# Patient Record
Sex: FEMALE | Race: BLACK OR AFRICAN AMERICAN | Employment: UNEMPLOYED | URBAN - METROPOLITAN AREA
[De-identification: names, ages, dates, MRNs, and addresses within clinical notes are randomized per-mention and may not be internally consistent; named-entity substitution may affect disease eponyms.]

---

## 2023-07-27 ENCOUNTER — HOSPITAL ENCOUNTER (INPATIENT)
Facility: HOSPITAL | Age: 53
LOS: 6 days | Discharge: HOME | DRG: 065 | End: 2023-08-02
Attending: EMERGENCY MEDICINE | Admitting: INTERNAL MEDICINE

## 2023-07-27 ENCOUNTER — APPOINTMENT (OUTPATIENT)
Facility: HOSPITAL | Age: 53
DRG: 065 | End: 2023-07-27

## 2023-07-27 DIAGNOSIS — E11.69 TYPE 2 DIABETES MELLITUS WITH OTHER SPECIFIED COMPLICATION, UNSPECIFIED WHETHER LONG TERM INSULIN USE (HCC): ICD-10-CM

## 2023-07-27 DIAGNOSIS — I63.9 CEREBROVASCULAR ACCIDENT (CVA), UNSPECIFIED MECHANISM (HCC): Primary | ICD-10-CM

## 2023-07-27 DIAGNOSIS — R77.8 ELEVATED TROPONIN: ICD-10-CM

## 2023-07-27 PROBLEM — E11.00 HYPEROSMOLAR HYPERGLYCEMIC STATE (HHS) (HCC): Status: ACTIVE | Noted: 2023-07-27

## 2023-07-27 LAB
ALBUMIN SERPL-MCNC: 3.6 G/DL (ref 3.5–5)
ALBUMIN/GLOB SERPL: 0.6 (ref 1.1–2.2)
ALP SERPL-CCNC: 170 U/L (ref 45–117)
ALT SERPL-CCNC: 27 U/L (ref 12–78)
ANION GAP SERPL CALC-SCNC: 11 MMOL/L (ref 5–15)
AST SERPL W P-5'-P-CCNC: 38 U/L (ref 15–37)
BASE EXCESS BLD CALC-SCNC: 2.4 MMOL/L
BASOPHILS # BLD: 0.1 K/UL (ref 0–0.1)
BASOPHILS NFR BLD: 0 % (ref 0–1)
BILIRUB SERPL-MCNC: 0.6 MG/DL (ref 0.2–1)
BUN SERPL-MCNC: 52 MG/DL (ref 6–20)
BUN/CREAT SERPL: 20 (ref 12–20)
CA-I BLD-MCNC: 1.11 MMOL/L (ref 1.12–1.32)
CA-I BLD-MCNC: 9.5 MG/DL (ref 8.5–10.1)
CHLORIDE BLD-SCNC: 93 MMOL/L (ref 98–107)
CHLORIDE SERPL-SCNC: 90 MMOL/L (ref 97–108)
CO2 BLD-SCNC: 27 MMOL/L
CO2 SERPL-SCNC: 25 MMOL/L (ref 21–32)
CREAT SERPL-MCNC: 2.61 MG/DL (ref 0.55–1.02)
CREAT UR-MCNC: 2.28 MG/DL (ref 0.6–1.3)
DIFFERENTIAL METHOD BLD: ABNORMAL
EOSINOPHIL # BLD: 0 K/UL (ref 0–0.4)
EOSINOPHIL NFR BLD: 0 % (ref 0–7)
ERYTHROCYTE [DISTWIDTH] IN BLOOD BY AUTOMATED COUNT: 11.8 % (ref 11.5–14.5)
GLOBULIN SER CALC-MCNC: 5.9 G/DL (ref 2–4)
GLUCOSE BLD STRIP.AUTO-MCNC: 351 MG/DL (ref 65–100)
GLUCOSE BLD STRIP.AUTO-MCNC: 443 MG/DL (ref 65–100)
GLUCOSE BLD STRIP.AUTO-MCNC: 554 MG/DL (ref 65–100)
GLUCOSE SERPL-MCNC: 556 MG/DL (ref 65–100)
HCO3 BLD-SCNC: 26.9 MMOL/L (ref 19–28)
HCT VFR BLD AUTO: 40.5 % (ref 35–47)
HGB BLD-MCNC: 13.8 G/DL (ref 11.5–16)
IMM GRANULOCYTES # BLD AUTO: 0.1 K/UL (ref 0–0.04)
IMM GRANULOCYTES NFR BLD AUTO: 0 % (ref 0–0.5)
LACTATE BLD-SCNC: 2.2 MMOL/L (ref 0.4–2)
LACTATE SERPL-SCNC: 1.5 MMOL/L (ref 0.4–2)
LYMPHOCYTES # BLD: 1.3 K/UL (ref 0.8–3.5)
LYMPHOCYTES NFR BLD: 12 % (ref 12–49)
MCH RBC QN AUTO: 29.4 PG (ref 26–34)
MCHC RBC AUTO-ENTMCNC: 34.1 G/DL (ref 30–36.5)
MCV RBC AUTO: 86.2 FL (ref 80–99)
MONOCYTES # BLD: 0.8 K/UL (ref 0–1)
MONOCYTES NFR BLD: 7 % (ref 5–13)
NEUTS SEG # BLD: 9.2 K/UL (ref 1.8–8)
NEUTS SEG NFR BLD: 81 % (ref 32–75)
NRBC # BLD: 0 K/UL (ref 0–0.01)
NRBC BLD-RTO: 0 PER 100 WBC
PCO2 BLD: 40.2 MMHG (ref 35–45)
PERFORMED BY:: ABNORMAL
PH BLD: 7.43 (ref 7.35–7.45)
PLATELET # BLD AUTO: 253 K/UL (ref 150–400)
PMV BLD AUTO: 12.6 FL (ref 8.9–12.9)
PO2 BLD: 37 MMHG (ref 75–100)
POTASSIUM BLD-SCNC: 4.7 MMOL/L (ref 3.5–5.5)
POTASSIUM SERPL-SCNC: 4.4 MMOL/L (ref 3.5–5.1)
PROT SERPL-MCNC: 9.5 G/DL (ref 6.4–8.2)
RBC # BLD AUTO: 4.7 M/UL (ref 3.8–5.2)
SAO2 % BLD: 72 %
SERVICE CMNT-IMP: ABNORMAL
SODIUM BLD-SCNC: 128 MMOL/L (ref 136–145)
SODIUM SERPL-SCNC: 126 MMOL/L (ref 136–145)
SPECIMEN SITE: ABNORMAL
TROPONIN I SERPL HS-MCNC: 167 NG/L (ref 0–51)
TROPONIN I SERPL HS-MCNC: 178 NG/L (ref 0–51)
WBC # BLD AUTO: 11.5 K/UL (ref 3.6–11)

## 2023-07-27 PROCEDURE — 6360000002 HC RX W HCPCS: Performed by: EMERGENCY MEDICINE

## 2023-07-27 PROCEDURE — 36415 COLL VENOUS BLD VENIPUNCTURE: CPT

## 2023-07-27 PROCEDURE — 82803 BLOOD GASES ANY COMBINATION: CPT

## 2023-07-27 PROCEDURE — 83735 ASSAY OF MAGNESIUM: CPT

## 2023-07-27 PROCEDURE — 99285 EMERGENCY DEPT VISIT HI MDM: CPT

## 2023-07-27 PROCEDURE — 96365 THER/PROPH/DIAG IV INF INIT: CPT

## 2023-07-27 PROCEDURE — 83605 ASSAY OF LACTIC ACID: CPT

## 2023-07-27 PROCEDURE — 2580000003 HC RX 258: Performed by: INTERNAL MEDICINE

## 2023-07-27 PROCEDURE — 84484 ASSAY OF TROPONIN QUANT: CPT

## 2023-07-27 PROCEDURE — 85025 COMPLETE CBC W/AUTO DIFF WBC: CPT

## 2023-07-27 PROCEDURE — 6370000000 HC RX 637 (ALT 250 FOR IP): Performed by: EMERGENCY MEDICINE

## 2023-07-27 PROCEDURE — 1100000000 HC RM PRIVATE

## 2023-07-27 PROCEDURE — 84132 ASSAY OF SERUM POTASSIUM: CPT

## 2023-07-27 PROCEDURE — 93005 ELECTROCARDIOGRAM TRACING: CPT | Performed by: EMERGENCY MEDICINE

## 2023-07-27 PROCEDURE — 84295 ASSAY OF SERUM SODIUM: CPT

## 2023-07-27 PROCEDURE — 82330 ASSAY OF CALCIUM: CPT

## 2023-07-27 PROCEDURE — 6370000000 HC RX 637 (ALT 250 FOR IP): Performed by: INTERNAL MEDICINE

## 2023-07-27 PROCEDURE — 2580000003 HC RX 258: Performed by: EMERGENCY MEDICINE

## 2023-07-27 PROCEDURE — 96375 TX/PRO/DX INJ NEW DRUG ADDON: CPT

## 2023-07-27 PROCEDURE — 82947 ASSAY GLUCOSE BLOOD QUANT: CPT

## 2023-07-27 PROCEDURE — 82962 GLUCOSE BLOOD TEST: CPT

## 2023-07-27 PROCEDURE — 83036 HEMOGLOBIN GLYCOSYLATED A1C: CPT

## 2023-07-27 PROCEDURE — 6360000002 HC RX W HCPCS: Performed by: INTERNAL MEDICINE

## 2023-07-27 PROCEDURE — 70450 CT HEAD/BRAIN W/O DYE: CPT

## 2023-07-27 PROCEDURE — 84100 ASSAY OF PHOSPHORUS: CPT

## 2023-07-27 PROCEDURE — 80053 COMPREHEN METABOLIC PANEL: CPT

## 2023-07-27 RX ORDER — DEXTROSE MONOHYDRATE, SODIUM CHLORIDE, AND POTASSIUM CHLORIDE 50; 1.49; 4.5 G/1000ML; G/1000ML; G/1000ML
INJECTION, SOLUTION INTRAVENOUS CONTINUOUS PRN
Status: DISCONTINUED | OUTPATIENT
Start: 2023-07-27 | End: 2023-08-01

## 2023-07-27 RX ORDER — POLYETHYLENE GLYCOL 3350 17 G/17G
17 POWDER, FOR SOLUTION ORAL DAILY PRN
Status: DISCONTINUED | OUTPATIENT
Start: 2023-07-27 | End: 2023-07-27

## 2023-07-27 RX ORDER — LABETALOL HYDROCHLORIDE 5 MG/ML
10 INJECTION, SOLUTION INTRAVENOUS EVERY 4 HOURS PRN
Status: DISCONTINUED | OUTPATIENT
Start: 2023-07-27 | End: 2023-07-28

## 2023-07-27 RX ORDER — AMLODIPINE BESYLATE 5 MG/1
10 TABLET ORAL DAILY
Status: DISCONTINUED | OUTPATIENT
Start: 2023-07-28 | End: 2023-07-30

## 2023-07-27 RX ORDER — SODIUM CHLORIDE 9 MG/ML
INJECTION, SOLUTION INTRAVENOUS CONTINUOUS
Status: DISCONTINUED | OUTPATIENT
Start: 2023-07-27 | End: 2023-07-27 | Stop reason: SDUPTHER

## 2023-07-27 RX ORDER — NITROGLYCERIN 0.4 MG/1
0.4 TABLET SUBLINGUAL EVERY 5 MIN PRN
Status: DISCONTINUED | OUTPATIENT
Start: 2023-07-27 | End: 2023-08-03 | Stop reason: HOSPADM

## 2023-07-27 RX ORDER — INSULIN GLARGINE 100 [IU]/ML
0.15 INJECTION, SOLUTION SUBCUTANEOUS NIGHTLY
Status: DISCONTINUED | OUTPATIENT
Start: 2023-07-27 | End: 2023-07-31

## 2023-07-27 RX ORDER — ENOXAPARIN SODIUM 100 MG/ML
30 INJECTION SUBCUTANEOUS DAILY
Status: DISCONTINUED | OUTPATIENT
Start: 2023-07-28 | End: 2023-08-03 | Stop reason: HOSPADM

## 2023-07-27 RX ORDER — SODIUM CHLORIDE 0.9 % (FLUSH) 0.9 %
5-40 SYRINGE (ML) INJECTION PRN
Status: DISCONTINUED | OUTPATIENT
Start: 2023-07-27 | End: 2023-08-03 | Stop reason: HOSPADM

## 2023-07-27 RX ORDER — INSULIN GLARGINE 100 [IU]/ML
0.25 INJECTION, SOLUTION SUBCUTANEOUS NIGHTLY
Status: DISCONTINUED | OUTPATIENT
Start: 2023-07-27 | End: 2023-07-27

## 2023-07-27 RX ORDER — MAGNESIUM SULFATE IN WATER 40 MG/ML
2000 INJECTION, SOLUTION INTRAVENOUS PRN
Status: DISCONTINUED | OUTPATIENT
Start: 2023-07-27 | End: 2023-08-01

## 2023-07-27 RX ORDER — INSULIN LISPRO 100 [IU]/ML
0-4 INJECTION, SOLUTION INTRAVENOUS; SUBCUTANEOUS NIGHTLY
Status: DISCONTINUED | OUTPATIENT
Start: 2023-07-27 | End: 2023-07-27

## 2023-07-27 RX ORDER — LORAZEPAM 2 MG/ML
1 INJECTION INTRAMUSCULAR ONCE
Status: COMPLETED | OUTPATIENT
Start: 2023-07-27 | End: 2023-07-27

## 2023-07-27 RX ORDER — SODIUM CHLORIDE 9 MG/ML
INJECTION, SOLUTION INTRAVENOUS PRN
Status: DISCONTINUED | OUTPATIENT
Start: 2023-07-27 | End: 2023-08-03 | Stop reason: HOSPADM

## 2023-07-27 RX ORDER — INSULIN LISPRO 100 [IU]/ML
0-4 INJECTION, SOLUTION INTRAVENOUS; SUBCUTANEOUS
Status: DISCONTINUED | OUTPATIENT
Start: 2023-07-28 | End: 2023-07-29

## 2023-07-27 RX ORDER — ATORVASTATIN CALCIUM 40 MG/1
40 TABLET, FILM COATED ORAL NIGHTLY
Status: DISCONTINUED | OUTPATIENT
Start: 2023-07-27 | End: 2023-08-03 | Stop reason: HOSPADM

## 2023-07-27 RX ORDER — ACETAMINOPHEN 325 MG/1
650 TABLET ORAL EVERY 6 HOURS PRN
Status: DISCONTINUED | OUTPATIENT
Start: 2023-07-27 | End: 2023-08-03 | Stop reason: HOSPADM

## 2023-07-27 RX ORDER — DEXTROSE MONOHYDRATE 100 MG/ML
INJECTION, SOLUTION INTRAVENOUS CONTINUOUS PRN
Status: DISCONTINUED | OUTPATIENT
Start: 2023-07-27 | End: 2023-07-27 | Stop reason: SDUPTHER

## 2023-07-27 RX ORDER — AMLODIPINE BESYLATE 5 MG/1
10 TABLET ORAL
Status: DISCONTINUED | OUTPATIENT
Start: 2023-07-27 | End: 2023-07-28

## 2023-07-27 RX ORDER — ACETAMINOPHEN 650 MG/1
650 SUPPOSITORY RECTAL EVERY 6 HOURS PRN
Status: DISCONTINUED | OUTPATIENT
Start: 2023-07-27 | End: 2023-08-03 | Stop reason: HOSPADM

## 2023-07-27 RX ORDER — ONDANSETRON 4 MG/1
4 TABLET, ORALLY DISINTEGRATING ORAL EVERY 8 HOURS PRN
Status: DISCONTINUED | OUTPATIENT
Start: 2023-07-27 | End: 2023-08-03 | Stop reason: HOSPADM

## 2023-07-27 RX ORDER — DEXTROSE MONOHYDRATE 100 MG/ML
INJECTION, SOLUTION INTRAVENOUS CONTINUOUS PRN
Status: DISCONTINUED | OUTPATIENT
Start: 2023-07-27 | End: 2023-08-03 | Stop reason: HOSPADM

## 2023-07-27 RX ORDER — SODIUM CHLORIDE 0.9 % (FLUSH) 0.9 %
5-40 SYRINGE (ML) INJECTION EVERY 12 HOURS SCHEDULED
Status: DISCONTINUED | OUTPATIENT
Start: 2023-07-27 | End: 2023-08-03 | Stop reason: HOSPADM

## 2023-07-27 RX ORDER — LABETALOL HYDROCHLORIDE 5 MG/ML
10 INJECTION, SOLUTION INTRAVENOUS ONCE
Status: COMPLETED | OUTPATIENT
Start: 2023-07-27 | End: 2023-07-27

## 2023-07-27 RX ORDER — 0.9 % SODIUM CHLORIDE 0.9 %
1000 INTRAVENOUS SOLUTION INTRAVENOUS ONCE
Status: COMPLETED | OUTPATIENT
Start: 2023-07-27 | End: 2023-07-28

## 2023-07-27 RX ORDER — ONDANSETRON 2 MG/ML
4 INJECTION INTRAMUSCULAR; INTRAVENOUS EVERY 6 HOURS PRN
Status: DISCONTINUED | OUTPATIENT
Start: 2023-07-27 | End: 2023-08-03 | Stop reason: HOSPADM

## 2023-07-27 RX ORDER — POTASSIUM CHLORIDE 7.45 MG/ML
10 INJECTION INTRAVENOUS PRN
Status: DISCONTINUED | OUTPATIENT
Start: 2023-07-27 | End: 2023-08-01

## 2023-07-27 RX ORDER — ASPIRIN 81 MG/1
81 TABLET, CHEWABLE ORAL DAILY
Status: DISCONTINUED | OUTPATIENT
Start: 2023-07-28 | End: 2023-08-03 | Stop reason: HOSPADM

## 2023-07-27 RX ORDER — POLYETHYLENE GLYCOL 3350 17 G/17G
17 POWDER, FOR SOLUTION ORAL DAILY PRN
Status: DISCONTINUED | OUTPATIENT
Start: 2023-07-27 | End: 2023-08-03 | Stop reason: HOSPADM

## 2023-07-27 RX ORDER — INSULIN LISPRO 100 [IU]/ML
0-4 INJECTION, SOLUTION INTRAVENOUS; SUBCUTANEOUS
Status: DISCONTINUED | OUTPATIENT
Start: 2023-07-28 | End: 2023-07-27

## 2023-07-27 RX ORDER — INSULIN LISPRO 100 [IU]/ML
0-4 INJECTION, SOLUTION INTRAVENOUS; SUBCUTANEOUS NIGHTLY
Status: DISCONTINUED | OUTPATIENT
Start: 2023-07-27 | End: 2023-07-29

## 2023-07-27 RX ORDER — SODIUM CHLORIDE 9 MG/ML
INJECTION, SOLUTION INTRAVENOUS CONTINUOUS
Status: DISPENSED | OUTPATIENT
Start: 2023-07-27 | End: 2023-07-28

## 2023-07-27 RX ADMIN — INSULIN HUMAN 6 UNITS: 100 INJECTION, SOLUTION PARENTERAL at 20:03

## 2023-07-27 RX ADMIN — SODIUM CHLORIDE 1000 ML: 9 INJECTION, SOLUTION INTRAVENOUS at 16:57

## 2023-07-27 RX ADMIN — LORAZEPAM 1 MG: 2 INJECTION INTRAMUSCULAR; INTRAVENOUS at 18:22

## 2023-07-27 RX ADMIN — SODIUM CHLORIDE 1 MG/MIN: 9 INJECTION, SOLUTION INTRAVENOUS at 20:03

## 2023-07-27 RX ADMIN — SODIUM CHLORIDE, PRESERVATIVE FREE 10 ML: 5 INJECTION INTRAVENOUS at 23:43

## 2023-07-27 RX ADMIN — INSULIN LISPRO 4 UNITS: 100 INJECTION, SOLUTION INTRAVENOUS; SUBCUTANEOUS at 23:42

## 2023-07-27 RX ADMIN — LABETALOL HYDROCHLORIDE 10 MG: 5 INJECTION INTRAVENOUS at 23:24

## 2023-07-27 RX ADMIN — SODIUM CHLORIDE: 9 INJECTION, SOLUTION INTRAVENOUS at 21:36

## 2023-07-27 RX ADMIN — INSULIN GLARGINE 10 UNITS: 100 INJECTION, SOLUTION SUBCUTANEOUS at 23:37

## 2023-07-27 ASSESSMENT — LIFESTYLE VARIABLES
HOW OFTEN DO YOU HAVE A DRINK CONTAINING ALCOHOL: NEVER
HOW MANY STANDARD DRINKS CONTAINING ALCOHOL DO YOU HAVE ON A TYPICAL DAY: PATIENT DOES NOT DRINK

## 2023-07-27 ASSESSMENT — PAIN - FUNCTIONAL ASSESSMENT: PAIN_FUNCTIONAL_ASSESSMENT: NONE - DENIES PAIN

## 2023-07-27 NOTE — ED PROVIDER NOTES
[facility] EMERGENCY DEPT  EMERGENCY DEPARTMENT HISTORY AND PHYSICAL EXAM      Date: 7/27/2023  Patient Name: Nisreen Oates  MRN: [id number]  [address] 1970  Date of evaluation: 7/27/2023  Provider: Lilian Castellanos MD   Note Started: 4:57 PM EDT 7/27/23    HISTORY OF PRESENT ILLNESS     Chief Complaint   Patient presents with    Hyperglycemia       History Provided By: Patient    HPI: Nisreen Oates is a 47 y.o. female significant past medical history presents with just increasing weakness and fatigue over the past couple of days. She was noted by EMS to have a syncopal episode as well as had elevated blood sugar greater than 500. Patient denies any fever, chills, nausea, vomiting, chest pain or shortness of breath rash diarrhea or headache. She says he has not treated it with anything and is felt to improve. Symptoms are mild at this point but she says she does not feel well. PAST MEDICAL HISTORY   Past Medical History:  History reviewed. No pertinent past medical history. Past Surgical History:  History reviewed. No pertinent surgical history. Family History:  History reviewed. No pertinent family history.     Social History:  Social History     Tobacco Use    Smoking status: Never    Smokeless tobacco: Never   Substance Use Topics    Alcohol use: Not Currently       Allergies:  No Known Allergies    PCP: None None    Current Meds:   Current Facility-Administered Medications   Medication Dose Route Frequency Provider Last Rate Last Admin    metoprolol succinate (TOPROL XL) extended release tablet 25 mg  25 mg Oral Daily Elisabet Lemos MD        labetalol (NORMODYNE;TRANDATE) injection 20 mg  20 mg IntraVENous Q4H PRN Elisabet Lemos MD   20 mg at 07/28/23 0438    levETIRAcetam (KEPPRA) injection 500 mg  500 mg IntraVENous Q12H Elisabet Lemos MD        LORazepam (ATIVAN) injection 1 mg  1 mg IntraVENous Q5 Min PRN Elisabet Lemos MD   1 mg at 07/28/23 0631    glucose chewable tablet 16 g  4 tablet Oral PRN Ajith

## 2023-07-27 NOTE — ED NOTES
Called pharmacy about labetolol drip states they will send over.        India Melgar RN  07/27/23 1954

## 2023-07-27 NOTE — ED TRIAGE NOTES
Patient arrives via EMS for a headache and weakness that began yesterday. Patient endorses waking up feeling ill. Prior to EMS arrival patient has a syncopal episode witnessed by . EMS states that also witnessed a syncopal episode lasting about 30 seconds. Blood glucose 574 with EMS. No known history of diabetes. Patient is oriented, but lethargic.

## 2023-07-27 NOTE — ED NOTES
Was unable to give PO Norvasc due to pt having seizure and postictal after. Pt BP still elevated, MD made aware and new orders placed. Awaiting pharmacy verification.       Summer Parisi RN  07/27/23 7778

## 2023-07-27 NOTE — ED NOTES
Was called into room by pt's family as pt having seizure like activity. Pt turned on side and MD alerted. Seizure lasted approx 30 seconds and pt was incont of urine.  Pt was given 1mg IV ativan per MD.            Karin Arredondo RN  07/27/23 1914

## 2023-07-28 ENCOUNTER — APPOINTMENT (OUTPATIENT)
Facility: HOSPITAL | Age: 53
DRG: 065 | End: 2023-07-28

## 2023-07-28 ENCOUNTER — APPOINTMENT (OUTPATIENT)
Facility: HOSPITAL | Age: 53
DRG: 065 | End: 2023-07-28
Attending: INTERNAL MEDICINE

## 2023-07-28 LAB
AMMONIA PLAS-SCNC: 21 UMOL/L
ANION GAP SERPL CALC-SCNC: 7 MMOL/L (ref 5–15)
APPEARANCE UR: CLEAR
BACTERIA URNS QL MICRO: NEGATIVE /HPF
BASOPHILS # BLD: 0 K/UL (ref 0–0.1)
BASOPHILS NFR BLD: 0 % (ref 0–1)
BILIRUB UR QL: NEGATIVE
BUN SERPL-MCNC: 39 MG/DL (ref 6–20)
BUN/CREAT SERPL: 21 (ref 12–20)
CA-I BLD-MCNC: 9.3 MG/DL (ref 8.5–10.1)
CHLORIDE SERPL-SCNC: 104 MMOL/L (ref 97–108)
CHOLEST SERPL-MCNC: 220 MG/DL
CO2 SERPL-SCNC: 27 MMOL/L (ref 21–32)
COLOR UR: ABNORMAL
CREAT SERPL-MCNC: 1.86 MG/DL (ref 0.55–1.02)
DIFFERENTIAL METHOD BLD: ABNORMAL
ECHO AO ASC DIAM: 2.4 CM
ECHO AO ASCENDING AORTA INDEX: 1.48 CM/M2
ECHO AO ROOT DIAM: 3 CM
ECHO AO ROOT INDEX: 1.85 CM/M2
ECHO AV AREA PEAK VELOCITY: 1.9 CM2
ECHO AV AREA VTI: 1.9 CM2
ECHO AV AREA/BSA PEAK VELOCITY: 1.2 CM2/M2
ECHO AV AREA/BSA VTI: 1.2 CM2/M2
ECHO AV MEAN GRADIENT: 4 MMHG
ECHO AV MEAN VELOCITY: 0.9 M/S
ECHO AV PEAK GRADIENT: 8 MMHG
ECHO AV PEAK VELOCITY: 1.4 M/S
ECHO AV VELOCITY RATIO: 0.64
ECHO AV VTI: 23.1 CM
ECHO BSA: 1.65 M2
ECHO LA AREA 4C: 15.9 CM2
ECHO LA DIAMETER INDEX: 1.36 CM/M2
ECHO LA DIAMETER: 2.2 CM
ECHO LA MAJOR AXIS: 4.6 CM
ECHO LA TO AORTIC ROOT RATIO: 0.73
ECHO LA VOL 4C: 46 ML (ref 22–52)
ECHO LA VOLUME INDEX A4C: 28 ML/M2 (ref 16–34)
ECHO LV E' LATERAL VELOCITY: 4 CM/S
ECHO LV E' SEPTAL VELOCITY: 4 CM/S
ECHO LV EDV A2C: 87 ML
ECHO LV EDV A4C: 66 ML
ECHO LV EDV INDEX A4C: 41 ML/M2
ECHO LV EDV NDEX A2C: 54 ML/M2
ECHO LV EJECTION FRACTION A2C: 66 %
ECHO LV EJECTION FRACTION A4C: 61 %
ECHO LV EJECTION FRACTION BIPLANE: 64 % (ref 55–100)
ECHO LV ESV A2C: 29 ML
ECHO LV ESV A4C: 26 ML
ECHO LV ESV INDEX A2C: 18 ML/M2
ECHO LV ESV INDEX A4C: 16 ML/M2
ECHO LV FRACTIONAL SHORTENING: 28 % (ref 28–44)
ECHO LV INTERNAL DIMENSION DIASTOLE INDEX: 1.98 CM/M2
ECHO LV INTERNAL DIMENSION DIASTOLIC MMODE: 2.9 CM (ref 3.9–5.3)
ECHO LV INTERNAL DIMENSION DIASTOLIC: 3.2 CM (ref 3.9–5.3)
ECHO LV INTERNAL DIMENSION SYSTOLIC INDEX: 1.42 CM/M2
ECHO LV INTERNAL DIMENSION SYSTOLIC MMODE: 2 CM
ECHO LV INTERNAL DIMENSION SYSTOLIC: 2.3 CM
ECHO LV IVSD: 1.7 CM (ref 0.6–0.9)
ECHO LV MASS 2D: 191.4 G (ref 67–162)
ECHO LV MASS INDEX 2D: 118.2 G/M2 (ref 43–95)
ECHO LV POSTERIOR WALL DIASTOLIC: 1.5 CM (ref 0.6–0.9)
ECHO LV RELATIVE WALL THICKNESS RATIO: 0.94
ECHO LVOT AREA: 2.8 CM2
ECHO LVOT AV VTI INDEX: 0.66
ECHO LVOT DIAM: 1.9 CM
ECHO LVOT MEAN GRADIENT: 2 MMHG
ECHO LVOT PEAK GRADIENT: 4 MMHG
ECHO LVOT PEAK VELOCITY: 0.9 M/S
ECHO LVOT STROKE VOLUME INDEX: 26.8 ML/M2
ECHO LVOT SV: 43.4 ML
ECHO LVOT VTI: 15.3 CM
ECHO MV A VELOCITY: 0.89 M/S
ECHO MV E DECELERATION TIME (DT): 214 MS
ECHO MV E VELOCITY: 0.6 M/S
ECHO MV E/A RATIO: 0.67
ECHO MV E/E' LATERAL: 15
ECHO MV E/E' RATIO (AVERAGED): 15
ECHO MV E/E' SEPTAL: 15
ECHO PV MAX VELOCITY: 1.3 M/S
ECHO PV PEAK GRADIENT: 6 MMHG
ECHO PVEIN A DURATION: 114 MS
ECHO PVEIN A VELOCITY: 0.4 M/S
ECHO RA AREA 4C: 8.5 CM2
ECHO RA END SYSTOLIC VOLUME APICAL 4 CHAMBER INDEX BSA: 10 ML/M2
ECHO RA VOLUME: 16 ML
ECHO RV BASAL DIMENSION: 3.4 CM
ECHO RV TAPSE: 1.7 CM (ref 1.7–?)
ECHO TV REGURGITANT MAX VELOCITY: 1.39 M/S
ECHO TV REGURGITANT PEAK GRADIENT: 8 MMHG
EKG ATRIAL RATE: 117 BPM
EKG DIAGNOSIS: NORMAL
EKG P AXIS: 60 DEGREES
EKG P-R INTERVAL: 144 MS
EKG Q-T INTERVAL: 348 MS
EKG QRS DURATION: 76 MS
EKG QTC CALCULATION (BAZETT): 485 MS
EKG R AXIS: -24 DEGREES
EKG T AXIS: 88 DEGREES
EKG VENTRICULAR RATE: 117 BPM
EOSINOPHIL # BLD: 0 K/UL (ref 0–0.4)
EOSINOPHIL NFR BLD: 0 % (ref 0–7)
EPITH CASTS URNS QL MICRO: ABNORMAL /LPF
ERYTHROCYTE [DISTWIDTH] IN BLOOD BY AUTOMATED COUNT: 12.1 % (ref 11.5–14.5)
EST. AVERAGE GLUCOSE BLD GHB EST-MCNC: 243 MG/DL
GLUCOSE BLD STRIP.AUTO-MCNC: 163 MG/DL (ref 65–100)
GLUCOSE BLD STRIP.AUTO-MCNC: 186 MG/DL (ref 65–100)
GLUCOSE BLD STRIP.AUTO-MCNC: 196 MG/DL (ref 65–100)
GLUCOSE BLD STRIP.AUTO-MCNC: 255 MG/DL (ref 65–100)
GLUCOSE BLD STRIP.AUTO-MCNC: 264 MG/DL (ref 65–100)
GLUCOSE SERPL-MCNC: 133 MG/DL (ref 65–100)
GLUCOSE UR STRIP.AUTO-MCNC: >300 MG/DL
HBA1C MFR BLD: 10.1 % (ref 4–5.6)
HCT VFR BLD AUTO: 35.7 % (ref 35–47)
HDLC SERPL-MCNC: 109 MG/DL
HDLC SERPL: 2 (ref 0–5)
HGB BLD-MCNC: 11.8 G/DL (ref 11.5–16)
HGB UR QL STRIP: ABNORMAL
IMM GRANULOCYTES # BLD AUTO: 0 K/UL (ref 0–0.04)
IMM GRANULOCYTES NFR BLD AUTO: 0 % (ref 0–0.5)
KETONES UR QL STRIP.AUTO: NEGATIVE MG/DL
LDLC SERPL CALC-MCNC: 99 MG/DL (ref 0–100)
LEUKOCYTE ESTERASE UR QL STRIP.AUTO: NEGATIVE
LIPID PANEL: ABNORMAL
LYMPHOCYTES # BLD: 2.4 K/UL (ref 0.8–3.5)
LYMPHOCYTES NFR BLD: 19 % (ref 12–49)
MAGNESIUM SERPL-MCNC: 2.6 MG/DL (ref 1.6–2.4)
MAGNESIUM SERPL-MCNC: NORMAL MG/DL (ref 1.6–2.4)
MCH RBC QN AUTO: 28.9 PG (ref 26–34)
MCHC RBC AUTO-ENTMCNC: 33.1 G/DL (ref 30–36.5)
MCV RBC AUTO: 87.5 FL (ref 80–99)
MONOCYTES # BLD: 1.3 K/UL (ref 0–1)
MONOCYTES NFR BLD: 10 % (ref 5–13)
MRSA DNA SPEC QL NAA+PROBE: NOT DETECTED
MUCOUS THREADS URNS QL MICRO: NEGATIVE /LPF
NEUTS SEG # BLD: 8.7 K/UL (ref 1.8–8)
NEUTS SEG NFR BLD: 71 % (ref 32–75)
NITRITE UR QL STRIP.AUTO: NEGATIVE
NRBC # BLD: 0 K/UL (ref 0–0.01)
NRBC BLD-RTO: 0 PER 100 WBC
PERFORMED BY:: ABNORMAL
PH UR STRIP: 6 (ref 5–8)
PHOSPHATE SERPL-MCNC: 3.1 MG/DL (ref 2.6–4.7)
PLATELET # BLD AUTO: 233 K/UL (ref 150–400)
PMV BLD AUTO: 12.3 FL (ref 8.9–12.9)
POTASSIUM SERPL-SCNC: 3.2 MMOL/L (ref 3.5–5.1)
PROT UR STRIP-MCNC: 100 MG/DL
RBC # BLD AUTO: 4.08 M/UL (ref 3.8–5.2)
RBC #/AREA URNS HPF: ABNORMAL /HPF (ref 0–5)
SODIUM SERPL-SCNC: 138 MMOL/L (ref 136–145)
SP GR UR REFRACTOMETRY: 1.02 (ref 1–1.03)
TRIGL SERPL-MCNC: 60 MG/DL
TSH SERPL DL<=0.05 MIU/L-ACNC: 2.32 UIU/ML (ref 0.36–3.74)
URINE CULTURE IF INDICATED: ABNORMAL
UROBILINOGEN UR QL STRIP.AUTO: 0.1 EU/DL (ref 0.1–1)
VLDLC SERPL CALC-MCNC: 12 MG/DL
WBC # BLD AUTO: 12.5 K/UL (ref 3.6–11)
WBC URNS QL MICRO: ABNORMAL /HPF (ref 0–4)

## 2023-07-28 PROCEDURE — C8929 TTE W OR WO FOL WCON,DOPPLER: HCPCS

## 2023-07-28 PROCEDURE — 97530 THERAPEUTIC ACTIVITIES: CPT

## 2023-07-28 PROCEDURE — 6370000000 HC RX 637 (ALT 250 FOR IP): Performed by: INTERNAL MEDICINE

## 2023-07-28 PROCEDURE — A4216 STERILE WATER/SALINE, 10 ML: HCPCS | Performed by: HOSPITALIST

## 2023-07-28 PROCEDURE — 2000000000 HC ICU R&B

## 2023-07-28 PROCEDURE — 80048 BASIC METABOLIC PNL TOTAL CA: CPT

## 2023-07-28 PROCEDURE — 6360000002 HC RX W HCPCS: Performed by: PSYCHIATRY & NEUROLOGY

## 2023-07-28 PROCEDURE — 85025 COMPLETE CBC W/AUTO DIFF WBC: CPT

## 2023-07-28 PROCEDURE — 83036 HEMOGLOBIN GLYCOSYLATED A1C: CPT

## 2023-07-28 PROCEDURE — 2580000003 HC RX 258: Performed by: INTERNAL MEDICINE

## 2023-07-28 PROCEDURE — 81001 URINALYSIS AUTO W/SCOPE: CPT

## 2023-07-28 PROCEDURE — 97162 PT EVAL MOD COMPLEX 30 MIN: CPT

## 2023-07-28 PROCEDURE — 6360000004 HC RX CONTRAST MEDICATION: Performed by: HOSPITALIST

## 2023-07-28 PROCEDURE — 2500000003 HC RX 250 WO HCPCS: Performed by: INTERNAL MEDICINE

## 2023-07-28 PROCEDURE — 87641 MR-STAPH DNA AMP PROBE: CPT

## 2023-07-28 PROCEDURE — 6360000002 HC RX W HCPCS: Performed by: INTERNAL MEDICINE

## 2023-07-28 PROCEDURE — 84443 ASSAY THYROID STIM HORMONE: CPT

## 2023-07-28 PROCEDURE — 82140 ASSAY OF AMMONIA: CPT

## 2023-07-28 PROCEDURE — 95819 EEG AWAKE AND ASLEEP: CPT

## 2023-07-28 PROCEDURE — 97161 PT EVAL LOW COMPLEX 20 MIN: CPT

## 2023-07-28 PROCEDURE — 82962 GLUCOSE BLOOD TEST: CPT

## 2023-07-28 PROCEDURE — 2580000003 HC RX 258: Performed by: HOSPITALIST

## 2023-07-28 PROCEDURE — 71045 X-RAY EXAM CHEST 1 VIEW: CPT

## 2023-07-28 PROCEDURE — 97165 OT EVAL LOW COMPLEX 30 MIN: CPT

## 2023-07-28 PROCEDURE — 70551 MRI BRAIN STEM W/O DYE: CPT

## 2023-07-28 PROCEDURE — 80061 LIPID PANEL: CPT

## 2023-07-28 PROCEDURE — 83735 ASSAY OF MAGNESIUM: CPT

## 2023-07-28 PROCEDURE — 95822 EEG COMA OR SLEEP ONLY: CPT

## 2023-07-28 RX ORDER — LEVETIRACETAM 500 MG/5ML
750 INJECTION, SOLUTION, CONCENTRATE INTRAVENOUS EVERY 12 HOURS
Status: DISCONTINUED | OUTPATIENT
Start: 2023-07-28 | End: 2023-07-28

## 2023-07-28 RX ORDER — LEVETIRACETAM 500 MG/5ML
1000 INJECTION, SOLUTION, CONCENTRATE INTRAVENOUS ONCE
Status: COMPLETED | OUTPATIENT
Start: 2023-07-28 | End: 2023-07-28

## 2023-07-28 RX ORDER — ESMOLOL HYDROCHLORIDE 10 MG/ML
25-300 INJECTION, SOLUTION INTRAVENOUS CONTINUOUS
Status: DISCONTINUED | OUTPATIENT
Start: 2023-07-28 | End: 2023-07-30

## 2023-07-28 RX ORDER — LABETALOL HYDROCHLORIDE 5 MG/ML
20 INJECTION, SOLUTION INTRAVENOUS EVERY 4 HOURS PRN
Status: DISCONTINUED | OUTPATIENT
Start: 2023-07-28 | End: 2023-07-28

## 2023-07-28 RX ORDER — METOPROLOL SUCCINATE 25 MG/1
25 TABLET, EXTENDED RELEASE ORAL DAILY
Status: DISCONTINUED | OUTPATIENT
Start: 2023-07-28 | End: 2023-07-30

## 2023-07-28 RX ORDER — LEVETIRACETAM 500 MG/5ML
1000 INJECTION, SOLUTION, CONCENTRATE INTRAVENOUS 2 TIMES DAILY
Status: DISCONTINUED | OUTPATIENT
Start: 2023-07-28 | End: 2023-07-30

## 2023-07-28 RX ORDER — LORAZEPAM 2 MG/ML
1 INJECTION INTRAMUSCULAR EVERY 5 MIN PRN
Status: DISCONTINUED | OUTPATIENT
Start: 2023-07-28 | End: 2023-08-03 | Stop reason: HOSPADM

## 2023-07-28 RX ORDER — LABETALOL HYDROCHLORIDE 5 MG/ML
20 INJECTION, SOLUTION INTRAVENOUS EVERY 4 HOURS PRN
Status: COMPLETED | OUTPATIENT
Start: 2023-07-28 | End: 2023-07-28

## 2023-07-28 RX ORDER — LEVETIRACETAM 500 MG/5ML
500 INJECTION, SOLUTION, CONCENTRATE INTRAVENOUS EVERY 12 HOURS
Status: DISCONTINUED | OUTPATIENT
Start: 2023-07-28 | End: 2023-07-28

## 2023-07-28 RX ORDER — LEVETIRACETAM 500 MG/5ML
1500 INJECTION, SOLUTION, CONCENTRATE INTRAVENOUS ONCE
Status: DISCONTINUED | OUTPATIENT
Start: 2023-07-28 | End: 2023-07-28

## 2023-07-28 RX ORDER — LABETALOL HYDROCHLORIDE 5 MG/ML
10 INJECTION, SOLUTION INTRAVENOUS EVERY 4 HOURS PRN
Status: DISCONTINUED | OUTPATIENT
Start: 2023-07-28 | End: 2023-07-28

## 2023-07-28 RX ADMIN — INSULIN GLARGINE 10 UNITS: 100 INJECTION, SOLUTION SUBCUTANEOUS at 19:48

## 2023-07-28 RX ADMIN — LEVETIRACETAM 1000 MG: 100 INJECTION INTRAVENOUS at 19:48

## 2023-07-28 RX ADMIN — LEVETIRACETAM 1000 MG: 100 INJECTION, SOLUTION INTRAVENOUS at 06:47

## 2023-07-28 RX ADMIN — PERFLUTREN 3 ML: 6.52 INJECTION, SUSPENSION INTRAVENOUS at 09:43

## 2023-07-28 RX ADMIN — LORAZEPAM 1 MG: 2 INJECTION INTRAMUSCULAR; INTRAVENOUS at 06:31

## 2023-07-28 RX ADMIN — LABETALOL HYDROCHLORIDE 20 MG: 5 INJECTION, SOLUTION INTRAVENOUS at 11:17

## 2023-07-28 RX ADMIN — SODIUM CHLORIDE, PRESERVATIVE FREE 10 ML: 5 INJECTION INTRAVENOUS at 19:45

## 2023-07-28 RX ADMIN — SODIUM CHLORIDE, PRESERVATIVE FREE 10 ML: 5 INJECTION INTRAVENOUS at 09:06

## 2023-07-28 RX ADMIN — LABETALOL HYDROCHLORIDE 20 MG: 5 INJECTION, SOLUTION INTRAVENOUS at 16:27

## 2023-07-28 RX ADMIN — ASPIRIN 81 MG 81 MG: 81 TABLET ORAL at 09:08

## 2023-07-28 RX ADMIN — NICARDIPINE HYDROCHLORIDE 5 MG/HR: 2.5 INJECTION, SOLUTION INTRAVENOUS at 06:10

## 2023-07-28 RX ADMIN — LEVETIRACETAM 1000 MG: 100 INJECTION INTRAVENOUS at 16:17

## 2023-07-28 RX ADMIN — ATORVASTATIN CALCIUM 40 MG: 40 TABLET, FILM COATED ORAL at 19:48

## 2023-07-28 RX ADMIN — ENOXAPARIN SODIUM 30 MG: 100 INJECTION SUBCUTANEOUS at 09:06

## 2023-07-28 RX ADMIN — INSULIN LISPRO 2 UNITS: 100 INJECTION, SOLUTION INTRAVENOUS; SUBCUTANEOUS at 11:25

## 2023-07-28 RX ADMIN — LABETALOL HYDROCHLORIDE 20 MG: 5 INJECTION, SOLUTION INTRAVENOUS at 04:38

## 2023-07-28 ASSESSMENT — PAIN SCALES - GENERAL
PAINLEVEL_OUTOF10: 0

## 2023-07-28 NOTE — CONSULTS
NEURO CONSULT      REASON FOR ADMISSION:  Seizures recurrent seizures      HISTORY:  Ms. Tara Crowe is 47years old with a possible history of diabetes, hypertension and other medical problems that are unknown because she does not follow a doctor, who was admitted to the ED for mental status changes and started seizing. Patient's seizures were stabilized as well as other medical conditions and patient was admitted to the ICU.   She has since had at least 2 other seizures and reportedly is currently postictal.    ROS: As per above plus more that is unknown    General:                     No fever, no chills, no sweats, no generalized weakness, no weight loss/gain,                                       No loss of appetite   Eyes:                           No blurred vision, no eye pain, no loss of vision, no double vision  ENT:                            rhinorrhea, no pharyngitis   Respiratory:               No cough, no sputum production, no SOB, no QUINONES, no wheezing, no pleuritic pain   Cardiology:                No chest pain, no palpitations, no orthopnea, no PND, no edema, no syncope   Gastrointestinal:       No abdominal pain , no N/V, no diarrhea, no dysphagia, no constipation, no bleeding   Genitourinary:           frequency, no urgency, no dysuria, no hematuria, no incontinence   Muskuloskeletal :      No arthralgia, no myalgia, no back pain  Hematology:              No easy bruising, no nose or gum bleeding, no lymphadenopathy   Dermatological:         No rash, no ulceration, no pruritis, no color change / jaundice  Endocrine:                 hot flashes or polydipsia   Neurological:             No headache, no dizziness, no confusion, no focal weakness, no paresthesia,                                      No Speech difficulties, no memory loss, no gait difficulty  Psychological:          No neelings of anxiety, no depression, no agitation      NEURO EXAM:    Mental status: Patient is drowsy but opens eyes to
mEq, IntraVENous, PRN, Roula Barron MD    magnesium sulfate 2000 mg in 50 mL IVPB premix, 2,000 mg, IntraVENous, PRN, Roula Barron MD    sodium phosphate 10 mmol in sodium chloride 0.9 % 250 mL IVPB, 10 mmol, IntraVENous, PRN **OR** sodium phosphate 15 mmol in sodium chloride 0.9 % 250 mL IVPB, 15 mmol, IntraVENous, PRN **OR** sodium phosphate 20 mmol in sodium chloride 0.9 % 250 mL IVPB, 20 mmol, IntraVENous, PRN, Roula Barron MD    enoxaparin Sodium (LOVENOX) injection 30 mg, 30 mg, SubCUTAneous, Daily, Roula Barron MD, 30 mg at 07/28/23 0906    dextrose 5 % and 0.45 % NaCl with KCl 20 mEq infusion, , IntraVENous, Continuous PRN, Roula Barron MD    sodium chloride flush 0.9 % injection 5-40 mL, 5-40 mL, IntraVENous, 2 times per day, Roula Barron MD, 10 mL at 07/28/23 0906    sodium chloride flush 0.9 % injection 5-40 mL, 5-40 mL, IntraVENous, PRN, Roula Barron MD    0.9 % sodium chloride infusion, , IntraVENous, PRN, Roula Barron MD    ondansetron (ZOFRAN-ODT) disintegrating tablet 4 mg, 4 mg, Oral, Q8H PRN **OR** ondansetron (ZOFRAN) injection 4 mg, 4 mg, IntraVENous, Q6H PRN, Roula Barron MD    polyethylene glycol (GLYCOLAX) packet 17 g, 17 g, Oral, Daily PRN, Roula Barron MD    acetaminophen (TYLENOL) tablet 650 mg, 650 mg, Oral, Q6H PRN **OR** acetaminophen (TYLENOL) suppository 650 mg, 650 mg, Rectal, Q6H PRN, Roula Barron MD    insulin glargine (LANTUS) injection vial 10 Units, 0.15 Units/kg, SubCUTAneous, Nightly, Roula Barron MD, 10 Units at 07/27/23 2337    insulin lispro (HUMALOG) injection vial 0-4 Units, 0-4 Units, SubCUTAneous, TID WC, Roula Barron MD, 2 Units at 07/28/23 1125    insulin lispro (HUMALOG) injection vial 0-4 Units, 0-4 Units, SubCUTAneous, Nightly, Roula Barron MD, 4 Units at 07/27/23 0452    aspirin chewable tablet 81 mg, 81 mg, Oral, Daily, Roula Barron MD    atorvastatin (LIPITOR) tablet 40 mg, 40 mg, Oral, Nightly, Roula Barron MD    nitroGLYCERIN (NITROSTAT) SL

## 2023-07-28 NOTE — H&P
History and Physical    Patient: Edgardo Britton MRN: [id number]  SSN: [id number]    YOB: 1970  Age: 47 y.o. Sex: female      Subjective:      Edgardo Britton is a 47 y.o. female with no known PMH and does not follow a PCP presented to the ED with chief complaint of altered mental status. She was having headache yesterday evening and progressed to have fatigued and weakness. Her mentation continued to worsened and had an episode of seizure. She was brought to the ED and had another episode of seizure with post-ictal symptoms. Associated with vomiting and urinary frequency. In the ED, noted hypertensive, and tachycardic.  no anion gap. ADONIS noted. Troponin elevated, later down-trended. CT showed non-specific white matter changes possibly due to small vessels ischemic changes. Case discussed with ED physician and agree that patient require inpatient admission/ observation for further management. Addendum: Later in the floor, she is slightly more awake and able to follow commands. However, noted that she was not moving her left arm and tongue deviated to left side. Plan to call tele-neurologist for recommendation, kane regarding CTA in the setting of ADONIS. Chart review: none    History reviewed. No pertinent past medical history. History reviewed. No pertinent family history. Social History     Tobacco Use    Smoking status: Never    Smokeless tobacco: Never   Substance Use Topics    Alcohol use: Not Currently        Objective:     Physical Exam:   General: somnolent, occasional cooperative, no distress  Eye: Omitted as patient is unable to cooperate. Throat and Neck: Dry mucosa. Omitted as patient is unable to cooperate. Lung: clear to auscultation bilaterally  Heart: regular rate and rhythm,   Abdomen: soft, non-tender. Bowel sounds normal. No masses,  Extremities: No LE edema.  able to move all extremities normal, atraumatic  Skin: Normal.  Neurologic: Omitted as patient is

## 2023-07-28 NOTE — CARE COORDINATION
07/28/23 1022   Service Assessment   Patient Orientation Other (see comment)  (Asleep)   Cognition Other (see comment)  (Asleep)   History Provided By Child/Family   Primary Caregiver Self   Support Systems Spouse/Significant Other;Children   Patient's Healthcare Decision Maker is: Legal Next of Kin   PCP Verified by CM No   Prior Functional Level Independent in ADLs/IADLs   Current Functional Level Independent in ADLs/IADLs   Can patient return to prior living arrangement Yes   Ability to make needs known: Other (see comment)  (Asleep)   Family able to assist with home care needs: Yes   Would you like for me to discuss the discharge plan with any other family members/significant others, and if so, who? Yes   Financial Resources None  (Uninsured)   Social/Functional History   Lives With Spouse   Type of [address] Help From Family   ADL Assistance Independent   Ambulation Assistance Independent   Transfer Assistance Independent   Active  Yes   Discharge Planning   Type of Residence House   Patient expects to be discharged to: House       Patient asleep. Daughter Adrianna Peterson) present at the bedside. Patient is uninsured and will need to be screened by public benefits dept. Resides in a single level home w/spouse. No established PCP. Daughter uncertain of the pharmacy her mother uses. Independent w/all ADL's & IADL's and required minimal assistance. Public benefits dept aware to see the patient.        SAKINA Platt

## 2023-07-28 NOTE — ED NOTES
Pt BP now in the 090O systolic with HR in 83E. Informed Dr. Nguyen Yang and verbal order given to stop labetolol drip at this time.      Paola Hudson RN  07/27/23 0553

## 2023-07-29 ENCOUNTER — APPOINTMENT (OUTPATIENT)
Facility: HOSPITAL | Age: 53
DRG: 065 | End: 2023-07-29
Attending: HOSPITALIST

## 2023-07-29 LAB
ALBUMIN SERPL-MCNC: 2.9 G/DL (ref 3.5–5)
ANION GAP SERPL CALC-SCNC: 6 MMOL/L (ref 5–15)
BASOPHILS # BLD: 0 K/UL (ref 0–0.1)
BASOPHILS NFR BLD: 0 % (ref 0–1)
BUN SERPL-MCNC: 39 MG/DL (ref 6–20)
BUN/CREAT SERPL: 27 (ref 12–20)
CA-I BLD-MCNC: 9.2 MG/DL (ref 8.5–10.1)
CHLORIDE SERPL-SCNC: 106 MMOL/L (ref 97–108)
CO2 SERPL-SCNC: 26 MMOL/L (ref 21–32)
CREAT SERPL-MCNC: 1.47 MG/DL (ref 0.55–1.02)
DIFFERENTIAL METHOD BLD: ABNORMAL
EOSINOPHIL # BLD: 0 K/UL (ref 0–0.4)
EOSINOPHIL NFR BLD: 1 % (ref 0–7)
ERYTHROCYTE [DISTWIDTH] IN BLOOD BY AUTOMATED COUNT: 12.4 % (ref 11.5–14.5)
GLUCOSE BLD STRIP.AUTO-MCNC: 144 MG/DL (ref 65–100)
GLUCOSE BLD STRIP.AUTO-MCNC: 252 MG/DL (ref 65–100)
GLUCOSE BLD STRIP.AUTO-MCNC: 261 MG/DL (ref 65–100)
GLUCOSE BLD STRIP.AUTO-MCNC: 442 MG/DL (ref 65–100)
GLUCOSE SERPL-MCNC: 159 MG/DL (ref 65–100)
HCT VFR BLD AUTO: 34.3 % (ref 35–47)
HGB BLD-MCNC: 11.1 G/DL (ref 11.5–16)
IMM GRANULOCYTES # BLD AUTO: 0 K/UL (ref 0–0.04)
IMM GRANULOCYTES NFR BLD AUTO: 0 % (ref 0–0.5)
LYMPHOCYTES # BLD: 2.4 K/UL (ref 0.8–3.5)
LYMPHOCYTES NFR BLD: 27 % (ref 12–49)
MAGNESIUM SERPL-MCNC: 2.3 MG/DL (ref 1.6–2.4)
MCH RBC QN AUTO: 29.1 PG (ref 26–34)
MCHC RBC AUTO-ENTMCNC: 32.4 G/DL (ref 30–36.5)
MCV RBC AUTO: 90 FL (ref 80–99)
MONOCYTES # BLD: 0.8 K/UL (ref 0–1)
MONOCYTES NFR BLD: 9 % (ref 5–13)
NEUTS SEG # BLD: 5.6 K/UL (ref 1.8–8)
NEUTS SEG NFR BLD: 63 % (ref 32–75)
NRBC # BLD: 0 K/UL (ref 0–0.01)
NRBC BLD-RTO: 0 PER 100 WBC
PERFORMED BY:: ABNORMAL
PHOSPHATE SERPL-MCNC: 3.8 MG/DL (ref 2.6–4.7)
PLATELET # BLD AUTO: 275 K/UL (ref 150–400)
PMV BLD AUTO: 11.9 FL (ref 8.9–12.9)
POTASSIUM SERPL-SCNC: 3.5 MMOL/L (ref 3.5–5.1)
PROLACTIN SERPL-MCNC: 11.8 NG/ML
RBC # BLD AUTO: 3.81 M/UL (ref 3.8–5.2)
SODIUM SERPL-SCNC: 138 MMOL/L (ref 136–145)
WBC # BLD AUTO: 8.9 K/UL (ref 3.6–11)

## 2023-07-29 PROCEDURE — 51798 US URINE CAPACITY MEASURE: CPT

## 2023-07-29 PROCEDURE — 93880 EXTRACRANIAL BILAT STUDY: CPT | Performed by: SURGERY

## 2023-07-29 PROCEDURE — 6360000002 HC RX W HCPCS: Performed by: INTERNAL MEDICINE

## 2023-07-29 PROCEDURE — 6370000000 HC RX 637 (ALT 250 FOR IP): Performed by: HOSPITALIST

## 2023-07-29 PROCEDURE — 92526 ORAL FUNCTION THERAPY: CPT

## 2023-07-29 PROCEDURE — 2580000003 HC RX 258: Performed by: INTERNAL MEDICINE

## 2023-07-29 PROCEDURE — 92610 EVALUATE SWALLOWING FUNCTION: CPT

## 2023-07-29 PROCEDURE — 6360000002 HC RX W HCPCS: Performed by: HOSPITALIST

## 2023-07-29 PROCEDURE — 85025 COMPLETE CBC W/AUTO DIFF WBC: CPT

## 2023-07-29 PROCEDURE — 51701 INSERT BLADDER CATHETER: CPT

## 2023-07-29 PROCEDURE — 6370000000 HC RX 637 (ALT 250 FOR IP): Performed by: INTERNAL MEDICINE

## 2023-07-29 PROCEDURE — 83735 ASSAY OF MAGNESIUM: CPT

## 2023-07-29 PROCEDURE — 97530 THERAPEUTIC ACTIVITIES: CPT

## 2023-07-29 PROCEDURE — 84146 ASSAY OF PROLACTIN: CPT

## 2023-07-29 PROCEDURE — 2700000000 HC OXYGEN THERAPY PER DAY

## 2023-07-29 PROCEDURE — 6360000002 HC RX W HCPCS: Performed by: PSYCHIATRY & NEUROLOGY

## 2023-07-29 PROCEDURE — 36415 COLL VENOUS BLD VENIPUNCTURE: CPT

## 2023-07-29 PROCEDURE — 2000000000 HC ICU R&B

## 2023-07-29 PROCEDURE — 82962 GLUCOSE BLOOD TEST: CPT

## 2023-07-29 PROCEDURE — 93880 EXTRACRANIAL BILAT STUDY: CPT

## 2023-07-29 PROCEDURE — 80069 RENAL FUNCTION PANEL: CPT

## 2023-07-29 PROCEDURE — 80177 DRUG SCRN QUAN LEVETIRACETAM: CPT

## 2023-07-29 RX ORDER — INSULIN LISPRO 100 [IU]/ML
0-8 INJECTION, SOLUTION INTRAVENOUS; SUBCUTANEOUS
Status: DISCONTINUED | OUTPATIENT
Start: 2023-07-29 | End: 2023-08-03 | Stop reason: HOSPADM

## 2023-07-29 RX ORDER — INSULIN LISPRO 100 [IU]/ML
0-4 INJECTION, SOLUTION INTRAVENOUS; SUBCUTANEOUS NIGHTLY
Status: DISCONTINUED | OUTPATIENT
Start: 2023-07-29 | End: 2023-08-03 | Stop reason: HOSPADM

## 2023-07-29 RX ORDER — INSULIN LISPRO 100 [IU]/ML
12 INJECTION, SOLUTION INTRAVENOUS; SUBCUTANEOUS ONCE
Status: COMPLETED | OUTPATIENT
Start: 2023-07-29 | End: 2023-07-29

## 2023-07-29 RX ORDER — CARVEDILOL 3.12 MG/1
6.25 TABLET ORAL 2 TIMES DAILY WITH MEALS
Status: DISCONTINUED | OUTPATIENT
Start: 2023-07-29 | End: 2023-07-30

## 2023-07-29 RX ORDER — LABETALOL HYDROCHLORIDE 5 MG/ML
5 INJECTION, SOLUTION INTRAVENOUS EVERY 4 HOURS PRN
Status: DISCONTINUED | OUTPATIENT
Start: 2023-07-29 | End: 2023-07-30

## 2023-07-29 RX ORDER — AMLODIPINE BESYLATE 5 MG/1
5 TABLET ORAL ONCE
Status: COMPLETED | OUTPATIENT
Start: 2023-07-29 | End: 2023-07-29

## 2023-07-29 RX ADMIN — ATORVASTATIN CALCIUM 40 MG: 40 TABLET, FILM COATED ORAL at 20:46

## 2023-07-29 RX ADMIN — LEVETIRACETAM 1000 MG: 100 INJECTION INTRAVENOUS at 20:46

## 2023-07-29 RX ADMIN — SODIUM CHLORIDE, PRESERVATIVE FREE 10 ML: 5 INJECTION INTRAVENOUS at 08:28

## 2023-07-29 RX ADMIN — ASPIRIN 81 MG 81 MG: 81 TABLET ORAL at 08:28

## 2023-07-29 RX ADMIN — LEVETIRACETAM 1000 MG: 100 INJECTION INTRAVENOUS at 08:28

## 2023-07-29 RX ADMIN — INSULIN LISPRO 12 UNITS: 100 INJECTION, SOLUTION INTRAVENOUS; SUBCUTANEOUS at 12:15

## 2023-07-29 RX ADMIN — CARVEDILOL 6.25 MG: 3.12 TABLET, FILM COATED ORAL at 18:28

## 2023-07-29 RX ADMIN — ENOXAPARIN SODIUM 30 MG: 100 INJECTION SUBCUTANEOUS at 08:28

## 2023-07-29 RX ADMIN — INSULIN GLARGINE 10 UNITS: 100 INJECTION, SOLUTION SUBCUTANEOUS at 20:46

## 2023-07-29 RX ADMIN — LABETALOL HYDROCHLORIDE 5 MG: 5 INJECTION, SOLUTION INTRAVENOUS at 19:44

## 2023-07-29 RX ADMIN — INSULIN LISPRO 4 UNITS: 100 INJECTION, SOLUTION INTRAVENOUS; SUBCUTANEOUS at 16:50

## 2023-07-29 RX ADMIN — AMLODIPINE BESYLATE 5 MG: 5 TABLET ORAL at 02:49

## 2023-07-29 RX ADMIN — SODIUM CHLORIDE, PRESERVATIVE FREE 10 ML: 5 INJECTION INTRAVENOUS at 20:47

## 2023-07-29 ASSESSMENT — PAIN SCALES - GENERAL
PAINLEVEL_OUTOF10: 0

## 2023-07-30 LAB
ALBUMIN SERPL-MCNC: 2.7 G/DL (ref 3.5–5)
ANION GAP SERPL CALC-SCNC: 9 MMOL/L (ref 5–15)
BUN SERPL-MCNC: 33 MG/DL (ref 6–20)
BUN/CREAT SERPL: 26 (ref 12–20)
CA-I BLD-MCNC: 8.9 MG/DL (ref 8.5–10.1)
CHLORIDE SERPL-SCNC: 103 MMOL/L (ref 97–108)
CO2 SERPL-SCNC: 24 MMOL/L (ref 21–32)
CREAT SERPL-MCNC: 1.28 MG/DL (ref 0.55–1.02)
GLUCOSE BLD STRIP.AUTO-MCNC: 176 MG/DL (ref 65–100)
GLUCOSE BLD STRIP.AUTO-MCNC: 363 MG/DL (ref 65–100)
GLUCOSE BLD STRIP.AUTO-MCNC: 396 MG/DL (ref 65–100)
GLUCOSE BLD STRIP.AUTO-MCNC: 495 MG/DL (ref 65–100)
GLUCOSE SERPL-MCNC: 193 MG/DL (ref 65–100)
PERFORMED BY:: ABNORMAL
PHOSPHATE SERPL-MCNC: 2.4 MG/DL (ref 2.6–4.7)
POTASSIUM SERPL-SCNC: 3.4 MMOL/L (ref 3.5–5.1)
SODIUM SERPL-SCNC: 136 MMOL/L (ref 136–145)

## 2023-07-30 PROCEDURE — 2580000003 HC RX 258: Performed by: INTERNAL MEDICINE

## 2023-07-30 PROCEDURE — 6370000000 HC RX 637 (ALT 250 FOR IP): Performed by: INTERNAL MEDICINE

## 2023-07-30 PROCEDURE — 36415 COLL VENOUS BLD VENIPUNCTURE: CPT

## 2023-07-30 PROCEDURE — 6370000000 HC RX 637 (ALT 250 FOR IP): Performed by: HOSPITALIST

## 2023-07-30 PROCEDURE — 6360000002 HC RX W HCPCS: Performed by: INTERNAL MEDICINE

## 2023-07-30 PROCEDURE — 1100000000 HC RM PRIVATE

## 2023-07-30 PROCEDURE — 80069 RENAL FUNCTION PANEL: CPT

## 2023-07-30 PROCEDURE — 2500000003 HC RX 250 WO HCPCS: Performed by: INTERNAL MEDICINE

## 2023-07-30 PROCEDURE — 6360000002 HC RX W HCPCS: Performed by: PSYCHIATRY & NEUROLOGY

## 2023-07-30 PROCEDURE — 82962 GLUCOSE BLOOD TEST: CPT

## 2023-07-30 PROCEDURE — 6360000002 HC RX W HCPCS: Performed by: HOSPITALIST

## 2023-07-30 RX ORDER — CARVEDILOL 12.5 MG/1
12.5 TABLET ORAL 2 TIMES DAILY WITH MEALS
Status: DISCONTINUED | OUTPATIENT
Start: 2023-07-30 | End: 2023-07-31

## 2023-07-30 RX ORDER — LEVETIRACETAM 500 MG/1
1000 TABLET ORAL 2 TIMES DAILY
Status: DISCONTINUED | OUTPATIENT
Start: 2023-07-30 | End: 2023-08-02

## 2023-07-30 RX ORDER — INSULIN LISPRO 100 [IU]/ML
6 INJECTION, SOLUTION INTRAVENOUS; SUBCUTANEOUS ONCE
Status: COMPLETED | OUTPATIENT
Start: 2023-07-30 | End: 2023-07-30

## 2023-07-30 RX ORDER — MAGNESIUM SULFATE 1 G/100ML
1000 INJECTION INTRAVENOUS ONCE
Status: COMPLETED | OUTPATIENT
Start: 2023-07-30 | End: 2023-07-30

## 2023-07-30 RX ORDER — AMLODIPINE BESYLATE 5 MG/1
10 TABLET ORAL DAILY
Status: DISCONTINUED | OUTPATIENT
Start: 2023-07-30 | End: 2023-08-03 | Stop reason: HOSPADM

## 2023-07-30 RX ORDER — LABETALOL HYDROCHLORIDE 5 MG/ML
10 INJECTION, SOLUTION INTRAVENOUS EVERY 4 HOURS PRN
Status: DISCONTINUED | OUTPATIENT
Start: 2023-07-30 | End: 2023-08-03 | Stop reason: HOSPADM

## 2023-07-30 RX ADMIN — INSULIN LISPRO 10 UNITS: 100 INJECTION, SOLUTION INTRAVENOUS; SUBCUTANEOUS at 11:48

## 2023-07-30 RX ADMIN — SODIUM CHLORIDE, PRESERVATIVE FREE 10 ML: 5 INJECTION INTRAVENOUS at 21:14

## 2023-07-30 RX ADMIN — CARVEDILOL 12.5 MG: 12.5 TABLET, FILM COATED ORAL at 18:53

## 2023-07-30 RX ADMIN — LEVETIRACETAM 1000 MG: 500 TABLET, FILM COATED ORAL at 21:13

## 2023-07-30 RX ADMIN — INSULIN LISPRO 6 UNITS: 100 INJECTION, SOLUTION INTRAVENOUS; SUBCUTANEOUS at 21:13

## 2023-07-30 RX ADMIN — SODIUM PHOSPHATE, MONOBASIC, MONOHYDRATE AND SODIUM PHOSPHATE, DIBASIC, ANHYDROUS 10 MMOL: 276; 142 INJECTION, SOLUTION INTRAVENOUS at 08:52

## 2023-07-30 RX ADMIN — POTASSIUM CHLORIDE 10 MEQ: 7.46 INJECTION, SOLUTION INTRAVENOUS at 08:06

## 2023-07-30 RX ADMIN — ATORVASTATIN CALCIUM 40 MG: 40 TABLET, FILM COATED ORAL at 21:13

## 2023-07-30 RX ADMIN — MAGNESIUM SULFATE HEPTAHYDRATE 1000 MG: 1 INJECTION, SOLUTION INTRAVENOUS at 09:40

## 2023-07-30 RX ADMIN — AMLODIPINE BESYLATE 10 MG: 5 TABLET ORAL at 11:49

## 2023-07-30 RX ADMIN — INSULIN GLARGINE 10 UNITS: 100 INJECTION, SOLUTION SUBCUTANEOUS at 21:13

## 2023-07-30 RX ADMIN — POTASSIUM CHLORIDE 10 MEQ: 7.46 INJECTION, SOLUTION INTRAVENOUS at 10:33

## 2023-07-30 RX ADMIN — ASPIRIN 81 MG 81 MG: 81 TABLET ORAL at 08:07

## 2023-07-30 RX ADMIN — CARVEDILOL 6.25 MG: 3.12 TABLET, FILM COATED ORAL at 08:07

## 2023-07-30 RX ADMIN — ENOXAPARIN SODIUM 30 MG: 100 INJECTION SUBCUTANEOUS at 09:30

## 2023-07-30 RX ADMIN — POTASSIUM CHLORIDE 10 MEQ: 7.46 INJECTION, SOLUTION INTRAVENOUS at 09:07

## 2023-07-30 RX ADMIN — LEVETIRACETAM 1000 MG: 100 INJECTION INTRAVENOUS at 09:30

## 2023-07-30 ASSESSMENT — PAIN SCALES - GENERAL
PAINLEVEL_OUTOF10: 0
PAINLEVEL_OUTOF10: 0

## 2023-07-31 LAB
GLUCOSE BLD STRIP.AUTO-MCNC: 236 MG/DL (ref 65–100)
GLUCOSE BLD STRIP.AUTO-MCNC: 334 MG/DL (ref 65–100)
GLUCOSE BLD STRIP.AUTO-MCNC: 420 MG/DL (ref 65–100)
GLUCOSE BLD STRIP.AUTO-MCNC: 70 MG/DL (ref 65–100)
GLUCOSE BLD STRIP.AUTO-MCNC: 71 MG/DL (ref 65–100)
LEVETIRACETAM SERPL-MCNC: 73.7 UG/ML (ref 10–40)
PERFORMED BY:: ABNORMAL
PERFORMED BY:: NORMAL
PERFORMED BY:: NORMAL

## 2023-07-31 PROCEDURE — 6360000002 HC RX W HCPCS: Performed by: HOSPITALIST

## 2023-07-31 PROCEDURE — 1100000000 HC RM PRIVATE

## 2023-07-31 PROCEDURE — 6370000000 HC RX 637 (ALT 250 FOR IP): Performed by: HOSPITALIST

## 2023-07-31 PROCEDURE — 6370000000 HC RX 637 (ALT 250 FOR IP): Performed by: INTERNAL MEDICINE

## 2023-07-31 PROCEDURE — 6360000002 HC RX W HCPCS: Performed by: INTERNAL MEDICINE

## 2023-07-31 PROCEDURE — 2580000003 HC RX 258: Performed by: INTERNAL MEDICINE

## 2023-07-31 PROCEDURE — 97530 THERAPEUTIC ACTIVITIES: CPT

## 2023-07-31 PROCEDURE — 82962 GLUCOSE BLOOD TEST: CPT

## 2023-07-31 RX ORDER — CARVEDILOL 12.5 MG/1
25 TABLET ORAL 2 TIMES DAILY WITH MEALS
Status: DISCONTINUED | OUTPATIENT
Start: 2023-07-31 | End: 2023-08-03 | Stop reason: HOSPADM

## 2023-07-31 RX ORDER — INSULIN GLARGINE 100 [IU]/ML
35 INJECTION, SOLUTION SUBCUTANEOUS NIGHTLY
Status: DISCONTINUED | OUTPATIENT
Start: 2023-07-31 | End: 2023-08-02

## 2023-07-31 RX ORDER — INSULIN GLARGINE 100 [IU]/ML
25 INJECTION, SOLUTION SUBCUTANEOUS NIGHTLY
Status: DISCONTINUED | OUTPATIENT
Start: 2023-07-31 | End: 2023-07-31

## 2023-07-31 RX ORDER — INSULIN LISPRO 100 [IU]/ML
8 INJECTION, SOLUTION INTRAVENOUS; SUBCUTANEOUS ONCE
Status: CANCELLED | OUTPATIENT
Start: 2023-07-31

## 2023-07-31 RX ORDER — LISINOPRIL 10 MG/1
10 TABLET ORAL
Status: DISCONTINUED | OUTPATIENT
Start: 2023-07-31 | End: 2023-08-01

## 2023-07-31 RX ADMIN — INSULIN LISPRO 8 UNITS: 100 INJECTION, SOLUTION INTRAVENOUS; SUBCUTANEOUS at 12:43

## 2023-07-31 RX ADMIN — INSULIN LISPRO 4 UNITS: 100 INJECTION, SOLUTION INTRAVENOUS; SUBCUTANEOUS at 23:51

## 2023-07-31 RX ADMIN — CARVEDILOL 12.5 MG: 12.5 TABLET, FILM COATED ORAL at 09:30

## 2023-07-31 RX ADMIN — LABETALOL HYDROCHLORIDE 10 MG: 5 INJECTION INTRAVENOUS at 23:49

## 2023-07-31 RX ADMIN — CARVEDILOL 25 MG: 12.5 TABLET, FILM COATED ORAL at 18:01

## 2023-07-31 RX ADMIN — ASPIRIN 81 MG 81 MG: 81 TABLET ORAL at 09:29

## 2023-07-31 RX ADMIN — LEVETIRACETAM 1000 MG: 500 TABLET, FILM COATED ORAL at 22:02

## 2023-07-31 RX ADMIN — LEVETIRACETAM 1000 MG: 500 TABLET, FILM COATED ORAL at 09:30

## 2023-07-31 RX ADMIN — ATORVASTATIN CALCIUM 40 MG: 40 TABLET, FILM COATED ORAL at 22:02

## 2023-07-31 RX ADMIN — SODIUM CHLORIDE, PRESERVATIVE FREE 10 ML: 5 INJECTION INTRAVENOUS at 22:02

## 2023-07-31 RX ADMIN — LABETALOL HYDROCHLORIDE 10 MG: 5 INJECTION INTRAVENOUS at 04:32

## 2023-07-31 RX ADMIN — LISINOPRIL 10 MG: 10 TABLET ORAL at 12:44

## 2023-07-31 RX ADMIN — SODIUM CHLORIDE, PRESERVATIVE FREE 10 ML: 5 INJECTION INTRAVENOUS at 09:00

## 2023-07-31 RX ADMIN — AMLODIPINE BESYLATE 10 MG: 5 TABLET ORAL at 09:29

## 2023-07-31 RX ADMIN — INSULIN LISPRO 6 UNITS: 100 INJECTION, SOLUTION INTRAVENOUS; SUBCUTANEOUS at 18:01

## 2023-07-31 RX ADMIN — ENOXAPARIN SODIUM 30 MG: 100 INJECTION SUBCUTANEOUS at 09:29

## 2023-07-31 RX ADMIN — INSULIN GLARGINE 35 UNITS: 100 INJECTION, SOLUTION SUBCUTANEOUS at 23:51

## 2023-07-31 RX ADMIN — LABETALOL HYDROCHLORIDE 10 MG: 5 INJECTION INTRAVENOUS at 00:26

## 2023-07-31 ASSESSMENT — PAIN SCALES - GENERAL
PAINLEVEL_OUTOF10: 0

## 2023-07-31 NOTE — CARE COORDINATION
Will need neurology and cardiology clearance. PT/OT recommended IRF. Patient is uninsured, and public benefits dept has been trying to contact the spouse re: screening for Medicaid. Will discuss therapy recommendation w/patient and spouse. In in agreement w/IRF patient will need a michael application for [facility].          Yamini Wilkinson, MSW

## 2023-08-01 PROBLEM — I63.9 CEREBROVASCULAR ACCIDENT (CVA) (HCC): Status: ACTIVE | Noted: 2023-08-01

## 2023-08-01 LAB
ALBUMIN SERPL-MCNC: 2.8 G/DL (ref 3.5–5)
ANION GAP SERPL CALC-SCNC: 5 MMOL/L (ref 5–15)
BUN SERPL-MCNC: 20 MG/DL (ref 6–20)
BUN/CREAT SERPL: 17 (ref 12–20)
CA-I BLD-MCNC: 9 MG/DL (ref 8.5–10.1)
CHLORIDE SERPL-SCNC: 108 MMOL/L (ref 97–108)
CO2 SERPL-SCNC: 28 MMOL/L (ref 21–32)
CREAT SERPL-MCNC: 1.21 MG/DL (ref 0.55–1.02)
ECHO BSA: 1.65 M2
GLUCOSE BLD STRIP.AUTO-MCNC: 164 MG/DL (ref 65–100)
GLUCOSE BLD STRIP.AUTO-MCNC: 259 MG/DL (ref 65–100)
GLUCOSE BLD STRIP.AUTO-MCNC: 283 MG/DL (ref 65–100)
GLUCOSE BLD STRIP.AUTO-MCNC: 339 MG/DL (ref 65–100)
GLUCOSE BLD STRIP.AUTO-MCNC: 342 MG/DL (ref 65–100)
GLUCOSE SERPL-MCNC: 115 MG/DL (ref 65–100)
MAGNESIUM SERPL-MCNC: 1.8 MG/DL (ref 1.6–2.4)
PERFORMED BY:: ABNORMAL
PHOSPHATE SERPL-MCNC: 3.1 MG/DL (ref 2.6–4.7)
POTASSIUM SERPL-SCNC: 3.1 MMOL/L (ref 3.5–5.1)
SODIUM SERPL-SCNC: 141 MMOL/L (ref 136–145)
VAS LEFT ARM BP: 183 MMHG
VAS LEFT CCA DIST EDV: 30.2 CM/S
VAS LEFT CCA DIST PSV: 138 CM/S
VAS LEFT CCA PROX EDV: 27 CM/S
VAS LEFT CCA PROX PSV: 152 CM/S
VAS LEFT ECA EDV: 12.7 CM/S
VAS LEFT ECA PSV: 81.4 CM/S
VAS LEFT ICA DIST EDV: 37.4 CM/S
VAS LEFT ICA DIST PSV: 117 CM/S
VAS LEFT ICA PROX EDV: 8.2 CM/S
VAS LEFT ICA PROX PSV: 48.1 CM/S
VAS LEFT ICA/CCA PSV: 0.85
VAS LEFT SUBCLAVIAN PROX EDV: 0 CM/S
VAS LEFT SUBCLAVIAN PROX PSV: 172 CM/S
VAS LEFT VERTEBRAL EDV: 9.67 CM/S
VAS LEFT VERTEBRAL PSV: 52.6 CM/S
VAS RIGHT CCA DIST EDV: 28.9 CM/S
VAS RIGHT CCA DIST PSV: 115 CM/S
VAS RIGHT CCA PROX EDV: 29.8 CM/S
VAS RIGHT CCA PROX PSV: 138 CM/S
VAS RIGHT ECA EDV: 11.7 CM/S
VAS RIGHT ECA PSV: 114 CM/S
VAS RIGHT ICA DIST EDV: 34.4 CM/S
VAS RIGHT ICA DIST PSV: 123 CM/S
VAS RIGHT ICA PROX EDV: 26.5 CM/S
VAS RIGHT ICA PROX PSV: 127 CM/S
VAS RIGHT ICA/CCA PSV: 1.1
VAS RIGHT SUBCLAVIAN PROX EDV: 0 CM/S
VAS RIGHT SUBCLAVIAN PROX PSV: 141 CM/S
VAS RIGHT VERTEBRAL EDV: 4.3 CM/S
VAS RIGHT VERTEBRAL PSV: 65.3 CM/S

## 2023-08-01 PROCEDURE — 6370000000 HC RX 637 (ALT 250 FOR IP): Performed by: INTERNAL MEDICINE

## 2023-08-01 PROCEDURE — 97530 THERAPEUTIC ACTIVITIES: CPT

## 2023-08-01 PROCEDURE — 6360000002 HC RX W HCPCS: Performed by: INTERNAL MEDICINE

## 2023-08-01 PROCEDURE — 1100000000 HC RM PRIVATE

## 2023-08-01 PROCEDURE — 83735 ASSAY OF MAGNESIUM: CPT

## 2023-08-01 PROCEDURE — 97116 GAIT TRAINING THERAPY: CPT

## 2023-08-01 PROCEDURE — 80069 RENAL FUNCTION PANEL: CPT

## 2023-08-01 PROCEDURE — 97110 THERAPEUTIC EXERCISES: CPT

## 2023-08-01 PROCEDURE — 6370000000 HC RX 637 (ALT 250 FOR IP): Performed by: HOSPITALIST

## 2023-08-01 PROCEDURE — 92526 ORAL FUNCTION THERAPY: CPT

## 2023-08-01 PROCEDURE — 2580000003 HC RX 258: Performed by: INTERNAL MEDICINE

## 2023-08-01 PROCEDURE — 36415 COLL VENOUS BLD VENIPUNCTURE: CPT

## 2023-08-01 RX ORDER — POTASSIUM CHLORIDE 20 MEQ/1
40 TABLET, EXTENDED RELEASE ORAL ONCE
Status: COMPLETED | OUTPATIENT
Start: 2023-08-01 | End: 2023-08-01

## 2023-08-01 RX ORDER — LISINOPRIL 10 MG/1
10 TABLET ORAL 2 TIMES DAILY
Status: DISCONTINUED | OUTPATIENT
Start: 2023-08-01 | End: 2023-08-02

## 2023-08-01 RX ADMIN — CARVEDILOL 25 MG: 12.5 TABLET, FILM COATED ORAL at 17:14

## 2023-08-01 RX ADMIN — CARVEDILOL 25 MG: 12.5 TABLET, FILM COATED ORAL at 08:56

## 2023-08-01 RX ADMIN — INSULIN GLARGINE 35 UNITS: 100 INJECTION, SOLUTION SUBCUTANEOUS at 20:58

## 2023-08-01 RX ADMIN — SODIUM CHLORIDE, PRESERVATIVE FREE 10 ML: 5 INJECTION INTRAVENOUS at 09:02

## 2023-08-01 RX ADMIN — INSULIN LISPRO 4 UNITS: 100 INJECTION, SOLUTION INTRAVENOUS; SUBCUTANEOUS at 20:58

## 2023-08-01 RX ADMIN — AMLODIPINE BESYLATE 10 MG: 5 TABLET ORAL at 08:56

## 2023-08-01 RX ADMIN — ATORVASTATIN CALCIUM 40 MG: 40 TABLET, FILM COATED ORAL at 20:58

## 2023-08-01 RX ADMIN — POTASSIUM CHLORIDE 40 MEQ: 1500 TABLET, EXTENDED RELEASE ORAL at 13:36

## 2023-08-01 RX ADMIN — LISINOPRIL 10 MG: 10 TABLET ORAL at 13:36

## 2023-08-01 RX ADMIN — ENOXAPARIN SODIUM 30 MG: 100 INJECTION SUBCUTANEOUS at 08:57

## 2023-08-01 RX ADMIN — ASPIRIN 81 MG 81 MG: 81 TABLET ORAL at 08:57

## 2023-08-01 RX ADMIN — LEVETIRACETAM 1000 MG: 500 TABLET, FILM COATED ORAL at 20:58

## 2023-08-01 RX ADMIN — LISINOPRIL 10 MG: 10 TABLET ORAL at 20:58

## 2023-08-01 RX ADMIN — LEVETIRACETAM 1000 MG: 500 TABLET, FILM COATED ORAL at 08:56

## 2023-08-01 RX ADMIN — SODIUM CHLORIDE, PRESERVATIVE FREE 10 ML: 5 INJECTION INTRAVENOUS at 20:58

## 2023-08-01 RX ADMIN — INSULIN LISPRO 4 UNITS: 100 INJECTION, SOLUTION INTRAVENOUS; SUBCUTANEOUS at 10:17

## 2023-08-01 RX ADMIN — INSULIN LISPRO 4 UNITS: 100 INJECTION, SOLUTION INTRAVENOUS; SUBCUTANEOUS at 17:13

## 2023-08-01 NOTE — CARE COORDINATION
DC Plan: Disposition pending    Pt transferred from the ICU to . Pt is self pay. Per ICU CM note, public benefits has been trying to get a hold of pt's spouse for Medicaid screen. Writer e-mailed public benefits dept to f/up. PT is rec IRF and OT is rec IRF vs SNF. Cm to f/up with pt to discuss. Cm will not be able to get pt to SNF unless she is willing to privately pay. If pt is agreeable with IRF, CM will see if IRF can accept for michael. ______________________________________    Cm spoke with PT this morning. PT is recommending home with family care.

## 2023-08-01 NOTE — PROCEDURES
[facility]  EEG    Name:  Erasmo Perez  MR#:  [id number]  :  1970  ACCOUNT #:  [de-identified]  DATE OF SERVICE:  2023    DIAGNOSIS:  New-onset seizures, mental status changes. DESCRIPTION:  Recording is done digitally on computer, electrodes are placed in a 10/20 international system. Initial recording is equipment calibration followed by biocalibration. Initial montages are bipolar montages. TECHNIQUE:  Tracing started with the patient drowsy. As the recording continues, the patient is hooked up to an EKG machine that shows a heart rate of 60. Drowsiness continues. She is exposed to photic stimulation without any abnormality. Hyperventilation is not done. IMPRESSION:  This is an EEG showing the patient drowsy and asleep.       Edmond Estes MD      [id number]  D:  2023 9:28  T:  2023 14:52  JOB #:  [id number]

## 2023-08-02 VITALS
SYSTOLIC BLOOD PRESSURE: 153 MMHG | HEIGHT: 62 IN | RESPIRATION RATE: 19 BRPM | BODY MASS INDEX: 25.72 KG/M2 | HEART RATE: 76 BPM | OXYGEN SATURATION: 97 % | TEMPERATURE: 98 F | DIASTOLIC BLOOD PRESSURE: 87 MMHG | WEIGHT: 139.77 LBS

## 2023-08-02 LAB
ALBUMIN SERPL-MCNC: 2.8 G/DL (ref 3.5–5)
ANION GAP SERPL CALC-SCNC: 8 MMOL/L (ref 5–15)
BUN SERPL-MCNC: 16 MG/DL (ref 6–20)
BUN/CREAT SERPL: 13 (ref 12–20)
CA-I BLD-MCNC: 9 MG/DL (ref 8.5–10.1)
CHLORIDE SERPL-SCNC: 105 MMOL/L (ref 97–108)
CO2 SERPL-SCNC: 25 MMOL/L (ref 21–32)
CREAT SERPL-MCNC: 1.19 MG/DL (ref 0.55–1.02)
GLUCOSE BLD STRIP.AUTO-MCNC: 180 MG/DL (ref 65–100)
GLUCOSE BLD STRIP.AUTO-MCNC: 230 MG/DL (ref 65–100)
GLUCOSE BLD STRIP.AUTO-MCNC: 245 MG/DL (ref 65–100)
GLUCOSE BLD STRIP.AUTO-MCNC: 293 MG/DL (ref 65–100)
GLUCOSE SERPL-MCNC: 210 MG/DL (ref 65–100)
MAGNESIUM SERPL-MCNC: 1.6 MG/DL (ref 1.6–2.4)
PERFORMED BY:: ABNORMAL
PHOSPHATE SERPL-MCNC: 2.9 MG/DL (ref 2.6–4.7)
POTASSIUM SERPL-SCNC: 3.8 MMOL/L (ref 3.5–5.1)
SODIUM SERPL-SCNC: 138 MMOL/L (ref 136–145)

## 2023-08-02 PROCEDURE — 6360000002 HC RX W HCPCS: Performed by: HOSPITALIST

## 2023-08-02 PROCEDURE — 6370000000 HC RX 637 (ALT 250 FOR IP): Performed by: HOSPITALIST

## 2023-08-02 PROCEDURE — 6370000000 HC RX 637 (ALT 250 FOR IP): Performed by: INTERNAL MEDICINE

## 2023-08-02 PROCEDURE — 2580000003 HC RX 258: Performed by: INTERNAL MEDICINE

## 2023-08-02 PROCEDURE — 92526 ORAL FUNCTION THERAPY: CPT

## 2023-08-02 PROCEDURE — 97535 SELF CARE MNGMENT TRAINING: CPT

## 2023-08-02 PROCEDURE — 80069 RENAL FUNCTION PANEL: CPT

## 2023-08-02 PROCEDURE — 83735 ASSAY OF MAGNESIUM: CPT

## 2023-08-02 PROCEDURE — 82962 GLUCOSE BLOOD TEST: CPT

## 2023-08-02 PROCEDURE — 97110 THERAPEUTIC EXERCISES: CPT

## 2023-08-02 PROCEDURE — 97116 GAIT TRAINING THERAPY: CPT

## 2023-08-02 PROCEDURE — 6360000002 HC RX W HCPCS: Performed by: INTERNAL MEDICINE

## 2023-08-02 PROCEDURE — 36415 COLL VENOUS BLD VENIPUNCTURE: CPT

## 2023-08-02 RX ORDER — ASPIRIN 81 MG/1
81 TABLET, CHEWABLE ORAL DAILY
Qty: 30 TABLET | Refills: 3 | Status: SHIPPED | OUTPATIENT
Start: 2023-08-02

## 2023-08-02 RX ORDER — LISINOPRIL 20 MG/1
20 TABLET ORAL 2 TIMES DAILY
Qty: 30 TABLET | Refills: 3 | Status: SHIPPED | OUTPATIENT
Start: 2023-08-02

## 2023-08-02 RX ORDER — GLUCOSAMINE HCL/CHONDROITIN SU 500-400 MG
CAPSULE ORAL
Qty: 120 STRIP | Refills: 1 | Status: SHIPPED | OUTPATIENT
Start: 2023-08-02

## 2023-08-02 RX ORDER — ATORVASTATIN CALCIUM 40 MG/1
40 TABLET, FILM COATED ORAL NIGHTLY
Qty: 30 TABLET | Refills: 3 | Status: SHIPPED | OUTPATIENT
Start: 2023-08-02

## 2023-08-02 RX ORDER — BLOOD-GLUCOSE METER
1 KIT MISCELLANEOUS DAILY
Qty: 1 KIT | Refills: 0 | Status: SHIPPED | OUTPATIENT
Start: 2023-08-02

## 2023-08-02 RX ORDER — HYDRALAZINE HYDROCHLORIDE 50 MG/1
25 TABLET, FILM COATED ORAL EVERY 8 HOURS SCHEDULED
Status: DISCONTINUED | OUTPATIENT
Start: 2023-08-02 | End: 2023-08-03 | Stop reason: HOSPADM

## 2023-08-02 RX ORDER — LISINOPRIL 10 MG/1
20 TABLET ORAL 2 TIMES DAILY
Status: DISCONTINUED | OUTPATIENT
Start: 2023-08-02 | End: 2023-08-03 | Stop reason: HOSPADM

## 2023-08-02 RX ORDER — AMLODIPINE BESYLATE 10 MG/1
10 TABLET ORAL DAILY
Qty: 30 TABLET | Refills: 3 | Status: SHIPPED | OUTPATIENT
Start: 2023-08-02

## 2023-08-02 RX ORDER — LEVETIRACETAM 750 MG/1
750 TABLET ORAL 2 TIMES DAILY
Qty: 60 TABLET | Refills: 3 | Status: SHIPPED | OUTPATIENT
Start: 2023-08-02

## 2023-08-02 RX ORDER — LANCETS 30 GAUGE
1 EACH MISCELLANEOUS 3 TIMES DAILY
Qty: 600 EACH | Refills: 1 | Status: SHIPPED | OUTPATIENT
Start: 2023-08-02

## 2023-08-02 RX ORDER — INSULIN GLARGINE 100 [IU]/ML
45 INJECTION, SOLUTION SUBCUTANEOUS NIGHTLY
Qty: 10 ML | Refills: 1 | Status: SHIPPED | OUTPATIENT
Start: 2023-08-02

## 2023-08-02 RX ORDER — INSULIN GLARGINE 100 [IU]/ML
45 INJECTION, SOLUTION SUBCUTANEOUS NIGHTLY
Status: DISCONTINUED | OUTPATIENT
Start: 2023-08-02 | End: 2023-08-03 | Stop reason: HOSPADM

## 2023-08-02 RX ORDER — CARVEDILOL 25 MG/1
25 TABLET ORAL 2 TIMES DAILY WITH MEALS
Qty: 60 TABLET | Refills: 3 | Status: SHIPPED | OUTPATIENT
Start: 2023-08-02

## 2023-08-02 RX ADMIN — INSULIN GLARGINE 45 UNITS: 100 INJECTION, SOLUTION SUBCUTANEOUS at 19:59

## 2023-08-02 RX ADMIN — HYDRALAZINE HYDROCHLORIDE 25 MG: 50 TABLET, FILM COATED ORAL at 15:28

## 2023-08-02 RX ADMIN — ENOXAPARIN SODIUM 30 MG: 100 INJECTION SUBCUTANEOUS at 11:01

## 2023-08-02 RX ADMIN — AMLODIPINE BESYLATE 10 MG: 5 TABLET ORAL at 11:00

## 2023-08-02 RX ADMIN — INSULIN LISPRO 4 UNITS: 100 INJECTION, SOLUTION INTRAVENOUS; SUBCUTANEOUS at 17:32

## 2023-08-02 RX ADMIN — ASPIRIN 81 MG 81 MG: 81 TABLET ORAL at 11:00

## 2023-08-02 RX ADMIN — LEVETIRACETAM 750 MG: 250 TABLET, FILM COATED ORAL at 11:02

## 2023-08-02 RX ADMIN — LABETALOL HYDROCHLORIDE 10 MG: 5 INJECTION INTRAVENOUS at 00:20

## 2023-08-02 RX ADMIN — CARVEDILOL 25 MG: 12.5 TABLET, FILM COATED ORAL at 17:33

## 2023-08-02 RX ADMIN — INSULIN LISPRO 2 UNITS: 100 INJECTION, SOLUTION INTRAVENOUS; SUBCUTANEOUS at 11:00

## 2023-08-02 RX ADMIN — ATORVASTATIN CALCIUM 40 MG: 40 TABLET, FILM COATED ORAL at 19:59

## 2023-08-02 RX ADMIN — CARVEDILOL 25 MG: 12.5 TABLET, FILM COATED ORAL at 11:00

## 2023-08-02 RX ADMIN — LEVETIRACETAM 750 MG: 250 TABLET, FILM COATED ORAL at 19:58

## 2023-08-02 RX ADMIN — SODIUM CHLORIDE, PRESERVATIVE FREE 10 ML: 5 INJECTION INTRAVENOUS at 11:02

## 2023-08-02 RX ADMIN — LISINOPRIL 20 MG: 10 TABLET ORAL at 19:59

## 2023-08-02 RX ADMIN — LISINOPRIL 20 MG: 10 TABLET ORAL at 11:04

## 2023-08-02 NOTE — DISCHARGE INSTRUCTIONS
Diet-cardiac/diabetic  Activity-slowly increase to levels before  Check labs at PCPs office next visit  Strict fall/aspiration precautions  Accu-Cheks before every meal and at bedtime call PCP if less than 70 or more than 299  Return to emergency or call ambulance immediately if symptoms recur or get worse

## 2023-08-02 NOTE — CARE COORDINATION
CM reviewed Pt medicals, Pt is D/C home with no CM needs identified. Met f/f with Pt, she stated that she has a ride home. Transition of Care Plan:    RUR: 15%  Prior Level of Functioning: Independent  Disposition: Home NN    Transportation at discharge: Family  IM/IMM Medicare/ letter given: No     Caregiver Contact: No  Discharge Caregiver contacted prior to discharge? No  Care Conference needed?  No  Barriers to discharge: none

## 2023-08-02 NOTE — DISCHARGE SUMMARY
Aortic Valve: Trileaflet valve. Contrast used: Definity. Carotid Doppler-Interpretation Summary          Heterogeneous plaque in the right internal carotid artery. Normal antegrade flow involving the right vertebral artery. Normal antegrade flow involving the left vertebral artery. Assessment/Plan:      Acute CVA with left-sided deficits-  MRI as above  Echo ok   LDL 99  Diet per SLP   Aspirin and statins  Carotid Doppler  ok as above  Neurology s/o case fu as OP     Seizures recurrent-  Continue   Keppra  As needed Ativan  EEG ? Neurology on case s/o  UDS not send        HTN urgency-  Adjusted bp meds to keep sbp <140     ? ADONIS-Labs pending from today  Unknown baseline creatinine  3.61-->1.27  S/p  IV fluids  Monitor and avoid nephrotoxins     New Dx DMT2 -   A1c10  monitor on Lantus sliding scale  Diabetic Educator  Diabetic teaching  Lantus dose increased  Monitor bg still elevated     Troponin leak -  unclear significance   Echo ok,   cards on case s/o    Cont asa/statins/bb  Would benefit from nuclear Cardiolite stress test as OP  Fu cardiology as OP       Disposition-  home with therapy                _______________________________________________________________________  Patient seen and examined by me on discharge day. Pertinent Findings:  Gen:    Not in distress  Chest: Symmetric expansion  CVS:   Regular rhythm. No edema  Abd:  Soft, not distended, not tender  Neuro:  Alert,   _______________________________________________________________________  DISCHARGE MEDICATIONS:      Medication List        START taking these medications      amLODIPine 10 MG tablet  Commonly known as: NORVASC  Take 1 tablet by mouth daily     aspirin 81 MG chewable tablet  Take 1 tablet by mouth daily     atorvastatin 40 MG tablet  Commonly known as: LIPITOR  Take 1 tablet by mouth nightly     blood glucose test strips  Test 4 times a day & as needed for symptoms of irregular blood glucose.  Dispense

## 2023-08-31 ENCOUNTER — HOSPITAL ENCOUNTER (EMERGENCY)
Facility: HOSPITAL | Age: 53
Discharge: ANOTHER HOSPITAL | End: 2023-09-01
Attending: EMERGENCY MEDICINE
Payer: MEDICAID

## 2023-08-31 ENCOUNTER — APPOINTMENT (OUTPATIENT)
Facility: HOSPITAL | Age: 53
End: 2023-08-31
Payer: MEDICAID

## 2023-08-31 DIAGNOSIS — E10.10 DIABETIC KETOACIDOSIS WITHOUT COMA ASSOCIATED WITH TYPE 1 DIABETES MELLITUS (HCC): ICD-10-CM

## 2023-08-31 DIAGNOSIS — G40.901 STATUS EPILEPTICUS (HCC): Primary | ICD-10-CM

## 2023-08-31 DIAGNOSIS — N17.9 AKI (ACUTE KIDNEY INJURY) (HCC): ICD-10-CM

## 2023-08-31 LAB
ALBUMIN SERPL-MCNC: 3.4 G/DL (ref 3.5–5)
ALBUMIN/GLOB SERPL: 0.7 (ref 1.1–2.2)
ALP SERPL-CCNC: 195 U/L (ref 45–117)
ALT SERPL-CCNC: 23 U/L (ref 12–78)
ANION GAP SERPL CALC-SCNC: 19 MMOL/L (ref 5–15)
AST SERPL W P-5'-P-CCNC: 17 U/L (ref 15–37)
BASE DEFICIT BLD-SCNC: 11.1 MMOL/L
BASOPHILS # BLD: 0 K/UL (ref 0–0.1)
BASOPHILS NFR BLD: 0 % (ref 0–1)
BILIRUB SERPL-MCNC: 0.2 MG/DL (ref 0.2–1)
BUN SERPL-MCNC: 25 MG/DL (ref 6–20)
BUN/CREAT SERPL: 10 (ref 12–20)
CA-I BLD-MCNC: 1.23 MMOL/L (ref 1.12–1.32)
CA-I BLD-MCNC: 9.1 MG/DL (ref 8.5–10.1)
CHLORIDE BLD-SCNC: 95 MMOL/L (ref 98–107)
CHLORIDE SERPL-SCNC: 95 MMOL/L (ref 97–108)
CO2 BLD-SCNC: 18 MMOL/L
CO2 SERPL-SCNC: 18 MMOL/L (ref 21–32)
CREAT SERPL-MCNC: 2.47 MG/DL (ref 0.55–1.02)
CREAT UR-MCNC: 1.85 MG/DL (ref 0.6–1.3)
DIFFERENTIAL METHOD BLD: ABNORMAL
EOSINOPHIL # BLD: 0.1 K/UL (ref 0–0.4)
EOSINOPHIL NFR BLD: 1 % (ref 0–7)
ERYTHROCYTE [DISTWIDTH] IN BLOOD BY AUTOMATED COUNT: 12.1 % (ref 11.5–14.5)
GLOBULIN SER CALC-MCNC: 4.6 G/DL (ref 2–4)
GLUCOSE BLD STRIP.AUTO-MCNC: 644 MG/DL (ref 65–100)
GLUCOSE SERPL-MCNC: 659 MG/DL (ref 65–100)
HCO3 BLD-SCNC: 17.8 MMOL/L (ref 19–28)
HCT VFR BLD AUTO: 33.1 % (ref 35–47)
HGB BLD-MCNC: 10.7 G/DL (ref 11.5–16)
IMM GRANULOCYTES # BLD AUTO: 0.1 K/UL (ref 0–0.04)
IMM GRANULOCYTES NFR BLD AUTO: 1 % (ref 0–0.5)
LACTATE BLD-SCNC: 13.76 MMOL/L (ref 0.4–2)
LACTATE SERPL-SCNC: 12.3 MMOL/L (ref 0.4–2)
LYMPHOCYTES # BLD: 3.9 K/UL (ref 0.8–3.5)
LYMPHOCYTES NFR BLD: 44 % (ref 12–49)
MCH RBC QN AUTO: 29.1 PG (ref 26–34)
MCHC RBC AUTO-ENTMCNC: 32.3 G/DL (ref 30–36.5)
MCV RBC AUTO: 89.9 FL (ref 80–99)
MONOCYTES # BLD: 0.6 K/UL (ref 0–1)
MONOCYTES NFR BLD: 6 % (ref 5–13)
NEUTS SEG # BLD: 4.2 K/UL (ref 1.8–8)
NEUTS SEG NFR BLD: 48 % (ref 32–75)
NRBC # BLD: 0 K/UL (ref 0–0.01)
NRBC BLD-RTO: 0 PER 100 WBC
PCO2 BLD: 51.4 MMHG (ref 35–45)
PERFORMED BY:: ABNORMAL
PH BLD: 7.15 (ref 7.35–7.45)
PLATELET # BLD AUTO: 334 K/UL (ref 150–400)
PMV BLD AUTO: 11.9 FL (ref 8.9–12.9)
PO2 BLD: 53 MMHG (ref 75–100)
POTASSIUM BLD-SCNC: 4 MMOL/L (ref 3.5–5.5)
POTASSIUM SERPL-SCNC: 3.9 MMOL/L (ref 3.5–5.1)
PROT SERPL-MCNC: 8 G/DL (ref 6.4–8.2)
RBC # BLD AUTO: 3.68 M/UL (ref 3.8–5.2)
SAO2 % BLD: 76 %
SERVICE CMNT-IMP: ABNORMAL
SODIUM BLD-SCNC: 137 MMOL/L (ref 136–145)
SODIUM SERPL-SCNC: 132 MMOL/L (ref 136–145)
SPECIMEN SITE: ABNORMAL
WBC # BLD AUTO: 8.9 K/UL (ref 3.6–11)

## 2023-08-31 PROCEDURE — 82330 ASSAY OF CALCIUM: CPT

## 2023-08-31 PROCEDURE — 82947 ASSAY GLUCOSE BLOOD QUANT: CPT

## 2023-08-31 PROCEDURE — 80053 COMPREHEN METABOLIC PANEL: CPT

## 2023-08-31 PROCEDURE — 96375 TX/PRO/DX INJ NEW DRUG ADDON: CPT

## 2023-08-31 PROCEDURE — 6360000002 HC RX W HCPCS: Performed by: EMERGENCY MEDICINE

## 2023-08-31 PROCEDURE — 2580000003 HC RX 258: Performed by: EMERGENCY MEDICINE

## 2023-08-31 PROCEDURE — 93005 ELECTROCARDIOGRAM TRACING: CPT | Performed by: EMERGENCY MEDICINE

## 2023-08-31 PROCEDURE — 84295 ASSAY OF SERUM SODIUM: CPT

## 2023-08-31 PROCEDURE — 87040 BLOOD CULTURE FOR BACTERIA: CPT

## 2023-08-31 PROCEDURE — 70450 CT HEAD/BRAIN W/O DYE: CPT

## 2023-08-31 PROCEDURE — 96365 THER/PROPH/DIAG IV INF INIT: CPT

## 2023-08-31 PROCEDURE — 84132 ASSAY OF SERUM POTASSIUM: CPT

## 2023-08-31 PROCEDURE — 83605 ASSAY OF LACTIC ACID: CPT

## 2023-08-31 PROCEDURE — 85025 COMPLETE CBC W/AUTO DIFF WBC: CPT

## 2023-08-31 PROCEDURE — 82803 BLOOD GASES ANY COMBINATION: CPT

## 2023-08-31 PROCEDURE — 36415 COLL VENOUS BLD VENIPUNCTURE: CPT

## 2023-08-31 PROCEDURE — 99285 EMERGENCY DEPT VISIT HI MDM: CPT

## 2023-08-31 PROCEDURE — 96361 HYDRATE IV INFUSION ADD-ON: CPT

## 2023-08-31 RX ORDER — MAGNESIUM SULFATE IN WATER 40 MG/ML
2000 INJECTION, SOLUTION INTRAVENOUS PRN
Status: DISCONTINUED | OUTPATIENT
Start: 2023-08-31 | End: 2023-09-01 | Stop reason: HOSPADM

## 2023-08-31 RX ORDER — POTASSIUM CHLORIDE 7.45 MG/ML
10 INJECTION INTRAVENOUS PRN
Status: DISCONTINUED | OUTPATIENT
Start: 2023-08-31 | End: 2023-09-01 | Stop reason: HOSPADM

## 2023-08-31 RX ORDER — 0.9 % SODIUM CHLORIDE 0.9 %
1000 INTRAVENOUS SOLUTION INTRAVENOUS ONCE
Status: COMPLETED | OUTPATIENT
Start: 2023-08-31 | End: 2023-09-01

## 2023-08-31 RX ORDER — DEXTROSE MONOHYDRATE, SODIUM CHLORIDE, AND POTASSIUM CHLORIDE 50; 1.49; 4.5 G/1000ML; G/1000ML; G/1000ML
INJECTION, SOLUTION INTRAVENOUS CONTINUOUS PRN
Status: DISCONTINUED | OUTPATIENT
Start: 2023-08-31 | End: 2023-09-01 | Stop reason: HOSPADM

## 2023-08-31 RX ORDER — SODIUM CHLORIDE 9 MG/ML
INJECTION, SOLUTION INTRAVENOUS CONTINUOUS
Status: DISCONTINUED | OUTPATIENT
Start: 2023-08-31 | End: 2023-09-01 | Stop reason: HOSPADM

## 2023-08-31 RX ORDER — LEVETIRACETAM 500 MG/5ML
2 INJECTION, SOLUTION, CONCENTRATE INTRAVENOUS ONCE
Status: COMPLETED | OUTPATIENT
Start: 2023-08-31 | End: 2023-08-31

## 2023-08-31 RX ADMIN — CEFTRIAXONE SODIUM 1000 MG: 1 INJECTION, POWDER, FOR SOLUTION INTRAMUSCULAR; INTRAVENOUS at 22:02

## 2023-08-31 RX ADMIN — SODIUM CHLORIDE 1000 ML: 9 INJECTION, SOLUTION INTRAVENOUS at 21:10

## 2023-08-31 RX ADMIN — LEVETIRACETAM 2000 MG: 100 INJECTION, SOLUTION INTRAVENOUS at 22:02

## 2023-09-01 ENCOUNTER — HOSPITAL ENCOUNTER (INPATIENT)
Facility: HOSPITAL | Age: 53
LOS: 5 days | Discharge: HOME | DRG: 100 | End: 2023-09-06
Attending: ANESTHESIOLOGY | Admitting: ANESTHESIOLOGY

## 2023-09-01 VITALS
BODY MASS INDEX: 26.68 KG/M2 | DIASTOLIC BLOOD PRESSURE: 96 MMHG | TEMPERATURE: 97.6 F | HEIGHT: 62 IN | WEIGHT: 145 LBS | OXYGEN SATURATION: 100 % | RESPIRATION RATE: 16 BRPM | HEART RATE: 137 BPM | SYSTOLIC BLOOD PRESSURE: 165 MMHG

## 2023-09-01 PROBLEM — E11.10 DKA, TYPE 2, NOT AT GOAL (HCC): Status: ACTIVE | Noted: 2023-09-01

## 2023-09-01 PROBLEM — R41.82 ALTERED MENTAL STATUS: Status: ACTIVE | Noted: 2023-09-01

## 2023-09-01 PROBLEM — G40.901 STATUS EPILEPTICUS (HCC): Status: ACTIVE | Noted: 2023-09-01

## 2023-09-01 LAB
ABO + RH BLD: NORMAL
AMPHET UR QL SCN: NEGATIVE
ANION GAP SERPL CALC-SCNC: 1 MMOL/L (ref 5–15)
ANION GAP SERPL CALC-SCNC: 7 MMOL/L (ref 5–15)
ANION GAP SERPL CALC-SCNC: 7 MMOL/L (ref 5–15)
ANION GAP SERPL CALC-SCNC: 8 MMOL/L (ref 5–15)
APPEARANCE UR: CLEAR
ARTERIAL PATENCY WRIST A: POSITIVE
B-OH-BUTYR SERPL-SCNC: 0.59 MMOL/L
B-OH-BUTYR SERPL-SCNC: NORMAL MMOL/L
BACTERIA URNS QL MICRO: NEGATIVE /HPF
BARBITURATES UR QL SCN: NEGATIVE
BASE DEFICIT BLD-SCNC: 0.9 MMOL/L
BASOPHILS # BLD: NORMAL K/UL (ref 0–0.1)
BASOPHILS NFR BLD: NORMAL % (ref 0–1)
BDY SITE: ABNORMAL
BENZODIAZ UR QL: POSITIVE
BILIRUB UR QL: NEGATIVE
BLOOD GROUP ANTIBODIES SERPL: NORMAL
BUN SERPL-MCNC: 10 MG/DL (ref 6–20)
BUN SERPL-MCNC: 11 MG/DL (ref 6–20)
BUN SERPL-MCNC: 13 MG/DL (ref 6–20)
BUN SERPL-MCNC: 16 MG/DL (ref 6–20)
BUN/CREAT SERPL: 10 (ref 12–20)
BUN/CREAT SERPL: 11 (ref 12–20)
BUN/CREAT SERPL: 7 (ref 12–20)
BUN/CREAT SERPL: 8 (ref 12–20)
CALCIUM SERPL-MCNC: 8.6 MG/DL (ref 8.5–10.1)
CALCIUM SERPL-MCNC: 8.7 MG/DL (ref 8.5–10.1)
CALCIUM SERPL-MCNC: 8.9 MG/DL (ref 8.5–10.1)
CALCIUM SERPL-MCNC: 8.9 MG/DL (ref 8.5–10.1)
CANNABINOIDS UR QL SCN: NEGATIVE
CHLORIDE SERPL-SCNC: 107 MMOL/L (ref 97–108)
CHLORIDE SERPL-SCNC: 107 MMOL/L (ref 97–108)
CHLORIDE SERPL-SCNC: 109 MMOL/L (ref 97–108)
CHLORIDE SERPL-SCNC: 110 MMOL/L (ref 97–108)
CK SERPL-CCNC: 368 U/L (ref 26–192)
CO2 SERPL-SCNC: 21 MMOL/L (ref 21–32)
CO2 SERPL-SCNC: 22 MMOL/L (ref 21–32)
CO2 SERPL-SCNC: 24 MMOL/L (ref 21–32)
CO2 SERPL-SCNC: 24 MMOL/L (ref 21–32)
COCAINE UR QL SCN: NEGATIVE
COLOR UR: ABNORMAL
CREAT SERPL-MCNC: 1.36 MG/DL (ref 0.55–1.02)
CREAT SERPL-MCNC: 1.43 MG/DL (ref 0.55–1.02)
CREAT SERPL-MCNC: 1.44 MG/DL (ref 0.55–1.02)
CREAT SERPL-MCNC: 1.44 MG/DL (ref 0.55–1.02)
DIFFERENTIAL METHOD BLD: NORMAL
EKG ATRIAL RATE: 124 BPM
EKG ATRIAL RATE: 135 BPM
EKG DIAGNOSIS: NORMAL
EKG DIAGNOSIS: NORMAL
EKG P AXIS: 79 DEGREES
EKG P-R INTERVAL: 138 MS
EKG Q-T INTERVAL: 322 MS
EKG Q-T INTERVAL: 380 MS
EKG QRS DURATION: 78 MS
EKG QRS DURATION: 86 MS
EKG QTC CALCULATION (BAZETT): 462 MS
EKG QTC CALCULATION (BAZETT): 570 MS
EKG R AXIS: 10 DEGREES
EKG R AXIS: 34 DEGREES
EKG T AXIS: 112 DEGREES
EKG T AXIS: 94 DEGREES
EKG VENTRICULAR RATE: 124 BPM
EKG VENTRICULAR RATE: 135 BPM
EOSINOPHIL # BLD: NORMAL K/UL (ref 0–0.4)
EOSINOPHIL NFR BLD: NORMAL % (ref 0–7)
EPITH CASTS URNS QL MICRO: ABNORMAL /LPF
ERYTHROCYTE [DISTWIDTH] IN BLOOD BY AUTOMATED COUNT: 12.1 % (ref 11.5–14.5)
ERYTHROCYTE [DISTWIDTH] IN BLOOD BY AUTOMATED COUNT: NORMAL % (ref 11.5–14.5)
EST. AVERAGE GLUCOSE BLD GHB EST-MCNC: 298 MG/DL
EST. AVERAGE GLUCOSE BLD GHB EST-MCNC: 301 MG/DL
EST. AVERAGE GLUCOSE BLD GHB EST-MCNC: NORMAL MG/DL
GAS FLOW.O2 O2 DELIVERY SYS: ABNORMAL
GLUCOSE BLD STRIP.AUTO-MCNC: 105 MG/DL (ref 65–117)
GLUCOSE BLD STRIP.AUTO-MCNC: 110 MG/DL (ref 65–117)
GLUCOSE BLD STRIP.AUTO-MCNC: 133 MG/DL (ref 65–117)
GLUCOSE BLD STRIP.AUTO-MCNC: 154 MG/DL (ref 65–117)
GLUCOSE BLD STRIP.AUTO-MCNC: 180 MG/DL (ref 65–117)
GLUCOSE BLD STRIP.AUTO-MCNC: 189 MG/DL (ref 65–117)
GLUCOSE BLD STRIP.AUTO-MCNC: 192 MG/DL (ref 65–117)
GLUCOSE BLD STRIP.AUTO-MCNC: 197 MG/DL (ref 65–117)
GLUCOSE BLD STRIP.AUTO-MCNC: 199 MG/DL (ref 65–117)
GLUCOSE BLD STRIP.AUTO-MCNC: 218 MG/DL (ref 65–117)
GLUCOSE BLD STRIP.AUTO-MCNC: 229 MG/DL (ref 65–117)
GLUCOSE BLD STRIP.AUTO-MCNC: 253 MG/DL (ref 65–117)
GLUCOSE BLD STRIP.AUTO-MCNC: 52 MG/DL (ref 65–117)
GLUCOSE BLD STRIP.AUTO-MCNC: 67 MG/DL (ref 65–117)
GLUCOSE BLD STRIP.AUTO-MCNC: 79 MG/DL (ref 65–117)
GLUCOSE SERPL-MCNC: 120 MG/DL (ref 65–100)
GLUCOSE SERPL-MCNC: 217 MG/DL (ref 65–100)
GLUCOSE SERPL-MCNC: 233 MG/DL (ref 65–100)
GLUCOSE SERPL-MCNC: 251 MG/DL (ref 65–100)
GLUCOSE UR STRIP.AUTO-MCNC: 250 MG/DL
HBA1C MFR BLD: 12 % (ref 4–5.6)
HBA1C MFR BLD: 12.1 % (ref 4–5.6)
HBA1C MFR BLD: NORMAL % (ref 4–5.6)
HCG UR QL: NEGATIVE
HCO3 BLD-SCNC: 22.9 MMOL/L (ref 22–26)
HCT VFR BLD AUTO: 32.2 % (ref 35–47)
HCT VFR BLD AUTO: NORMAL % (ref 35–47)
HGB BLD-MCNC: 10.8 G/DL (ref 11.5–16)
HGB BLD-MCNC: NORMAL G/DL (ref 11.5–16)
HGB UR QL STRIP: ABNORMAL
HYALINE CASTS URNS QL MICRO: ABNORMAL /LPF (ref 0–5)
IMM GRANULOCYTES # BLD AUTO: NORMAL K/UL (ref 0–0.04)
IMM GRANULOCYTES NFR BLD AUTO: NORMAL % (ref 0–0.5)
INR PPP: 1 (ref 0.9–1.1)
INR PPP: NORMAL (ref 0.9–1.1)
KETONES UR QL STRIP.AUTO: NEGATIVE MG/DL
LACTATE SERPL-SCNC: 1 MMOL/L (ref 0.4–2)
LACTATE SERPL-SCNC: 1 MMOL/L (ref 0.4–2)
LACTATE SERPL-SCNC: 2.1 MMOL/L (ref 0.4–2)
LEUKOCYTE ESTERASE UR QL STRIP.AUTO: NEGATIVE
LYMPHOCYTES # BLD: NORMAL K/UL (ref 0.8–3.5)
LYMPHOCYTES NFR BLD: NORMAL % (ref 12–49)
Lab: ABNORMAL
MAGNESIUM SERPL-MCNC: 1.8 MG/DL (ref 1.6–2.4)
MAGNESIUM SERPL-MCNC: 1.8 MG/DL (ref 1.6–2.4)
MAGNESIUM SERPL-MCNC: 1.9 MG/DL (ref 1.6–2.4)
MAGNESIUM SERPL-MCNC: 1.9 MG/DL (ref 1.6–2.4)
MCH RBC QN AUTO: 29.1 PG (ref 26–34)
MCH RBC QN AUTO: NORMAL PG (ref 26–34)
MCHC RBC AUTO-ENTMCNC: 33.5 G/DL (ref 30–36.5)
MCHC RBC AUTO-ENTMCNC: NORMAL G/DL (ref 30–36.5)
MCV RBC AUTO: 86.8 FL (ref 80–99)
MCV RBC AUTO: NORMAL FL (ref 80–99)
METHADONE UR QL: NEGATIVE
MONOCYTES # BLD: NORMAL K/UL (ref 0–1)
MONOCYTES NFR BLD: NORMAL % (ref 5–13)
NEUTS SEG # BLD: NORMAL K/UL (ref 1.8–8)
NEUTS SEG NFR BLD: NORMAL % (ref 32–75)
NITRITE UR QL STRIP.AUTO: NEGATIVE
NRBC # BLD: 0 K/UL (ref 0–0.01)
NRBC # BLD: NORMAL K/UL (ref 0–0.01)
NRBC BLD-RTO: 0 PER 100 WBC
NRBC BLD-RTO: NORMAL PER 100 WBC
OPIATES UR QL: NEGATIVE
PCO2 BLD: 34.6 MMHG (ref 35–45)
PCP UR QL: NEGATIVE
PH BLD: 7.43 (ref 7.35–7.45)
PH UR STRIP: 8 (ref 5–8)
PHOSPHATE SERPL-MCNC: 2.6 MG/DL (ref 2.6–4.7)
PHOSPHATE SERPL-MCNC: 2.8 MG/DL (ref 2.6–4.7)
PHOSPHATE SERPL-MCNC: 2.8 MG/DL (ref 2.6–4.7)
PLATELET # BLD AUTO: 327 K/UL (ref 150–400)
PLATELET # BLD AUTO: NORMAL K/UL (ref 150–400)
PMV BLD AUTO: 11.4 FL (ref 8.9–12.9)
PMV BLD AUTO: NORMAL FL (ref 8.9–12.9)
PO2 BLD: 100 MMHG (ref 80–100)
POTASSIUM SERPL-SCNC: 3 MMOL/L (ref 3.5–5.1)
POTASSIUM SERPL-SCNC: 3.6 MMOL/L (ref 3.5–5.1)
POTASSIUM SERPL-SCNC: 3.8 MMOL/L (ref 3.5–5.1)
POTASSIUM SERPL-SCNC: 6.2 MMOL/L (ref 3.5–5.1)
PROCALCITONIN SERPL-MCNC: 0.3 NG/ML
PROT UR STRIP-MCNC: 30 MG/DL
PROTHROMBIN TIME: 10.7 SEC (ref 9–11.1)
PROTHROMBIN TIME: NORMAL SEC (ref 9–11.1)
RBC # BLD AUTO: 3.71 M/UL (ref 3.8–5.2)
RBC # BLD AUTO: NORMAL M/UL (ref 3.8–5.2)
RBC #/AREA URNS HPF: ABNORMAL /HPF (ref 0–5)
SAO2 % BLD: 98 % (ref 92–97)
SERVICE CMNT-IMP: ABNORMAL
SERVICE CMNT-IMP: NORMAL
SODIUM SERPL-SCNC: 134 MMOL/L (ref 136–145)
SODIUM SERPL-SCNC: 136 MMOL/L (ref 136–145)
SODIUM SERPL-SCNC: 138 MMOL/L (ref 136–145)
SODIUM SERPL-SCNC: 139 MMOL/L (ref 136–145)
SP GR UR REFRACTOMETRY: 1.01 (ref 1–1.03)
SPECIMEN EXP DATE BLD: NORMAL
SPECIMEN TYPE: ABNORMAL
TROPONIN I SERPL HS-MCNC: 74 NG/L (ref 0–51)
TSH SERPL DL<=0.05 MIU/L-ACNC: 0.48 UIU/ML (ref 0.36–3.74)
TSH SERPL DL<=0.05 MIU/L-ACNC: 0.52 UIU/ML (ref 0.36–3.74)
URINE CULTURE IF INDICATED: ABNORMAL
UROBILINOGEN UR QL STRIP.AUTO: 0.2 EU/DL (ref 0.2–1)
WBC # BLD AUTO: 12.6 K/UL (ref 3.6–11)
WBC # BLD AUTO: NORMAL K/UL (ref 3.6–11)
WBC URNS QL MICRO: ABNORMAL /HPF (ref 0–4)

## 2023-09-01 PROCEDURE — 80307 DRUG TEST PRSMV CHEM ANLYZR: CPT

## 2023-09-01 PROCEDURE — 80048 BASIC METABOLIC PNL TOTAL CA: CPT

## 2023-09-01 PROCEDURE — 95717 EEG PHYS/QHP 2-12 HR W/O VID: CPT | Performed by: PSYCHIATRY & NEUROLOGY

## 2023-09-01 PROCEDURE — 83735 ASSAY OF MAGNESIUM: CPT

## 2023-09-01 PROCEDURE — 81001 URINALYSIS AUTO W/SCOPE: CPT

## 2023-09-01 PROCEDURE — 2500000003 HC RX 250 WO HCPCS: Performed by: NURSE PRACTITIONER

## 2023-09-01 PROCEDURE — 82550 ASSAY OF CK (CPK): CPT

## 2023-09-01 PROCEDURE — 81025 URINE PREGNANCY TEST: CPT

## 2023-09-01 PROCEDURE — 36600 WITHDRAWAL OF ARTERIAL BLOOD: CPT

## 2023-09-01 PROCEDURE — 82962 GLUCOSE BLOOD TEST: CPT

## 2023-09-01 PROCEDURE — 99221 1ST HOSP IP/OBS SF/LOW 40: CPT | Performed by: PSYCHIATRY & NEUROLOGY

## 2023-09-01 PROCEDURE — 85027 COMPLETE CBC AUTOMATED: CPT

## 2023-09-01 PROCEDURE — 6370000000 HC RX 637 (ALT 250 FOR IP): Performed by: EMERGENCY MEDICINE

## 2023-09-01 PROCEDURE — 6370000000 HC RX 637 (ALT 250 FOR IP): Performed by: CLINICAL NURSE SPECIALIST

## 2023-09-01 PROCEDURE — 93005 ELECTROCARDIOGRAM TRACING: CPT | Performed by: NURSE PRACTITIONER

## 2023-09-01 PROCEDURE — 86901 BLOOD TYPING SEROLOGIC RH(D): CPT

## 2023-09-01 PROCEDURE — 99223 1ST HOSP IP/OBS HIGH 75: CPT | Performed by: CLINICAL NURSE SPECIALIST

## 2023-09-01 PROCEDURE — 82010 KETONE BODYS QUAN: CPT

## 2023-09-01 PROCEDURE — 83036 HEMOGLOBIN GLYCOSYLATED A1C: CPT

## 2023-09-01 PROCEDURE — 84484 ASSAY OF TROPONIN QUANT: CPT

## 2023-09-01 PROCEDURE — A4216 STERILE WATER/SALINE, 10 ML: HCPCS | Performed by: NURSE PRACTITIONER

## 2023-09-01 PROCEDURE — 6360000002 HC RX W HCPCS: Performed by: NURSE PRACTITIONER

## 2023-09-01 PROCEDURE — 82803 BLOOD GASES ANY COMBINATION: CPT

## 2023-09-01 PROCEDURE — 84443 ASSAY THYROID STIM HORMONE: CPT

## 2023-09-01 PROCEDURE — 86850 RBC ANTIBODY SCREEN: CPT

## 2023-09-01 PROCEDURE — 95706 EEG WO VID 2-12HR INTMT MNTR: CPT

## 2023-09-01 PROCEDURE — 2580000003 HC RX 258: Performed by: NURSE PRACTITIONER

## 2023-09-01 PROCEDURE — 84100 ASSAY OF PHOSPHORUS: CPT

## 2023-09-01 PROCEDURE — 85610 PROTHROMBIN TIME: CPT

## 2023-09-01 PROCEDURE — 36415 COLL VENOUS BLD VENIPUNCTURE: CPT

## 2023-09-01 PROCEDURE — 84145 PROCALCITONIN (PCT): CPT

## 2023-09-01 PROCEDURE — 85025 COMPLETE CBC W/AUTO DIFF WBC: CPT

## 2023-09-01 PROCEDURE — 2000000000 HC ICU R&B

## 2023-09-01 PROCEDURE — 6370000000 HC RX 637 (ALT 250 FOR IP): Performed by: NURSE PRACTITIONER

## 2023-09-01 PROCEDURE — 6360000002 HC RX W HCPCS: Performed by: EMERGENCY MEDICINE

## 2023-09-01 PROCEDURE — 83605 ASSAY OF LACTIC ACID: CPT

## 2023-09-01 PROCEDURE — 2500000003 HC RX 250 WO HCPCS: Performed by: EMERGENCY MEDICINE

## 2023-09-01 PROCEDURE — 2580000003 HC RX 258: Performed by: EMERGENCY MEDICINE

## 2023-09-01 PROCEDURE — 86900 BLOOD TYPING SEROLOGIC ABO: CPT

## 2023-09-01 RX ORDER — DEXTROSE AND SODIUM CHLORIDE 5; .45 G/100ML; G/100ML
INJECTION, SOLUTION INTRAVENOUS CONTINUOUS
Status: DISCONTINUED | OUTPATIENT
Start: 2023-09-01 | End: 2023-09-01

## 2023-09-01 RX ORDER — MAGNESIUM HYDROXIDE/ALUMINUM HYDROXICE/SIMETHICONE 120; 1200; 1200 MG/30ML; MG/30ML; MG/30ML
30 SUSPENSION ORAL EVERY 6 HOURS PRN
Status: DISCONTINUED | OUTPATIENT
Start: 2023-09-01 | End: 2023-09-01

## 2023-09-01 RX ORDER — INSULIN GLARGINE 100 [IU]/ML
15 INJECTION, SOLUTION SUBCUTANEOUS ONCE
Status: COMPLETED | OUTPATIENT
Start: 2023-09-01 | End: 2023-09-01

## 2023-09-01 RX ORDER — INSULIN LISPRO 100 [IU]/ML
0-8 INJECTION, SOLUTION INTRAVENOUS; SUBCUTANEOUS EVERY 4 HOURS
Status: DISCONTINUED | OUTPATIENT
Start: 2023-09-01 | End: 2023-09-05

## 2023-09-01 RX ORDER — LEVETIRACETAM 500 MG/5ML
750 INJECTION, SOLUTION, CONCENTRATE INTRAVENOUS EVERY 12 HOURS
Status: DISCONTINUED | OUTPATIENT
Start: 2023-09-01 | End: 2023-09-05

## 2023-09-01 RX ORDER — LORAZEPAM 2 MG/ML
0.5 INJECTION INTRAMUSCULAR ONCE
Status: COMPLETED | OUTPATIENT
Start: 2023-09-01 | End: 2023-09-01

## 2023-09-01 RX ORDER — SODIUM CHLORIDE 0.9 % (FLUSH) 0.9 %
5-40 SYRINGE (ML) INJECTION PRN
Status: DISCONTINUED | OUTPATIENT
Start: 2023-09-01 | End: 2023-09-06 | Stop reason: HOSPADM

## 2023-09-01 RX ORDER — LABETALOL HYDROCHLORIDE 5 MG/ML
10 INJECTION, SOLUTION INTRAVENOUS EVERY 6 HOURS PRN
Status: DISCONTINUED | OUTPATIENT
Start: 2023-09-01 | End: 2023-09-03

## 2023-09-01 RX ORDER — SODIUM CHLORIDE 9 MG/ML
INJECTION, SOLUTION INTRAVENOUS PRN
Status: DISCONTINUED | OUTPATIENT
Start: 2023-09-01 | End: 2023-09-06 | Stop reason: HOSPADM

## 2023-09-01 RX ORDER — SENNOSIDES A AND B 8.6 MG/1
1 TABLET, FILM COATED ORAL DAILY PRN
Status: DISCONTINUED | OUTPATIENT
Start: 2023-09-01 | End: 2023-09-06 | Stop reason: HOSPADM

## 2023-09-01 RX ORDER — DEXTROSE AND SODIUM CHLORIDE 5; .45 G/100ML; G/100ML
INJECTION, SOLUTION INTRAVENOUS CONTINUOUS PRN
Status: DISCONTINUED | OUTPATIENT
Start: 2023-09-01 | End: 2023-09-01

## 2023-09-01 RX ORDER — ACETAMINOPHEN 650 MG/1
650 SUPPOSITORY RECTAL EVERY 6 HOURS PRN
Status: DISCONTINUED | OUTPATIENT
Start: 2023-09-01 | End: 2023-09-06 | Stop reason: HOSPADM

## 2023-09-01 RX ORDER — LEVETIRACETAM 500 MG/5ML
500 INJECTION, SOLUTION, CONCENTRATE INTRAVENOUS EVERY 12 HOURS
Status: DISCONTINUED | OUTPATIENT
Start: 2023-09-01 | End: 2023-09-01

## 2023-09-01 RX ORDER — THIAMINE HYDROCHLORIDE 100 MG/ML
100 INJECTION, SOLUTION INTRAMUSCULAR; INTRAVENOUS
Status: DISPENSED | OUTPATIENT
Start: 2023-09-01 | End: 2023-09-02

## 2023-09-01 RX ORDER — ALBUTEROL SULFATE 2.5 MG/3ML
2.5 SOLUTION RESPIRATORY (INHALATION) EVERY 6 HOURS PRN
Status: DISCONTINUED | OUTPATIENT
Start: 2023-09-01 | End: 2023-09-06 | Stop reason: HOSPADM

## 2023-09-01 RX ORDER — ONDANSETRON 2 MG/ML
4 INJECTION INTRAMUSCULAR; INTRAVENOUS EVERY 6 HOURS PRN
Status: DISCONTINUED | OUTPATIENT
Start: 2023-09-01 | End: 2023-09-06 | Stop reason: HOSPADM

## 2023-09-01 RX ORDER — LORAZEPAM 2 MG/ML
4 INJECTION INTRAMUSCULAR EVERY 5 MIN PRN
Status: DISCONTINUED | OUTPATIENT
Start: 2023-09-01 | End: 2023-09-06 | Stop reason: HOSPADM

## 2023-09-01 RX ORDER — 0.9 % SODIUM CHLORIDE 0.9 %
15 INTRAVENOUS SOLUTION INTRAVENOUS ONCE
Status: DISCONTINUED | OUTPATIENT
Start: 2023-09-01 | End: 2023-09-01

## 2023-09-01 RX ORDER — SODIUM CHLORIDE 9 MG/ML
INJECTION, SOLUTION INTRAVENOUS CONTINUOUS
Status: DISCONTINUED | OUTPATIENT
Start: 2023-09-01 | End: 2023-09-01

## 2023-09-01 RX ORDER — POTASSIUM CHLORIDE 7.45 MG/ML
10 INJECTION INTRAVENOUS PRN
Status: DISCONTINUED | OUTPATIENT
Start: 2023-09-01 | End: 2023-09-06 | Stop reason: HOSPADM

## 2023-09-01 RX ORDER — ENOXAPARIN SODIUM 100 MG/ML
30 INJECTION SUBCUTANEOUS DAILY
Status: CANCELLED | OUTPATIENT
Start: 2023-09-01

## 2023-09-01 RX ORDER — SODIUM CHLORIDE 0.9 % (FLUSH) 0.9 %
5-40 SYRINGE (ML) INJECTION EVERY 12 HOURS SCHEDULED
Status: DISCONTINUED | OUTPATIENT
Start: 2023-09-01 | End: 2023-09-06 | Stop reason: HOSPADM

## 2023-09-01 RX ORDER — POTASSIUM CHLORIDE 7.45 MG/ML
10 INJECTION INTRAVENOUS
Status: COMPLETED | OUTPATIENT
Start: 2023-09-01 | End: 2023-09-01

## 2023-09-01 RX ORDER — DEXTROSE MONOHYDRATE 100 MG/ML
INJECTION, SOLUTION INTRAVENOUS CONTINUOUS PRN
Status: DISCONTINUED | OUTPATIENT
Start: 2023-09-01 | End: 2023-09-06 | Stop reason: HOSPADM

## 2023-09-01 RX ORDER — POTASSIUM CHLORIDE 7.45 MG/ML
10 INJECTION INTRAVENOUS
Status: DISCONTINUED | OUTPATIENT
Start: 2023-09-01 | End: 2023-09-01

## 2023-09-01 RX ORDER — MAGNESIUM SULFATE IN WATER 40 MG/ML
2000 INJECTION, SOLUTION INTRAVENOUS PRN
Status: DISCONTINUED | OUTPATIENT
Start: 2023-09-01 | End: 2023-09-06 | Stop reason: HOSPADM

## 2023-09-01 RX ORDER — ACETAMINOPHEN 325 MG/1
650 TABLET ORAL EVERY 6 HOURS PRN
Status: DISCONTINUED | OUTPATIENT
Start: 2023-09-01 | End: 2023-09-06 | Stop reason: HOSPADM

## 2023-09-01 RX ORDER — POTASSIUM CHLORIDE 7.45 MG/ML
10 INJECTION INTRAVENOUS ONCE
Status: DISCONTINUED | OUTPATIENT
Start: 2023-09-01 | End: 2023-09-01 | Stop reason: HOSPADM

## 2023-09-01 RX ADMIN — POTASSIUM CHLORIDE 10 MEQ: 7.46 INJECTION, SOLUTION INTRAVENOUS at 14:38

## 2023-09-01 RX ADMIN — SODIUM CHLORIDE: 9 INJECTION, SOLUTION INTRAVENOUS at 00:44

## 2023-09-01 RX ADMIN — LEVETIRACETAM 750 MG: 100 INJECTION, SOLUTION INTRAVENOUS at 21:52

## 2023-09-01 RX ADMIN — NICARDIPINE HYDROCHLORIDE 5 MG/HR: 25 INJECTION, SOLUTION INTRAVENOUS at 21:21

## 2023-09-01 RX ADMIN — SODIUM CHLORIDE, PRESERVATIVE FREE 10 ML: 5 INJECTION INTRAVENOUS at 20:33

## 2023-09-01 RX ADMIN — LORAZEPAM 0.5 MG: 2 INJECTION INTRAMUSCULAR; INTRAVENOUS at 01:18

## 2023-09-01 RX ADMIN — DEXTROSE AND SODIUM CHLORIDE: 5; 450 INJECTION, SOLUTION INTRAVENOUS at 10:27

## 2023-09-01 RX ADMIN — SODIUM CHLORIDE 5 MG/HR: 9 INJECTION, SOLUTION INTRAVENOUS at 00:41

## 2023-09-01 RX ADMIN — INSULIN HUMAN 10.82 UNITS/HR: 1 INJECTION, SOLUTION INTRAVENOUS at 00:36

## 2023-09-01 RX ADMIN — SODIUM CHLORIDE, PRESERVATIVE FREE 10 ML: 5 INJECTION INTRAVENOUS at 09:08

## 2023-09-01 RX ADMIN — SODIUM CHLORIDE 3.86 UNITS/HR: 9 INJECTION, SOLUTION INTRAVENOUS at 06:52

## 2023-09-01 RX ADMIN — INSULIN GLARGINE 15 UNITS: 100 INJECTION, SOLUTION SUBCUTANEOUS at 15:07

## 2023-09-01 RX ADMIN — LABETALOL HYDROCHLORIDE 10 MG: 5 INJECTION INTRAVENOUS at 20:14

## 2023-09-01 RX ADMIN — FAMOTIDINE 20 MG: 10 INJECTION INTRAVENOUS at 10:19

## 2023-09-01 RX ADMIN — LEVETIRACETAM 500 MG: 100 INJECTION INTRAVENOUS at 10:18

## 2023-09-01 RX ADMIN — SODIUM CHLORIDE 0.94 UNITS/HR: 9 INJECTION, SOLUTION INTRAVENOUS at 12:03

## 2023-09-01 RX ADMIN — POTASSIUM CHLORIDE 10 MEQ: 7.46 INJECTION, SOLUTION INTRAVENOUS at 13:34

## 2023-09-01 RX ADMIN — DEXTROSE AND SODIUM CHLORIDE: 5; 450 INJECTION, SOLUTION INTRAVENOUS at 03:57

## 2023-09-01 RX ADMIN — DEXTROSE MONOHYDRATE 125 ML: 100 INJECTION, SOLUTION INTRAVENOUS at 03:38

## 2023-09-01 RX ADMIN — NICARDIPINE HYDROCHLORIDE 2.5 MG/HR: 25 INJECTION, SOLUTION INTRAVENOUS at 04:53

## 2023-09-01 RX ADMIN — POTASSIUM CHLORIDE 10 MEQ: 7.46 INJECTION, SOLUTION INTRAVENOUS at 16:42

## 2023-09-01 RX ADMIN — POTASSIUM CHLORIDE 10 MEQ: 7.46 INJECTION, SOLUTION INTRAVENOUS at 15:31

## 2023-09-01 ASSESSMENT — PAIN SCALES - GENERAL
PAINLEVEL_OUTOF10: 0

## 2023-09-01 NOTE — PROCEDURES
EEG Session Report  Patient Name: Jihan Roberson  Medical ID: [id number]  YOB: 1970  Age: 47  Session Duration:  Sep 1, 2023 3:37 AM - Sep 1, 2023 5:59 AM  Recording Total Time: 02:21:44  Ordering Physician: Violet Kumar NP  Primary Indication: Prior Seizure, Toxic/Metabolic  Location: ICU/Floor    Description of procedure: This EEG was obtained using a 10 lead, 8 channel system positioned circumferentially without any parasagittal coverage (rapid EEG). Computer selected EEG is reviewed as well as background features and all clinically significant events. Clarity algorithm utilized and implemented to provide analysis of underlying activity and seizure detection used to facilitate reading. Description of recording: Background activity consists of medium voltage rhythms in the 7 to 8 Hz frequency range with intermittent bursts of slower frequencies with rushes of faster frequencies which could represent sleep architecture. No asymmetry or sharp wave activity noted. Impressions: Mild background slowing which may indicate drowsiness or nonspecific encephalopathy. No epileptiform activity or electrographic seizures seen. Comments: Consider routine 18 channel EEG for further diagnostic clarification as it provides better parasagittal coverage and spatial resolution.      Report prepared by: N/A  Report generated on: Sep 1, 2023 4:19 PM [facility]-

## 2023-09-01 NOTE — H&P
Name: Lakshmi Rae   : 1970   MRN: [id number]   Date: 2023      REASON FOR ICU ADMISSION:  Status Epilepticus     PRINCIPLE ICU DIAGNOSIS   Status Epilepticus  Anion gap metabolic acidosis with setting of DKA - now hypoglycemic  HTN Emergency   DM 2  Acute hypercapnic respiratory and metabolic acidosis   ADONIS  Hx CVA 2023    PATIENT SUMMARY   Lakshmi Rae is a 47 y.o. female with a history of recently diagnosed DM 2, recent seizure, and recent CVA who presented initially to [facility] ED with complaints of seizures. EMS was called d/t seizures. Reportedly patient had 4 seizures prior to EMS arrival. They gave versed that stopped seizure activity, and patient had post ictal state after. Was brought to the ED. Was also found to be hyperglycemic and in suspected DKA. Started on insulin drip with IVF. Lactate was 13. Aleukocytosis. Patient was loaded with 2 g of Keppra and given a dose of Rocephin IV. Transfer center was called and due to concern for going back into status and need for available 24 hr continuous EEG monitoring, the patient was accepted to [facility] ICU. Patient also with ADONIS and HTN Emergency, B/P 207/118, asked to start patient on Cardene drip for blood pressure control. Continue fluid resuscitation. On arrival to ICU patient  is obtunded, withdraws to pain, not speaking or following commands. Started rapid EEG. Blood glucose checked with arterial blood, now has dropped to 52. Bolus with dextrose and change IVF to dextrose containing IVF. Repeat labs sent and pending. Neurology at bedside for evaluation. COMPREHENSIVE ASSESSMENT & PLAN:SYSTEM BASED     24 HOUR EVENTS: as above    NEUROLOGICAL:   Status Epilepticus  Analgesia: None  Sedation: None  Neuro check Frequency: Hourly  Rapid EEG now  24 hr EEG per neurology  Was loaded with Keppra 2g IV at OSH ED. AED management per neurology  If still in status will intubate and start propofol.    UDS  Thiamine    PULMONOLOGY:   Respiratory Goals:

## 2023-09-01 NOTE — ED PROVIDER NOTES
[facility] EMERGENCY DEPT  EMERGENCY DEPARTMENT HISTORY AND PHYSICAL EXAM      Date: 8/31/2023  Patient Name: Zaira Trinh  MRN: [id number]  [address] [facility] 1970  Date of evaluation: 8/31/2023  Provider: Candido Diaz MD   Note Started: 11:34 PM EDT 8/31/23    HISTORY OF PRESENT ILLNESS     Chief Complaint   Patient presents with    Seizures    Hyperglycemia       History Provided By: EMS  HPI: Zaria Trinh is a 47 y.o. female past medical history of 1 seizure, newly diagnosed diabetes presents for evaluation of status epilepticus. Patient had 4 seizures prior to EMS arrival.  They gave Versed with cessation of seizures. Patient also found to be hyperglycemic with a EMS blood sugar reading high. On arrival here patient was postictal and unable to contribute to history. No family at bedside. PAST MEDICAL HISTORY   Past Medical History:  No past medical history on file. Past Surgical History:  No past surgical history on file. Family History:  No family history on file.     Social History:  Social History     Tobacco Use    Smoking status: Never    Smokeless tobacco: Never   Substance Use Topics    Alcohol use: Not Currently       Allergies:  No Known Allergies    PCP: None None    Current Meds:   Current Facility-Administered Medications   Medication Dose Route Frequency Provider Last Rate Last Admin    dextrose bolus 10% 125 mL  125 mL IntraVENous PRN Candido Diaz MD        Or    dextrose bolus 10% 250 mL  250 mL IntraVENous PRN Candido Diaz MD        potassium chloride 10 mEq/100 mL IVPB (Peripheral Line)  10 mEq IntraVENous PRN Candido Diaz MD        magnesium sulfate 2000 mg in 50 mL IVPB premix  2,000 mg IntraVENous PRN Candido Diaz MD        sodium phosphate 10 mmol in sodium chloride 0.9 % 250 mL IVPB  10 mmol IntraVENous PRN Candido Diaz MD        Or    sodium phosphate 15 mmol in sodium chloride 0.9 % 250 mL IVPB  15 mmol IntraVENous PRN Candido Diaz MD        Or    sodium phosphate 20 mmol

## 2023-09-02 PROBLEM — Z86.73 OLD CEREBROVASCULAR ACCIDENT (CVA) WITHOUT LATE EFFECT: Status: ACTIVE | Noted: 2023-09-02

## 2023-09-02 PROBLEM — R56.9 SEIZURE (HCC): Status: ACTIVE | Noted: 2023-09-02

## 2023-09-02 LAB
ALBUMIN SERPL-MCNC: 3.2 G/DL (ref 3.5–5)
ALBUMIN/GLOB SERPL: 0.7 (ref 1.1–2.2)
ALP SERPL-CCNC: 176 U/L (ref 45–117)
ALT SERPL-CCNC: 23 U/L (ref 12–78)
ANION GAP SERPL CALC-SCNC: 7 MMOL/L (ref 5–15)
AST SERPL-CCNC: 22 U/L (ref 15–37)
BASOPHILS # BLD: 0 K/UL (ref 0–0.1)
BASOPHILS NFR BLD: 0 % (ref 0–1)
BILIRUB SERPL-MCNC: 0.4 MG/DL (ref 0.2–1)
BUN SERPL-MCNC: 9 MG/DL (ref 6–20)
BUN/CREAT SERPL: 6 (ref 12–20)
CALCIUM SERPL-MCNC: 9.3 MG/DL (ref 8.5–10.1)
CHLORIDE SERPL-SCNC: 110 MMOL/L (ref 97–108)
CO2 SERPL-SCNC: 22 MMOL/L (ref 21–32)
CREAT SERPL-MCNC: 1.4 MG/DL (ref 0.55–1.02)
CRP SERPL-MCNC: 1.86 MG/DL (ref 0–0.6)
DIFFERENTIAL METHOD BLD: ABNORMAL
EOSINOPHIL # BLD: 0 K/UL (ref 0–0.4)
EOSINOPHIL NFR BLD: 0 % (ref 0–7)
ERYTHROCYTE [DISTWIDTH] IN BLOOD BY AUTOMATED COUNT: 12.4 % (ref 11.5–14.5)
ERYTHROCYTE [SEDIMENTATION RATE] IN BLOOD: 56 MM/HR (ref 0–30)
FOLATE SERPL-MCNC: 9.9 NG/ML (ref 5–21)
GLOBULIN SER CALC-MCNC: 4.6 G/DL (ref 2–4)
GLUCOSE BLD STRIP.AUTO-MCNC: 142 MG/DL (ref 65–117)
GLUCOSE BLD STRIP.AUTO-MCNC: 148 MG/DL (ref 65–117)
GLUCOSE BLD STRIP.AUTO-MCNC: 258 MG/DL (ref 65–117)
GLUCOSE BLD STRIP.AUTO-MCNC: 258 MG/DL (ref 65–117)
GLUCOSE BLD STRIP.AUTO-MCNC: 304 MG/DL (ref 65–117)
GLUCOSE BLD STRIP.AUTO-MCNC: 66 MG/DL (ref 65–117)
GLUCOSE BLD STRIP.AUTO-MCNC: 85 MG/DL (ref 65–117)
GLUCOSE SERPL-MCNC: 242 MG/DL (ref 65–100)
HCT VFR BLD AUTO: 32.1 % (ref 35–47)
HGB BLD-MCNC: 10.5 G/DL (ref 11.5–16)
IMM GRANULOCYTES # BLD AUTO: 0 K/UL (ref 0–0.04)
IMM GRANULOCYTES NFR BLD AUTO: 0 % (ref 0–0.5)
LYMPHOCYTES # BLD: 3.4 K/UL (ref 0.8–3.5)
LYMPHOCYTES NFR BLD: 46 % (ref 12–49)
MAGNESIUM SERPL-MCNC: 1.9 MG/DL (ref 1.6–2.4)
MCH RBC QN AUTO: 28.8 PG (ref 26–34)
MCHC RBC AUTO-ENTMCNC: 32.7 G/DL (ref 30–36.5)
MCV RBC AUTO: 87.9 FL (ref 80–99)
MONOCYTES # BLD: 0.6 K/UL (ref 0–1)
MONOCYTES NFR BLD: 8 % (ref 5–13)
NEUTS SEG # BLD: 3.4 K/UL (ref 1.8–8)
NEUTS SEG NFR BLD: 46 % (ref 32–75)
NRBC # BLD: 0 K/UL (ref 0–0.01)
NRBC BLD-RTO: 0 PER 100 WBC
PHOSPHATE SERPL-MCNC: 2.5 MG/DL (ref 2.6–4.7)
PLATELET # BLD AUTO: 306 K/UL (ref 150–400)
PMV BLD AUTO: 11.4 FL (ref 8.9–12.9)
POTASSIUM SERPL-SCNC: 3.5 MMOL/L (ref 3.5–5.1)
PROT SERPL-MCNC: 7.8 G/DL (ref 6.4–8.2)
RBC # BLD AUTO: 3.65 M/UL (ref 3.8–5.2)
SERVICE CMNT-IMP: ABNORMAL
SERVICE CMNT-IMP: NORMAL
SERVICE CMNT-IMP: NORMAL
SODIUM SERPL-SCNC: 139 MMOL/L (ref 136–145)
T3FREE SERPL-MCNC: 1.8 PG/ML (ref 2.2–4)
T4 FREE SERPL-MCNC: 1 NG/DL (ref 0.8–1.5)
VIT B12 SERPL-MCNC: 453 PG/ML (ref 193–986)
WBC # BLD AUTO: 7.4 K/UL (ref 3.6–11)

## 2023-09-02 PROCEDURE — 36415 COLL VENOUS BLD VENIPUNCTURE: CPT

## 2023-09-02 PROCEDURE — 99233 SBSQ HOSP IP/OBS HIGH 50: CPT | Performed by: PSYCHIATRY & NEUROLOGY

## 2023-09-02 PROCEDURE — 84100 ASSAY OF PHOSPHORUS: CPT

## 2023-09-02 PROCEDURE — 80053 COMPREHEN METABOLIC PANEL: CPT

## 2023-09-02 PROCEDURE — 82962 GLUCOSE BLOOD TEST: CPT

## 2023-09-02 PROCEDURE — 6360000002 HC RX W HCPCS: Performed by: NURSE PRACTITIONER

## 2023-09-02 PROCEDURE — 82746 ASSAY OF FOLIC ACID SERUM: CPT

## 2023-09-02 PROCEDURE — 84439 ASSAY OF FREE THYROXINE: CPT

## 2023-09-02 PROCEDURE — 2500000003 HC RX 250 WO HCPCS: Performed by: NURSE PRACTITIONER

## 2023-09-02 PROCEDURE — 2000000000 HC ICU R&B

## 2023-09-02 PROCEDURE — 6370000000 HC RX 637 (ALT 250 FOR IP): Performed by: NURSE PRACTITIONER

## 2023-09-02 PROCEDURE — 6370000000 HC RX 637 (ALT 250 FOR IP): Performed by: CLINICAL NURSE SPECIALIST

## 2023-09-02 PROCEDURE — 83735 ASSAY OF MAGNESIUM: CPT

## 2023-09-02 PROCEDURE — 84481 FREE ASSAY (FT-3): CPT

## 2023-09-02 PROCEDURE — 86140 C-REACTIVE PROTEIN: CPT

## 2023-09-02 PROCEDURE — 85652 RBC SED RATE AUTOMATED: CPT

## 2023-09-02 PROCEDURE — 82607 VITAMIN B-12: CPT

## 2023-09-02 PROCEDURE — 2580000003 HC RX 258: Performed by: NURSE PRACTITIONER

## 2023-09-02 PROCEDURE — A4216 STERILE WATER/SALINE, 10 ML: HCPCS | Performed by: NURSE PRACTITIONER

## 2023-09-02 PROCEDURE — 85025 COMPLETE CBC W/AUTO DIFF WBC: CPT

## 2023-09-02 RX ORDER — ATORVASTATIN CALCIUM 40 MG/1
40 TABLET, FILM COATED ORAL NIGHTLY
Status: DISCONTINUED | OUTPATIENT
Start: 2023-09-02 | End: 2023-09-06 | Stop reason: HOSPADM

## 2023-09-02 RX ORDER — CARVEDILOL 12.5 MG/1
25 TABLET ORAL 2 TIMES DAILY WITH MEALS
Status: DISCONTINUED | OUTPATIENT
Start: 2023-09-02 | End: 2023-09-06 | Stop reason: HOSPADM

## 2023-09-02 RX ORDER — POTASSIUM CHLORIDE 7.45 MG/ML
10 INJECTION INTRAVENOUS
Status: DISCONTINUED | OUTPATIENT
Start: 2023-09-02 | End: 2023-09-02

## 2023-09-02 RX ORDER — LISINOPRIL 20 MG/1
20 TABLET ORAL 2 TIMES DAILY
Status: DISCONTINUED | OUTPATIENT
Start: 2023-09-02 | End: 2023-09-06 | Stop reason: HOSPADM

## 2023-09-02 RX ORDER — AMLODIPINE BESYLATE 5 MG/1
10 TABLET ORAL DAILY
Status: DISCONTINUED | OUTPATIENT
Start: 2023-09-02 | End: 2023-09-06 | Stop reason: HOSPADM

## 2023-09-02 RX ORDER — POTASSIUM CHLORIDE 7.45 MG/ML
10 INJECTION INTRAVENOUS ONCE
Status: COMPLETED | OUTPATIENT
Start: 2023-09-02 | End: 2023-09-02

## 2023-09-02 RX ADMIN — SODIUM CHLORIDE, PRESERVATIVE FREE 10 ML: 5 INJECTION INTRAVENOUS at 21:00

## 2023-09-02 RX ADMIN — CARVEDILOL 25 MG: 12.5 TABLET, FILM COATED ORAL at 08:23

## 2023-09-02 RX ADMIN — Medication 6 UNITS: at 23:42

## 2023-09-02 RX ADMIN — LISINOPRIL 20 MG: 20 TABLET ORAL at 08:23

## 2023-09-02 RX ADMIN — Medication 4 UNITS: at 10:36

## 2023-09-02 RX ADMIN — CARVEDILOL 25 MG: 12.5 TABLET, FILM COATED ORAL at 18:19

## 2023-09-02 RX ADMIN — LISINOPRIL 20 MG: 20 TABLET ORAL at 21:13

## 2023-09-02 RX ADMIN — LEVETIRACETAM 750 MG: 100 INJECTION, SOLUTION INTRAVENOUS at 22:20

## 2023-09-02 RX ADMIN — NICARDIPINE HYDROCHLORIDE 2.5 MG/HR: 25 INJECTION, SOLUTION INTRAVENOUS at 01:51

## 2023-09-02 RX ADMIN — LEVETIRACETAM 750 MG: 100 INJECTION, SOLUTION INTRAVENOUS at 10:29

## 2023-09-02 RX ADMIN — LABETALOL HYDROCHLORIDE 10 MG: 5 INJECTION INTRAVENOUS at 23:37

## 2023-09-02 RX ADMIN — FAMOTIDINE 20 MG: 10 INJECTION INTRAVENOUS at 08:24

## 2023-09-02 RX ADMIN — Medication 4 UNITS: at 03:12

## 2023-09-02 RX ADMIN — Medication 10 UNITS: at 08:24

## 2023-09-02 RX ADMIN — ATORVASTATIN CALCIUM 40 MG: 40 TABLET, FILM COATED ORAL at 21:13

## 2023-09-02 RX ADMIN — POTASSIUM CHLORIDE 10 MEQ: 7.46 INJECTION, SOLUTION INTRAVENOUS at 05:28

## 2023-09-02 RX ADMIN — SODIUM CHLORIDE, PRESERVATIVE FREE 10 ML: 5 INJECTION INTRAVENOUS at 08:25

## 2023-09-02 RX ADMIN — AMLODIPINE BESYLATE 10 MG: 5 TABLET ORAL at 08:23

## 2023-09-02 ASSESSMENT — ENCOUNTER SYMPTOMS
SHORTNESS OF BREATH: 0
ABDOMINAL PAIN: 0

## 2023-09-02 ASSESSMENT — PAIN SCALES - GENERAL
PAINLEVEL_OUTOF10: 0

## 2023-09-03 LAB
ANION GAP SERPL CALC-SCNC: 6 MMOL/L (ref 5–15)
BACTERIA SPEC CULT: NORMAL
BACTERIA SPEC CULT: NORMAL
BASOPHILS # BLD: 0 K/UL (ref 0–0.1)
BASOPHILS NFR BLD: 0 % (ref 0–1)
BUN SERPL-MCNC: 15 MG/DL (ref 6–20)
BUN/CREAT SERPL: 8 (ref 12–20)
CALCIUM SERPL-MCNC: 9.4 MG/DL (ref 8.5–10.1)
CHLORIDE SERPL-SCNC: 109 MMOL/L (ref 97–108)
CO2 SERPL-SCNC: 24 MMOL/L (ref 21–32)
CREAT SERPL-MCNC: 1.93 MG/DL (ref 0.55–1.02)
DIFFERENTIAL METHOD BLD: ABNORMAL
EOSINOPHIL # BLD: 0.1 K/UL (ref 0–0.4)
EOSINOPHIL NFR BLD: 1 % (ref 0–7)
ERYTHROCYTE [DISTWIDTH] IN BLOOD BY AUTOMATED COUNT: 12.2 % (ref 11.5–14.5)
GLUCOSE BLD STRIP.AUTO-MCNC: 119 MG/DL (ref 65–117)
GLUCOSE BLD STRIP.AUTO-MCNC: 192 MG/DL (ref 65–117)
GLUCOSE BLD STRIP.AUTO-MCNC: 205 MG/DL (ref 65–117)
GLUCOSE BLD STRIP.AUTO-MCNC: 273 MG/DL (ref 65–117)
GLUCOSE BLD STRIP.AUTO-MCNC: 300 MG/DL (ref 65–117)
GLUCOSE SERPL-MCNC: 124 MG/DL (ref 65–100)
HCT VFR BLD AUTO: 32 % (ref 35–47)
HGB BLD-MCNC: 10.5 G/DL (ref 11.5–16)
IMM GRANULOCYTES # BLD AUTO: 0 K/UL (ref 0–0.04)
IMM GRANULOCYTES NFR BLD AUTO: 0 % (ref 0–0.5)
LYMPHOCYTES # BLD: 3.3 K/UL (ref 0.8–3.5)
LYMPHOCYTES NFR BLD: 41 % (ref 12–49)
Lab: NORMAL
Lab: NORMAL
MCH RBC QN AUTO: 28.7 PG (ref 26–34)
MCHC RBC AUTO-ENTMCNC: 32.8 G/DL (ref 30–36.5)
MCV RBC AUTO: 87.4 FL (ref 80–99)
MONOCYTES # BLD: 0.7 K/UL (ref 0–1)
MONOCYTES NFR BLD: 9 % (ref 5–13)
NEUTS SEG # BLD: 4 K/UL (ref 1.8–8)
NEUTS SEG NFR BLD: 49 % (ref 32–75)
NRBC # BLD: 0 K/UL (ref 0–0.01)
NRBC BLD-RTO: 0 PER 100 WBC
PLATELET # BLD AUTO: 319 K/UL (ref 150–400)
PMV BLD AUTO: 11 FL (ref 8.9–12.9)
POTASSIUM SERPL-SCNC: 3.4 MMOL/L (ref 3.5–5.1)
RBC # BLD AUTO: 3.66 M/UL (ref 3.8–5.2)
SERVICE CMNT-IMP: ABNORMAL
SODIUM SERPL-SCNC: 139 MMOL/L (ref 136–145)
WBC # BLD AUTO: 8.2 K/UL (ref 3.6–11)

## 2023-09-03 PROCEDURE — 80048 BASIC METABOLIC PNL TOTAL CA: CPT

## 2023-09-03 PROCEDURE — 6370000000 HC RX 637 (ALT 250 FOR IP): Performed by: CLINICAL NURSE SPECIALIST

## 2023-09-03 PROCEDURE — A4216 STERILE WATER/SALINE, 10 ML: HCPCS | Performed by: NURSE PRACTITIONER

## 2023-09-03 PROCEDURE — 85025 COMPLETE CBC W/AUTO DIFF WBC: CPT

## 2023-09-03 PROCEDURE — 6370000000 HC RX 637 (ALT 250 FOR IP): Performed by: NURSE PRACTITIONER

## 2023-09-03 PROCEDURE — 6360000002 HC RX W HCPCS: Performed by: NURSE PRACTITIONER

## 2023-09-03 PROCEDURE — 95720 EEG PHY/QHP EA INCR W/VEEG: CPT | Performed by: PSYCHIATRY & NEUROLOGY

## 2023-09-03 PROCEDURE — 2500000003 HC RX 250 WO HCPCS: Performed by: NURSE PRACTITIONER

## 2023-09-03 PROCEDURE — 2060000000 HC ICU INTERMEDIATE R&B

## 2023-09-03 PROCEDURE — 82962 GLUCOSE BLOOD TEST: CPT

## 2023-09-03 PROCEDURE — 36415 COLL VENOUS BLD VENIPUNCTURE: CPT

## 2023-09-03 PROCEDURE — 2580000003 HC RX 258: Performed by: NURSE PRACTITIONER

## 2023-09-03 PROCEDURE — 92610 EVALUATE SWALLOWING FUNCTION: CPT

## 2023-09-03 RX ORDER — LABETALOL HYDROCHLORIDE 5 MG/ML
20 INJECTION, SOLUTION INTRAVENOUS EVERY 6 HOURS PRN
Status: DISCONTINUED | OUTPATIENT
Start: 2023-09-03 | End: 2023-09-06 | Stop reason: HOSPADM

## 2023-09-03 RX ADMIN — NICARDIPINE HYDROCHLORIDE 5 MG/HR: 25 INJECTION, SOLUTION INTRAVENOUS at 03:33

## 2023-09-03 RX ADMIN — FAMOTIDINE 20 MG: 10 INJECTION INTRAVENOUS at 08:11

## 2023-09-03 RX ADMIN — ATORVASTATIN CALCIUM 40 MG: 40 TABLET, FILM COATED ORAL at 21:04

## 2023-09-03 RX ADMIN — Medication 4 UNITS: at 23:09

## 2023-09-03 RX ADMIN — CARVEDILOL 25 MG: 12.5 TABLET, FILM COATED ORAL at 17:34

## 2023-09-03 RX ADMIN — AMLODIPINE BESYLATE 10 MG: 5 TABLET ORAL at 08:10

## 2023-09-03 RX ADMIN — LEVETIRACETAM 750 MG: 100 INJECTION, SOLUTION INTRAVENOUS at 10:54

## 2023-09-03 RX ADMIN — LISINOPRIL 20 MG: 20 TABLET ORAL at 21:04

## 2023-09-03 RX ADMIN — SODIUM CHLORIDE, PRESERVATIVE FREE 10 ML: 5 INJECTION INTRAVENOUS at 08:12

## 2023-09-03 RX ADMIN — LISINOPRIL 20 MG: 20 TABLET ORAL at 08:10

## 2023-09-03 RX ADMIN — Medication 2 UNITS: at 06:55

## 2023-09-03 RX ADMIN — CARVEDILOL 25 MG: 12.5 TABLET, FILM COATED ORAL at 08:10

## 2023-09-03 RX ADMIN — LEVETIRACETAM 750 MG: 100 INJECTION, SOLUTION INTRAVENOUS at 23:07

## 2023-09-03 RX ADMIN — Medication 6 UNITS: at 11:13

## 2023-09-03 ASSESSMENT — PAIN SCALES - GENERAL
PAINLEVEL_OUTOF10: 0

## 2023-09-03 ASSESSMENT — ENCOUNTER SYMPTOMS
SHORTNESS OF BREATH: 0
ABDOMINAL PAIN: 0

## 2023-09-03 NOTE — PLAN OF CARE
Problem: Discharge Planning  Goal: Discharge to home or other facility with appropriate resources  Outcome: Progressing  Flowsheets  Taken 9/3/2023 1225 by Rayne Dave RN  Discharge to home or other facility with appropriate resources: Identify barriers to discharge with patient and caregiver  Taken 9/3/2023 0800 by Jorge Arzola RN  Discharge to home or other facility with appropriate resources: Identify barriers to discharge with patient and caregiver     Problem: Pain  Goal: Verbalizes/displays adequate comfort level or baseline comfort level  Outcome: Progressing  Flowsheets (Taken 9/3/2023 0800 by Jorge Arzola RN)  Verbalizes/displays adequate comfort level or baseline comfort level: Implement non-pharmacological measures as appropriate and evaluate response     Problem: Safety - Adult  Goal: Free from fall injury  Outcome: Progressing  Flowsheets (Taken 9/3/2023 1237)  Free From Fall Injury: Instruct family/caregiver on patient safety     Problem: Chronic Conditions and Co-morbidities  Goal: Patient's chronic conditions and co-morbidity symptoms are monitored and maintained or improved  Outcome: Progressing  Flowsheets  Taken 9/3/2023 1225 by William Amaral [facility] - Patient's Chronic Conditions and Co-Morbidity Symptoms are Monitored and Maintained or Improved: Monitor and assess patient's chronic conditions and comorbid symptoms for stability, deterioration, or improvement  Taken 9/3/2023 0800 by Jorge Arzola RN  Care Plan - Patient's Chronic Conditions and Co-Morbidity Symptoms are Monitored and Maintained or Improved: Monitor and assess patient's chronic conditions and comorbid symptoms for stability, deterioration, or improvement     Problem: Skin/Tissue Integrity  Goal: Absence of new skin breakdown  Description: 1. Monitor for areas of redness and/or skin breakdown  2. Assess vascular access sites hourly  3. Every 4-6 hours minimum:  Change oxygen saturation probe site  4.

## 2023-09-03 NOTE — H&P
History and Physical    Date of Service:  9/3/2023  Primary Care Provider: None None  Source of information: Chart Review    Chief Complaint: No chief complaint on file. History of Presenting Illness:   Per H&P: \"47year-old female who presented to [facility] (8/31) via EMS from home with seizures. EMS report Pt had approximately 4 seizures prior to transport and en route for which they gave versed which was reported to stop the seizure. On arrival to [facility] patient was in a postictal state, found to be hyperglycemic with suspected DKA and ADONIS. Lactic acid was 13. Patient received IVF, started on insulin infusion, loaded with Keppra, and received single dose of Rocephin. Patient transferred to [facility] ICU for further neurologic monitoring (EEG monitoring). Pt hypertensive (/118), started on nicardipine infusion. On arrival to [facility] patients blood glucose was in the 50s. She was emergently bolused with dextrose and IVF changed to include dextrose. Neurology following\"     Patent off nicardpine gtt today with somewhat improved mentation per ICU notes. At bedside she is somnolent but rouses easily. She does not converse or participate with the exam. Spoke with ICU team, this is an improvement in her mental status. VSS will transfer to [facility]     REVIEW OF SYSTEMS:  A comprehensive review of systems was negative except for that written in the History of Present Illness. No past medical history on file. No past surgical history on file. Prior to Admission medications    Medication Sig Start Date End Date Taking?  Authorizing Provider   aspirin 81 MG chewable tablet Take 1 tablet by mouth daily 8/2/23   Zana Thurman MD   insulin glargine (LANTUS) 100 UNIT/ML injection vial Inject 45 Units into the skin nightly 8/2/23   Zana Thurman MD   atorvastatin (LIPITOR) 40 MG tablet Take 1 tablet by mouth nightly 8/2/23   Zana Thurman MD   lisinopril (PRINIVIL;ZESTRIL) 20 MG tablet Take 1 tablet by mouth in the morning

## 2023-09-04 LAB
BASOPHILS # BLD: 0 K/UL (ref 0–0.1)
BASOPHILS NFR BLD: 1 % (ref 0–1)
DIFFERENTIAL METHOD BLD: ABNORMAL
EOSINOPHIL # BLD: 0.1 K/UL (ref 0–0.4)
EOSINOPHIL NFR BLD: 1 % (ref 0–7)
ERYTHROCYTE [DISTWIDTH] IN BLOOD BY AUTOMATED COUNT: 12.1 % (ref 11.5–14.5)
GLUCOSE BLD STRIP.AUTO-MCNC: 108 MG/DL (ref 65–117)
GLUCOSE BLD STRIP.AUTO-MCNC: 158 MG/DL (ref 65–117)
GLUCOSE BLD STRIP.AUTO-MCNC: 178 MG/DL (ref 65–117)
GLUCOSE BLD STRIP.AUTO-MCNC: 257 MG/DL (ref 65–117)
GLUCOSE BLD STRIP.AUTO-MCNC: 375 MG/DL (ref 65–117)
GLUCOSE BLD STRIP.AUTO-MCNC: 381 MG/DL (ref 65–117)
GLUCOSE BLD STRIP.AUTO-MCNC: 92 MG/DL (ref 65–117)
HCT VFR BLD AUTO: 32.1 % (ref 35–47)
HGB BLD-MCNC: 10.4 G/DL (ref 11.5–16)
IMM GRANULOCYTES # BLD AUTO: 0 K/UL (ref 0–0.04)
IMM GRANULOCYTES NFR BLD AUTO: 0 % (ref 0–0.5)
LYMPHOCYTES # BLD: 3.4 K/UL (ref 0.8–3.5)
LYMPHOCYTES NFR BLD: 46 % (ref 12–49)
MCH RBC QN AUTO: 28.6 PG (ref 26–34)
MCHC RBC AUTO-ENTMCNC: 32.4 G/DL (ref 30–36.5)
MCV RBC AUTO: 88.2 FL (ref 80–99)
MONOCYTES # BLD: 0.7 K/UL (ref 0–1)
MONOCYTES NFR BLD: 10 % (ref 5–13)
NEUTS SEG # BLD: 3 K/UL (ref 1.8–8)
NEUTS SEG NFR BLD: 42 % (ref 32–75)
NRBC # BLD: 0 K/UL (ref 0–0.01)
NRBC BLD-RTO: 0 PER 100 WBC
PLATELET # BLD AUTO: 315 K/UL (ref 150–400)
PMV BLD AUTO: 10.8 FL (ref 8.9–12.9)
RBC # BLD AUTO: 3.64 M/UL (ref 3.8–5.2)
SERVICE CMNT-IMP: ABNORMAL
SERVICE CMNT-IMP: NORMAL
SERVICE CMNT-IMP: NORMAL
WBC # BLD AUTO: 7.1 K/UL (ref 3.6–11)

## 2023-09-04 PROCEDURE — 6370000000 HC RX 637 (ALT 250 FOR IP): Performed by: NURSE PRACTITIONER

## 2023-09-04 PROCEDURE — 2580000003 HC RX 258: Performed by: NURSE PRACTITIONER

## 2023-09-04 PROCEDURE — 95708 EEG WO VID EA 12-26HR UNMNTR: CPT

## 2023-09-04 PROCEDURE — 2060000000 HC ICU INTERMEDIATE R&B

## 2023-09-04 PROCEDURE — 2500000003 HC RX 250 WO HCPCS: Performed by: NURSE PRACTITIONER

## 2023-09-04 PROCEDURE — 6360000002 HC RX W HCPCS: Performed by: NURSE PRACTITIONER

## 2023-09-04 PROCEDURE — 85025 COMPLETE CBC W/AUTO DIFF WBC: CPT

## 2023-09-04 PROCEDURE — 6370000000 HC RX 637 (ALT 250 FOR IP): Performed by: CLINICAL NURSE SPECIALIST

## 2023-09-04 PROCEDURE — 82962 GLUCOSE BLOOD TEST: CPT

## 2023-09-04 PROCEDURE — A4216 STERILE WATER/SALINE, 10 ML: HCPCS | Performed by: NURSE PRACTITIONER

## 2023-09-04 PROCEDURE — 36415 COLL VENOUS BLD VENIPUNCTURE: CPT

## 2023-09-04 RX ADMIN — SODIUM CHLORIDE, PRESERVATIVE FREE 10 ML: 5 INJECTION INTRAVENOUS at 19:25

## 2023-09-04 RX ADMIN — Medication 4 UNITS: at 22:45

## 2023-09-04 RX ADMIN — CARVEDILOL 25 MG: 12.5 TABLET, FILM COATED ORAL at 09:39

## 2023-09-04 RX ADMIN — CARVEDILOL 25 MG: 12.5 TABLET, FILM COATED ORAL at 17:09

## 2023-09-04 RX ADMIN — Medication 8 UNITS: at 19:20

## 2023-09-04 RX ADMIN — FAMOTIDINE 20 MG: 10 INJECTION INTRAVENOUS at 09:39

## 2023-09-04 RX ADMIN — Medication 15 UNITS: at 11:37

## 2023-09-04 RX ADMIN — SODIUM CHLORIDE, PRESERVATIVE FREE 10 ML: 5 INJECTION INTRAVENOUS at 09:43

## 2023-09-04 RX ADMIN — LISINOPRIL 20 MG: 20 TABLET ORAL at 09:39

## 2023-09-04 RX ADMIN — ATORVASTATIN CALCIUM 40 MG: 40 TABLET, FILM COATED ORAL at 19:21

## 2023-09-04 RX ADMIN — LEVETIRACETAM 750 MG: 100 INJECTION, SOLUTION INTRAVENOUS at 22:39

## 2023-09-04 RX ADMIN — LEVETIRACETAM 750 MG: 100 INJECTION, SOLUTION INTRAVENOUS at 11:36

## 2023-09-04 RX ADMIN — LISINOPRIL 20 MG: 20 TABLET ORAL at 19:21

## 2023-09-04 RX ADMIN — AMLODIPINE BESYLATE 10 MG: 5 TABLET ORAL at 09:39

## 2023-09-04 NOTE — PLAN OF CARE
Problem: Discharge Planning  Goal: Discharge to home or other facility with appropriate resources  9/4/2023 0246 by Brooks Barcenas RN  Outcome: Progressing  9/3/2023 1547 by Willie Roland RN  Outcome: Progressing  Flowsheets  Taken 9/3/2023 1225 by Willie Roland RN  Discharge to home or other facility with appropriate resources: Identify barriers to discharge with patient and caregiver  Taken 9/3/2023 0800 by Shahab Roberts RN  Discharge to home or other facility with appropriate resources: Identify barriers to discharge with patient and caregiver     Problem: Pain  Goal: Verbalizes/displays adequate comfort level or baseline comfort level  9/4/2023 0246 by Brooks Barcenas RN  Outcome: Progressing  9/3/2023 1547 by Willie Roland RN  Outcome: Progressing  Flowsheets (Taken 9/3/2023 0800 by Shahab Roberts RN)  Verbalizes/displays adequate comfort level or baseline comfort level: Implement non-pharmacological measures as appropriate and evaluate response     Problem: Safety - Adult  Goal: Free from fall injury  9/4/2023 0246 by Brooks Barcenas RN  Outcome: Progressing  9/3/2023 1547 by Willie Roland RN  Outcome: Progressing  Flowsheets (Taken 9/3/2023 1237)  Free From Fall Injury: Instruct family/caregiver on patient safety     Problem: Chronic Conditions and Co-morbidities  Goal: Patient's chronic conditions and co-morbidity symptoms are monitored and maintained or improved  9/4/2023 0246 by Brooks Barcenas RN  Outcome: Progressing  9/3/2023 1547 by Willie Roland RN  Outcome: Progressing  Flowsheets  Taken 9/3/2023 1225 by Reina Brooks RN  Care Plan - Patient's Chronic Conditions and Co-Morbidity Symptoms are Monitored and Maintained or Improved: Monitor and assess patient's chronic conditions and comorbid symptoms for stability, deterioration, or improvement  Taken 9/3/2023 0800 by Shahab Roberts RN  Care Plan - Patient's Chronic Conditions and Co-Morbidity Symptoms are Monitored and Maintained or Improved:

## 2023-09-04 NOTE — PROCEDURES
[address] [place] [address]  EEG    Name:  Janina Cox  MR#:  [id number]  :  1970  ACCOUNT #:  [de-identified]  DATE OF SERVICE:  2023    REQUESTING PHYSICIAN:  Oswald Allen. HISTORY:  The patient is a 60-year-old female who is being evaluated after multiple recent seizures on . She was admitted for hyperglycemia, lactic acidosis, and was connected to video EEG to rule out subclinical seizures. DESCRIPTION:  This is an 18-channel prolonged EEG with video monitoring performed on 2023. The recording starts at 04:18 p.m. and continues until 02:12 a.m. on 2023. The dominant posterior background rhythm consists of medium voltage rhythms in the 8 Hz frequency range out of the posterior head region. Faster frequencies are seen when the patient was clinically noted to be awake. During majority of the recording, the patient appeared to be drowsy and asleep and there was background slowing ranging from high delta to low theta range with poorly formed sleep architecture in the form of sleep spindles and generalized delta. No clinical seizure-like activity was noted on the video. EEG SUMMARY:  Mildly abnormal EEG due to mild intermittent slowing of the background rhythms. CLINICAL INTERPRETATION:  This EEG shows mild background slowing indicating a nonspecific generalized encephalopathic process or may just be related to drowsiness, structural brain injury, or toxic/metabolic abnormality. No convincingly lateralizing or epileptiform features were noted. No clinical seizures were seen on the video and no electrographic seizure activity was seen.       Benita Houston MD      [id number]  D:  2023 13:56  T:  2023 17:46  JOB #:  [id number]

## 2023-09-05 PROBLEM — E11.65 TYPE 2 DIABETES MELLITUS WITH HYPERGLYCEMIA, WITHOUT LONG-TERM CURRENT USE OF INSULIN (HCC): Status: ACTIVE | Noted: 2023-09-05

## 2023-09-05 LAB
ANION GAP SERPL CALC-SCNC: 10 MMOL/L (ref 5–15)
BASOPHILS # BLD: 0 K/UL (ref 0–0.1)
BASOPHILS NFR BLD: 0 % (ref 0–1)
BUN SERPL-MCNC: 20 MG/DL (ref 6–20)
BUN/CREAT SERPL: 12 (ref 12–20)
CALCIUM SERPL-MCNC: 8.7 MG/DL (ref 8.5–10.1)
CHLORIDE SERPL-SCNC: 109 MMOL/L (ref 97–108)
CO2 SERPL-SCNC: 22 MMOL/L (ref 21–32)
COMMENT:: NORMAL
CREAT SERPL-MCNC: 1.67 MG/DL (ref 0.55–1.02)
DIFFERENTIAL METHOD BLD: ABNORMAL
EOSINOPHIL # BLD: 0.1 K/UL (ref 0–0.4)
EOSINOPHIL NFR BLD: 1 % (ref 0–7)
ERYTHROCYTE [DISTWIDTH] IN BLOOD BY AUTOMATED COUNT: 12 % (ref 11.5–14.5)
GLUCOSE BLD STRIP.AUTO-MCNC: 157 MG/DL (ref 65–117)
GLUCOSE BLD STRIP.AUTO-MCNC: 194 MG/DL (ref 65–117)
GLUCOSE BLD STRIP.AUTO-MCNC: 230 MG/DL (ref 65–117)
GLUCOSE BLD STRIP.AUTO-MCNC: 253 MG/DL (ref 65–117)
GLUCOSE BLD STRIP.AUTO-MCNC: 327 MG/DL (ref 65–117)
GLUCOSE SERPL-MCNC: 137 MG/DL (ref 65–100)
HCT VFR BLD AUTO: 30.6 % (ref 35–47)
HGB BLD-MCNC: 9.9 G/DL (ref 11.5–16)
IMM GRANULOCYTES # BLD AUTO: 0 K/UL (ref 0–0.04)
IMM GRANULOCYTES NFR BLD AUTO: 0 % (ref 0–0.5)
LYMPHOCYTES # BLD: 3.4 K/UL (ref 0.8–3.5)
LYMPHOCYTES NFR BLD: 43 % (ref 12–49)
MCH RBC QN AUTO: 28.5 PG (ref 26–34)
MCHC RBC AUTO-ENTMCNC: 32.4 G/DL (ref 30–36.5)
MCV RBC AUTO: 88.2 FL (ref 80–99)
MONOCYTES # BLD: 0.8 K/UL (ref 0–1)
MONOCYTES NFR BLD: 10 % (ref 5–13)
NEUTS SEG # BLD: 3.6 K/UL (ref 1.8–8)
NEUTS SEG NFR BLD: 46 % (ref 32–75)
NRBC # BLD: 0 K/UL (ref 0–0.01)
NRBC BLD-RTO: 0 PER 100 WBC
PLATELET # BLD AUTO: 325 K/UL (ref 150–400)
PMV BLD AUTO: 11.1 FL (ref 8.9–12.9)
POTASSIUM SERPL-SCNC: 4 MMOL/L (ref 3.5–5.1)
RBC # BLD AUTO: 3.47 M/UL (ref 3.8–5.2)
SERVICE CMNT-IMP: ABNORMAL
SODIUM SERPL-SCNC: 141 MMOL/L (ref 136–145)
SPECIMEN HOLD: NORMAL
WBC # BLD AUTO: 7.9 K/UL (ref 3.6–11)

## 2023-09-05 PROCEDURE — 85025 COMPLETE CBC W/AUTO DIFF WBC: CPT

## 2023-09-05 PROCEDURE — 2500000003 HC RX 250 WO HCPCS: Performed by: NURSE PRACTITIONER

## 2023-09-05 PROCEDURE — 84681 ASSAY OF C-PEPTIDE: CPT

## 2023-09-05 PROCEDURE — 82962 GLUCOSE BLOOD TEST: CPT

## 2023-09-05 PROCEDURE — 6370000000 HC RX 637 (ALT 250 FOR IP): Performed by: PHYSICIAN ASSISTANT

## 2023-09-05 PROCEDURE — 36415 COLL VENOUS BLD VENIPUNCTURE: CPT

## 2023-09-05 PROCEDURE — 2580000003 HC RX 258: Performed by: NURSE PRACTITIONER

## 2023-09-05 PROCEDURE — 2060000000 HC ICU INTERMEDIATE R&B

## 2023-09-05 PROCEDURE — 86337 INSULIN ANTIBODIES: CPT

## 2023-09-05 PROCEDURE — 6370000000 HC RX 637 (ALT 250 FOR IP): Performed by: NURSE PRACTITIONER

## 2023-09-05 PROCEDURE — 6360000002 HC RX W HCPCS: Performed by: NURSE PRACTITIONER

## 2023-09-05 PROCEDURE — 86341 ISLET CELL ANTIBODY: CPT

## 2023-09-05 PROCEDURE — 80048 BASIC METABOLIC PNL TOTAL CA: CPT

## 2023-09-05 PROCEDURE — 6370000000 HC RX 637 (ALT 250 FOR IP): Performed by: CLINICAL NURSE SPECIALIST

## 2023-09-05 PROCEDURE — A4216 STERILE WATER/SALINE, 10 ML: HCPCS | Performed by: NURSE PRACTITIONER

## 2023-09-05 RX ORDER — INSULIN LISPRO 100 [IU]/ML
3 INJECTION, SOLUTION INTRAVENOUS; SUBCUTANEOUS
Status: DISCONTINUED | OUTPATIENT
Start: 2023-09-05 | End: 2023-09-06 | Stop reason: HOSPADM

## 2023-09-05 RX ORDER — INSULIN LISPRO 100 [IU]/ML
0-4 INJECTION, SOLUTION INTRAVENOUS; SUBCUTANEOUS
Status: DISCONTINUED | OUTPATIENT
Start: 2023-09-05 | End: 2023-09-06 | Stop reason: HOSPADM

## 2023-09-05 RX ORDER — INSULIN LISPRO 100 [IU]/ML
0-4 INJECTION, SOLUTION INTRAVENOUS; SUBCUTANEOUS NIGHTLY
Status: DISCONTINUED | OUTPATIENT
Start: 2023-09-05 | End: 2023-09-06 | Stop reason: HOSPADM

## 2023-09-05 RX ADMIN — LEVETIRACETAM 750 MG: 500 TABLET, FILM COATED ORAL at 20:46

## 2023-09-05 RX ADMIN — ATORVASTATIN CALCIUM 40 MG: 40 TABLET, FILM COATED ORAL at 20:46

## 2023-09-05 RX ADMIN — SODIUM CHLORIDE, PRESERVATIVE FREE 10 ML: 5 INJECTION INTRAVENOUS at 08:39

## 2023-09-05 RX ADMIN — CARVEDILOL 25 MG: 12.5 TABLET, FILM COATED ORAL at 17:10

## 2023-09-05 RX ADMIN — Medication 6 UNITS: at 08:35

## 2023-09-05 RX ADMIN — Medication 3 UNITS: at 08:33

## 2023-09-05 RX ADMIN — Medication 3 UNITS: at 17:12

## 2023-09-05 RX ADMIN — Medication 6 UNITS: at 20:47

## 2023-09-05 RX ADMIN — Medication 3 UNITS: at 13:04

## 2023-09-05 RX ADMIN — SODIUM CHLORIDE, PRESERVATIVE FREE 10 ML: 5 INJECTION INTRAVENOUS at 20:47

## 2023-09-05 RX ADMIN — AMLODIPINE BESYLATE 10 MG: 5 TABLET ORAL at 08:32

## 2023-09-05 RX ADMIN — LISINOPRIL 20 MG: 20 TABLET ORAL at 08:35

## 2023-09-05 RX ADMIN — FAMOTIDINE 20 MG: 10 INJECTION INTRAVENOUS at 08:33

## 2023-09-05 RX ADMIN — LEVETIRACETAM 750 MG: 100 INJECTION, SOLUTION INTRAVENOUS at 11:10

## 2023-09-05 RX ADMIN — Medication 2 UNITS: at 05:14

## 2023-09-05 RX ADMIN — CARVEDILOL 25 MG: 12.5 TABLET, FILM COATED ORAL at 08:33

## 2023-09-05 RX ADMIN — LISINOPRIL 20 MG: 20 TABLET ORAL at 20:46

## 2023-09-06 VITALS
TEMPERATURE: 97.4 F | OXYGEN SATURATION: 100 % | BODY MASS INDEX: 27.74 KG/M2 | RESPIRATION RATE: 16 BRPM | DIASTOLIC BLOOD PRESSURE: 89 MMHG | HEART RATE: 72 BPM | SYSTOLIC BLOOD PRESSURE: 137 MMHG | WEIGHT: 151.68 LBS

## 2023-09-06 PROBLEM — I63.9 ACUTE CVA (CEREBROVASCULAR ACCIDENT) (HCC): Status: RESOLVED | Noted: 2023-08-01 | Resolved: 2023-09-06

## 2023-09-06 PROBLEM — E11.10 DKA, TYPE 2, NOT AT GOAL (HCC): Status: RESOLVED | Noted: 2023-09-01 | Resolved: 2023-09-06

## 2023-09-06 PROBLEM — R41.82 ALTERED MENTAL STATUS: Status: RESOLVED | Noted: 2023-09-01 | Resolved: 2023-09-06

## 2023-09-06 PROBLEM — G40.901 STATUS EPILEPTICUS (HCC): Status: RESOLVED | Noted: 2023-09-01 | Resolved: 2023-09-06

## 2023-09-06 LAB
BACTERIA SPEC CULT: NORMAL
BACTERIA SPEC CULT: NORMAL
BASOPHILS # BLD: 0 K/UL (ref 0–0.1)
BASOPHILS NFR BLD: 0 % (ref 0–1)
COMMENT:: NORMAL
DIFFERENTIAL METHOD BLD: ABNORMAL
EOSINOPHIL # BLD: 0.1 K/UL (ref 0–0.4)
EOSINOPHIL NFR BLD: 1 % (ref 0–7)
ERYTHROCYTE [DISTWIDTH] IN BLOOD BY AUTOMATED COUNT: 12 % (ref 11.5–14.5)
GLUCOSE BLD STRIP.AUTO-MCNC: 158 MG/DL (ref 65–117)
GLUCOSE BLD STRIP.AUTO-MCNC: 259 MG/DL (ref 65–117)
HCT VFR BLD AUTO: 33 % (ref 35–47)
HGB BLD-MCNC: 10.7 G/DL (ref 11.5–16)
IMM GRANULOCYTES # BLD AUTO: 0 K/UL (ref 0–0.04)
IMM GRANULOCYTES NFR BLD AUTO: 0 % (ref 0–0.5)
LYMPHOCYTES # BLD: 3.5 K/UL (ref 0.8–3.5)
LYMPHOCYTES NFR BLD: 50 % (ref 12–49)
Lab: NORMAL
Lab: NORMAL
MCH RBC QN AUTO: 28.7 PG (ref 26–34)
MCHC RBC AUTO-ENTMCNC: 32.4 G/DL (ref 30–36.5)
MCV RBC AUTO: 88.5 FL (ref 80–99)
MONOCYTES # BLD: 0.6 K/UL (ref 0–1)
MONOCYTES NFR BLD: 8 % (ref 5–13)
NEUTS SEG # BLD: 2.9 K/UL (ref 1.8–8)
NEUTS SEG NFR BLD: 41 % (ref 32–75)
NRBC # BLD: 0 K/UL (ref 0–0.01)
NRBC BLD-RTO: 0 PER 100 WBC
PLATELET # BLD AUTO: 359 K/UL (ref 150–400)
PMV BLD AUTO: 11.3 FL (ref 8.9–12.9)
RBC # BLD AUTO: 3.73 M/UL (ref 3.8–5.2)
SERVICE CMNT-IMP: ABNORMAL
SERVICE CMNT-IMP: ABNORMAL
SPECIMEN HOLD: NORMAL
WBC # BLD AUTO: 7.1 K/UL (ref 3.6–11)

## 2023-09-06 PROCEDURE — 6370000000 HC RX 637 (ALT 250 FOR IP): Performed by: NURSE PRACTITIONER

## 2023-09-06 PROCEDURE — 2500000003 HC RX 250 WO HCPCS: Performed by: NURSE PRACTITIONER

## 2023-09-06 PROCEDURE — 6370000000 HC RX 637 (ALT 250 FOR IP): Performed by: PHYSICIAN ASSISTANT

## 2023-09-06 PROCEDURE — 85025 COMPLETE CBC W/AUTO DIFF WBC: CPT

## 2023-09-06 PROCEDURE — 2580000003 HC RX 258: Performed by: NURSE PRACTITIONER

## 2023-09-06 PROCEDURE — 6370000000 HC RX 637 (ALT 250 FOR IP): Performed by: CLINICAL NURSE SPECIALIST

## 2023-09-06 PROCEDURE — 36415 COLL VENOUS BLD VENIPUNCTURE: CPT

## 2023-09-06 PROCEDURE — A4216 STERILE WATER/SALINE, 10 ML: HCPCS | Performed by: NURSE PRACTITIONER

## 2023-09-06 PROCEDURE — 82962 GLUCOSE BLOOD TEST: CPT

## 2023-09-06 RX ORDER — BLOOD SUGAR DIAGNOSTIC
STRIP MISCELLANEOUS
Qty: 100 EACH | Refills: 1 | Status: SHIPPED | OUTPATIENT
Start: 2023-09-06 | End: 2023-09-06 | Stop reason: SDUPTHER

## 2023-09-06 RX ORDER — BLOOD SUGAR DIAGNOSTIC
STRIP MISCELLANEOUS
Qty: 100 EACH | Refills: 1 | Status: SHIPPED | OUTPATIENT
Start: 2023-09-06

## 2023-09-06 RX ORDER — DIPHENHYDRAMINE HYDROCHLORIDE 25 MG/1
CAPSULE, LIQUID FILLED ORAL
Qty: 1 KIT | Refills: 1 | Status: SHIPPED | OUTPATIENT
Start: 2023-09-06

## 2023-09-06 RX ORDER — GLUCOSAMINE HCL/CHONDROITIN SU 500-400 MG
CAPSULE ORAL
Qty: 100 STRIP | Refills: 1 | Status: SHIPPED | OUTPATIENT
Start: 2023-09-06 | End: 2023-09-06 | Stop reason: SDUPTHER

## 2023-09-06 RX ORDER — GLUCOSAMINE HCL/CHONDROITIN SU 500-400 MG
CAPSULE ORAL
Qty: 100 STRIP | Refills: 1 | Status: SHIPPED | OUTPATIENT
Start: 2023-09-06

## 2023-09-06 RX ADMIN — Medication 3 UNITS: at 12:09

## 2023-09-06 RX ADMIN — Medication 2 UNITS: at 12:10

## 2023-09-06 RX ADMIN — Medication 6 UNITS: at 09:06

## 2023-09-06 RX ADMIN — AMLODIPINE BESYLATE 10 MG: 5 TABLET ORAL at 09:05

## 2023-09-06 RX ADMIN — SODIUM CHLORIDE, PRESERVATIVE FREE 10 ML: 5 INJECTION INTRAVENOUS at 09:12

## 2023-09-06 RX ADMIN — CARVEDILOL 25 MG: 12.5 TABLET, FILM COATED ORAL at 09:04

## 2023-09-06 RX ADMIN — LISINOPRIL 20 MG: 20 TABLET ORAL at 09:04

## 2023-09-06 RX ADMIN — LEVETIRACETAM 750 MG: 500 TABLET, FILM COATED ORAL at 09:03

## 2023-09-06 RX ADMIN — Medication 3 UNITS: at 09:09

## 2023-09-06 RX ADMIN — FAMOTIDINE 20 MG: 10 INJECTION INTRAVENOUS at 09:14

## 2023-09-06 NOTE — DIABETES MGMT
[facility]  DIABETES MANAGEMENT CONSULT    Consulted by Provider for advanced nursing evaluation and care for inpatient blood glucose management. Evaluation and Action Plan   Ronny Bedolla is a 47 y.o. AA female who is admitted from OSH ED after witnesses seizure activity PTA and with EMS. Seizures treated with Keppra prior to transfer to [facility] ICU-post ictal on arrival to [facility]-  also found to be in DKA at OSH ED and insulin infusion started - Then became hypoglycemic likely 2/2 no BG checks between 210- and 0300 and no dextrose IVF once BG fell to <250. Insulin infusion continues this AM with dextrose IVF also in place. Labs this AM show improvement and anion gap 7/ c02 22. Another anion gap <12 needed to initiate transitioning off insulin infusion. Discussed with RN to draw another set of labs @1100.  ? DKA causing seizure activity- Wouldn't be bad idea to rule out T1D given two admissions for marked hyperglycemia /DKA and seizures-     Patient remains poor historian given clinical condition, however, her  provided recent self care diabetes mgmt practices since being discharged from a recent hospital stay for CVA and seizures / and new T2D diagnosis end of July 2023. Due to financial strain after being prescribed 7+ new medications at discharge, patient was unable to  any medications, including the Lantus insulin and glucometer. Per patient's RN and my assessment there is some level low health literacy noted. Discussed with patient's  that [facility] will be the most affordable place to purchase diabetes supplies, including insulin. Will ensure financial affordability with diabetes medications are addressed prior to discharge. Clinically improving today from a mental standpoint- was at bedside to received diabetes education- he successfully performed a return demonstration of vial insulin injection x2.  Verbally explained how to check a blood
but was getting steps confused- could figure out how to dial up correct dose number given. Teach back education provided as well and repeat was not adequate- There were a couple instances where I had to ask \" Are you awake? \" Unsure of her baseline cognitive function given recent seizures/ CVA. There are safety concerns re: her ability to safely administer her insulin and monitor her BG once discharged. Would like to see how BG trends another 24h on current basal/bolus correction insulin regimen before discharged to ensure safe insulin dosing at discharge. Management Rationale Action Plan   Medication  Basal insulin: /NPH 6 units q12h   Correctional insulin ACHS at LOW  sensitivity, start at a glucose of 200  Consistent Carbohydrate diet-60gm when diet advanced  Bolus: Lispro 3 units TID with meals   Labs pending [x]        Hemoglobin [id number] as ADD ON  [x] MITCH antibodies/ c peptide / insulin antibody -rule in /out T1D      Additional orders          Diabetes Discharge Plan   Medication  SCRIPT for glucometer kit -On discharge med rec  SCRIPT for [facility] 70/30 insulin PEN - 11 units BID with breakfast and dinner/supper  SCRIPT Pen Needle, Diabetic 32 Gauge x 5/32\" (1 box)    Referral  [x]        Outpatient diabetes education-SELF PAY so likely would not be able to afford   Additional orders  Will need [facility] to manage diabetes until she is on her spouse's insurance. Initial Presentation   Brenda Amaya is a 47 y.o. female admitted 9/1/23 from  [facility] ED after experiencing recent seizures. Recently diagnosed with T2D/ and CVA. Medicated for active seizure  and post ictal -  Also noted to be hyperglycemic  with suspected DKA. Insulin infusion initiated along with IVF. Patient then transferred to [facility] ICU for increased level of care. ADONIS and marked elevated BP noted on arrival to [facility]. Cardene infusion initiated for BP mgmt.   On arrival to ICU patient  is obtunded, withdraws to

## 2023-09-06 NOTE — DISCHARGE SUMMARY
symmetrically.  Shrug Shoulders B/L       CHRONIC MEDICAL DIAGNOSES:      Greater than 31 minutes were spent with the patient on counseling and coordination of care    Signed:   KATERIN Walls NP  9/6/2023  2:42 PM

## 2023-09-06 NOTE — CARE COORDINATION
09/01/23 1429   Readmission Assessment   Number of Days since last admission? 8-30 days   Previous Disposition Home with Family   Who is being [employer]  (spouse)   What was the patient's/caregiver's perception as to why they think they needed to return back to the hospital? Unable to obtain medications   Did you visit your Primary Care Physician after you left the hospital, before you returned this time? Yes   Did you see a specialist, such as Cardiac, Pulmonary, Orthopedic Physician, etc. after you left the hospital? No   Who advised the patient to return to the hospital? Self-referral   Does the patient report anything that got in the way of taking their medications? Yes   What reasons did they give? Other (Comment)   In our efforts to provide the best possible care to you and others like you, can you think of anything that we could have done to help you after you left the hospital the first time, so that you might not have needed to return so soon? Teaching during hospitalization regarding your illness; Teach back instructions regarding management of illness;Education on how to continue taking medications upon discharge     Curtis Marsh RN BSN  Care Manager
Transition of Care Plan:    Home with spouse    Medication Assist - Pt is not insured, not able to afford cost of DC meds, hospital to cover. RUR: 18%  Prior Level of Functioning: Independent  Disposition: Home    Follow up appointments: [facility] - Sept/27  DME needed: none  Transportation at discharge: family  IM/IMM Medicare/ letter given: mars  Caregiver Contact:   Discharge Caregiver contacted prior to discharge? yes  Care Conference needed? no  Barriers to discharge:  no medical insurance    Plan for Pt to discharge home today. [facility] appt secured and application provided to spouse 9/5. DC medications sent to OP Pharmacy, insulin and supplies  Pt not able to afford cost of medication, hospital to cover cost. Waiting on quote from [facility] on medications. 1533: Total cost of DC medications: $218.92. CM provided Med Voucher to OP Pharmacy and Pt spouse to retrieve medications. No further CM needs. WILLI MckeonS.CARLYLE.
Transition of Care Plan:Discharge home    RUR: 18%  Prior Level of Functioning: Independent  Disposition: Home    Follow up appointments: [facility]   DME needed: none  Transportation at discharge: family  IM/IMM Medicare/ letter given: na    Caregiver Contact:   Discharge Caregiver contacted prior to discharge? yes  Care Conference needed? no  Barriers to discharge:  no medical insurance    MONTRELL spoke with pt's  regarding need for medications/management upon discharge. Pt does not have insurance at this time. Pt's  verbalized that he is currently in the process with HR at his job to have pt added on. CM will have prescribing doctor  send all prescriptions to be filled, including insulin regimen to [facility] pharmacy for a covered 30 day supply to pt. CM contacted CM specialist to line up a follow up appointment [facility] for medication management until she is added on to her husbands insurance. CM provided pt with a [facility] application. CM following.     Levi Smith RN BSN  Care Manager
Ambulation Assistance Independent   Transfer Assistance Independent   Active  Yes   Mode of Transportation Car   Occupation Unemployed   Discharge Planning   Type of [address] Spouse/Significant Other;Children   Current Services Prior To Admission None   Potential Assistance Needed Prescription Assistance   DME Ordered? No   Potential Assistance Purchasing Medications Yes   Type of Home Care Services None   Services At/After Discharge   Transition of Care Consult (CM Consult) Discharge [address] Provided? No   Mode of Transport at Discharge Other (see comment)  (family)   Confirm Follow Up Transport Family   Condition of Participation: Discharge Planning   The Patient and/or Patient Representative was provided with a Choice of Provider? Patient Representative  (spouse)   The Patient and/Or Patient Representative agree with the Discharge Plan? Yes   Freedom of Choice list was provided with basic dialogue that supports the patient's individualized plan of care/goals, treatment preferences, and shares the quality data associated with the providers?   Yes     Les Werner RN BSN  Care Manager

## 2023-09-06 NOTE — DISCHARGE INSTRUCTIONS
fever, chills, nausea, vomiting, diarrhea, change in mentation, falling, weakness, bleeding. Severe pain or pain not relieved by medications. Or, any other signs or symptoms that you may have questions about.       DISPOSITION:  x  Home With:   OT  PT  HH  RN       SNF/Inpatient Rehab/LTAC    Independent/assisted living    Hospice    Other:     CDMP Checked:   Yes x     PROBLEM LIST Updated:  Yes x       Signed:   KATERIN Edouard NP  9/6/2023  1:48 PM

## 2023-09-07 LAB — GAD65 AB SER-ACNC: <5 U/ML (ref 0–5)

## 2023-09-09 LAB — C PEPTIDE SERPL-MCNC: 1.8 NG/ML (ref 1.1–4.4)

## 2023-09-10 LAB — INSULIN AB SER-ACNC: <5 UU/ML

## 2023-12-13 ENCOUNTER — APPOINTMENT (OUTPATIENT)
Facility: HOSPITAL | Age: 53
DRG: 981 | End: 2023-12-13

## 2023-12-13 ENCOUNTER — HOSPITAL ENCOUNTER (INPATIENT)
Facility: HOSPITAL | Age: 53
LOS: 42 days | Discharge: HOME | DRG: 981 | End: 2024-01-24
Attending: STUDENT IN AN ORGANIZED HEALTH CARE EDUCATION/TRAINING PROGRAM | Admitting: INTERNAL MEDICINE

## 2023-12-13 DIAGNOSIS — J96.01 ACUTE RESPIRATORY FAILURE WITH HYPOXIA (HCC): ICD-10-CM

## 2023-12-13 DIAGNOSIS — J96.02 ACUTE RESPIRATORY FAILURE WITH HYPOXIA AND HYPERCARBIA (HCC): Primary | ICD-10-CM

## 2023-12-13 DIAGNOSIS — I21.4 NSTEMI (NON-ST ELEVATED MYOCARDIAL INFARCTION) (HCC): ICD-10-CM

## 2023-12-13 DIAGNOSIS — Z86.73 OLD CEREBROVASCULAR ACCIDENT (CVA) WITHOUT LATE EFFECT: ICD-10-CM

## 2023-12-13 DIAGNOSIS — R19.00 RETROPERITONEAL MASS: ICD-10-CM

## 2023-12-13 DIAGNOSIS — E11.00 HYPEROSMOLAR HYPERGLYCEMIC STATE (HHS) (HCC): ICD-10-CM

## 2023-12-13 DIAGNOSIS — R73.9 HYPERGLYCEMIA: ICD-10-CM

## 2023-12-13 DIAGNOSIS — I16.1 HYPERTENSIVE EMERGENCY: ICD-10-CM

## 2023-12-13 DIAGNOSIS — J96.01 ACUTE RESPIRATORY FAILURE WITH HYPOXIA AND HYPERCARBIA (HCC): Primary | ICD-10-CM

## 2023-12-13 DIAGNOSIS — D35.02 PHEOCHROMOCYTOMA OF LEFT ADRENAL GLAND: ICD-10-CM

## 2023-12-13 DIAGNOSIS — E27.8 ADRENAL MASS GREATER THAN 4 CM IN DIAMETER (HCC): ICD-10-CM

## 2023-12-13 DIAGNOSIS — S03.2XXA TOOTH AVULSION, INITIAL ENCOUNTER: ICD-10-CM

## 2023-12-13 DIAGNOSIS — R56.9 SEIZURE (HCC): ICD-10-CM

## 2023-12-13 DIAGNOSIS — E11.65 TYPE 2 DIABETES MELLITUS WITH HYPERGLYCEMIA, WITHOUT LONG-TERM CURRENT USE OF INSULIN (HCC): ICD-10-CM

## 2023-12-13 LAB
ALBUMIN SERPL-MCNC: 4.1 G/DL (ref 3.5–5)
ALBUMIN/GLOB SERPL: 0.9 (ref 1.1–2.2)
ALP SERPL-CCNC: 261 U/L (ref 45–117)
ALT SERPL-CCNC: 30 U/L (ref 12–78)
ANION GAP SERPL CALC-SCNC: 12 MMOL/L (ref 5–15)
ANION GAP SERPL CALC-SCNC: 8 MMOL/L (ref 5–15)
APPEARANCE UR: CLEAR
APPEARANCE UR: CLEAR
ARTERIAL PATENCY WRIST A: YES
AST SERPL W P-5'-P-CCNC: 46 U/L (ref 15–37)
BACTERIA URNS QL MICRO: NEGATIVE /HPF
BACTERIA URNS QL MICRO: NEGATIVE /HPF
BASE DEFICIT BLD-SCNC: 4.5 MMOL/L
BASE DEFICIT BLDA-SCNC: 3.3 MMOL/L
BASOPHILS # BLD: 0.1 K/UL (ref 0–0.1)
BASOPHILS NFR BLD: 0 % (ref 0–1)
BDY SITE: ABNORMAL
BILIRUB SERPL-MCNC: 0.2 MG/DL (ref 0.2–1)
BILIRUB UR QL: NEGATIVE
BILIRUB UR QL: NEGATIVE
BUN SERPL-MCNC: 19 MG/DL (ref 6–20)
BUN SERPL-MCNC: 27 MG/DL (ref 6–20)
BUN/CREAT SERPL: 10 (ref 12–20)
BUN/CREAT SERPL: 13 (ref 12–20)
CA-I BLD-MCNC: 1.21 MMOL/L (ref 1.12–1.32)
CA-I BLD-MCNC: 10 MG/DL (ref 8.5–10.1)
CA-I BLD-MCNC: 8.9 MG/DL (ref 8.5–10.1)
CHLORIDE BLD-SCNC: 102 MMOL/L (ref 98–107)
CHLORIDE SERPL-SCNC: 108 MMOL/L (ref 97–108)
CHLORIDE SERPL-SCNC: 97 MMOL/L (ref 97–108)
CK SERPL-CCNC: 224 U/L (ref 26–192)
CO2 BLD-SCNC: 25 MMOL/L
CO2 SERPL-SCNC: 24 MMOL/L (ref 21–32)
CO2 SERPL-SCNC: 24 MMOL/L (ref 21–32)
COHGB MFR BLD: 0.3 % (ref 1–2)
COLOR UR: ABNORMAL
COLOR UR: ABNORMAL
CREAT SERPL-MCNC: 1.83 MG/DL (ref 0.55–1.02)
CREAT SERPL-MCNC: 2.08 MG/DL (ref 0.55–1.02)
CREAT UR-MCNC: 1.54 MG/DL (ref 0.6–1.3)
DIFFERENTIAL METHOD BLD: ABNORMAL
EKG ATRIAL RATE: 132 BPM
EKG DIAGNOSIS: NORMAL
EKG P AXIS: 69 DEGREES
EKG P-R INTERVAL: 136 MS
EKG Q-T INTERVAL: 282 MS
EKG QRS DURATION: 88 MS
EKG QTC CALCULATION (BAZETT): 417 MS
EKG R AXIS: 16 DEGREES
EKG T AXIS: 178 DEGREES
EKG VENTRICULAR RATE: 132 BPM
EOSINOPHIL # BLD: 0 K/UL (ref 0–0.4)
EOSINOPHIL NFR BLD: 0 % (ref 0–7)
EPITH CASTS URNS QL MICRO: ABNORMAL /LPF
EPITH CASTS URNS QL MICRO: ABNORMAL /LPF
ERYTHROCYTE [DISTWIDTH] IN BLOOD BY AUTOMATED COUNT: 12 % (ref 11.5–14.5)
FIO2 ON VENT: 45 %
FLUAV AG NPH QL IA: NEGATIVE
FLUBV AG NOSE QL IA: NEGATIVE
GLOBULIN SER CALC-MCNC: 4.5 G/DL (ref 2–4)
GLUCOSE BLD STRIP.AUTO-MCNC: 112 MG/DL (ref 65–100)
GLUCOSE BLD STRIP.AUTO-MCNC: 126 MG/DL (ref 65–100)
GLUCOSE BLD STRIP.AUTO-MCNC: 214 MG/DL (ref 65–100)
GLUCOSE BLD STRIP.AUTO-MCNC: 409 MG/DL (ref 65–100)
GLUCOSE BLD STRIP.AUTO-MCNC: 479 MG/DL (ref 65–100)
GLUCOSE BLD STRIP.AUTO-MCNC: 62 MG/DL (ref 65–100)
GLUCOSE BLD STRIP.AUTO-MCNC: 99 MG/DL (ref 65–100)
GLUCOSE SERPL-MCNC: 100 MG/DL (ref 65–100)
GLUCOSE SERPL-MCNC: 422 MG/DL (ref 65–100)
GLUCOSE UR STRIP.AUTO-MCNC: 50 MG/DL
GLUCOSE UR STRIP.AUTO-MCNC: >500 MG/DL
HCG SERPL QL: POSITIVE
HCG SERPL-ACNC: 7 MIU/ML (ref 0–6)
HCO3 BLD-SCNC: 25.3 MMOL/L (ref 19–28)
HCO3 BLDA-SCNC: 19 MMOL/L (ref 22–26)
HCT VFR BLD AUTO: 37.9 % (ref 35–47)
HGB BLD-MCNC: 12.7 G/DL (ref 11.5–16)
HGB UR QL STRIP: ABNORMAL
HGB UR QL STRIP: NEGATIVE
IMM GRANULOCYTES # BLD AUTO: 0.2 K/UL (ref 0–0.04)
IMM GRANULOCYTES NFR BLD AUTO: 1 % (ref 0–0.5)
KETONES UR QL STRIP.AUTO: NEGATIVE MG/DL
KETONES UR QL STRIP.AUTO: NEGATIVE MG/DL
LACTATE BLD-SCNC: 5.38 MMOL/L (ref 0.4–2)
LACTATE BLD-SCNC: 5.61 MMOL/L (ref 0.4–2)
LACTATE SERPL-SCNC: 1.7 MMOL/L (ref 0.4–2)
LACTATE SERPL-SCNC: 2.6 MMOL/L (ref 0.4–2)
LACTATE SERPL-SCNC: 3.5 MMOL/L (ref 0.4–2)
LACTATE SERPL-SCNC: 4.2 MMOL/L (ref 0.4–2)
LACTATE SERPL-SCNC: 5 MMOL/L (ref 0.4–2)
LEUKOCYTE ESTERASE UR QL STRIP.AUTO: NEGATIVE
LEUKOCYTE ESTERASE UR QL STRIP.AUTO: NEGATIVE
LIPASE SERPL-CCNC: 30 U/L (ref 13–75)
LYMPHOCYTES # BLD: 1.9 K/UL (ref 0.8–3.5)
LYMPHOCYTES NFR BLD: 8 % (ref 12–49)
MAGNESIUM SERPL-MCNC: 2.3 MG/DL (ref 1.6–2.4)
MCH RBC QN AUTO: 28.7 PG (ref 26–34)
MCHC RBC AUTO-ENTMCNC: 33.5 G/DL (ref 30–36.5)
MCV RBC AUTO: 85.7 FL (ref 80–99)
METHGB MFR BLD: 0.4 % (ref 0–1.4)
MONOCYTES # BLD: 1.3 K/UL (ref 0–1)
MONOCYTES NFR BLD: 6 % (ref 5–13)
MUCOUS THREADS URNS QL MICRO: ABNORMAL /LPF
NEUTS SEG # BLD: 19.5 K/UL (ref 1.8–8)
NEUTS SEG NFR BLD: 85 % (ref 32–75)
NITRITE UR QL STRIP.AUTO: NEGATIVE
NITRITE UR QL STRIP.AUTO: NEGATIVE
NRBC # BLD: 0 K/UL (ref 0–0.01)
NRBC BLD-RTO: 0 PER 100 WBC
OSMOLALITY SERPL: 310 MOSM/KG H2O
OXYHGB MFR BLD: 97.7 % (ref 95–99)
PCO2 BLD: 65.8 MMHG (ref 35–45)
PCO2 BLDA: 26 MMHG (ref 35–45)
PEEP RESPIRATORY: 6
PERFORMED BY:: ABNORMAL
PERFORMED BY:: NORMAL
PH BLD: 7.19 (ref 7.35–7.45)
PH BLDA: 7.47 (ref 7.35–7.45)
PH UR STRIP: 5 (ref 5–8)
PH UR STRIP: 6 (ref 5–8)
PLATELET # BLD AUTO: 311 K/UL (ref 150–400)
PMV BLD AUTO: 11.9 FL (ref 8.9–12.9)
PO2 BLD: 41 MMHG (ref 75–100)
PO2 BLDA: 141 MMHG (ref 80–100)
POTASSIUM BLD-SCNC: 4.5 MMOL/L (ref 3.5–5.5)
POTASSIUM SERPL-SCNC: 2.8 MMOL/L (ref 3.5–5.1)
POTASSIUM SERPL-SCNC: 4.3 MMOL/L (ref 3.5–5.1)
PROCALCITONIN SERPL-MCNC: 0.15 NG/ML
PROT SERPL-MCNC: 8.6 G/DL (ref 6.4–8.2)
PROT UR STRIP-MCNC: 100 MG/DL
PROT UR STRIP-MCNC: 30 MG/DL
RBC # BLD AUTO: 4.42 M/UL (ref 3.8–5.2)
RBC #/AREA URNS HPF: ABNORMAL /HPF (ref 0–5)
RBC #/AREA URNS HPF: ABNORMAL /HPF (ref 0–5)
SAO2 % BLD: 63 %
SAO2 % BLD: 98 % (ref 95–99)
SAO2% DEVICE SAO2% SENSOR NAME: ABNORMAL
SARS-COV-2 RDRP RESP QL NAA+PROBE: NOT DETECTED
SERVICE CMNT-IMP: ABNORMAL
SODIUM BLD-SCNC: 136 MMOL/L (ref 136–145)
SODIUM SERPL-SCNC: 133 MMOL/L (ref 136–145)
SODIUM SERPL-SCNC: 140 MMOL/L (ref 136–145)
SP GR UR REFRACTOMETRY: 1.02 (ref 1–1.03)
SPECIMEN SITE: ABNORMAL
SPECIMEN SITE: ABNORMAL
TROPONIN I SERPL HS-MCNC: 109 NG/L (ref 0–51)
TROPONIN I SERPL HS-MCNC: 232 NG/L (ref 0–51)
TROPONIN I SERPL HS-MCNC: 455 NG/L (ref 0–51)
TSH SERPL DL<=0.05 MIU/L-ACNC: 6.11 UIU/ML (ref 0.36–3.74)
URINE CULTURE IF INDICATED: ABNORMAL
UROBILINOGEN UR QL STRIP.AUTO: 0.1 EU/DL (ref 0.1–1)
UROBILINOGEN UR QL STRIP.AUTO: 0.1 EU/DL (ref 0.1–1)
VENTILATION MODE VENT: ABNORMAL
VT SETTING VENT: 530
WBC # BLD AUTO: 22.9 K/UL (ref 3.6–11)
WBC URNS QL MICRO: ABNORMAL /HPF (ref 0–4)
WBC URNS QL MICRO: ABNORMAL /HPF (ref 0–4)

## 2023-12-13 PROCEDURE — 70450 CT HEAD/BRAIN W/O DYE: CPT

## 2023-12-13 PROCEDURE — 84295 ASSAY OF SERUM SODIUM: CPT

## 2023-12-13 PROCEDURE — 2580000003 HC RX 258: Performed by: INTERNAL MEDICINE

## 2023-12-13 PROCEDURE — 84703 CHORIONIC GONADOTROPIN ASSAY: CPT

## 2023-12-13 PROCEDURE — 93005 ELECTROCARDIOGRAM TRACING: CPT | Performed by: NURSE PRACTITIONER

## 2023-12-13 PROCEDURE — 2500000003 HC RX 250 WO HCPCS: Performed by: INTERNAL MEDICINE

## 2023-12-13 PROCEDURE — 93005 ELECTROCARDIOGRAM TRACING: CPT | Performed by: STUDENT IN AN ORGANIZED HEALTH CARE EDUCATION/TRAINING PROGRAM

## 2023-12-13 PROCEDURE — 76817 TRANSVAGINAL US OBSTETRIC: CPT

## 2023-12-13 PROCEDURE — 6370000000 HC RX 637 (ALT 250 FOR IP): Performed by: INTERNAL MEDICINE

## 2023-12-13 PROCEDURE — 31500 INSERT EMERGENCY AIRWAY: CPT

## 2023-12-13 PROCEDURE — 84132 ASSAY OF SERUM POTASSIUM: CPT

## 2023-12-13 PROCEDURE — 82330 ASSAY OF CALCIUM: CPT

## 2023-12-13 PROCEDURE — 84484 ASSAY OF TROPONIN QUANT: CPT

## 2023-12-13 PROCEDURE — 84443 ASSAY THYROID STIM HORMONE: CPT

## 2023-12-13 PROCEDURE — 2000000000 HC ICU R&B

## 2023-12-13 PROCEDURE — 2580000003 HC RX 258: Performed by: STUDENT IN AN ORGANIZED HEALTH CARE EDUCATION/TRAINING PROGRAM

## 2023-12-13 PROCEDURE — 6360000004 HC RX CONTRAST MEDICATION: Performed by: STUDENT IN AN ORGANIZED HEALTH CARE EDUCATION/TRAINING PROGRAM

## 2023-12-13 PROCEDURE — 6360000002 HC RX W HCPCS: Performed by: STUDENT IN AN ORGANIZED HEALTH CARE EDUCATION/TRAINING PROGRAM

## 2023-12-13 PROCEDURE — 96360 HYDRATION IV INFUSION INIT: CPT

## 2023-12-13 PROCEDURE — 2700000000 HC OXYGEN THERAPY PER DAY

## 2023-12-13 PROCEDURE — 80053 COMPREHEN METABOLIC PANEL: CPT

## 2023-12-13 PROCEDURE — 6360000002 HC RX W HCPCS: Performed by: INTERNAL MEDICINE

## 2023-12-13 PROCEDURE — 36415 COLL VENOUS BLD VENIPUNCTURE: CPT

## 2023-12-13 PROCEDURE — 5A1955Z RESPIRATORY VENTILATION, GREATER THAN 96 CONSECUTIVE HOURS: ICD-10-PCS | Performed by: INTERNAL MEDICINE

## 2023-12-13 PROCEDURE — 82803 BLOOD GASES ANY COMBINATION: CPT

## 2023-12-13 PROCEDURE — 0BH17EZ INSERTION OF ENDOTRACHEAL AIRWAY INTO TRACHEA, VIA NATURAL OR ARTIFICIAL OPENING: ICD-10-PCS | Performed by: INTERNAL MEDICINE

## 2023-12-13 PROCEDURE — 83930 ASSAY OF BLOOD OSMOLALITY: CPT

## 2023-12-13 PROCEDURE — 87635 SARS-COV-2 COVID-19 AMP PRB: CPT

## 2023-12-13 PROCEDURE — 82550 ASSAY OF CK (CPK): CPT

## 2023-12-13 PROCEDURE — 94002 VENT MGMT INPAT INIT DAY: CPT

## 2023-12-13 PROCEDURE — 96365 THER/PROPH/DIAG IV INF INIT: CPT

## 2023-12-13 PROCEDURE — 84145 PROCALCITONIN (PCT): CPT

## 2023-12-13 PROCEDURE — 0042T CT BRAIN PERFUSION: CPT

## 2023-12-13 PROCEDURE — 99291 CRITICAL CARE FIRST HOUR: CPT

## 2023-12-13 PROCEDURE — 70498 CT ANGIOGRAPHY NECK: CPT

## 2023-12-13 PROCEDURE — 80048 BASIC METABOLIC PNL TOTAL CA: CPT

## 2023-12-13 PROCEDURE — 93005 ELECTROCARDIOGRAM TRACING: CPT | Performed by: INTERNAL MEDICINE

## 2023-12-13 PROCEDURE — 94640 AIRWAY INHALATION TREATMENT: CPT

## 2023-12-13 PROCEDURE — 83735 ASSAY OF MAGNESIUM: CPT

## 2023-12-13 PROCEDURE — 82947 ASSAY GLUCOSE BLOOD QUANT: CPT

## 2023-12-13 PROCEDURE — 6360000002 HC RX W HCPCS

## 2023-12-13 PROCEDURE — 71045 X-RAY EXAM CHEST 1 VIEW: CPT

## 2023-12-13 PROCEDURE — 81001 URINALYSIS AUTO W/SCOPE: CPT

## 2023-12-13 PROCEDURE — 82962 GLUCOSE BLOOD TEST: CPT

## 2023-12-13 PROCEDURE — 36600 WITHDRAWAL OF ARTERIAL BLOOD: CPT

## 2023-12-13 PROCEDURE — 2500000003 HC RX 250 WO HCPCS: Performed by: STUDENT IN AN ORGANIZED HEALTH CARE EDUCATION/TRAINING PROGRAM

## 2023-12-13 PROCEDURE — 83605 ASSAY OF LACTIC ACID: CPT

## 2023-12-13 PROCEDURE — 87804 INFLUENZA ASSAY W/OPTIC: CPT

## 2023-12-13 PROCEDURE — 85025 COMPLETE CBC W/AUTO DIFF WBC: CPT

## 2023-12-13 PROCEDURE — 87040 BLOOD CULTURE FOR BACTERIA: CPT

## 2023-12-13 PROCEDURE — 96375 TX/PRO/DX INJ NEW DRUG ADDON: CPT

## 2023-12-13 PROCEDURE — 94761 N-INVAS EAR/PLS OXIMETRY MLT: CPT

## 2023-12-13 PROCEDURE — 84702 CHORIONIC GONADOTROPIN TEST: CPT

## 2023-12-13 PROCEDURE — 83690 ASSAY OF LIPASE: CPT

## 2023-12-13 RX ORDER — SODIUM CHLORIDE 9 MG/ML
INJECTION, SOLUTION INTRAVENOUS PRN
Status: DISCONTINUED | OUTPATIENT
Start: 2023-12-13 | End: 2024-01-24 | Stop reason: HOSPADM

## 2023-12-13 RX ORDER — ENOXAPARIN SODIUM 100 MG/ML
30 INJECTION SUBCUTANEOUS DAILY
Status: DISCONTINUED | OUTPATIENT
Start: 2023-12-13 | End: 2024-01-24 | Stop reason: HOSPADM

## 2023-12-13 RX ORDER — PROPOFOL 10 MG/ML
5-50 INJECTION, EMULSION INTRAVENOUS CONTINUOUS
Status: DISCONTINUED | OUTPATIENT
Start: 2023-12-13 | End: 2023-12-29

## 2023-12-13 RX ORDER — INSULIN GLARGINE 100 [IU]/ML
20 INJECTION, SOLUTION SUBCUTANEOUS DAILY
Status: DISCONTINUED | OUTPATIENT
Start: 2023-12-13 | End: 2023-12-19

## 2023-12-13 RX ORDER — SODIUM CHLORIDE 0.9 % (FLUSH) 0.9 %
5-40 SYRINGE (ML) INJECTION EVERY 12 HOURS SCHEDULED
Status: DISCONTINUED | OUTPATIENT
Start: 2023-12-13 | End: 2024-01-24 | Stop reason: HOSPADM

## 2023-12-13 RX ORDER — ACETAMINOPHEN 650 MG/1
650 SUPPOSITORY RECTAL EVERY 6 HOURS PRN
Status: DISCONTINUED | OUTPATIENT
Start: 2023-12-13 | End: 2024-01-24 | Stop reason: HOSPADM

## 2023-12-13 RX ORDER — DEXTROSE MONOHYDRATE 100 MG/ML
INJECTION, SOLUTION INTRAVENOUS CONTINUOUS PRN
Status: DISCONTINUED | OUTPATIENT
Start: 2023-12-13 | End: 2024-01-24 | Stop reason: HOSPADM

## 2023-12-13 RX ORDER — ONDANSETRON 4 MG/1
4 TABLET, ORALLY DISINTEGRATING ORAL EVERY 8 HOURS PRN
Status: DISCONTINUED | OUTPATIENT
Start: 2023-12-13 | End: 2024-01-24 | Stop reason: HOSPADM

## 2023-12-13 RX ORDER — LEVETIRACETAM 500 MG/5ML
2000 INJECTION, SOLUTION, CONCENTRATE INTRAVENOUS ONCE
Status: COMPLETED | OUTPATIENT
Start: 2023-12-13 | End: 2023-12-13

## 2023-12-13 RX ORDER — SODIUM CHLORIDE, SODIUM LACTATE, POTASSIUM CHLORIDE, AND CALCIUM CHLORIDE .6; .31; .03; .02 G/100ML; G/100ML; G/100ML; G/100ML
1000 INJECTION, SOLUTION INTRAVENOUS
Status: COMPLETED | OUTPATIENT
Start: 2023-12-13 | End: 2023-12-13

## 2023-12-13 RX ORDER — ACETAMINOPHEN 325 MG/1
650 TABLET ORAL EVERY 6 HOURS PRN
Status: DISCONTINUED | OUTPATIENT
Start: 2023-12-13 | End: 2024-01-24 | Stop reason: HOSPADM

## 2023-12-13 RX ORDER — FENTANYL CITRATE-0.9 % NACL/PF 10 MCG/ML
25-300 PLASTIC BAG, INJECTION (ML) INTRAVENOUS CONTINUOUS
Status: DISCONTINUED | OUTPATIENT
Start: 2023-12-13 | End: 2023-12-30

## 2023-12-13 RX ORDER — PROPOFOL 10 MG/ML
INJECTION, EMULSION INTRAVENOUS
Status: COMPLETED
Start: 2023-12-13 | End: 2023-12-13

## 2023-12-13 RX ORDER — IPRATROPIUM BROMIDE AND ALBUTEROL SULFATE 2.5; .5 MG/3ML; MG/3ML
1 SOLUTION RESPIRATORY (INHALATION)
Status: DISCONTINUED | OUTPATIENT
Start: 2023-12-13 | End: 2023-12-15

## 2023-12-13 RX ORDER — PROPOFOL 10 MG/ML
5-50 INJECTION, EMULSION INTRAVENOUS
Status: COMPLETED | OUTPATIENT
Start: 2023-12-13 | End: 2023-12-13

## 2023-12-13 RX ORDER — SODIUM CHLORIDE 0.9 % (FLUSH) 0.9 %
5-40 SYRINGE (ML) INJECTION PRN
Status: DISCONTINUED | OUTPATIENT
Start: 2023-12-13 | End: 2024-01-24 | Stop reason: HOSPADM

## 2023-12-13 RX ORDER — PROPOFOL 10 MG/ML
5-50 INJECTION, EMULSION INTRAVENOUS CONTINUOUS
Status: DISCONTINUED | OUTPATIENT
Start: 2023-12-13 | End: 2023-12-13

## 2023-12-13 RX ORDER — PROPOFOL 10 MG/ML
5-200 INJECTION, EMULSION INTRAVENOUS
Status: COMPLETED | OUTPATIENT
Start: 2023-12-13 | End: 2023-12-13

## 2023-12-13 RX ORDER — ETOMIDATE 2 MG/ML
20 INJECTION INTRAVENOUS
Status: COMPLETED | OUTPATIENT
Start: 2023-12-13 | End: 2023-12-13

## 2023-12-13 RX ORDER — INSULIN LISPRO 100 [IU]/ML
0-8 INJECTION, SOLUTION INTRAVENOUS; SUBCUTANEOUS EVERY 4 HOURS
Status: DISCONTINUED | OUTPATIENT
Start: 2023-12-13 | End: 2023-12-19

## 2023-12-13 RX ORDER — ROCURONIUM BROMIDE 10 MG/ML
100 INJECTION, SOLUTION INTRAVENOUS
Status: COMPLETED | OUTPATIENT
Start: 2023-12-13 | End: 2023-12-13

## 2023-12-13 RX ORDER — ONDANSETRON 2 MG/ML
4 INJECTION INTRAMUSCULAR; INTRAVENOUS EVERY 6 HOURS PRN
Status: DISCONTINUED | OUTPATIENT
Start: 2023-12-13 | End: 2024-01-24 | Stop reason: HOSPADM

## 2023-12-13 RX ORDER — POLYETHYLENE GLYCOL 3350 17 G/17G
17 POWDER, FOR SOLUTION ORAL DAILY PRN
Status: DISCONTINUED | OUTPATIENT
Start: 2023-12-13 | End: 2024-01-24 | Stop reason: HOSPADM

## 2023-12-13 RX ADMIN — SODIUM CHLORIDE, PRESERVATIVE FREE 10 ML: 5 INJECTION INTRAVENOUS at 20:41

## 2023-12-13 RX ADMIN — SODIUM CHLORIDE 5 MG/HR: 9 INJECTION, SOLUTION INTRAVENOUS at 23:23

## 2023-12-13 RX ADMIN — SODIUM CHLORIDE 5 MG/HR: 9 INJECTION, SOLUTION INTRAVENOUS at 06:58

## 2023-12-13 RX ADMIN — PROPOFOL 55 MCG/KG/MIN: 10 INJECTION, EMULSION INTRAVENOUS at 17:31

## 2023-12-13 RX ADMIN — IPRATROPIUM BROMIDE AND ALBUTEROL SULFATE 1 DOSE: 2.5; .5 SOLUTION RESPIRATORY (INHALATION) at 21:39

## 2023-12-13 RX ADMIN — PROPOFOL 65 MCG/KG/MIN: 10 INJECTION, EMULSION INTRAVENOUS at 07:46

## 2023-12-13 RX ADMIN — ACETAMINOPHEN 650 MG: 325 TABLET ORAL at 23:34

## 2023-12-13 RX ADMIN — SODIUM CHLORIDE, PRESERVATIVE FREE 10 ML: 5 INJECTION INTRAVENOUS at 08:09

## 2023-12-13 RX ADMIN — ROCURONIUM BROMIDE 100 MG: 10 INJECTION, SOLUTION INTRAVENOUS at 05:30

## 2023-12-13 RX ADMIN — PIPERACILLIN AND TAZOBACTAM 4500 MG: 4; .5 INJECTION, POWDER, FOR SOLUTION INTRAVENOUS at 10:30

## 2023-12-13 RX ADMIN — PROPOFOL 20 MCG/KG/MIN: 10 INJECTION, EMULSION INTRAVENOUS at 05:49

## 2023-12-13 RX ADMIN — LEVETIRACETAM 2000 MG: 100 INJECTION, SOLUTION INTRAVENOUS at 06:30

## 2023-12-13 RX ADMIN — IPRATROPIUM BROMIDE AND ALBUTEROL SULFATE 1 DOSE: 2.5; .5 SOLUTION RESPIRATORY (INHALATION) at 14:34

## 2023-12-13 RX ADMIN — DOXYCYCLINE 100 MG: 100 INJECTION, POWDER, LYOPHILIZED, FOR SOLUTION INTRAVENOUS at 20:40

## 2023-12-13 RX ADMIN — PIPERACILLIN SODIUM AND TAZOBACTAM SODIUM 3375 MG: 3; .375 INJECTION, POWDER, LYOPHILIZED, FOR SOLUTION INTRAVENOUS at 22:30

## 2023-12-13 RX ADMIN — IOPAMIDOL 100 ML: 755 INJECTION, SOLUTION INTRAVENOUS at 06:08

## 2023-12-13 RX ADMIN — SODIUM CHLORIDE, POTASSIUM CHLORIDE, SODIUM LACTATE AND CALCIUM CHLORIDE 1000 ML: 600; 310; 30; 20 INJECTION, SOLUTION INTRAVENOUS at 06:39

## 2023-12-13 RX ADMIN — PROPOFOL 50 MCG/KG/MIN: 10 INJECTION, EMULSION INTRAVENOUS at 22:21

## 2023-12-13 RX ADMIN — IPRATROPIUM BROMIDE AND ALBUTEROL SULFATE 1 DOSE: 2.5; .5 SOLUTION RESPIRATORY (INHALATION) at 10:57

## 2023-12-13 RX ADMIN — DOXYCYCLINE 100 MG: 100 INJECTION, POWDER, LYOPHILIZED, FOR SOLUTION INTRAVENOUS at 09:28

## 2023-12-13 RX ADMIN — Medication 50 MCG/HR: at 06:28

## 2023-12-13 RX ADMIN — LEVETIRACETAM 2000 MG: 100 INJECTION, SOLUTION INTRAVENOUS at 08:07

## 2023-12-13 RX ADMIN — ETOMIDATE 20 MG: 2 INJECTION, SOLUTION INTRAVENOUS at 05:29

## 2023-12-13 RX ADMIN — ACETAMINOPHEN 650 MG: 650 SUPPOSITORY RECTAL at 15:55

## 2023-12-13 RX ADMIN — SODIUM CHLORIDE 3000 MG: 900 INJECTION INTRAVENOUS at 09:00

## 2023-12-13 RX ADMIN — ENOXAPARIN SODIUM 30 MG: 100 INJECTION SUBCUTANEOUS at 09:20

## 2023-12-13 RX ADMIN — VANCOMYCIN HYDROCHLORIDE 1500 MG: 1 INJECTION, POWDER, LYOPHILIZED, FOR SOLUTION INTRAVENOUS at 20:25

## 2023-12-13 RX ADMIN — Medication 75 MCG/HR: at 17:05

## 2023-12-13 RX ADMIN — PIPERACILLIN SODIUM AND TAZOBACTAM SODIUM 3375 MG: 3; .375 INJECTION, POWDER, LYOPHILIZED, FOR SOLUTION INTRAVENOUS at 15:16

## 2023-12-13 RX ADMIN — INSULIN GLARGINE 20 UNITS: 100 INJECTION, SOLUTION SUBCUTANEOUS at 09:19

## 2023-12-13 RX ADMIN — PROPOFOL 60 MCG/KG/MIN: 10 INJECTION, EMULSION INTRAVENOUS at 13:18

## 2023-12-13 ASSESSMENT — LIFESTYLE VARIABLES
HOW MANY STANDARD DRINKS CONTAINING ALCOHOL DO YOU HAVE ON A TYPICAL DAY: PATIENT DOES NOT DRINK
HOW OFTEN DO YOU HAVE A DRINK CONTAINING ALCOHOL: NEVER

## 2023-12-13 ASSESSMENT — PULMONARY FUNCTION TESTS
PIF_VALUE: 29
PIF_VALUE: 22
PIF_VALUE: 30
PIF_VALUE: 30
PIF_VALUE: 45

## 2023-12-13 NOTE — ED NOTES
Provider Amelie made aware of pt previously receiving ampicillin 3g and now ordered zosyn 4.5g. order received to administer zosyn.      Alhaji Delgadillo RN  12/13/23 9638

## 2023-12-13 NOTE — ED NOTES
ICU nurse Hannah and Pratik in department to assume care of pt both RN made aware of new lactic of 5.0.      Alhaji Delgadillo, CAMILA  12/13/23 8227

## 2023-12-13 NOTE — PROCEDURES
Intubation Procedure Note    Performed By:  Ajith Quach Cha, MD     Medications given were: etomidate and rocuronium   A number: 7.5 F  ETT was placed to: 23 cm at the teeth.   Placement was evaluated by noting: breath sounds bilaterally. Chest X-ray pending.   Attempts required: 1  Complications: none    The procedure was tolerated well    Estimated Blood Loss:  Minimal           Disposition: ED    Supervised and assisted by ED physician Dr. Lucas and respiratory therapist              Signed By: Ajith Quach Cha, MD

## 2023-12-13 NOTE — ED PROVIDER NOTES
[facility]  EMERGENCY DEPARTMENT ENCOUNTER NOTE    Date: 12/13/2023  Patient Name: Lucretia Andres    History of Presenting Illness     No chief complaint on file.      History obtained from: EMS    HPI: Lucretia Andres, 53 y.o. female with  a past medical history of IDDM and seizures on Keppra  presents for seizures.  Per EMS, the patient had back-to-back seizures and was found unresponsive, postictal, and hypoxic.  Required nonrebreather on route for oxygenation to improve, had spontaneous breathing, however had a GCS of 4.  On arrival, the patient has spontaneous breathing but no gag, no response to noxious stimuli, and has gurgling breathing.  She had avulsed her incisor.  On route, her glucose was noted to be 600.  History is limited.    Medical History   I reviewed the medical, surgical, family, and social history, as well as allergies:    PCP: None, None    Past Medical History:  No past medical history on file.  Past Surgical History:  No past surgical history on file.  Current Outpatient Medications:  Current Outpatient Medications   Medication Instructions    amLODIPine (NORVASC) 10 mg, Oral, DAILY    aspirin 81 mg, Oral, DAILY    atorvastatin (LIPITOR) 40 mg, Oral, NIGHTLY    blood glucose monitor strips Test 3-4 times a day BEFORE MEALS and BEDTIME & as needed for symptoms of irregular blood glucose. Dispense sufficient amount for indicated testing frequency plus additional to accommodate PRN testing needs.    Blood Glucose Monitoring Suppl (BLOOD GLUCOSE MONITOR SYSTEM) w/Device KIT Pharmacist to identify preferred meter and strips.    carvedilol (COREG) 25 mg, Oral, 2 TIMES DAILY WITH MEALS    insulin 70-30 (HUMULIN;NOVOLIN) (70-30) 100 UNIT per ML injection vial 11 Units, SubCUTAneous, 2 TIMES DAILY WITH MEALS, Okay to substitute Humulin 70-30 or ReliOn 70-30.    Insulin Syringe-Needle U-100 30G X 5/16\" 0.3 ML MISC Use as directed for insulin injections.  Use a NEW

## 2023-12-13 NOTE — ED NOTES
1610- Pt assessed to have temperature of 101.4( bladder)  at 1550. pt given prn tylenol suppository. provider Ali notified.       1633- new orders received for blood and respiratory cultures.     Alhaji Delgadillo RN  12/13/23 1702       Alhaji Delgadillo RN  12/13/23 1734

## 2023-12-13 NOTE — ED NOTES
Pt bladder scanned 401ml in bladder. Provider Amelie Notified. Received or to place hopper catheter.      Alhaji Delgadillo RN  12/13/23 4611

## 2023-12-13 NOTE — ED TRIAGE NOTES
Patient arrived via EMS. Patients  called due to her having multiple seizures this evening. Patient has had seizures before due to her glucose being high.

## 2023-12-13 NOTE — H&P
History & Physical    Primary Care Provider: None, None  Source of Information: Patient/family     Chief complaint: No chief complaint on file.       History of Presenting Illness:   Lucretia Andres is a 53 y.o. female with a history of CVA, seizure, poorly controlled diabetes, hypertension.    September 2023 admitted to [facility] for new onset seizure activity with status epilepticus, CVA, uncontrolled diabetes. Discharged home with a regimen of keppra, lisinopril, carvedilol, amlodipine, statin, insulin. Unfortunately she was unable to get any of her prescriptions aside from her insulin.    12/13/23 presents to hospital by EMS because of witnessed seizure activity at home. Per EMS, the patient had back-to-back seizures and was found unresponsive, postictal, and hypoxic. Intubated ventilated during the ED course. During the ED course a pregnancy test was positive but US did not reveal IUP nor ectopic pregnancy. Also with hyperglycemia and acute renal failure. Started on nicardipine for hypertension. I have been asked to admit to hospital and ICU for further stabilization and management of her condition.       Review of Systems:  Pertinent items are noted in the History of Present Illness.     Past Medical History:   Diagnosis Date    CKD (chronic kidney disease), stage III (HCC)     CVA (cerebral vascular accident) (HCC)     Diabetes mellitus (HCC)     Dyslipidemia     Hypertension     Seizure disorder (HCC)         History reviewed. No pertinent surgical history.    Prior to Admission medications    Medication Sig Start Date End Date Taking? Authorizing Provider   insulin 70-30 (HUMULIN;NOVOLIN) (70-30) 100 UNIT per ML injection vial Inject 11 Units into the skin 2 times daily (with meals) Okay to substitute Humulin 70-30 or ReliOn 70-30. 9/6/23   Genoveva Pak APRN - CNS   blood glucose monitor strips Test 3-4 times a day BEFORE MEALS and BEDTIME & as needed for symptoms of irregular blood glucose.

## 2023-12-13 NOTE — ED NOTES
RT Brennan  and provider  Amelie notified of pt entidal of 21 continuously.      Alhaji Delgadillo RN  12/13/23 6886

## 2023-12-14 ENCOUNTER — APPOINTMENT (OUTPATIENT)
Facility: HOSPITAL | Age: 53
DRG: 981 | End: 2023-12-14

## 2023-12-14 LAB
ALBUMIN SERPL-MCNC: 2.9 G/DL (ref 3.5–5)
ALBUMIN/GLOB SERPL: 0.7 (ref 1.1–2.2)
ALP SERPL-CCNC: 192 U/L (ref 45–117)
ALT SERPL-CCNC: 23 U/L (ref 12–78)
ANION GAP SERPL CALC-SCNC: 6 MMOL/L (ref 5–15)
ANION GAP SERPL CALC-SCNC: 8 MMOL/L (ref 5–15)
ANION GAP SERPL CALC-SCNC: 9 MMOL/L (ref 5–15)
APTT PPP: 26.7 SEC (ref 21.2–34.1)
ARTERIAL PATENCY WRIST A: YES
AST SERPL W P-5'-P-CCNC: 35 U/L (ref 15–37)
BASE EXCESS BLDA CALC-SCNC: 1.6 MMOL/L (ref 0–3)
BASOPHILS # BLD: 0.1 K/UL (ref 0–0.1)
BASOPHILS NFR BLD: 1 % (ref 0–1)
BDY SITE: ABNORMAL
BILIRUB SERPL-MCNC: 0.7 MG/DL (ref 0.2–1)
BODY TEMPERATURE: 100.5
BUN SERPL-MCNC: 17 MG/DL (ref 6–20)
BUN SERPL-MCNC: 18 MG/DL (ref 6–20)
BUN SERPL-MCNC: 19 MG/DL (ref 6–20)
BUN/CREAT SERPL: 10 (ref 12–20)
BUN/CREAT SERPL: 11 (ref 12–20)
BUN/CREAT SERPL: 12 (ref 12–20)
CA-I BLD-MCNC: 1.12 MMOL/L (ref 1.13–1.32)
CA-I BLD-MCNC: 7.5 MG/DL (ref 8.5–10.1)
CA-I BLD-MCNC: 8.3 MG/DL (ref 8.5–10.1)
CA-I BLD-MCNC: 8.5 MG/DL (ref 8.5–10.1)
CHLORIDE SERPL-SCNC: 107 MMOL/L (ref 97–108)
CHLORIDE SERPL-SCNC: 112 MMOL/L (ref 97–108)
CHLORIDE SERPL-SCNC: 114 MMOL/L (ref 97–108)
CO2 SERPL-SCNC: 20 MMOL/L (ref 21–32)
CO2 SERPL-SCNC: 21 MMOL/L (ref 21–32)
CO2 SERPL-SCNC: 21 MMOL/L (ref 21–32)
COHGB MFR BLD: 0.3 % (ref 1–2)
CREAT SERPL-MCNC: 1.52 MG/DL (ref 0.55–1.02)
CREAT SERPL-MCNC: 1.57 MG/DL (ref 0.55–1.02)
CREAT SERPL-MCNC: 1.83 MG/DL (ref 0.55–1.02)
DIFFERENTIAL METHOD BLD: ABNORMAL
EKG ATRIAL RATE: 106 BPM
EKG ATRIAL RATE: 99 BPM
EKG DIAGNOSIS: NORMAL
EKG DIAGNOSIS: NORMAL
EKG P AXIS: -10 DEGREES
EKG P AXIS: 66 DEGREES
EKG P-R INTERVAL: 130 MS
EKG P-R INTERVAL: 136 MS
EKG Q-T INTERVAL: 366 MS
EKG Q-T INTERVAL: 438 MS
EKG QRS DURATION: 74 MS
EKG QRS DURATION: 86 MS
EKG QTC CALCULATION (BAZETT): 486 MS
EKG QTC CALCULATION (BAZETT): 562 MS
EKG R AXIS: 31 DEGREES
EKG R AXIS: 34 DEGREES
EKG T AXIS: 149 DEGREES
EKG T AXIS: 242 DEGREES
EKG VENTRICULAR RATE: 106 BPM
EKG VENTRICULAR RATE: 99 BPM
EOSINOPHIL # BLD: 0 K/UL (ref 0–0.4)
EOSINOPHIL NFR BLD: 0 % (ref 0–7)
ERYTHROCYTE [DISTWIDTH] IN BLOOD BY AUTOMATED COUNT: 12.3 % (ref 11.5–14.5)
EST. AVERAGE GLUCOSE BLD GHB EST-MCNC: 229 MG/DL
FIO2 ON VENT: 40 %
GAS FLOW.O2 SETTING OXYMISER: 18
GLOBULIN SER CALC-MCNC: 4.1 G/DL (ref 2–4)
GLUCOSE BLD STRIP.AUTO-MCNC: 139 MG/DL (ref 65–100)
GLUCOSE BLD STRIP.AUTO-MCNC: 207 MG/DL (ref 65–100)
GLUCOSE BLD STRIP.AUTO-MCNC: 212 MG/DL (ref 65–100)
GLUCOSE BLD STRIP.AUTO-MCNC: 218 MG/DL (ref 65–100)
GLUCOSE BLD STRIP.AUTO-MCNC: 224 MG/DL (ref 65–100)
GLUCOSE BLD STRIP.AUTO-MCNC: 241 MG/DL (ref 65–100)
GLUCOSE BLD STRIP.AUTO-MCNC: 247 MG/DL (ref 65–100)
GLUCOSE SERPL-MCNC: 214 MG/DL (ref 65–100)
GLUCOSE SERPL-MCNC: 223 MG/DL (ref 65–100)
GLUCOSE SERPL-MCNC: 243 MG/DL (ref 65–100)
HBA1C MFR BLD: 9.6 % (ref 4–5.6)
HCG SERPL-ACNC: 4 MIU/ML (ref 0–6)
HCO3 BLDA-SCNC: 21 MMOL/L (ref 22–26)
HCT VFR BLD AUTO: 29.1 % (ref 35–47)
HGB BLD-MCNC: 10.2 G/DL (ref 11.5–16)
IMM GRANULOCYTES # BLD AUTO: 0 K/UL (ref 0–0.04)
IMM GRANULOCYTES NFR BLD AUTO: 0 % (ref 0–0.5)
INR PPP: 1 (ref 0.9–1.1)
LACTATE SERPL-SCNC: 0.8 MMOL/L (ref 0.4–2)
LACTATE SERPL-SCNC: 2.2 MMOL/L (ref 0.4–2)
LYMPHOCYTES # BLD: 3.7 K/UL (ref 0.8–3.5)
LYMPHOCYTES NFR BLD: 32 % (ref 12–49)
MAGNESIUM SERPL-MCNC: 1.8 MG/DL (ref 1.6–2.4)
MAGNESIUM SERPL-MCNC: 1.8 MG/DL (ref 1.6–2.4)
MCH RBC QN AUTO: 29.1 PG (ref 26–34)
MCHC RBC AUTO-ENTMCNC: 35.1 G/DL (ref 30–36.5)
MCV RBC AUTO: 82.9 FL (ref 80–99)
METHGB MFR BLD: 0.3 % (ref 0–1.4)
MONOCYTES # BLD: 0.8 K/UL (ref 0–1)
MONOCYTES NFR BLD: 7 % (ref 5–13)
NEUTS SEG # BLD: 7 K/UL (ref 1.8–8)
NEUTS SEG NFR BLD: 60 % (ref 32–75)
NRBC # BLD: 0 K/UL (ref 0–0.01)
NRBC BLD-RTO: 0 PER 100 WBC
OXYHGB MFR BLD: 98 % (ref 95–99)
PCO2 BLDA: 20 MMHG (ref 35–45)
PERFORMED BY:: ABNORMAL
PH BLDA: 7.64 (ref 7.35–7.45)
PHOSPHATE SERPL-MCNC: 1.7 MG/DL (ref 2.6–4.7)
PLATELET # BLD AUTO: 303 K/UL (ref 150–400)
PMV BLD AUTO: 11.8 FL (ref 8.9–12.9)
PO2 BLDA: 145 MMHG (ref 80–100)
POTASSIUM SERPL-SCNC: 2.9 MMOL/L (ref 3.5–5.1)
POTASSIUM SERPL-SCNC: 3.6 MMOL/L (ref 3.5–5.1)
POTASSIUM SERPL-SCNC: 4 MMOL/L (ref 3.5–5.1)
PROCALCITONIN SERPL-MCNC: 0.7 NG/ML
PROT SERPL-MCNC: 7 G/DL (ref 6.4–8.2)
PROTHROMBIN TIME: 13.6 SEC (ref 11.9–14.6)
RBC # BLD AUTO: 3.51 M/UL (ref 3.8–5.2)
SAO2 % BLD: 99 % (ref 95–99)
SAO2% DEVICE SAO2% SENSOR NAME: ABNORMAL
SERVICE CMNT-IMP: ABNORMAL
SODIUM SERPL-SCNC: 136 MMOL/L (ref 136–145)
SODIUM SERPL-SCNC: 141 MMOL/L (ref 136–145)
SODIUM SERPL-SCNC: 141 MMOL/L (ref 136–145)
SPECIMEN SITE: ABNORMAL
THERAPEUTIC RANGE: NORMAL SEC (ref 82–109)
VANCOMYCIN SERPL-MCNC: 15.6 UG/ML
VT SETTING VENT: 530
WBC # BLD AUTO: 11.5 K/UL (ref 3.6–11)

## 2023-12-14 PROCEDURE — 6370000000 HC RX 637 (ALT 250 FOR IP): Performed by: INTERNAL MEDICINE

## 2023-12-14 PROCEDURE — 2000000000 HC ICU R&B

## 2023-12-14 PROCEDURE — 6370000000 HC RX 637 (ALT 250 FOR IP): Performed by: HOSPITALIST

## 2023-12-14 PROCEDURE — 2700000000 HC OXYGEN THERAPY PER DAY

## 2023-12-14 PROCEDURE — 2500000003 HC RX 250 WO HCPCS: Performed by: INTERNAL MEDICINE

## 2023-12-14 PROCEDURE — 80048 BASIC METABOLIC PNL TOTAL CA: CPT

## 2023-12-14 PROCEDURE — 2500000003 HC RX 250 WO HCPCS: Performed by: HOSPITALIST

## 2023-12-14 PROCEDURE — 82962 GLUCOSE BLOOD TEST: CPT

## 2023-12-14 PROCEDURE — 2580000003 HC RX 258: Performed by: INTERNAL MEDICINE

## 2023-12-14 PROCEDURE — 82330 ASSAY OF CALCIUM: CPT

## 2023-12-14 PROCEDURE — 83735 ASSAY OF MAGNESIUM: CPT

## 2023-12-14 PROCEDURE — 6360000002 HC RX W HCPCS: Performed by: INTERNAL MEDICINE

## 2023-12-14 PROCEDURE — 82803 BLOOD GASES ANY COMBINATION: CPT

## 2023-12-14 PROCEDURE — 85025 COMPLETE CBC W/AUTO DIFF WBC: CPT

## 2023-12-14 PROCEDURE — 85730 THROMBOPLASTIN TIME PARTIAL: CPT

## 2023-12-14 PROCEDURE — 80202 ASSAY OF VANCOMYCIN: CPT

## 2023-12-14 PROCEDURE — 83605 ASSAY OF LACTIC ACID: CPT

## 2023-12-14 PROCEDURE — 80053 COMPREHEN METABOLIC PANEL: CPT

## 2023-12-14 PROCEDURE — 83835 ASSAY OF METANEPHRINES: CPT

## 2023-12-14 PROCEDURE — 94640 AIRWAY INHALATION TREATMENT: CPT

## 2023-12-14 PROCEDURE — 84145 PROCALCITONIN (PCT): CPT

## 2023-12-14 PROCEDURE — 6360000002 HC RX W HCPCS: Performed by: HOSPITALIST

## 2023-12-14 PROCEDURE — 94761 N-INVAS EAR/PLS OXIMETRY MLT: CPT

## 2023-12-14 PROCEDURE — 95822 EEG COMA OR SLEEP ONLY: CPT

## 2023-12-14 PROCEDURE — 71045 X-RAY EXAM CHEST 1 VIEW: CPT

## 2023-12-14 PROCEDURE — 84702 CHORIONIC GONADOTROPIN TEST: CPT

## 2023-12-14 PROCEDURE — 84100 ASSAY OF PHOSPHORUS: CPT

## 2023-12-14 PROCEDURE — 84244 ASSAY OF RENIN: CPT

## 2023-12-14 PROCEDURE — 36600 WITHDRAWAL OF ARTERIAL BLOOD: CPT

## 2023-12-14 PROCEDURE — 94003 VENT MGMT INPAT SUBQ DAY: CPT

## 2023-12-14 PROCEDURE — 85610 PROTHROMBIN TIME: CPT

## 2023-12-14 PROCEDURE — 83036 HEMOGLOBIN GLYCOSYLATED A1C: CPT

## 2023-12-14 PROCEDURE — 36415 COLL VENOUS BLD VENIPUNCTURE: CPT

## 2023-12-14 PROCEDURE — 6360000002 HC RX W HCPCS: Performed by: STUDENT IN AN ORGANIZED HEALTH CARE EDUCATION/TRAINING PROGRAM

## 2023-12-14 RX ORDER — POTASSIUM CHLORIDE 20 MEQ/1
40 TABLET, EXTENDED RELEASE ORAL ONCE
Status: COMPLETED | OUTPATIENT
Start: 2023-12-14 | End: 2023-12-14

## 2023-12-14 RX ORDER — MIDAZOLAM IN NACL,ISO-OSMOT/PF 50 MG/50ML
0-10 INFUSION BOTTLE (ML) INTRAVENOUS CONTINUOUS
Status: DISCONTINUED | OUTPATIENT
Start: 2023-12-14 | End: 2023-12-30

## 2023-12-14 RX ORDER — ATORVASTATIN CALCIUM 40 MG/1
40 TABLET, FILM COATED ORAL NIGHTLY
Status: DISCONTINUED | OUTPATIENT
Start: 2023-12-14 | End: 2024-01-24 | Stop reason: HOSPADM

## 2023-12-14 RX ORDER — METOPROLOL TARTRATE 1 MG/ML
5 INJECTION, SOLUTION INTRAVENOUS ONCE
Status: COMPLETED | OUTPATIENT
Start: 2023-12-14 | End: 2023-12-14

## 2023-12-14 RX ORDER — MIDAZOLAM IN NACL,ISO-OSMOT/PF 50 MG/50ML
5 INFUSION BOTTLE (ML) INTRAVENOUS
Status: DISCONTINUED | OUTPATIENT
Start: 2023-12-14 | End: 2023-12-14 | Stop reason: SDUPTHER

## 2023-12-14 RX ORDER — POTASSIUM CHLORIDE 7.45 MG/ML
10 INJECTION INTRAVENOUS
Status: COMPLETED | OUTPATIENT
Start: 2023-12-14 | End: 2023-12-14

## 2023-12-14 RX ORDER — MIDAZOLAM IN NACL,ISO-OSMOT/PF 50 MG/50ML
5 INFUSION BOTTLE (ML) INTRAVENOUS CONTINUOUS
Status: DISCONTINUED | OUTPATIENT
Start: 2023-12-14 | End: 2023-12-14

## 2023-12-14 RX ADMIN — Medication 5 MG/HR: at 07:20

## 2023-12-14 RX ADMIN — POTASSIUM BICARBONATE 40 MEQ: 782 TABLET, EFFERVESCENT ORAL at 08:09

## 2023-12-14 RX ADMIN — ATORVASTATIN CALCIUM 40 MG: 40 TABLET, FILM COATED ORAL at 21:12

## 2023-12-14 RX ADMIN — IPRATROPIUM BROMIDE AND ALBUTEROL SULFATE 1 DOSE: 2.5; .5 SOLUTION RESPIRATORY (INHALATION) at 20:48

## 2023-12-14 RX ADMIN — LEVETIRACETAM 750 MG: 250 TABLET, FILM COATED ORAL at 01:30

## 2023-12-14 RX ADMIN — POTASSIUM CHLORIDE 10 MEQ: 7.46 INJECTION, SOLUTION INTRAVENOUS at 10:29

## 2023-12-14 RX ADMIN — SODIUM CHLORIDE, PRESERVATIVE FREE 10 ML: 5 INJECTION INTRAVENOUS at 21:24

## 2023-12-14 RX ADMIN — Medication 150 MCG/HR: at 16:46

## 2023-12-14 RX ADMIN — POTASSIUM CHLORIDE 10 MEQ: 7.46 INJECTION, SOLUTION INTRAVENOUS at 11:18

## 2023-12-14 RX ADMIN — IPRATROPIUM BROMIDE AND ALBUTEROL SULFATE 1 DOSE: 2.5; .5 SOLUTION RESPIRATORY (INHALATION) at 11:26

## 2023-12-14 RX ADMIN — IPRATROPIUM BROMIDE AND ALBUTEROL SULFATE 1 DOSE: 2.5; .5 SOLUTION RESPIRATORY (INHALATION) at 16:40

## 2023-12-14 RX ADMIN — INSULIN LISPRO 2 UNITS: 100 INJECTION, SOLUTION INTRAVENOUS; SUBCUTANEOUS at 11:35

## 2023-12-14 RX ADMIN — ENOXAPARIN SODIUM 30 MG: 100 INJECTION SUBCUTANEOUS at 08:08

## 2023-12-14 RX ADMIN — IPRATROPIUM BROMIDE AND ALBUTEROL SULFATE 1 DOSE: 2.5; .5 SOLUTION RESPIRATORY (INHALATION) at 08:15

## 2023-12-14 RX ADMIN — POTASSIUM CHLORIDE 10 MEQ: 7.46 INJECTION, SOLUTION INTRAVENOUS at 08:09

## 2023-12-14 RX ADMIN — METOPROLOL TARTRATE 5 MG: 5 INJECTION INTRAVENOUS at 01:25

## 2023-12-14 RX ADMIN — POTASSIUM CHLORIDE 10 MEQ: 7.46 INJECTION, SOLUTION INTRAVENOUS at 09:21

## 2023-12-14 RX ADMIN — INSULIN GLARGINE 20 UNITS: 100 INJECTION, SOLUTION SUBCUTANEOUS at 08:09

## 2023-12-14 RX ADMIN — ATORVASTATIN CALCIUM 40 MG: 40 TABLET, FILM COATED ORAL at 01:47

## 2023-12-14 RX ADMIN — LEVETIRACETAM 750 MG: 250 TABLET, FILM COATED ORAL at 08:08

## 2023-12-14 RX ADMIN — INSULIN LISPRO 2 UNITS: 100 INJECTION, SOLUTION INTRAVENOUS; SUBCUTANEOUS at 21:11

## 2023-12-14 RX ADMIN — PROPOFOL 30 MCG/KG/MIN: 10 INJECTION, EMULSION INTRAVENOUS at 10:25

## 2023-12-14 RX ADMIN — PIPERACILLIN SODIUM AND TAZOBACTAM SODIUM 3375 MG: 3; .375 INJECTION, POWDER, LYOPHILIZED, FOR SOLUTION INTRAVENOUS at 13:55

## 2023-12-14 RX ADMIN — INSULIN LISPRO 2 UNITS: 100 INJECTION, SOLUTION INTRAVENOUS; SUBCUTANEOUS at 16:30

## 2023-12-14 RX ADMIN — SODIUM CHLORIDE, PRESERVATIVE FREE 10 ML: 5 INJECTION INTRAVENOUS at 08:10

## 2023-12-14 RX ADMIN — DOXYCYCLINE 100 MG: 100 INJECTION, POWDER, LYOPHILIZED, FOR SOLUTION INTRAVENOUS at 08:09

## 2023-12-14 RX ADMIN — PIPERACILLIN SODIUM AND TAZOBACTAM SODIUM 3375 MG: 3; .375 INJECTION, POWDER, LYOPHILIZED, FOR SOLUTION INTRAVENOUS at 22:51

## 2023-12-14 RX ADMIN — Medication 150 MCG/HR: at 10:44

## 2023-12-14 RX ADMIN — PIPERACILLIN SODIUM AND TAZOBACTAM SODIUM 3375 MG: 3; .375 INJECTION, POWDER, LYOPHILIZED, FOR SOLUTION INTRAVENOUS at 06:24

## 2023-12-14 RX ADMIN — INSULIN LISPRO 2 UNITS: 100 INJECTION, SOLUTION INTRAVENOUS; SUBCUTANEOUS at 05:19

## 2023-12-14 RX ADMIN — Medication 5 MG/HR: at 02:18

## 2023-12-14 RX ADMIN — Medication 150 MCG/HR: at 23:10

## 2023-12-14 RX ADMIN — INSULIN LISPRO 2 UNITS: 100 INJECTION, SOLUTION INTRAVENOUS; SUBCUTANEOUS at 08:43

## 2023-12-14 RX ADMIN — POTASSIUM CHLORIDE 40 MEQ: 1500 TABLET, EXTENDED RELEASE ORAL at 01:25

## 2023-12-14 RX ADMIN — VANCOMYCIN HYDROCHLORIDE 1000 MG: 1 INJECTION, POWDER, LYOPHILIZED, FOR SOLUTION INTRAVENOUS at 12:37

## 2023-12-14 RX ADMIN — POTASSIUM PHOSPHATE 15 MMOL: 236; 224 INJECTION, SOLUTION INTRAVENOUS at 11:40

## 2023-12-14 RX ADMIN — LEVETIRACETAM 750 MG: 250 TABLET, FILM COATED ORAL at 21:12

## 2023-12-14 RX ADMIN — DOXYCYCLINE 100 MG: 100 INJECTION, POWDER, LYOPHILIZED, FOR SOLUTION INTRAVENOUS at 21:00

## 2023-12-14 RX ADMIN — PROPOFOL 30 MCG/KG/MIN: 10 INJECTION, EMULSION INTRAVENOUS at 02:58

## 2023-12-14 RX ADMIN — Medication 150 MCG/HR: at 05:00

## 2023-12-14 ASSESSMENT — PAIN SCALES - GENERAL
PAINLEVEL_OUTOF10: 0

## 2023-12-14 ASSESSMENT — PULMONARY FUNCTION TESTS
PIF_VALUE: 20
PIF_VALUE: 20
PIF_VALUE: 191
PIF_VALUE: 24
PIF_VALUE: 22
PIF_VALUE: 19

## 2023-12-14 NOTE — CARE COORDINATION
12/14/23 1312   Service Assessment   Patient Orientation Unable to Assess  (Intubated)   Cognition Other (see comment)  (Intubated)   History Provided By Spouse  ( Tomi Andres)   Primary Caregiver Family   Accompanied By/Relationship  Tomi Andres   Support Systems Spouse/Significant Other   Patient's Healthcare Decision Maker is: Legal Next of Kin   PCP Verified by CM Yes  (Per  no PCP.)   Last Visit to PCP   (No PCP.)   Prior Functional Level Independent in ADLs/IADLs   Current Functional Level Assistance with the following:;Bathing;Toileting;Dressing;Feeding;Cooking;Shopping;Housework   Can patient return to prior living arrangement Yes   Ability to make needs known: Good  (Per )   Family able to assist with home care needs: Yes   Would you like for me to discuss the discharge plan with any other family members/significant others, and if so, who? Yes  ( Tomi Andres)   Financial Resources Other (Comment)  (Self Pay)   Community Resources None   Social/Functional History   Lives With Spouse   Type of Home Apartment   Home Layout One level   Home Access Stairs to enter without rails   Entrance Stairs - Number of Steps 2 outside steps.   Bathroom Shower/Tub Tub/Shower unit   Bathroom Toilet Standard   Bathroom Accessibility Accessible   Home Equipment None   Receives Help From Family   ADL Assistance Needs assistance   Toileting Needs assistance   Homemaking Assistance Needs assistance   Homemaking Responsibilities Yes   Ambulation Assistance Needs assistance   Transfer Assistance Needs assistance   Active  No   Occupation Unemployed   Discharge Planning   Living Arrangements Spouse/Significant Other   Current Services Prior To Admission None   Potential Assistance Needed N/A   DME Ordered? No   Potential Assistance Purchasing Medications No   Type of Home Care Services None   Patient expects to be discharged to: Apartment   One/Two Story Residence One story   History of

## 2023-12-15 ENCOUNTER — APPOINTMENT (OUTPATIENT)
Facility: HOSPITAL | Age: 53
DRG: 981 | End: 2023-12-15
Attending: INTERNAL MEDICINE

## 2023-12-15 ENCOUNTER — APPOINTMENT (OUTPATIENT)
Facility: HOSPITAL | Age: 53
DRG: 981 | End: 2023-12-15

## 2023-12-15 LAB
ALBUMIN SERPL-MCNC: 2.8 G/DL (ref 3.5–5)
ALBUMIN SERPL-MCNC: 2.9 G/DL (ref 3.5–5)
ALBUMIN/GLOB SERPL: 0.7 (ref 1.1–2.2)
ALP SERPL-CCNC: 180 U/L (ref 45–117)
ALT SERPL-CCNC: 21 U/L (ref 12–78)
AMMONIA PLAS-SCNC: 19 UMOL/L
ANION GAP SERPL CALC-SCNC: 8 MMOL/L (ref 5–15)
ANION GAP SERPL CALC-SCNC: 9 MMOL/L (ref 5–15)
ARTERIAL PATENCY WRIST A: YES
AST SERPL W P-5'-P-CCNC: 29 U/L (ref 15–37)
BASE DEFICIT BLDA-SCNC: 4.1 MMOL/L
BASOPHILS # BLD: 0.1 K/UL (ref 0–0.1)
BASOPHILS NFR BLD: 1 % (ref 0–1)
BDY SITE: ABNORMAL
BILIRUB SERPL-MCNC: 0.9 MG/DL (ref 0.2–1)
BODY TEMPERATURE: 97.5
BUN SERPL-MCNC: 18 MG/DL (ref 6–20)
BUN SERPL-MCNC: 18 MG/DL (ref 6–20)
BUN/CREAT SERPL: 13 (ref 12–20)
BUN/CREAT SERPL: 13 (ref 12–20)
CA-I BLD-MCNC: 1.21 MMOL/L (ref 1.13–1.32)
CA-I BLD-MCNC: 8.6 MG/DL (ref 8.5–10.1)
CA-I BLD-MCNC: 8.7 MG/DL (ref 8.5–10.1)
CHLORIDE SERPL-SCNC: 114 MMOL/L (ref 97–108)
CHLORIDE SERPL-SCNC: 115 MMOL/L (ref 97–108)
CO2 SERPL-SCNC: 21 MMOL/L (ref 21–32)
CO2 SERPL-SCNC: 22 MMOL/L (ref 21–32)
COHGB MFR BLD: 0 % (ref 1–2)
CREAT SERPL-MCNC: 1.41 MG/DL (ref 0.55–1.02)
CREAT SERPL-MCNC: 1.43 MG/DL (ref 0.55–1.02)
DIFFERENTIAL METHOD BLD: ABNORMAL
ECHO BSA: 1.71 M2
ECHO LA AREA 4C: 10 CM2
ECHO LA DIAMETER INDEX: 1.89 CM/M2
ECHO LA DIAMETER: 3.2 CM
ECHO LA MAJOR AXIS: 4.2 CM
ECHO LA VOL MOD A4C: 20 ML (ref 22–52)
ECHO LA VOLUME INDEX MOD A4C: 12 ML/M2 (ref 16–34)
ECHO LV E' LATERAL VELOCITY: 7 CM/S
ECHO LV E' SEPTAL VELOCITY: 7 CM/S
ECHO LV FRACTIONAL SHORTENING: 29 % (ref 28–44)
ECHO LV INTERNAL DIMENSION DIASTOLE INDEX: 2.01 CM/M2
ECHO LV INTERNAL DIMENSION DIASTOLIC: 3.4 CM (ref 3.9–5.3)
ECHO LV INTERNAL DIMENSION SYSTOLIC INDEX: 1.42 CM/M2
ECHO LV INTERNAL DIMENSION SYSTOLIC: 2.4 CM
ECHO LV IVSD: 1.6 CM (ref 0.6–0.9)
ECHO LV MASS 2D: 196.4 G (ref 67–162)
ECHO LV MASS INDEX 2D: 116.2 G/M2 (ref 43–95)
ECHO LV POSTERIOR WALL DIASTOLIC: 1.5 CM (ref 0.6–0.9)
ECHO LV RELATIVE WALL THICKNESS RATIO: 0.88
ECHO MV A VELOCITY: 0.89 M/S
ECHO MV E DECELERATION TIME (DT): 213 MS
ECHO MV E VELOCITY: 0.82 M/S
ECHO MV E/A RATIO: 0.92
ECHO MV E/E' LATERAL: 11.71
ECHO MV E/E' RATIO (AVERAGED): 11.71
ECHO MV MAX VELOCITY: 1.2 M/S
ECHO MV MEAN GRADIENT: 3 MMHG
ECHO MV MEAN VELOCITY: 0.8 M/S
ECHO MV PEAK GRADIENT: 6 MMHG
ECHO MV REGURGITANT PEAK GRADIENT: 49 MMHG
ECHO MV REGURGITANT PEAK VELOCITY: 3.5 M/S
ECHO MV VTI: 26.3 CM
ECHO RV FREE WALL PEAK S': 12 CM/S
ECHO TV REGURGITANT MAX VELOCITY: 2.81 M/S
ECHO TV REGURGITANT PEAK GRADIENT: 32 MMHG
EOSINOPHIL # BLD: 0.2 K/UL (ref 0–0.4)
EOSINOPHIL NFR BLD: 3 % (ref 0–7)
ERYTHROCYTE [DISTWIDTH] IN BLOOD BY AUTOMATED COUNT: 13 % (ref 11.5–14.5)
FIO2 ON VENT: 30 %
GAS FLOW.O2 SETTING OXYMISER: 14
GLOBULIN SER CALC-MCNC: 3.9 G/DL (ref 2–4)
GLUCOSE BLD STRIP.AUTO-MCNC: 169 MG/DL (ref 65–100)
GLUCOSE BLD STRIP.AUTO-MCNC: 178 MG/DL (ref 65–100)
GLUCOSE BLD STRIP.AUTO-MCNC: 180 MG/DL (ref 65–100)
GLUCOSE BLD STRIP.AUTO-MCNC: 191 MG/DL (ref 65–100)
GLUCOSE BLD STRIP.AUTO-MCNC: 232 MG/DL (ref 65–100)
GLUCOSE SERPL-MCNC: 223 MG/DL (ref 65–100)
GLUCOSE SERPL-MCNC: 223 MG/DL (ref 65–100)
HCG SERPL-ACNC: 2 MIU/ML (ref 0–6)
HCO3 BLDA-SCNC: 21 MMOL/L (ref 22–26)
HCT VFR BLD AUTO: 28.2 % (ref 35–47)
HGB BLD-MCNC: 9.4 G/DL (ref 11.5–16)
IMM GRANULOCYTES # BLD AUTO: 0 K/UL (ref 0–0.04)
IMM GRANULOCYTES NFR BLD AUTO: 0 % (ref 0–0.5)
LYMPHOCYTES # BLD: 3.2 K/UL (ref 0.8–3.5)
LYMPHOCYTES NFR BLD: 41 % (ref 12–49)
MCH RBC QN AUTO: 29.2 PG (ref 26–34)
MCHC RBC AUTO-ENTMCNC: 33.3 G/DL (ref 30–36.5)
MCV RBC AUTO: 87.6 FL (ref 80–99)
METHGB MFR BLD: 0.2 % (ref 0–1.4)
MONOCYTES # BLD: 0.7 K/UL (ref 0–1)
MONOCYTES NFR BLD: 9 % (ref 5–13)
NEUTS SEG # BLD: 3.6 K/UL (ref 1.8–8)
NEUTS SEG NFR BLD: 46 % (ref 32–75)
NRBC # BLD: 0 K/UL (ref 0–0.01)
NRBC BLD-RTO: 0 PER 100 WBC
OXYHGB MFR BLD: 97 % (ref 95–99)
PCO2 BLDA: 35 MMHG (ref 35–45)
PEEP RESPIRATORY: 6
PERFORMED BY:: ABNORMAL
PH BLDA: 7.38 (ref 7.35–7.45)
PHOSPHATE SERPL-MCNC: 4.8 MG/DL (ref 2.6–4.7)
PLATELET # BLD AUTO: 289 K/UL (ref 150–400)
PMV BLD AUTO: 12 FL (ref 8.9–12.9)
PO2 BLDA: 107 MMHG (ref 80–100)
POTASSIUM SERPL-SCNC: 4.2 MMOL/L (ref 3.5–5.1)
POTASSIUM SERPL-SCNC: 4.2 MMOL/L (ref 3.5–5.1)
PROCALCITONIN SERPL-MCNC: 0.39 NG/ML
PROT SERPL-MCNC: 6.7 G/DL (ref 6.4–8.2)
RBC # BLD AUTO: 3.22 M/UL (ref 3.8–5.2)
SAO2 % BLD: 97 % (ref 95–99)
SAO2% DEVICE SAO2% SENSOR NAME: ABNORMAL
SODIUM SERPL-SCNC: 144 MMOL/L (ref 136–145)
SODIUM SERPL-SCNC: 145 MMOL/L (ref 136–145)
SPECIMEN SITE: ABNORMAL
TROPONIN I SERPL HS-MCNC: 338 NG/L (ref 0–51)
VANCOMYCIN SERPL-MCNC: 16.4 UG/ML
VT SETTING VENT: 400
WBC # BLD AUTO: 7.7 K/UL (ref 3.6–11)

## 2023-12-15 PROCEDURE — 36415 COLL VENOUS BLD VENIPUNCTURE: CPT

## 2023-12-15 PROCEDURE — 71045 X-RAY EXAM CHEST 1 VIEW: CPT

## 2023-12-15 PROCEDURE — 82140 ASSAY OF AMMONIA: CPT

## 2023-12-15 PROCEDURE — 2000000000 HC ICU R&B

## 2023-12-15 PROCEDURE — 6360000002 HC RX W HCPCS: Performed by: STUDENT IN AN ORGANIZED HEALTH CARE EDUCATION/TRAINING PROGRAM

## 2023-12-15 PROCEDURE — 80202 ASSAY OF VANCOMYCIN: CPT

## 2023-12-15 PROCEDURE — 2580000003 HC RX 258: Performed by: INTERNAL MEDICINE

## 2023-12-15 PROCEDURE — 84145 PROCALCITONIN (PCT): CPT

## 2023-12-15 PROCEDURE — 84702 CHORIONIC GONADOTROPIN TEST: CPT

## 2023-12-15 PROCEDURE — 6360000004 HC RX CONTRAST MEDICATION

## 2023-12-15 PROCEDURE — 94761 N-INVAS EAR/PLS OXIMETRY MLT: CPT

## 2023-12-15 PROCEDURE — 6360000002 HC RX W HCPCS: Performed by: INTERNAL MEDICINE

## 2023-12-15 PROCEDURE — 84146 ASSAY OF PROLACTIN: CPT

## 2023-12-15 PROCEDURE — 36600 WITHDRAWAL OF ARTERIAL BLOOD: CPT

## 2023-12-15 PROCEDURE — 2500000003 HC RX 250 WO HCPCS: Performed by: INTERNAL MEDICINE

## 2023-12-15 PROCEDURE — 94003 VENT MGMT INPAT SUBQ DAY: CPT

## 2023-12-15 PROCEDURE — 6370000000 HC RX 637 (ALT 250 FOR IP): Performed by: INTERNAL MEDICINE

## 2023-12-15 PROCEDURE — 85025 COMPLETE CBC W/AUTO DIFF WBC: CPT

## 2023-12-15 PROCEDURE — 80069 RENAL FUNCTION PANEL: CPT

## 2023-12-15 PROCEDURE — 82330 ASSAY OF CALCIUM: CPT

## 2023-12-15 PROCEDURE — 6360000002 HC RX W HCPCS: Performed by: HOSPITALIST

## 2023-12-15 PROCEDURE — 80053 COMPREHEN METABOLIC PANEL: CPT

## 2023-12-15 PROCEDURE — C8924 2D TTE W OR W/O FOL W/CON,FU: HCPCS

## 2023-12-15 PROCEDURE — 82962 GLUCOSE BLOOD TEST: CPT

## 2023-12-15 PROCEDURE — 2700000000 HC OXYGEN THERAPY PER DAY

## 2023-12-15 PROCEDURE — 94640 AIRWAY INHALATION TREATMENT: CPT

## 2023-12-15 PROCEDURE — 82803 BLOOD GASES ANY COMBINATION: CPT

## 2023-12-15 PROCEDURE — 84484 ASSAY OF TROPONIN QUANT: CPT

## 2023-12-15 RX ORDER — METOPROLOL TARTRATE 1 MG/ML
5 INJECTION, SOLUTION INTRAVENOUS ONCE
Status: COMPLETED | OUTPATIENT
Start: 2023-12-15 | End: 2023-12-15

## 2023-12-15 RX ORDER — IPRATROPIUM BROMIDE AND ALBUTEROL SULFATE 2.5; .5 MG/3ML; MG/3ML
1 SOLUTION RESPIRATORY (INHALATION)
Status: DISCONTINUED | OUTPATIENT
Start: 2023-12-15 | End: 2024-01-02

## 2023-12-15 RX ORDER — HYDRALAZINE HYDROCHLORIDE 20 MG/ML
10 INJECTION INTRAMUSCULAR; INTRAVENOUS EVERY 4 HOURS PRN
Status: DISCONTINUED | OUTPATIENT
Start: 2023-12-15 | End: 2023-12-21

## 2023-12-15 RX ORDER — 0.9 % SODIUM CHLORIDE 0.9 %
500 INTRAVENOUS SOLUTION INTRAVENOUS ONCE
Status: COMPLETED | OUTPATIENT
Start: 2023-12-15 | End: 2023-12-15

## 2023-12-15 RX ORDER — CARVEDILOL 12.5 MG/1
25 TABLET ORAL 2 TIMES DAILY WITH MEALS
Status: DISCONTINUED | OUTPATIENT
Start: 2023-12-15 | End: 2023-12-22

## 2023-12-15 RX ADMIN — SODIUM CHLORIDE 500 ML: 9 INJECTION, SOLUTION INTRAVENOUS at 09:19

## 2023-12-15 RX ADMIN — LEVETIRACETAM 750 MG: 250 TABLET, FILM COATED ORAL at 08:08

## 2023-12-15 RX ADMIN — INSULIN LISPRO 2 UNITS: 100 INJECTION, SOLUTION INTRAVENOUS; SUBCUTANEOUS at 05:30

## 2023-12-15 RX ADMIN — INSULIN GLARGINE 20 UNITS: 100 INJECTION, SOLUTION SUBCUTANEOUS at 07:53

## 2023-12-15 RX ADMIN — ACETAMINOPHEN 650 MG: 325 TABLET ORAL at 22:07

## 2023-12-15 RX ADMIN — DOXYCYCLINE 100 MG: 100 INJECTION, POWDER, LYOPHILIZED, FOR SOLUTION INTRAVENOUS at 07:53

## 2023-12-15 RX ADMIN — METOPROLOL TARTRATE 5 MG: 5 INJECTION INTRAVENOUS at 07:52

## 2023-12-15 RX ADMIN — Medication 100 MCG/HR: at 13:54

## 2023-12-15 RX ADMIN — Medication 100 MCG/HR: at 23:31

## 2023-12-15 RX ADMIN — ACETAMINOPHEN 650 MG: 325 TABLET ORAL at 12:10

## 2023-12-15 RX ADMIN — ACETAMINOPHEN 650 MG: 325 TABLET ORAL at 08:21

## 2023-12-15 RX ADMIN — VANCOMYCIN HYDROCHLORIDE 1000 MG: 1 INJECTION, POWDER, LYOPHILIZED, FOR SOLUTION INTRAVENOUS at 11:00

## 2023-12-15 RX ADMIN — IPRATROPIUM BROMIDE AND ALBUTEROL SULFATE 1 DOSE: 2.5; .5 SOLUTION RESPIRATORY (INHALATION) at 07:53

## 2023-12-15 RX ADMIN — PIPERACILLIN SODIUM AND TAZOBACTAM SODIUM 3375 MG: 3; .375 INJECTION, POWDER, LYOPHILIZED, FOR SOLUTION INTRAVENOUS at 13:52

## 2023-12-15 RX ADMIN — LEVETIRACETAM 750 MG: 250 TABLET, FILM COATED ORAL at 20:30

## 2023-12-15 RX ADMIN — DOXYCYCLINE 100 MG: 100 INJECTION, POWDER, LYOPHILIZED, FOR SOLUTION INTRAVENOUS at 20:12

## 2023-12-15 RX ADMIN — PERFLUTREN 2 ML: 6.52 INJECTION, SUSPENSION INTRAVENOUS at 14:48

## 2023-12-15 RX ADMIN — CARVEDILOL 25 MG: 12.5 TABLET, FILM COATED ORAL at 07:52

## 2023-12-15 RX ADMIN — INSULIN LISPRO 2 UNITS: 100 INJECTION, SOLUTION INTRAVENOUS; SUBCUTANEOUS at 00:22

## 2023-12-15 RX ADMIN — PIPERACILLIN SODIUM AND TAZOBACTAM SODIUM 3375 MG: 3; .375 INJECTION, POWDER, LYOPHILIZED, FOR SOLUTION INTRAVENOUS at 06:24

## 2023-12-15 RX ADMIN — ENOXAPARIN SODIUM 30 MG: 100 INJECTION SUBCUTANEOUS at 08:08

## 2023-12-15 RX ADMIN — HYDRALAZINE HYDROCHLORIDE 10 MG: 20 INJECTION INTRAMUSCULAR; INTRAVENOUS at 16:51

## 2023-12-15 RX ADMIN — IPRATROPIUM BROMIDE AND ALBUTEROL SULFATE 1 DOSE: 2.5; .5 SOLUTION RESPIRATORY (INHALATION) at 13:57

## 2023-12-15 RX ADMIN — Medication 150 MCG/HR: at 06:30

## 2023-12-15 RX ADMIN — SODIUM CHLORIDE, PRESERVATIVE FREE 10 ML: 5 INJECTION INTRAVENOUS at 20:35

## 2023-12-15 RX ADMIN — PROPOFOL 40 MCG/KG/MIN: 10 INJECTION, EMULSION INTRAVENOUS at 06:18

## 2023-12-15 RX ADMIN — PROPOFOL 15 MCG/KG/MIN: 10 INJECTION, EMULSION INTRAVENOUS at 17:27

## 2023-12-15 RX ADMIN — PIPERACILLIN SODIUM AND TAZOBACTAM SODIUM 3375 MG: 3; .375 INJECTION, POWDER, LYOPHILIZED, FOR SOLUTION INTRAVENOUS at 22:32

## 2023-12-15 RX ADMIN — HYDRALAZINE HYDROCHLORIDE 10 MG: 20 INJECTION INTRAMUSCULAR; INTRAVENOUS at 05:02

## 2023-12-15 RX ADMIN — IPRATROPIUM BROMIDE AND ALBUTEROL SULFATE 1 DOSE: 2.5; .5 SOLUTION RESPIRATORY (INHALATION) at 19:48

## 2023-12-15 RX ADMIN — ATORVASTATIN CALCIUM 40 MG: 40 TABLET, FILM COATED ORAL at 20:12

## 2023-12-15 RX ADMIN — SODIUM CHLORIDE, PRESERVATIVE FREE 10 ML: 5 INJECTION INTRAVENOUS at 08:09

## 2023-12-15 ASSESSMENT — PULMONARY FUNCTION TESTS
PIF_VALUE: 20
PIF_VALUE: 21
PIF_VALUE: 23
PIF_VALUE: 22
PIF_VALUE: 20
PIF_VALUE: 20

## 2023-12-15 ASSESSMENT — PAIN SCALES - GENERAL
PAINLEVEL_OUTOF10: 0
PAINLEVEL_OUTOF10: 2
PAINLEVEL_OUTOF10: 2
PAINLEVEL_OUTOF10: 0

## 2023-12-15 NOTE — CARE COORDINATION
OFFICE VISIT      Patient: Briana Gupta Date of Service: 2019   : 1970 MRN: [id number]     SUBJECTIVE:     Chief Complaint   Patient presents with   • Annual Exam         HISTORY OF PRESENT ILLNESS:  Briana Gupta is a 49 year old female here for CPE.  New pt to me.    No complaints.  Feeling well.    Abnormal mammogram:  - bilateral breast masses in 2018  - recommended 6 month bilat diagn US and left breast diag mammo  - pt reports had f/u in [place] - has disk at home, unsure exactly what was done     Anemia:  - mild, chronic  - had EGD and c-scope in 2015 that were essentially unremarkable  - hgb electrophoresis normal in past  - iron was low normal, normal ferritin in past  - trying to increase iron in diet    Diet: fruits, veggies, fish, lots of beans, no dairy, 2 cups coffee  Exercise: yoga, frequent walks daily    GynHx:  - last menses 2 years ago -   - no vag bleeding since  - , NSVDs, 1 miscarriage    HM:  - pap 2018 - neg, neg HPV - due   - mammo scheduled next week  - c-scope 2015 - normal colon - f/u 10 years ()  - UTD on tetanus      Review of Systems   Constitutional: Negative for chills, fever and unexpected weight change.   HENT: Negative for congestion, ear pain and sore throat.    Eyes: Negative for visual disturbance.   Respiratory: Negative for shortness of breath.    Cardiovascular: Negative for chest pain and palpitations.   Gastrointestinal: Negative for abdominal pain, constipation, diarrhea, nausea and vomiting.   Endocrine: Negative for cold intolerance and heat intolerance.   Genitourinary: Negative for dysuria and frequency.   Musculoskeletal: Negative for arthralgias.   Skin: Negative for rash and wound.   Neurological: Negative for dizziness and headaches.   Hematological: Negative for adenopathy.   Psychiatric/Behavioral: Negative for dysphoric mood. The patient is not nervous/anxious.    All other systems reviewed and are negative.      Patient  Pt is on the vent, will need PT/OT eval/recommendation when medically stable.    CM will follow.    Active Problem List   Diagnosis   • Encounter for health maintenance examination   • Iron deficiency anemia   • Abnormal mammogram       PAST MEDICAL HISTORY:  Past Medical History:   Diagnosis Date   • Iron deficiency anemia    • Viral meningitis        MEDICATIONS:  No current outpatient medications on file.     No current facility-administered medications for this visit.        ALLERGIES:  ALLERGIES:  No Known Allergies    PAST SURGICAL HISTORY:  Past Surgical History:   Procedure Laterality Date   • No past surgeries         FAMILY HISTORY:  Family History   Problem Relation Age of Onset   • Stroke Mother    • Hypertension Mother    • Cancer Neg Hx    • Cancer, Colon Neg Hx    • Cancer, Breast Neg Hx        SOCIAL HISTORY:  Social History     Tobacco Use   • Smoking status: Never Smoker   • Smokeless tobacco: Never Used   Substance Use Topics   • Alcohol use: Never     Frequency: Never   • Drug use: Never       Patient's medications, allergies, past medical, surgical, social and family histories were reviewed and updated as appropriate.    OBJECTIVE:     Visit Vitals  BP 91/57 (BP Location: [facility] [place], Patient Position: Sitting)   Pulse 64   Temp 97.6 °F (36.4 °C) (Temporal)   Ht 4' 11.84\" (1.52 m)   Wt 46.7 kg (103 lb)   LMP 07/24/2017   BMI 20.22 kg/m²       Physical Exam  Vitals signs and nursing note reviewed.   Constitutional:       General: She is not in acute distress.     Appearance: Normal appearance. She is well-developed.   HENT:      Head: Normocephalic and atraumatic.      Right Ear: Tympanic membrane and external ear normal.      Left Ear: Tympanic membrane and external ear normal.      Nose: Nose normal.      Mouth/Throat:      Mouth: Mucous membranes are moist.      Pharynx: No oropharyngeal exudate.   Eyes:      General: No scleral icterus.     Conjunctiva/sclera: Conjunctivae normal.      Pupils: Pupils are equal, round, and reactive to light.   Neck:      Musculoskeletal: Normal range of motion and  neck supple.   Cardiovascular:      Rate and Rhythm: Normal rate and regular rhythm.      Heart sounds: Normal heart sounds. No murmur.   Pulmonary:      Effort: Pulmonary effort is normal.      Breath sounds: Normal breath sounds. No wheezing or rales.   Chest:      Comments: Dense breasts bilaterally but no masses palpated  Abdominal:      General: Bowel sounds are normal.      Palpations: Abdomen is soft.      Tenderness: There is no tenderness.   Musculoskeletal: Normal range of motion.   Lymphadenopathy:      Cervical: No cervical adenopathy.   Skin:     General: Skin is warm and dry.      Findings: No rash.   Neurological:      General: No focal deficit present.      Mental Status: She is alert and oriented to person, place, and time.      Cranial Nerves: No cranial nerve deficit.   Psychiatric:         Mood and Affect: Mood normal.           DIAGNOSTIC STUDIES:   LAB RESULTS:    Lab Results Reviewed,   Lab Results   Component Value Date    SODIUM 142 07/17/2019    POTASSIUM 4.7 07/17/2019    CHLORIDE 108 (H) 07/17/2019    CO2 26 07/17/2019    BUN 9 07/17/2019    CREATININE 0.63 07/17/2019    GLUCOSE 85 07/17/2019   ,   Lab Results   Component Value Date    WBC 4.7 07/17/2019    HCT 39.3 07/17/2019    HGB 11.7 (L) 07/17/2019     07/17/2019   ,   Lab Results   Component Value Date    CHOLESTEROL 198 07/17/2019    HDL 68 07/17/2019    CALCLDL 111 07/17/2019    TRIGLYCERIDE 97 07/17/2019    and   Lab Results   Component Value Date    AST 20 07/17/2019    GPT 26 07/17/2019    ALKPT 78 07/17/2019    BILIRUBIN 0.5 07/17/2019       ASSESSMENT AND PLAN:   This is a 49 year old female who presents for CPE.    Assessment     1. Encounter for health maintenance examination    2. Abnormal mammogram    3. Screening for breast cancer    4. Iron deficiency anemia, unspecified iron deficiency anemia type        Problem List Items Addressed This Visit        Hematologic & Lymphatic    Iron deficiency anemia       Other     Encounter for health maintenance examination - Primary    Abnormal mammogram    Relevant Orders    MAMMO DIAGNOSTIC BILATERAL W GHAZALA    US BREAST DIAGNOSTIC ALL 4 QUADRANTS BILATERAL      Other Visit Diagnoses     Screening for breast cancer        Relevant Orders    MAMMO DIAGNOSTIC BILATERAL W GHAZALA    US BREAST DIAGNOSTIC ALL 4 QUADRANTS BILATERAL          HM:  - pap UTD - due 2023  - mammo scheduled next week  - c-scope 8/4/2015 - normal colon - f/u 10 years (2025)  - UTD on tetanus  - discussed diet/exercise  - reviewed recent labs    Abnormal mammogram - based on 7/2018  - check bilateral diagnostic mammo and bilateral diagnostic US given prior abnormality and unclear exactly what imaging was done abroad    Anemia - mild, chronic; EGD and c-scope in 2015 essentially unremarkable  - encourage iron rich foods      Return in about 1 year (around 7/24/2020) for physical., SOONER IF NEEDED.

## 2023-12-16 ENCOUNTER — APPOINTMENT (OUTPATIENT)
Facility: HOSPITAL | Age: 53
DRG: 981 | End: 2023-12-16

## 2023-12-16 LAB
ALBUMIN SERPL-MCNC: 2.7 G/DL (ref 3.5–5)
ALBUMIN/GLOB SERPL: 0.6 (ref 1.1–2.2)
ALP SERPL-CCNC: 249 U/L (ref 45–117)
ALT SERPL-CCNC: 27 U/L (ref 12–78)
ANION GAP SERPL CALC-SCNC: 7 MMOL/L (ref 5–15)
ARTERIAL PATENCY WRIST A: YES
AST SERPL W P-5'-P-CCNC: 37 U/L (ref 15–37)
BASE DEFICIT BLDA-SCNC: 5.5 MMOL/L
BDY SITE: ABNORMAL
BILIRUB SERPL-MCNC: 1 MG/DL (ref 0.2–1)
BODY TEMPERATURE: 100.4
BUN SERPL-MCNC: 21 MG/DL (ref 6–20)
BUN/CREAT SERPL: 15 (ref 12–20)
CA-I BLD-MCNC: 8.5 MG/DL (ref 8.5–10.1)
CHLORIDE SERPL-SCNC: 111 MMOL/L (ref 97–108)
CO2 SERPL-SCNC: 20 MMOL/L (ref 21–32)
COHGB MFR BLD: 0.3 % (ref 1–2)
CREAT SERPL-MCNC: 1.43 MG/DL (ref 0.55–1.02)
ERYTHROCYTE [DISTWIDTH] IN BLOOD BY AUTOMATED COUNT: 13.4 % (ref 11.5–14.5)
FERRITIN SERPL-MCNC: 213 NG/ML (ref 8–252)
FIO2 ON VENT: 24 %
GAS FLOW.O2 SETTING OXYMISER: 14
GLOBULIN SER CALC-MCNC: 4.3 G/DL (ref 2–4)
GLUCOSE BLD STRIP.AUTO-MCNC: 152 MG/DL (ref 65–100)
GLUCOSE BLD STRIP.AUTO-MCNC: 166 MG/DL (ref 65–100)
GLUCOSE BLD STRIP.AUTO-MCNC: 166 MG/DL (ref 65–100)
GLUCOSE BLD STRIP.AUTO-MCNC: 182 MG/DL (ref 65–100)
GLUCOSE BLD STRIP.AUTO-MCNC: 196 MG/DL (ref 65–100)
GLUCOSE BLD STRIP.AUTO-MCNC: 241 MG/DL (ref 65–100)
GLUCOSE SERPL-MCNC: 183 MG/DL (ref 65–100)
HCG SERPL-ACNC: 3 MIU/ML (ref 0–6)
HCO3 BLDA-SCNC: 19 MMOL/L (ref 22–26)
HCT VFR BLD AUTO: 28.9 % (ref 35–47)
HGB BLD-MCNC: 9.7 G/DL (ref 11.5–16)
IRON SATN MFR SERPL: 40 % (ref 20–50)
IRON SERPL-MCNC: 119 UG/DL (ref 35–150)
MCH RBC QN AUTO: 29.8 PG (ref 26–34)
MCHC RBC AUTO-ENTMCNC: 33.6 G/DL (ref 30–36.5)
MCV RBC AUTO: 88.9 FL (ref 80–99)
METHGB MFR BLD: 0.6 % (ref 0–1.4)
NRBC # BLD: 0 K/UL (ref 0–0.01)
NRBC BLD-RTO: 0 PER 100 WBC
OXYHGB MFR BLD: 94.3 % (ref 95–99)
PCO2 BLDA: 35 MMHG (ref 35–45)
PEEP RESPIRATORY: 6
PERFORMED BY:: ABNORMAL
PH BLDA: 7.36 (ref 7.35–7.45)
PLATELET # BLD AUTO: 276 K/UL (ref 150–400)
PMV BLD AUTO: 11.7 FL (ref 8.9–12.9)
PO2 BLDA: 89 MMHG (ref 80–100)
POTASSIUM SERPL-SCNC: 3.9 MMOL/L (ref 3.5–5.1)
PROCALCITONIN SERPL-MCNC: 0.46 NG/ML
PROLACTIN SERPL-MCNC: 10.6 NG/ML
PROT SERPL-MCNC: 7 G/DL (ref 6.4–8.2)
RBC # BLD AUTO: 3.25 M/UL (ref 3.8–5.2)
SAO2 % BLD: 95 % (ref 95–99)
SAO2% DEVICE SAO2% SENSOR NAME: ABNORMAL
SODIUM SERPL-SCNC: 138 MMOL/L (ref 136–145)
SPECIMEN SITE: ABNORMAL
TIBC SERPL-MCNC: 297 UG/DL (ref 250–450)
TROPONIN I SERPL HS-MCNC: 252 NG/L (ref 0–51)
VENTILATION MODE VENT: ABNORMAL
VT SETTING VENT: 400
WBC # BLD AUTO: 13.6 K/UL (ref 3.6–11)

## 2023-12-16 PROCEDURE — 82803 BLOOD GASES ANY COMBINATION: CPT

## 2023-12-16 PROCEDURE — 84702 CHORIONIC GONADOTROPIN TEST: CPT

## 2023-12-16 PROCEDURE — 2500000003 HC RX 250 WO HCPCS: Performed by: HOSPITALIST

## 2023-12-16 PROCEDURE — 83540 ASSAY OF IRON: CPT

## 2023-12-16 PROCEDURE — 2500000003 HC RX 250 WO HCPCS

## 2023-12-16 PROCEDURE — 94761 N-INVAS EAR/PLS OXIMETRY MLT: CPT

## 2023-12-16 PROCEDURE — 2700000000 HC OXYGEN THERAPY PER DAY

## 2023-12-16 PROCEDURE — 71045 X-RAY EXAM CHEST 1 VIEW: CPT

## 2023-12-16 PROCEDURE — 6360000002 HC RX W HCPCS: Performed by: INTERNAL MEDICINE

## 2023-12-16 PROCEDURE — 2580000003 HC RX 258: Performed by: INTERNAL MEDICINE

## 2023-12-16 PROCEDURE — 94003 VENT MGMT INPAT SUBQ DAY: CPT

## 2023-12-16 PROCEDURE — 82728 ASSAY OF FERRITIN: CPT

## 2023-12-16 PROCEDURE — 6360000002 HC RX W HCPCS: Performed by: STUDENT IN AN ORGANIZED HEALTH CARE EDUCATION/TRAINING PROGRAM

## 2023-12-16 PROCEDURE — 84484 ASSAY OF TROPONIN QUANT: CPT

## 2023-12-16 PROCEDURE — 6360000002 HC RX W HCPCS: Performed by: HOSPITALIST

## 2023-12-16 PROCEDURE — 94640 AIRWAY INHALATION TREATMENT: CPT

## 2023-12-16 PROCEDURE — 6370000000 HC RX 637 (ALT 250 FOR IP): Performed by: HOSPITALIST

## 2023-12-16 PROCEDURE — 36415 COLL VENOUS BLD VENIPUNCTURE: CPT

## 2023-12-16 PROCEDURE — 6370000000 HC RX 637 (ALT 250 FOR IP): Performed by: INTERNAL MEDICINE

## 2023-12-16 PROCEDURE — 2500000003 HC RX 250 WO HCPCS: Performed by: INTERNAL MEDICINE

## 2023-12-16 PROCEDURE — 2000000000 HC ICU R&B

## 2023-12-16 PROCEDURE — 80053 COMPREHEN METABOLIC PANEL: CPT

## 2023-12-16 PROCEDURE — 82962 GLUCOSE BLOOD TEST: CPT

## 2023-12-16 PROCEDURE — 36600 WITHDRAWAL OF ARTERIAL BLOOD: CPT

## 2023-12-16 PROCEDURE — 84145 PROCALCITONIN (PCT): CPT

## 2023-12-16 PROCEDURE — 85027 COMPLETE CBC AUTOMATED: CPT

## 2023-12-16 RX ORDER — METOPROLOL TARTRATE 1 MG/ML
5 INJECTION, SOLUTION INTRAVENOUS EVERY 6 HOURS
Status: DISCONTINUED | OUTPATIENT
Start: 2023-12-16 | End: 2023-12-17

## 2023-12-16 RX ORDER — METOPROLOL TARTRATE 1 MG/ML
INJECTION, SOLUTION INTRAVENOUS
Status: COMPLETED
Start: 2023-12-16 | End: 2023-12-16

## 2023-12-16 RX ORDER — POTASSIUM CHLORIDE 20 MEQ/1
40 TABLET, EXTENDED RELEASE ORAL ONCE
Status: DISCONTINUED | OUTPATIENT
Start: 2023-12-16 | End: 2023-12-25

## 2023-12-16 RX ORDER — METOPROLOL TARTRATE 1 MG/ML
2.5 INJECTION, SOLUTION INTRAVENOUS ONCE
Status: COMPLETED | OUTPATIENT
Start: 2023-12-16 | End: 2023-12-16

## 2023-12-16 RX ADMIN — METOPROLOL TARTRATE 5 MG: 5 INJECTION INTRAVENOUS at 23:40

## 2023-12-16 RX ADMIN — PROPOFOL 30 MCG/KG/MIN: 10 INJECTION, EMULSION INTRAVENOUS at 10:24

## 2023-12-16 RX ADMIN — PROPOFOL 30 MCG/KG/MIN: 10 INJECTION, EMULSION INTRAVENOUS at 17:16

## 2023-12-16 RX ADMIN — VANCOMYCIN HYDROCHLORIDE 1000 MG: 1 INJECTION, POWDER, LYOPHILIZED, FOR SOLUTION INTRAVENOUS at 10:24

## 2023-12-16 RX ADMIN — HYDRALAZINE HYDROCHLORIDE 10 MG: 20 INJECTION INTRAMUSCULAR; INTRAVENOUS at 02:30

## 2023-12-16 RX ADMIN — SODIUM CHLORIDE, PRESERVATIVE FREE 10 ML: 5 INJECTION INTRAVENOUS at 08:40

## 2023-12-16 RX ADMIN — LEVETIRACETAM 750 MG: 250 TABLET, FILM COATED ORAL at 08:40

## 2023-12-16 RX ADMIN — PIPERACILLIN SODIUM AND TAZOBACTAM SODIUM 3375 MG: 3; .375 INJECTION, POWDER, LYOPHILIZED, FOR SOLUTION INTRAVENOUS at 14:09

## 2023-12-16 RX ADMIN — POLYETHYLENE GLYCOL 3350 17 G: 17 POWDER, FOR SOLUTION ORAL at 11:19

## 2023-12-16 RX ADMIN — HYDRALAZINE HYDROCHLORIDE 10 MG: 20 INJECTION INTRAMUSCULAR; INTRAVENOUS at 13:19

## 2023-12-16 RX ADMIN — ENOXAPARIN SODIUM 30 MG: 100 INJECTION SUBCUTANEOUS at 08:40

## 2023-12-16 RX ADMIN — DOXYCYCLINE 100 MG: 100 INJECTION, POWDER, LYOPHILIZED, FOR SOLUTION INTRAVENOUS at 20:48

## 2023-12-16 RX ADMIN — INSULIN GLARGINE 20 UNITS: 100 INJECTION, SOLUTION SUBCUTANEOUS at 08:40

## 2023-12-16 RX ADMIN — Medication 100 MCG/HR: at 08:41

## 2023-12-16 RX ADMIN — POTASSIUM BICARBONATE 20 MEQ: 782 TABLET, EFFERVESCENT ORAL at 08:40

## 2023-12-16 RX ADMIN — POTASSIUM BICARBONATE 20 MEQ: 782 TABLET, EFFERVESCENT ORAL at 20:48

## 2023-12-16 RX ADMIN — DOXYCYCLINE 100 MG: 100 INJECTION, POWDER, LYOPHILIZED, FOR SOLUTION INTRAVENOUS at 08:40

## 2023-12-16 RX ADMIN — PIPERACILLIN SODIUM AND TAZOBACTAM SODIUM 3375 MG: 3; .375 INJECTION, POWDER, LYOPHILIZED, FOR SOLUTION INTRAVENOUS at 06:20

## 2023-12-16 RX ADMIN — METOPROLOL TARTRATE 5 MG: 5 INJECTION INTRAVENOUS at 17:40

## 2023-12-16 RX ADMIN — ACETAMINOPHEN 650 MG: 325 TABLET ORAL at 06:39

## 2023-12-16 RX ADMIN — PIPERACILLIN SODIUM AND TAZOBACTAM SODIUM 3375 MG: 3; .375 INJECTION, POWDER, LYOPHILIZED, FOR SOLUTION INTRAVENOUS at 23:15

## 2023-12-16 RX ADMIN — ACETAMINOPHEN 650 MG: 650 SUPPOSITORY RECTAL at 14:06

## 2023-12-16 RX ADMIN — INSULIN LISPRO 2 UNITS: 100 INJECTION, SOLUTION INTRAVENOUS; SUBCUTANEOUS at 11:19

## 2023-12-16 RX ADMIN — IPRATROPIUM BROMIDE AND ALBUTEROL SULFATE 1 DOSE: 2.5; .5 SOLUTION RESPIRATORY (INHALATION) at 13:45

## 2023-12-16 RX ADMIN — SODIUM CHLORIDE, PRESERVATIVE FREE 10 ML: 5 INJECTION INTRAVENOUS at 20:49

## 2023-12-16 RX ADMIN — IPRATROPIUM BROMIDE AND ALBUTEROL SULFATE 1 DOSE: 2.5; .5 SOLUTION RESPIRATORY (INHALATION) at 06:18

## 2023-12-16 RX ADMIN — Medication 100 MCG/HR: at 18:50

## 2023-12-16 RX ADMIN — LEVETIRACETAM 750 MG: 250 TABLET, FILM COATED ORAL at 20:48

## 2023-12-16 RX ADMIN — IPRATROPIUM BROMIDE AND ALBUTEROL SULFATE 1 DOSE: 2.5; .5 SOLUTION RESPIRATORY (INHALATION) at 19:54

## 2023-12-16 RX ADMIN — METOPROLOL TARTRATE 2.5 MG: 5 INJECTION INTRAVENOUS at 04:59

## 2023-12-16 RX ADMIN — ATORVASTATIN CALCIUM 40 MG: 40 TABLET, FILM COATED ORAL at 20:48

## 2023-12-16 ASSESSMENT — PULMONARY FUNCTION TESTS
PIF_VALUE: 25
PIF_VALUE: 15
PIF_VALUE: 15
PIF_VALUE: 21
PIF_VALUE: 17
PIF_VALUE: 21

## 2023-12-16 ASSESSMENT — PAIN SCALES - GENERAL
PAINLEVEL_OUTOF10: 0

## 2023-12-16 NOTE — PROCEDURES
[facility]  EEG    Name:  RIGOBERTO SARKAR  MR#:  [id number]  :  1970  ACCOUNT #:  [id number]  DATE OF SERVICE:  2023      EEG Number:  ***    DIAGNOSES:  Seizures, respiratory failure.    DESCRIPTION:  Recording is done digitally on a computer.  Electrodes are placed in a 10/20 international system.  Initial recording is equipment calibration followed by biocalibration.  Initial montages are bipolar montages.    Tracing started with the patient very drowsy.  As the recording continues, the patient is very drowsy with background frequency of fast beta activity secondary to exposure to CNS-acting drugs.  As the recording continues, the patient is hooked up to an EKG machine that shows a heart rate of 66.  Tracings continue with the patient being exposed to photic stimulation without any changes.  Hyperventilation is not done.    IMPRESSION:  This is an abnormal electroencephalogram showing the patient very drowsy in addition to a fast beta activity secondary to exposure to central nervous system-acting drugs.  There was no seizure activity.      TANISHA HILTON MD      [id number]  D:  12/15/2023 10:36  T:  12/15/2023 20:01  JOB #:  [id number]

## 2023-12-17 ENCOUNTER — APPOINTMENT (OUTPATIENT)
Facility: HOSPITAL | Age: 53
DRG: 981 | End: 2023-12-17

## 2023-12-17 LAB
ALBUMIN SERPL-MCNC: 2.4 G/DL (ref 3.5–5)
ALBUMIN/GLOB SERPL: 0.5 (ref 1.1–2.2)
ALP SERPL-CCNC: 249 U/L (ref 45–117)
ALT SERPL-CCNC: 28 U/L (ref 12–78)
ANION GAP SERPL CALC-SCNC: 8 MMOL/L (ref 5–15)
ARTERIAL PATENCY WRIST A: YES
AST SERPL W P-5'-P-CCNC: 43 U/L (ref 15–37)
BASE DEFICIT BLDA-SCNC: 4.2 MMOL/L
BASOPHILS # BLD: 0.1 K/UL (ref 0–0.1)
BASOPHILS NFR BLD: 0 % (ref 0–1)
BDY SITE: ABNORMAL
BILIRUB SERPL-MCNC: 0.9 MG/DL (ref 0.2–1)
BODY TEMPERATURE: 98.8
BUN SERPL-MCNC: 22 MG/DL (ref 6–20)
BUN/CREAT SERPL: 19 (ref 12–20)
CA-I BLD-MCNC: 8.7 MG/DL (ref 8.5–10.1)
CHLORIDE SERPL-SCNC: 109 MMOL/L (ref 97–108)
CO2 SERPL-SCNC: 23 MMOL/L (ref 21–32)
COHGB MFR BLD: 0.5 % (ref 1–2)
CREAT SERPL-MCNC: 1.13 MG/DL (ref 0.55–1.02)
DIFFERENTIAL METHOD BLD: ABNORMAL
EOSINOPHIL # BLD: 0.1 K/UL (ref 0–0.4)
EOSINOPHIL NFR BLD: 1 % (ref 0–7)
ERYTHROCYTE [DISTWIDTH] IN BLOOD BY AUTOMATED COUNT: 13.4 % (ref 11.5–14.5)
FIO2 ON VENT: 24 %
GAS FLOW.O2 SETTING OXYMISER: 14
GLOBULIN SER CALC-MCNC: 4.6 G/DL (ref 2–4)
GLUCOSE BLD STRIP.AUTO-MCNC: 183 MG/DL (ref 65–100)
GLUCOSE BLD STRIP.AUTO-MCNC: 186 MG/DL (ref 65–100)
GLUCOSE BLD STRIP.AUTO-MCNC: 199 MG/DL (ref 65–100)
GLUCOSE BLD STRIP.AUTO-MCNC: 201 MG/DL (ref 65–100)
GLUCOSE BLD STRIP.AUTO-MCNC: 237 MG/DL (ref 65–100)
GLUCOSE BLD STRIP.AUTO-MCNC: 263 MG/DL (ref 65–100)
GLUCOSE SERPL-MCNC: 194 MG/DL (ref 65–100)
HCG SERPL-ACNC: 1 MIU/ML (ref 0–6)
HCO3 BLDA-SCNC: 20 MMOL/L (ref 22–26)
HCT VFR BLD AUTO: 26.2 % (ref 35–47)
HGB BLD-MCNC: 8.7 G/DL (ref 11.5–16)
IMM GRANULOCYTES # BLD AUTO: 0.1 K/UL (ref 0–0.04)
IMM GRANULOCYTES NFR BLD AUTO: 0 % (ref 0–0.5)
LYMPHOCYTES # BLD: 3.1 K/UL (ref 0.8–3.5)
LYMPHOCYTES NFR BLD: 23 % (ref 12–49)
MCH RBC QN AUTO: 29.4 PG (ref 26–34)
MCHC RBC AUTO-ENTMCNC: 33.2 G/DL (ref 30–36.5)
MCV RBC AUTO: 88.5 FL (ref 80–99)
METHGB MFR BLD: 0.2 % (ref 0–1.4)
MONOCYTES # BLD: 1.5 K/UL (ref 0–1)
MONOCYTES NFR BLD: 11 % (ref 5–13)
NEUTS SEG # BLD: 8.8 K/UL (ref 1.8–8)
NEUTS SEG NFR BLD: 65 % (ref 32–75)
NRBC # BLD: 0 K/UL (ref 0–0.01)
NRBC BLD-RTO: 0 PER 100 WBC
OXYHGB MFR BLD: 95.4 % (ref 95–99)
PCO2 BLDA: 34 MMHG (ref 35–45)
PEEP RESPIRATORY: 6
PERFORMED BY:: ABNORMAL
PH BLDA: 7.39 (ref 7.35–7.45)
PLATELET # BLD AUTO: 300 K/UL (ref 150–400)
PMV BLD AUTO: 11.4 FL (ref 8.9–12.9)
PO2 BLDA: 90 MMHG (ref 80–100)
POTASSIUM SERPL-SCNC: 3.7 MMOL/L (ref 3.5–5.1)
PROCALCITONIN SERPL-MCNC: 0.4 NG/ML
PROT SERPL-MCNC: 7 G/DL (ref 6.4–8.2)
RBC # BLD AUTO: 2.96 M/UL (ref 3.8–5.2)
SAO2 % BLD: 96 % (ref 95–99)
SAO2% DEVICE SAO2% SENSOR NAME: ABNORMAL
SODIUM SERPL-SCNC: 140 MMOL/L (ref 136–145)
SPECIMEN SITE: ABNORMAL
TROPONIN I SERPL HS-MCNC: 692 NG/L (ref 0–51)
VANCOMYCIN SERPL-MCNC: 9.8 UG/ML
VENTILATION MODE VENT: ABNORMAL
VT SETTING VENT: 400
WBC # BLD AUTO: 13.6 K/UL (ref 3.6–11)

## 2023-12-17 PROCEDURE — 36600 WITHDRAWAL OF ARTERIAL BLOOD: CPT

## 2023-12-17 PROCEDURE — 36415 COLL VENOUS BLD VENIPUNCTURE: CPT

## 2023-12-17 PROCEDURE — 6360000002 HC RX W HCPCS: Performed by: INTERNAL MEDICINE

## 2023-12-17 PROCEDURE — 94761 N-INVAS EAR/PLS OXIMETRY MLT: CPT

## 2023-12-17 PROCEDURE — 80053 COMPREHEN METABOLIC PANEL: CPT

## 2023-12-17 PROCEDURE — 6370000000 HC RX 637 (ALT 250 FOR IP): Performed by: INTERNAL MEDICINE

## 2023-12-17 PROCEDURE — 94640 AIRWAY INHALATION TREATMENT: CPT

## 2023-12-17 PROCEDURE — 2000000000 HC ICU R&B

## 2023-12-17 PROCEDURE — 82962 GLUCOSE BLOOD TEST: CPT

## 2023-12-17 PROCEDURE — 80202 ASSAY OF VANCOMYCIN: CPT

## 2023-12-17 PROCEDURE — 2700000000 HC OXYGEN THERAPY PER DAY

## 2023-12-17 PROCEDURE — 6360000002 HC RX W HCPCS: Performed by: STUDENT IN AN ORGANIZED HEALTH CARE EDUCATION/TRAINING PROGRAM

## 2023-12-17 PROCEDURE — 2580000003 HC RX 258: Performed by: INTERNAL MEDICINE

## 2023-12-17 PROCEDURE — 94003 VENT MGMT INPAT SUBQ DAY: CPT

## 2023-12-17 PROCEDURE — 82803 BLOOD GASES ANY COMBINATION: CPT

## 2023-12-17 PROCEDURE — 85025 COMPLETE CBC W/AUTO DIFF WBC: CPT

## 2023-12-17 PROCEDURE — 6360000002 HC RX W HCPCS: Performed by: HOSPITALIST

## 2023-12-17 PROCEDURE — 2500000003 HC RX 250 WO HCPCS: Performed by: INTERNAL MEDICINE

## 2023-12-17 PROCEDURE — 84702 CHORIONIC GONADOTROPIN TEST: CPT

## 2023-12-17 PROCEDURE — 84145 PROCALCITONIN (PCT): CPT

## 2023-12-17 PROCEDURE — 6370000000 HC RX 637 (ALT 250 FOR IP): Performed by: HOSPITALIST

## 2023-12-17 PROCEDURE — 84484 ASSAY OF TROPONIN QUANT: CPT

## 2023-12-17 PROCEDURE — 71045 X-RAY EXAM CHEST 1 VIEW: CPT

## 2023-12-17 RX ORDER — METOPROLOL TARTRATE 1 MG/ML
5 INJECTION, SOLUTION INTRAVENOUS EVERY 4 HOURS
Status: DISCONTINUED | OUTPATIENT
Start: 2023-12-17 | End: 2023-12-18

## 2023-12-17 RX ADMIN — INSULIN GLARGINE 20 UNITS: 100 INJECTION, SOLUTION SUBCUTANEOUS at 08:09

## 2023-12-17 RX ADMIN — PROPOFOL 40 MCG/KG/MIN: 10 INJECTION, EMULSION INTRAVENOUS at 21:17

## 2023-12-17 RX ADMIN — IPRATROPIUM BROMIDE AND ALBUTEROL SULFATE 1 DOSE: 2.5; .5 SOLUTION RESPIRATORY (INHALATION) at 19:53

## 2023-12-17 RX ADMIN — Medication 100 MCG/HR: at 22:53

## 2023-12-17 RX ADMIN — PIPERACILLIN SODIUM AND TAZOBACTAM SODIUM 3375 MG: 3; .375 INJECTION, POWDER, LYOPHILIZED, FOR SOLUTION INTRAVENOUS at 22:45

## 2023-12-17 RX ADMIN — DOXYCYCLINE 100 MG: 100 INJECTION, POWDER, LYOPHILIZED, FOR SOLUTION INTRAVENOUS at 20:30

## 2023-12-17 RX ADMIN — VANCOMYCIN HYDROCHLORIDE 1000 MG: 1 INJECTION, POWDER, LYOPHILIZED, FOR SOLUTION INTRAVENOUS at 10:02

## 2023-12-17 RX ADMIN — METOPROLOL TARTRATE 5 MG: 5 INJECTION INTRAVENOUS at 10:04

## 2023-12-17 RX ADMIN — INSULIN LISPRO 2 UNITS: 100 INJECTION, SOLUTION INTRAVENOUS; SUBCUTANEOUS at 15:31

## 2023-12-17 RX ADMIN — METOPROLOL TARTRATE 5 MG: 5 INJECTION INTRAVENOUS at 04:34

## 2023-12-17 RX ADMIN — PROPOFOL 30 MCG/KG/MIN: 10 INJECTION, EMULSION INTRAVENOUS at 04:25

## 2023-12-17 RX ADMIN — INSULIN LISPRO 4 UNITS: 100 INJECTION, SOLUTION INTRAVENOUS; SUBCUTANEOUS at 11:01

## 2023-12-17 RX ADMIN — METOPROLOL TARTRATE 5 MG: 5 INJECTION INTRAVENOUS at 20:56

## 2023-12-17 RX ADMIN — POTASSIUM BICARBONATE 20 MEQ: 782 TABLET, EFFERVESCENT ORAL at 20:57

## 2023-12-17 RX ADMIN — PIPERACILLIN SODIUM AND TAZOBACTAM SODIUM 3375 MG: 3; .375 INJECTION, POWDER, LYOPHILIZED, FOR SOLUTION INTRAVENOUS at 06:23

## 2023-12-17 RX ADMIN — METOPROLOL TARTRATE 5 MG: 5 INJECTION INTRAVENOUS at 17:31

## 2023-12-17 RX ADMIN — DOXYCYCLINE 100 MG: 100 INJECTION, POWDER, LYOPHILIZED, FOR SOLUTION INTRAVENOUS at 08:08

## 2023-12-17 RX ADMIN — VANCOMYCIN HYDROCHLORIDE 750 MG: 750 INJECTION, POWDER, LYOPHILIZED, FOR SOLUTION INTRAVENOUS at 22:47

## 2023-12-17 RX ADMIN — LEVETIRACETAM 750 MG: 250 TABLET, FILM COATED ORAL at 20:56

## 2023-12-17 RX ADMIN — INSULIN LISPRO 2 UNITS: 100 INJECTION, SOLUTION INTRAVENOUS; SUBCUTANEOUS at 00:56

## 2023-12-17 RX ADMIN — ATORVASTATIN CALCIUM 40 MG: 40 TABLET, FILM COATED ORAL at 20:56

## 2023-12-17 RX ADMIN — Medication 100 MCG/HR: at 14:16

## 2023-12-17 RX ADMIN — IPRATROPIUM BROMIDE AND ALBUTEROL SULFATE 1 DOSE: 2.5; .5 SOLUTION RESPIRATORY (INHALATION) at 14:02

## 2023-12-17 RX ADMIN — IPRATROPIUM BROMIDE AND ALBUTEROL SULFATE 1 DOSE: 2.5; .5 SOLUTION RESPIRATORY (INHALATION) at 08:15

## 2023-12-17 RX ADMIN — HYDRALAZINE HYDROCHLORIDE 10 MG: 20 INJECTION INTRAMUSCULAR; INTRAVENOUS at 08:12

## 2023-12-17 RX ADMIN — SODIUM CHLORIDE, PRESERVATIVE FREE 10 ML: 5 INJECTION INTRAVENOUS at 08:09

## 2023-12-17 RX ADMIN — PIPERACILLIN SODIUM AND TAZOBACTAM SODIUM 3375 MG: 3; .375 INJECTION, POWDER, LYOPHILIZED, FOR SOLUTION INTRAVENOUS at 14:32

## 2023-12-17 RX ADMIN — METOPROLOL TARTRATE 5 MG: 5 INJECTION INTRAVENOUS at 14:17

## 2023-12-17 RX ADMIN — PROPOFOL 35 MCG/KG/MIN: 10 INJECTION, EMULSION INTRAVENOUS at 09:15

## 2023-12-17 RX ADMIN — Medication 100 MCG/HR: at 04:56

## 2023-12-17 RX ADMIN — ENOXAPARIN SODIUM 30 MG: 100 INJECTION SUBCUTANEOUS at 08:08

## 2023-12-17 RX ADMIN — POTASSIUM BICARBONATE 20 MEQ: 782 TABLET, EFFERVESCENT ORAL at 08:09

## 2023-12-17 RX ADMIN — LEVETIRACETAM 750 MG: 250 TABLET, FILM COATED ORAL at 08:08

## 2023-12-17 RX ADMIN — PROPOFOL 40 MCG/KG/MIN: 10 INJECTION, EMULSION INTRAVENOUS at 15:30

## 2023-12-17 RX ADMIN — SODIUM CHLORIDE, PRESERVATIVE FREE 10 ML: 5 INJECTION INTRAVENOUS at 20:57

## 2023-12-17 ASSESSMENT — PULMONARY FUNCTION TESTS
PIF_VALUE: 20
PIF_VALUE: 19
PIF_VALUE: 21
PIF_VALUE: 21
PIF_VALUE: 20
PIF_VALUE: 20
PIF_VALUE: 19
PIF_VALUE: 21
PIF_VALUE: 23
PIF_VALUE: 21
PIF_VALUE: 17
PIF_VALUE: 21
PIF_VALUE: 21
PIF_VALUE: 19
PIF_VALUE: 20
PIF_VALUE: 20
PIF_VALUE: 19
PIF_VALUE: 19

## 2023-12-17 ASSESSMENT — PAIN SCALES - GENERAL
PAINLEVEL_OUTOF10: 0

## 2023-12-18 ENCOUNTER — APPOINTMENT (OUTPATIENT)
Facility: HOSPITAL | Age: 53
DRG: 981 | End: 2023-12-18

## 2023-12-18 LAB
ALBUMIN SERPL-MCNC: 2.5 G/DL (ref 3.5–5)
ANION GAP SERPL CALC-SCNC: 8 MMOL/L (ref 5–15)
ARTERIAL PATENCY WRIST A: YES
BASE DEFICIT BLDA-SCNC: 2.4 MMOL/L
BDY SITE: ABNORMAL
BODY TEMPERATURE: 98.9
BUN SERPL-MCNC: 27 MG/DL (ref 6–20)
BUN/CREAT SERPL: 22 (ref 12–20)
CA-I BLD-MCNC: 1.19 MMOL/L (ref 1.13–1.32)
CA-I BLD-MCNC: 8.4 MG/DL (ref 8.5–10.1)
CHLORIDE SERPL-SCNC: 109 MMOL/L (ref 97–108)
CO2 SERPL-SCNC: 23 MMOL/L (ref 21–32)
COHGB MFR BLD: 0.4 % (ref 1–2)
CREAT SERPL-MCNC: 1.21 MG/DL (ref 0.55–1.02)
FIO2 ON VENT: 24 %
GAS FLOW.O2 SETTING OXYMISER: 14
GLUCOSE BLD STRIP.AUTO-MCNC: 191 MG/DL (ref 65–100)
GLUCOSE BLD STRIP.AUTO-MCNC: 194 MG/DL (ref 65–100)
GLUCOSE BLD STRIP.AUTO-MCNC: 197 MG/DL (ref 65–100)
GLUCOSE BLD STRIP.AUTO-MCNC: 239 MG/DL (ref 65–100)
GLUCOSE BLD STRIP.AUTO-MCNC: 251 MG/DL (ref 65–100)
GLUCOSE BLD STRIP.AUTO-MCNC: 278 MG/DL (ref 65–100)
GLUCOSE SERPL-MCNC: 189 MG/DL (ref 65–100)
HCO3 BLDA-SCNC: 22 MMOL/L (ref 22–26)
METHGB MFR BLD: 0.3 % (ref 0–1.4)
OXYHGB MFR BLD: 93.4 % (ref 95–99)
PCO2 BLDA: 36 MMHG (ref 35–45)
PEEP RESPIRATORY: 6
PERFORMED BY:: ABNORMAL
PH BLDA: 7.4 (ref 7.35–7.45)
PHOSPHATE SERPL-MCNC: 3.7 MG/DL (ref 2.6–4.7)
PO2 BLDA: 76 MMHG (ref 80–100)
POTASSIUM SERPL-SCNC: 3.8 MMOL/L (ref 3.5–5.1)
SAO2 % BLD: 94 % (ref 95–99)
SAO2% DEVICE SAO2% SENSOR NAME: ABNORMAL
SODIUM SERPL-SCNC: 140 MMOL/L (ref 136–145)
SPECIMEN SITE: ABNORMAL
TROPONIN I SERPL HS-MCNC: 453 NG/L (ref 0–51)
VENTILATION MODE VENT: ABNORMAL
VT SETTING VENT: 400

## 2023-12-18 PROCEDURE — 6360000002 HC RX W HCPCS: Performed by: STUDENT IN AN ORGANIZED HEALTH CARE EDUCATION/TRAINING PROGRAM

## 2023-12-18 PROCEDURE — 6360000002 HC RX W HCPCS: Performed by: INTERNAL MEDICINE

## 2023-12-18 PROCEDURE — 6370000000 HC RX 637 (ALT 250 FOR IP): Performed by: INTERNAL MEDICINE

## 2023-12-18 PROCEDURE — 2580000003 HC RX 258: Performed by: INTERNAL MEDICINE

## 2023-12-18 PROCEDURE — 93005 ELECTROCARDIOGRAM TRACING: CPT | Performed by: NURSE PRACTITIONER

## 2023-12-18 PROCEDURE — 82330 ASSAY OF CALCIUM: CPT

## 2023-12-18 PROCEDURE — 71045 X-RAY EXAM CHEST 1 VIEW: CPT

## 2023-12-18 PROCEDURE — 2500000003 HC RX 250 WO HCPCS: Performed by: INTERNAL MEDICINE

## 2023-12-18 PROCEDURE — 2000000000 HC ICU R&B

## 2023-12-18 PROCEDURE — 36600 WITHDRAWAL OF ARTERIAL BLOOD: CPT

## 2023-12-18 PROCEDURE — 6370000000 HC RX 637 (ALT 250 FOR IP): Performed by: NURSE PRACTITIONER

## 2023-12-18 PROCEDURE — 6360000002 HC RX W HCPCS: Performed by: HOSPITALIST

## 2023-12-18 PROCEDURE — 2700000000 HC OXYGEN THERAPY PER DAY

## 2023-12-18 PROCEDURE — 94761 N-INVAS EAR/PLS OXIMETRY MLT: CPT

## 2023-12-18 PROCEDURE — 84484 ASSAY OF TROPONIN QUANT: CPT

## 2023-12-18 PROCEDURE — 6370000000 HC RX 637 (ALT 250 FOR IP): Performed by: HOSPITALIST

## 2023-12-18 PROCEDURE — 36415 COLL VENOUS BLD VENIPUNCTURE: CPT

## 2023-12-18 PROCEDURE — 94003 VENT MGMT INPAT SUBQ DAY: CPT

## 2023-12-18 PROCEDURE — 94640 AIRWAY INHALATION TREATMENT: CPT

## 2023-12-18 PROCEDURE — 82962 GLUCOSE BLOOD TEST: CPT

## 2023-12-18 PROCEDURE — 80069 RENAL FUNCTION PANEL: CPT

## 2023-12-18 PROCEDURE — 82803 BLOOD GASES ANY COMBINATION: CPT

## 2023-12-18 RX ORDER — LABETALOL 100 MG/1
200 TABLET, FILM COATED ORAL EVERY 8 HOURS SCHEDULED
Status: DISCONTINUED | OUTPATIENT
Start: 2023-12-18 | End: 2023-12-22

## 2023-12-18 RX ADMIN — PROPOFOL 45 MCG/KG/MIN: 10 INJECTION, EMULSION INTRAVENOUS at 13:28

## 2023-12-18 RX ADMIN — METOPROLOL TARTRATE 5 MG: 5 INJECTION INTRAVENOUS at 13:28

## 2023-12-18 RX ADMIN — VANCOMYCIN HYDROCHLORIDE 750 MG: 750 INJECTION, POWDER, LYOPHILIZED, FOR SOLUTION INTRAVENOUS at 11:03

## 2023-12-18 RX ADMIN — PIPERACILLIN SODIUM AND TAZOBACTAM SODIUM 3375 MG: 3; .375 INJECTION, POWDER, LYOPHILIZED, FOR SOLUTION INTRAVENOUS at 22:40

## 2023-12-18 RX ADMIN — DOXYCYCLINE 100 MG: 100 INJECTION, POWDER, LYOPHILIZED, FOR SOLUTION INTRAVENOUS at 20:23

## 2023-12-18 RX ADMIN — IPRATROPIUM BROMIDE AND ALBUTEROL SULFATE 1 DOSE: 2.5; .5 SOLUTION RESPIRATORY (INHALATION) at 13:45

## 2023-12-18 RX ADMIN — VANCOMYCIN HYDROCHLORIDE 750 MG: 750 INJECTION, POWDER, LYOPHILIZED, FOR SOLUTION INTRAVENOUS at 22:41

## 2023-12-18 RX ADMIN — HYDRALAZINE HYDROCHLORIDE 10 MG: 20 INJECTION INTRAMUSCULAR; INTRAVENOUS at 03:38

## 2023-12-18 RX ADMIN — Medication 100 MCG/HR: at 13:28

## 2023-12-18 RX ADMIN — INSULIN LISPRO 4 UNITS: 100 INJECTION, SOLUTION INTRAVENOUS; SUBCUTANEOUS at 00:39

## 2023-12-18 RX ADMIN — INSULIN LISPRO 4 UNITS: 100 INJECTION, SOLUTION INTRAVENOUS; SUBCUTANEOUS at 08:28

## 2023-12-18 RX ADMIN — LEVETIRACETAM 750 MG: 250 TABLET, FILM COATED ORAL at 08:11

## 2023-12-18 RX ADMIN — PIPERACILLIN SODIUM AND TAZOBACTAM SODIUM 3375 MG: 3; .375 INJECTION, POWDER, LYOPHILIZED, FOR SOLUTION INTRAVENOUS at 06:30

## 2023-12-18 RX ADMIN — POTASSIUM BICARBONATE 20 MEQ: 782 TABLET, EFFERVESCENT ORAL at 20:32

## 2023-12-18 RX ADMIN — SODIUM CHLORIDE, PRESERVATIVE FREE 10 ML: 5 INJECTION INTRAVENOUS at 08:29

## 2023-12-18 RX ADMIN — ATORVASTATIN CALCIUM 40 MG: 40 TABLET, FILM COATED ORAL at 20:27

## 2023-12-18 RX ADMIN — IPRATROPIUM BROMIDE AND ALBUTEROL SULFATE 1 DOSE: 2.5; .5 SOLUTION RESPIRATORY (INHALATION) at 08:35

## 2023-12-18 RX ADMIN — INSULIN GLARGINE 20 UNITS: 100 INJECTION, SOLUTION SUBCUTANEOUS at 08:28

## 2023-12-18 RX ADMIN — LABETALOL HYDROCHLORIDE 200 MG: 100 TABLET, FILM COATED ORAL at 15:30

## 2023-12-18 RX ADMIN — IPRATROPIUM BROMIDE AND ALBUTEROL SULFATE 1 DOSE: 2.5; .5 SOLUTION RESPIRATORY (INHALATION) at 19:50

## 2023-12-18 RX ADMIN — PIPERACILLIN SODIUM AND TAZOBACTAM SODIUM 3375 MG: 3; .375 INJECTION, POWDER, LYOPHILIZED, FOR SOLUTION INTRAVENOUS at 13:28

## 2023-12-18 RX ADMIN — DOXYCYCLINE 100 MG: 100 INJECTION, POWDER, LYOPHILIZED, FOR SOLUTION INTRAVENOUS at 08:29

## 2023-12-18 RX ADMIN — POTASSIUM BICARBONATE 20 MEQ: 782 TABLET, EFFERVESCENT ORAL at 08:10

## 2023-12-18 RX ADMIN — METOPROLOL TARTRATE 5 MG: 5 INJECTION INTRAVENOUS at 09:17

## 2023-12-18 RX ADMIN — PROPOFOL 45 MCG/KG/MIN: 10 INJECTION, EMULSION INTRAVENOUS at 07:10

## 2023-12-18 RX ADMIN — INSULIN LISPRO 2 UNITS: 100 INJECTION, SOLUTION INTRAVENOUS; SUBCUTANEOUS at 20:31

## 2023-12-18 RX ADMIN — Medication 100 MCG/HR: at 22:52

## 2023-12-18 RX ADMIN — METOPROLOL TARTRATE 5 MG: 5 INJECTION INTRAVENOUS at 01:46

## 2023-12-18 RX ADMIN — ENOXAPARIN SODIUM 30 MG: 100 INJECTION SUBCUTANEOUS at 08:29

## 2023-12-18 RX ADMIN — PROPOFOL 40 MCG/KG/MIN: 10 INJECTION, EMULSION INTRAVENOUS at 03:40

## 2023-12-18 RX ADMIN — METOPROLOL TARTRATE 5 MG: 5 INJECTION INTRAVENOUS at 05:29

## 2023-12-18 RX ADMIN — PROPOFOL 40 MCG/KG/MIN: 10 INJECTION, EMULSION INTRAVENOUS at 20:32

## 2023-12-18 RX ADMIN — LEVETIRACETAM 750 MG: 250 TABLET, FILM COATED ORAL at 20:31

## 2023-12-18 RX ADMIN — LABETALOL HYDROCHLORIDE 200 MG: 100 TABLET, FILM COATED ORAL at 21:30

## 2023-12-18 RX ADMIN — SODIUM CHLORIDE, PRESERVATIVE FREE 10 ML: 5 INJECTION INTRAVENOUS at 20:32

## 2023-12-18 ASSESSMENT — PULMONARY FUNCTION TESTS
PIF_VALUE: 21
PIF_VALUE: 20
PIF_VALUE: 18
PIF_VALUE: 21
PIF_VALUE: 20
PIF_VALUE: 15
PIF_VALUE: 20
PIF_VALUE: 20
PIF_VALUE: 21
PIF_VALUE: 20
PIF_VALUE: 19
PIF_VALUE: 21
PIF_VALUE: 20
PIF_VALUE: 19
PIF_VALUE: 20
PIF_VALUE: 21
PIF_VALUE: 15
PIF_VALUE: 20
PIF_VALUE: 19
PIF_VALUE: 20
PIF_VALUE: 21
PIF_VALUE: 20
PIF_VALUE: 20
PIF_VALUE: 17
PIF_VALUE: 22
PIF_VALUE: 21
PIF_VALUE: 20
PIF_VALUE: 21
PIF_VALUE: 17
PIF_VALUE: 21
PIF_VALUE: 16
PIF_VALUE: 20

## 2023-12-18 ASSESSMENT — PAIN SCALES - GENERAL
PAINLEVEL_OUTOF10: 0
PAINLEVEL_OUTOF10: 0

## 2023-12-18 NOTE — CARE COORDINATION
[facility]. Medicaid application pending, as supporting documentation is needed.  Patient is employed of [employer] and was in the process of getting health insurance reinstated.        SAKINA Joshi  [phone]        09:23AM Remains vented.  Patient is uninsured at this time.  Public benefits dept has been trying to reach the spouse re: health insurance vs applying for Medicaid to determine eligibility.  Writer to contact spouse to inform.          SAKINA Joshi  [phone]

## 2023-12-19 ENCOUNTER — APPOINTMENT (OUTPATIENT)
Facility: HOSPITAL | Age: 53
DRG: 981 | End: 2023-12-19

## 2023-12-19 LAB
ALBUMIN SERPL-MCNC: 2.3 G/DL (ref 3.5–5)
ALDOST SERPL-MCNC: <1 NG/DL (ref 0–30)
ANION GAP SERPL CALC-SCNC: 9 MMOL/L (ref 5–15)
ARTERIAL PATENCY WRIST A: YES
BACTERIA SPEC CULT: NORMAL
BASE DEFICIT BLDA-SCNC: 0.5 MMOL/L
BDY SITE: ABNORMAL
BODY TEMPERATURE: 97.7
BUN SERPL-MCNC: 32 MG/DL (ref 6–20)
BUN/CREAT SERPL: 28 (ref 12–20)
CA-I BLD-MCNC: 8.6 MG/DL (ref 8.5–10.1)
CHLORIDE SERPL-SCNC: 110 MMOL/L (ref 97–108)
CO2 SERPL-SCNC: 21 MMOL/L (ref 21–32)
COHGB MFR BLD: 0.6 % (ref 1–2)
CREAT SERPL-MCNC: 1.15 MG/DL (ref 0.55–1.02)
ERYTHROCYTE [DISTWIDTH] IN BLOOD BY AUTOMATED COUNT: 13.3 % (ref 11.5–14.5)
FIO2 ON VENT: 24 %
GAS FLOW.O2 SETTING OXYMISER: 14
GLUCOSE BLD STRIP.AUTO-MCNC: 148 MG/DL (ref 65–100)
GLUCOSE BLD STRIP.AUTO-MCNC: 197 MG/DL (ref 65–100)
GLUCOSE BLD STRIP.AUTO-MCNC: 218 MG/DL (ref 65–100)
GLUCOSE BLD STRIP.AUTO-MCNC: 223 MG/DL (ref 65–100)
GLUCOSE BLD STRIP.AUTO-MCNC: 234 MG/DL (ref 65–100)
GLUCOSE BLD STRIP.AUTO-MCNC: 256 MG/DL (ref 65–100)
GLUCOSE SERPL-MCNC: 192 MG/DL (ref 65–100)
HCO3 BLDA-SCNC: 24 MMOL/L (ref 22–26)
HCT VFR BLD AUTO: 22.3 % (ref 35–47)
HGB BLD-MCNC: 7.1 G/DL (ref 11.5–16)
Lab: NORMAL
MCH RBC QN AUTO: 29.2 PG (ref 26–34)
MCHC RBC AUTO-ENTMCNC: 31.8 G/DL (ref 30–36.5)
MCV RBC AUTO: 91.8 FL (ref 80–99)
METHGB MFR BLD: 0.3 % (ref 0–1.4)
NRBC # BLD: 0 K/UL (ref 0–0.01)
NRBC BLD-RTO: 0 PER 100 WBC
OXYHGB MFR BLD: 94.6 % (ref 95–99)
PCO2 BLDA: 35 MMHG (ref 35–45)
PEEP RESPIRATORY: 5
PERFORMED BY:: ABNORMAL
PH BLDA: 7.44 (ref 7.35–7.45)
PHOSPHATE SERPL-MCNC: 4.8 MG/DL (ref 2.6–4.7)
PLATELET # BLD AUTO: 385 K/UL (ref 150–400)
PMV BLD AUTO: 10.7 FL (ref 8.9–12.9)
PO2 BLDA: 83 MMHG (ref 80–100)
POTASSIUM SERPL-SCNC: 4 MMOL/L (ref 3.5–5.1)
RBC # BLD AUTO: 2.43 M/UL (ref 3.8–5.2)
SAO2 % BLD: 96 % (ref 95–99)
SAO2% DEVICE SAO2% SENSOR NAME: ABNORMAL
SODIUM SERPL-SCNC: 140 MMOL/L (ref 136–145)
SPECIMEN SITE: ABNORMAL
VANCOMYCIN SERPL-MCNC: 16.9 UG/ML
VENTILATION MODE VENT: ABNORMAL
VT SETTING VENT: 400
WBC # BLD AUTO: 14.1 K/UL (ref 3.6–11)

## 2023-12-19 PROCEDURE — 2000000000 HC ICU R&B

## 2023-12-19 PROCEDURE — 2580000003 HC RX 258: Performed by: INTERNAL MEDICINE

## 2023-12-19 PROCEDURE — 80202 ASSAY OF VANCOMYCIN: CPT

## 2023-12-19 PROCEDURE — 71045 X-RAY EXAM CHEST 1 VIEW: CPT

## 2023-12-19 PROCEDURE — 94003 VENT MGMT INPAT SUBQ DAY: CPT

## 2023-12-19 PROCEDURE — 94640 AIRWAY INHALATION TREATMENT: CPT

## 2023-12-19 PROCEDURE — 82803 BLOOD GASES ANY COMBINATION: CPT

## 2023-12-19 PROCEDURE — 6360000002 HC RX W HCPCS: Performed by: INTERNAL MEDICINE

## 2023-12-19 PROCEDURE — 36600 WITHDRAWAL OF ARTERIAL BLOOD: CPT

## 2023-12-19 PROCEDURE — 80069 RENAL FUNCTION PANEL: CPT

## 2023-12-19 PROCEDURE — 6370000000 HC RX 637 (ALT 250 FOR IP): Performed by: NURSE PRACTITIONER

## 2023-12-19 PROCEDURE — 6370000000 HC RX 637 (ALT 250 FOR IP): Performed by: INTERNAL MEDICINE

## 2023-12-19 PROCEDURE — 2700000000 HC OXYGEN THERAPY PER DAY

## 2023-12-19 PROCEDURE — 94761 N-INVAS EAR/PLS OXIMETRY MLT: CPT

## 2023-12-19 PROCEDURE — 82962 GLUCOSE BLOOD TEST: CPT

## 2023-12-19 PROCEDURE — 85027 COMPLETE CBC AUTOMATED: CPT

## 2023-12-19 PROCEDURE — 6360000002 HC RX W HCPCS: Performed by: STUDENT IN AN ORGANIZED HEALTH CARE EDUCATION/TRAINING PROGRAM

## 2023-12-19 PROCEDURE — 6370000000 HC RX 637 (ALT 250 FOR IP): Performed by: HOSPITALIST

## 2023-12-19 PROCEDURE — 2500000003 HC RX 250 WO HCPCS: Performed by: INTERNAL MEDICINE

## 2023-12-19 PROCEDURE — 36415 COLL VENOUS BLD VENIPUNCTURE: CPT

## 2023-12-19 PROCEDURE — 6360000002 HC RX W HCPCS: Performed by: HOSPITALIST

## 2023-12-19 RX ORDER — INSULIN LISPRO 100 [IU]/ML
0-8 INJECTION, SOLUTION INTRAVENOUS; SUBCUTANEOUS EVERY 6 HOURS
Status: DISCONTINUED | OUTPATIENT
Start: 2023-12-19 | End: 2023-12-28

## 2023-12-19 RX ORDER — INSULIN GLARGINE 100 [IU]/ML
25 INJECTION, SOLUTION SUBCUTANEOUS DAILY
Status: DISCONTINUED | OUTPATIENT
Start: 2023-12-20 | End: 2023-12-22

## 2023-12-19 RX ORDER — INSULIN LISPRO 100 [IU]/ML
0-8 INJECTION, SOLUTION INTRAVENOUS; SUBCUTANEOUS
Status: DISCONTINUED | OUTPATIENT
Start: 2023-12-19 | End: 2023-12-19

## 2023-12-19 RX ADMIN — PIPERACILLIN SODIUM AND TAZOBACTAM SODIUM 3375 MG: 3; .375 INJECTION, POWDER, LYOPHILIZED, FOR SOLUTION INTRAVENOUS at 23:01

## 2023-12-19 RX ADMIN — ENOXAPARIN SODIUM 30 MG: 100 INJECTION SUBCUTANEOUS at 08:09

## 2023-12-19 RX ADMIN — POTASSIUM BICARBONATE 20 MEQ: 782 TABLET, EFFERVESCENT ORAL at 20:43

## 2023-12-19 RX ADMIN — DOXYCYCLINE 100 MG: 100 INJECTION, POWDER, LYOPHILIZED, FOR SOLUTION INTRAVENOUS at 08:09

## 2023-12-19 RX ADMIN — PIPERACILLIN SODIUM AND TAZOBACTAM SODIUM 3375 MG: 3; .375 INJECTION, POWDER, LYOPHILIZED, FOR SOLUTION INTRAVENOUS at 15:37

## 2023-12-19 RX ADMIN — LEVETIRACETAM 750 MG: 250 TABLET, FILM COATED ORAL at 20:43

## 2023-12-19 RX ADMIN — PROPOFOL 35 MCG/KG/MIN: 10 INJECTION, EMULSION INTRAVENOUS at 11:11

## 2023-12-19 RX ADMIN — SODIUM CHLORIDE, PRESERVATIVE FREE 10 ML: 5 INJECTION INTRAVENOUS at 20:50

## 2023-12-19 RX ADMIN — Medication 50 MCG/HR: at 13:57

## 2023-12-19 RX ADMIN — INSULIN GLARGINE 20 UNITS: 100 INJECTION, SOLUTION SUBCUTANEOUS at 08:09

## 2023-12-19 RX ADMIN — IPRATROPIUM BROMIDE AND ALBUTEROL SULFATE 1 DOSE: 2.5; .5 SOLUTION RESPIRATORY (INHALATION) at 14:56

## 2023-12-19 RX ADMIN — INSULIN LISPRO 2 UNITS: 100 INJECTION, SOLUTION INTRAVENOUS; SUBCUTANEOUS at 23:01

## 2023-12-19 RX ADMIN — HYDRALAZINE HYDROCHLORIDE 10 MG: 20 INJECTION INTRAMUSCULAR; INTRAVENOUS at 20:43

## 2023-12-19 RX ADMIN — VANCOMYCIN HYDROCHLORIDE 750 MG: 750 INJECTION, POWDER, LYOPHILIZED, FOR SOLUTION INTRAVENOUS at 12:33

## 2023-12-19 RX ADMIN — DOXYCYCLINE 100 MG: 100 INJECTION, POWDER, LYOPHILIZED, FOR SOLUTION INTRAVENOUS at 20:42

## 2023-12-19 RX ADMIN — INSULIN LISPRO 2 UNITS: 100 INJECTION, SOLUTION INTRAVENOUS; SUBCUTANEOUS at 12:33

## 2023-12-19 RX ADMIN — LABETALOL HYDROCHLORIDE 200 MG: 100 TABLET, FILM COATED ORAL at 13:58

## 2023-12-19 RX ADMIN — SODIUM CHLORIDE, PRESERVATIVE FREE 10 ML: 5 INJECTION INTRAVENOUS at 08:13

## 2023-12-19 RX ADMIN — ATORVASTATIN CALCIUM 40 MG: 40 TABLET, FILM COATED ORAL at 20:43

## 2023-12-19 RX ADMIN — LABETALOL HYDROCHLORIDE 200 MG: 100 TABLET, FILM COATED ORAL at 05:41

## 2023-12-19 RX ADMIN — INSULIN LISPRO 4 UNITS: 100 INJECTION, SOLUTION INTRAVENOUS; SUBCUTANEOUS at 08:10

## 2023-12-19 RX ADMIN — PROPOFOL 35 MCG/KG/MIN: 10 INJECTION, EMULSION INTRAVENOUS at 16:36

## 2023-12-19 RX ADMIN — LEVETIRACETAM 750 MG: 250 TABLET, FILM COATED ORAL at 08:10

## 2023-12-19 RX ADMIN — PROPOFOL 35 MCG/KG/MIN: 10 INJECTION, EMULSION INTRAVENOUS at 20:30

## 2023-12-19 RX ADMIN — INSULIN LISPRO 2 UNITS: 100 INJECTION, SOLUTION INTRAVENOUS; SUBCUTANEOUS at 18:04

## 2023-12-19 RX ADMIN — POTASSIUM BICARBONATE 20 MEQ: 782 TABLET, EFFERVESCENT ORAL at 08:10

## 2023-12-19 RX ADMIN — LABETALOL HYDROCHLORIDE 200 MG: 100 TABLET, FILM COATED ORAL at 20:43

## 2023-12-19 RX ADMIN — PROPOFOL 40 MCG/KG/MIN: 10 INJECTION, EMULSION INTRAVENOUS at 04:03

## 2023-12-19 RX ADMIN — PIPERACILLIN SODIUM AND TAZOBACTAM SODIUM 3375 MG: 3; .375 INJECTION, POWDER, LYOPHILIZED, FOR SOLUTION INTRAVENOUS at 06:30

## 2023-12-19 RX ADMIN — IPRATROPIUM BROMIDE AND ALBUTEROL SULFATE 1 DOSE: 2.5; .5 SOLUTION RESPIRATORY (INHALATION) at 19:48

## 2023-12-19 RX ADMIN — IPRATROPIUM BROMIDE AND ALBUTEROL SULFATE 1 DOSE: 2.5; .5 SOLUTION RESPIRATORY (INHALATION) at 06:05

## 2023-12-19 ASSESSMENT — PULMONARY FUNCTION TESTS
PIF_VALUE: 21
PIF_VALUE: 23
PIF_VALUE: 22
PIF_VALUE: 21
PIF_VALUE: 23
PIF_VALUE: 23
PIF_VALUE: 22
PIF_VALUE: 21
PIF_VALUE: 22
PIF_VALUE: 22
PIF_VALUE: 21
PIF_VALUE: 23
PIF_VALUE: 23
PIF_VALUE: 25
PIF_VALUE: 23
PIF_VALUE: 24
PIF_VALUE: 23
PIF_VALUE: 21
PIF_VALUE: 25
PIF_VALUE: 21
PIF_VALUE: 21
PIF_VALUE: 22
PIF_VALUE: 24
PIF_VALUE: 22
PIF_VALUE: 21
PIF_VALUE: 23

## 2023-12-19 ASSESSMENT — PAIN SCALES - GENERAL: PAINLEVEL_OUTOF10: 0

## 2023-12-20 ENCOUNTER — APPOINTMENT (OUTPATIENT)
Facility: HOSPITAL | Age: 53
DRG: 981 | End: 2023-12-20

## 2023-12-20 ENCOUNTER — APPOINTMENT (OUTPATIENT)
Facility: HOSPITAL | Age: 53
DRG: 981 | End: 2023-12-20
Attending: INTERNAL MEDICINE

## 2023-12-20 LAB
ALBUMIN SERPL-MCNC: 2.2 G/DL (ref 3.5–5)
ANION GAP SERPL CALC-SCNC: 8 MMOL/L (ref 5–15)
ARTERIAL PATENCY WRIST A: YES
BASE DEFICIT BLDA-SCNC: 1.9 MMOL/L
BASOPHILS # BLD: 0.1 K/UL (ref 0–0.1)
BASOPHILS NFR BLD: 0 % (ref 0–1)
BDY SITE: ABNORMAL
BODY TEMPERATURE: 98.2
BUN SERPL-MCNC: 32 MG/DL (ref 6–20)
BUN/CREAT SERPL: 26 (ref 12–20)
CA-I BLD-MCNC: 8.8 MG/DL (ref 8.5–10.1)
CHLORIDE SERPL-SCNC: 111 MMOL/L (ref 97–108)
CO2 SERPL-SCNC: 23 MMOL/L (ref 21–32)
COHGB MFR BLD: 0.4 % (ref 1–2)
CREAT SERPL-MCNC: 1.21 MG/DL (ref 0.55–1.02)
DIFFERENTIAL METHOD BLD: ABNORMAL
EKG ATRIAL RATE: 127 BPM
EKG DIAGNOSIS: NORMAL
EKG P AXIS: 77 DEGREES
EKG P-R INTERVAL: 138 MS
EKG Q-T INTERVAL: 316 MS
EKG QRS DURATION: 76 MS
EKG QTC CALCULATION (BAZETT): 459 MS
EKG R AXIS: 58 DEGREES
EKG T AXIS: 85 DEGREES
EKG VENTRICULAR RATE: 127 BPM
EOSINOPHIL # BLD: 0.3 K/UL (ref 0–0.4)
EOSINOPHIL NFR BLD: 2 % (ref 0–7)
ERYTHROCYTE [DISTWIDTH] IN BLOOD BY AUTOMATED COUNT: 13.6 % (ref 11.5–14.5)
FIO2 ON VENT: 24 %
GAS FLOW.O2 SETTING OXYMISER: 14
GLUCOSE BLD STRIP.AUTO-MCNC: 115 MG/DL (ref 65–100)
GLUCOSE BLD STRIP.AUTO-MCNC: 202 MG/DL (ref 65–100)
GLUCOSE BLD STRIP.AUTO-MCNC: 219 MG/DL (ref 65–100)
GLUCOSE BLD STRIP.AUTO-MCNC: 244 MG/DL (ref 65–100)
GLUCOSE BLD STRIP.AUTO-MCNC: 249 MG/DL (ref 65–100)
GLUCOSE SERPL-MCNC: 231 MG/DL (ref 65–100)
HCO3 BLDA-SCNC: 22 MMOL/L (ref 22–26)
HCT VFR BLD AUTO: 22 % (ref 35–47)
HGB BLD-MCNC: 7 G/DL (ref 11.5–16)
IMM GRANULOCYTES # BLD AUTO: 0.2 K/UL (ref 0–0.04)
IMM GRANULOCYTES NFR BLD AUTO: 2 % (ref 0–0.5)
LYMPHOCYTES # BLD: 2.7 K/UL (ref 0.8–3.5)
LYMPHOCYTES NFR BLD: 20 % (ref 12–49)
MCH RBC QN AUTO: 29.3 PG (ref 26–34)
MCHC RBC AUTO-ENTMCNC: 31.8 G/DL (ref 30–36.5)
MCV RBC AUTO: 92.1 FL (ref 80–99)
METHGB MFR BLD: 0.3 % (ref 0–1.4)
MONOCYTES # BLD: 1.5 K/UL (ref 0–1)
MONOCYTES NFR BLD: 11 % (ref 5–13)
NEUTS SEG # BLD: 9 K/UL (ref 1.8–8)
NEUTS SEG NFR BLD: 65 % (ref 32–75)
NRBC # BLD: 0 K/UL (ref 0–0.01)
NRBC BLD-RTO: 0 PER 100 WBC
OXYHGB MFR BLD: 93.5 % (ref 95–99)
PCO2 BLDA: 32 MMHG (ref 35–45)
PEEP RESPIRATORY: 5
PERFORMED BY:: ABNORMAL
PH BLDA: 7.45 (ref 7.35–7.45)
PHOSPHATE SERPL-MCNC: 4.1 MG/DL (ref 2.6–4.7)
PLATELET # BLD AUTO: 396 K/UL (ref 150–400)
PMV BLD AUTO: 10.3 FL (ref 8.9–12.9)
PO2 BLDA: 78 MMHG (ref 80–100)
POTASSIUM SERPL-SCNC: 3.9 MMOL/L (ref 3.5–5.1)
RBC # BLD AUTO: 2.39 M/UL (ref 3.8–5.2)
SAO2 % BLD: 94 % (ref 95–99)
SAO2% DEVICE SAO2% SENSOR NAME: ABNORMAL
SODIUM SERPL-SCNC: 142 MMOL/L (ref 136–145)
SPECIMEN SITE: ABNORMAL
VENTILATION MODE VENT: ABNORMAL
VT SETTING VENT: 400
WBC # BLD AUTO: 13.8 K/UL (ref 3.6–11)

## 2023-12-20 PROCEDURE — 2700000000 HC OXYGEN THERAPY PER DAY

## 2023-12-20 PROCEDURE — 94640 AIRWAY INHALATION TREATMENT: CPT

## 2023-12-20 PROCEDURE — 2580000003 HC RX 258: Performed by: INTERNAL MEDICINE

## 2023-12-20 PROCEDURE — 6360000002 HC RX W HCPCS: Performed by: INTERNAL MEDICINE

## 2023-12-20 PROCEDURE — 02HV33Z INSERTION OF INFUSION DEVICE INTO SUPERIOR VENA CAVA, PERCUTANEOUS APPROACH: ICD-10-PCS | Performed by: INTERNAL MEDICINE

## 2023-12-20 PROCEDURE — 2000000000 HC ICU R&B

## 2023-12-20 PROCEDURE — 82962 GLUCOSE BLOOD TEST: CPT

## 2023-12-20 PROCEDURE — 36600 WITHDRAWAL OF ARTERIAL BLOOD: CPT

## 2023-12-20 PROCEDURE — 6370000000 HC RX 637 (ALT 250 FOR IP): Performed by: INTERNAL MEDICINE

## 2023-12-20 PROCEDURE — 71045 X-RAY EXAM CHEST 1 VIEW: CPT

## 2023-12-20 PROCEDURE — 2500000003 HC RX 250 WO HCPCS: Performed by: INTERNAL MEDICINE

## 2023-12-20 PROCEDURE — 6360000002 HC RX W HCPCS: Performed by: STUDENT IN AN ORGANIZED HEALTH CARE EDUCATION/TRAINING PROGRAM

## 2023-12-20 PROCEDURE — 94003 VENT MGMT INPAT SUBQ DAY: CPT

## 2023-12-20 PROCEDURE — 85025 COMPLETE CBC W/AUTO DIFF WBC: CPT

## 2023-12-20 PROCEDURE — 36415 COLL VENOUS BLD VENIPUNCTURE: CPT

## 2023-12-20 PROCEDURE — 6360000002 HC RX W HCPCS: Performed by: HOSPITALIST

## 2023-12-20 PROCEDURE — 82803 BLOOD GASES ANY COMBINATION: CPT

## 2023-12-20 PROCEDURE — 80069 RENAL FUNCTION PANEL: CPT

## 2023-12-20 PROCEDURE — 6370000000 HC RX 637 (ALT 250 FOR IP): Performed by: HOSPITALIST

## 2023-12-20 PROCEDURE — 6370000000 HC RX 637 (ALT 250 FOR IP): Performed by: NURSE PRACTITIONER

## 2023-12-20 PROCEDURE — 94761 N-INVAS EAR/PLS OXIMETRY MLT: CPT

## 2023-12-20 RX ORDER — DEXMEDETOMIDINE HYDROCHLORIDE 4 UG/ML
.1-1.5 INJECTION, SOLUTION INTRAVENOUS CONTINUOUS
Status: DISCONTINUED | OUTPATIENT
Start: 2023-12-20 | End: 2023-12-29

## 2023-12-20 RX ORDER — LEVETIRACETAM 100 MG/ML
750 SOLUTION ORAL 2 TIMES DAILY
Status: DISCONTINUED | OUTPATIENT
Start: 2023-12-20 | End: 2023-12-21

## 2023-12-20 RX ORDER — FUROSEMIDE 10 MG/ML
40 INJECTION INTRAMUSCULAR; INTRAVENOUS ONCE
Status: COMPLETED | OUTPATIENT
Start: 2023-12-20 | End: 2023-12-20

## 2023-12-20 RX ADMIN — HYDRALAZINE HYDROCHLORIDE 10 MG: 20 INJECTION INTRAMUSCULAR; INTRAVENOUS at 22:55

## 2023-12-20 RX ADMIN — DEXMEDETOMIDINE HYDROCHLORIDE 0.6 MCG/KG/HR: 400 INJECTION, SOLUTION INTRAVENOUS at 20:40

## 2023-12-20 RX ADMIN — LABETALOL HYDROCHLORIDE 200 MG: 100 TABLET, FILM COATED ORAL at 20:26

## 2023-12-20 RX ADMIN — IPRATROPIUM BROMIDE AND ALBUTEROL SULFATE 1 DOSE: 2.5; .5 SOLUTION RESPIRATORY (INHALATION) at 20:43

## 2023-12-20 RX ADMIN — INSULIN LISPRO 2 UNITS: 100 INJECTION, SOLUTION INTRAVENOUS; SUBCUTANEOUS at 05:17

## 2023-12-20 RX ADMIN — LEVETIRACETAM 750 MG: 500 SOLUTION ORAL at 20:27

## 2023-12-20 RX ADMIN — POTASSIUM BICARBONATE 20 MEQ: 782 TABLET, EFFERVESCENT ORAL at 20:26

## 2023-12-20 RX ADMIN — Medication 50 MCG/HR: at 02:44

## 2023-12-20 RX ADMIN — PROPOFOL 35 MCG/KG/MIN: 10 INJECTION, EMULSION INTRAVENOUS at 06:39

## 2023-12-20 RX ADMIN — FUROSEMIDE 40 MG: 10 INJECTION, SOLUTION INTRAMUSCULAR; INTRAVENOUS at 15:21

## 2023-12-20 RX ADMIN — ENOXAPARIN SODIUM 30 MG: 100 INJECTION SUBCUTANEOUS at 08:33

## 2023-12-20 RX ADMIN — INSULIN GLARGINE 25 UNITS: 100 INJECTION, SOLUTION SUBCUTANEOUS at 08:33

## 2023-12-20 RX ADMIN — LABETALOL HYDROCHLORIDE 200 MG: 100 TABLET, FILM COATED ORAL at 14:15

## 2023-12-20 RX ADMIN — ATORVASTATIN CALCIUM 40 MG: 40 TABLET, FILM COATED ORAL at 20:26

## 2023-12-20 RX ADMIN — PIPERACILLIN SODIUM AND TAZOBACTAM SODIUM 3375 MG: 3; .375 INJECTION, POWDER, LYOPHILIZED, FOR SOLUTION INTRAVENOUS at 14:15

## 2023-12-20 RX ADMIN — INSULIN LISPRO 2 UNITS: 100 INJECTION, SOLUTION INTRAVENOUS; SUBCUTANEOUS at 23:56

## 2023-12-20 RX ADMIN — POTASSIUM BICARBONATE 20 MEQ: 782 TABLET, EFFERVESCENT ORAL at 08:33

## 2023-12-20 RX ADMIN — DEXMEDETOMIDINE HYDROCHLORIDE 0.2 MCG/KG/HR: 400 INJECTION, SOLUTION INTRAVENOUS at 12:00

## 2023-12-20 RX ADMIN — HYDRALAZINE HYDROCHLORIDE 10 MG: 20 INJECTION INTRAMUSCULAR; INTRAVENOUS at 08:33

## 2023-12-20 RX ADMIN — HYDRALAZINE HYDROCHLORIDE 10 MG: 20 INJECTION INTRAMUSCULAR; INTRAVENOUS at 03:58

## 2023-12-20 RX ADMIN — DOXYCYCLINE 100 MG: 100 INJECTION, POWDER, LYOPHILIZED, FOR SOLUTION INTRAVENOUS at 08:34

## 2023-12-20 RX ADMIN — SODIUM CHLORIDE, PRESERVATIVE FREE 10 ML: 5 INJECTION INTRAVENOUS at 08:34

## 2023-12-20 RX ADMIN — PROPOFOL 35 MCG/KG/MIN: 10 INJECTION, EMULSION INTRAVENOUS at 23:29

## 2023-12-20 RX ADMIN — INSULIN LISPRO 2 UNITS: 100 INJECTION, SOLUTION INTRAVENOUS; SUBCUTANEOUS at 12:00

## 2023-12-20 RX ADMIN — PIPERACILLIN SODIUM AND TAZOBACTAM SODIUM 3375 MG: 3; .375 INJECTION, POWDER, LYOPHILIZED, FOR SOLUTION INTRAVENOUS at 22:24

## 2023-12-20 RX ADMIN — DOXYCYCLINE 100 MG: 100 INJECTION, POWDER, LYOPHILIZED, FOR SOLUTION INTRAVENOUS at 20:40

## 2023-12-20 RX ADMIN — SODIUM CHLORIDE, PRESERVATIVE FREE 10 ML: 5 INJECTION INTRAVENOUS at 20:42

## 2023-12-20 RX ADMIN — PIPERACILLIN SODIUM AND TAZOBACTAM SODIUM 3375 MG: 3; .375 INJECTION, POWDER, LYOPHILIZED, FOR SOLUTION INTRAVENOUS at 05:59

## 2023-12-20 RX ADMIN — PROPOFOL 35 MCG/KG/MIN: 10 INJECTION, EMULSION INTRAVENOUS at 02:43

## 2023-12-20 RX ADMIN — IPRATROPIUM BROMIDE AND ALBUTEROL SULFATE 1 DOSE: 2.5; .5 SOLUTION RESPIRATORY (INHALATION) at 14:10

## 2023-12-20 RX ADMIN — LEVETIRACETAM 750 MG: 250 TABLET, FILM COATED ORAL at 08:33

## 2023-12-20 RX ADMIN — POLYETHYLENE GLYCOL 3350 17 G: 17 POWDER, FOR SOLUTION ORAL at 17:27

## 2023-12-20 RX ADMIN — LABETALOL HYDROCHLORIDE 200 MG: 100 TABLET, FILM COATED ORAL at 05:17

## 2023-12-20 RX ADMIN — PROPOFOL 35 MCG/KG/MIN: 10 INJECTION, EMULSION INTRAVENOUS at 17:27

## 2023-12-20 RX ADMIN — IPRATROPIUM BROMIDE AND ALBUTEROL SULFATE 1 DOSE: 2.5; .5 SOLUTION RESPIRATORY (INHALATION) at 07:16

## 2023-12-20 ASSESSMENT — PULMONARY FUNCTION TESTS
PIF_VALUE: 21
PIF_VALUE: 18
PIF_VALUE: 23
PIF_VALUE: 24
PIF_VALUE: 23
PIF_VALUE: 24
PIF_VALUE: 23
PIF_VALUE: 21
PIF_VALUE: 22
PIF_VALUE: 23
PIF_VALUE: 22
PIF_VALUE: 23
PIF_VALUE: 23
PIF_VALUE: 21
PIF_VALUE: 22
PIF_VALUE: 23
PIF_VALUE: 22
PIF_VALUE: 21
PIF_VALUE: 23
PIF_VALUE: 22
PIF_VALUE: 21
PIF_VALUE: 22
PIF_VALUE: 21
PIF_VALUE: 22
PIF_VALUE: 33
PIF_VALUE: 22
PIF_VALUE: 21
PIF_VALUE: 26
PIF_VALUE: 22
PIF_VALUE: 22
PIF_VALUE: 23
PIF_VALUE: 22
PIF_VALUE: 22
PIF_VALUE: 23
PIF_VALUE: 23
PIF_VALUE: 65
PIF_VALUE: 24
PIF_VALUE: 21
PIF_VALUE: 22
PIF_VALUE: 21
PIF_VALUE: 18
PIF_VALUE: 24
PIF_VALUE: 23
PIF_VALUE: 22
PIF_VALUE: 22
PIF_VALUE: 23
PIF_VALUE: 25
PIF_VALUE: 22
PIF_VALUE: 23
PIF_VALUE: 23

## 2023-12-20 ASSESSMENT — PAIN SCALES - GENERAL
PAINLEVEL_OUTOF10: 0
PAINLEVEL_OUTOF10: 0
PAINLEVEL_OUTOF10: 3
PAINLEVEL_OUTOF10: 0

## 2023-12-20 ASSESSMENT — PAIN DESCRIPTION - LOCATION: LOCATION: LEG;FOOT

## 2023-12-20 NOTE — PROCEDURES
Vascular Access Team Consult Note: received consult for USG PIV assessment x 3 and PIV replacement per primary care ICU nurse, Clara.     Ultrasound-guided (USG) PIV placement: see Epic Avatar and EHR flowsheet date for additional vascular access device and procedural information.     20 g 1.75 inch PIV placed with ultrasound-guidance x 1 attempt in right anterior mid-forearm cephalic vein. Brisk blood return noted, flushed easily with 10 mL NS. Dressed with sterile skin barrier prep wipe and 3M CHG wafer transparent Tegaderm dressing. Needle-free connector and alcohol swab end caps utilized.     22 g 1.75 inch PIV placed with ultrasound-guidance x 1 attempt in right upper forearm posterior vein. Brisk blood return noted, flushed easily with 10 mL NS. Dressed with sterile skin barrier prep wipe and 3M CHG wafer transparent Tegaderm dressing. Needle-free connector and alcohol swab end caps utilized.     Client tolerated well, no patient complaints. Remains intubated and sedated, so had to establish PIV access for sedation.     Recommendations for CVC placement at this time, as client has no suitable veins per ultrasound assessment and client has reached venous depletion. Upon, CVC placement, 2 PIV's to be removed by nursing due to risk of black flow and infiltration.     Primary care nurse, Clara, notified.     Please re-enter IP PICC Team Consult in [facility] for any further vascular access needs.     Radha Dumont RN

## 2023-12-21 ENCOUNTER — APPOINTMENT (OUTPATIENT)
Facility: HOSPITAL | Age: 53
DRG: 981 | End: 2023-12-21

## 2023-12-21 ENCOUNTER — APPOINTMENT (OUTPATIENT)
Facility: HOSPITAL | Age: 53
DRG: 981 | End: 2023-12-21
Attending: INTERNAL MEDICINE

## 2023-12-21 LAB
ANION GAP SERPL CALC-SCNC: 8 MMOL/L (ref 5–15)
ARTERIAL PATENCY WRIST A: YES
BASE EXCESS BLDA CALC-SCNC: 1.1 MMOL/L (ref 0–3)
BASOPHILS # BLD: 0 K/UL (ref 0–0.1)
BASOPHILS NFR BLD: 0 % (ref 0–1)
BDY SITE: ABNORMAL
BODY TEMPERATURE: 98.4
BUN SERPL-MCNC: 45 MG/DL (ref 6–20)
BUN/CREAT SERPL: 33 (ref 12–20)
CA-I BLD-MCNC: 8.8 MG/DL (ref 8.5–10.1)
CHLORIDE SERPL-SCNC: 109 MMOL/L (ref 97–108)
CO2 SERPL-SCNC: 25 MMOL/L (ref 21–32)
COHGB MFR BLD: 0 % (ref 1–2)
CREAT SERPL-MCNC: 1.38 MG/DL (ref 0.55–1.02)
DIFFERENTIAL METHOD BLD: ABNORMAL
EOSINOPHIL # BLD: 0.3 K/UL (ref 0–0.4)
EOSINOPHIL NFR BLD: 2 % (ref 0–7)
ERYTHROCYTE [DISTWIDTH] IN BLOOD BY AUTOMATED COUNT: 14.4 % (ref 11.5–14.5)
FIO2 ON VENT: 24 %
GAS FLOW.O2 SETTING OXYMISER: 14
GLUCOSE BLD STRIP.AUTO-MCNC: 239 MG/DL (ref 65–100)
GLUCOSE BLD STRIP.AUTO-MCNC: 277 MG/DL (ref 65–100)
GLUCOSE BLD STRIP.AUTO-MCNC: 343 MG/DL (ref 65–100)
GLUCOSE SERPL-MCNC: 249 MG/DL (ref 65–100)
HCO3 BLDA-SCNC: 24 MMOL/L (ref 22–26)
HCT VFR BLD AUTO: 22.2 % (ref 35–47)
HGB BLD-MCNC: 7.2 G/DL (ref 11.5–16)
IMM GRANULOCYTES # BLD AUTO: 0.2 K/UL (ref 0–0.04)
IMM GRANULOCYTES NFR BLD AUTO: 1 % (ref 0–0.5)
LYMPHOCYTES # BLD: 2.8 K/UL (ref 0.8–3.5)
LYMPHOCYTES NFR BLD: 19 % (ref 12–49)
MCH RBC QN AUTO: 30 PG (ref 26–34)
MCHC RBC AUTO-ENTMCNC: 32.4 G/DL (ref 30–36.5)
MCV RBC AUTO: 92.5 FL (ref 80–99)
METHGB MFR BLD: 0.6 % (ref 0–1.4)
MONOCYTES # BLD: 1.8 K/UL (ref 0–1)
MONOCYTES NFR BLD: 12 % (ref 5–13)
NEUTS SEG # BLD: 9.4 K/UL (ref 1.8–8)
NEUTS SEG NFR BLD: 66 % (ref 32–75)
NRBC # BLD: 0.03 K/UL (ref 0–0.01)
NRBC BLD-RTO: 0.2 PER 100 WBC
OXYHGB MFR BLD: 94.2 % (ref 95–99)
PCO2 BLDA: 33 MMHG (ref 35–45)
PEEP RESPIRATORY: 5
PERFORMED BY:: ABNORMAL
PH BLDA: 7.49 (ref 7.35–7.45)
PLATELET # BLD AUTO: 472 K/UL (ref 150–400)
PMV BLD AUTO: 10.6 FL (ref 8.9–12.9)
PO2 BLDA: 76 MMHG (ref 80–100)
POTASSIUM SERPL-SCNC: 3.6 MMOL/L (ref 3.5–5.1)
RBC # BLD AUTO: 2.4 M/UL (ref 3.8–5.2)
RENIN PLAS-CCNC: 1.1 NG/ML/HR (ref 0.17–5.38)
SAO2 % BLD: 95 % (ref 95–99)
SAO2% DEVICE SAO2% SENSOR NAME: ABNORMAL
SODIUM SERPL-SCNC: 142 MMOL/L (ref 136–145)
SPECIMEN SITE: ABNORMAL
VENTILATION MODE VENT: ABNORMAL
VT SETTING VENT: 400
WBC # BLD AUTO: 14.4 K/UL (ref 3.6–11)

## 2023-12-21 PROCEDURE — 2580000003 HC RX 258: Performed by: INTERNAL MEDICINE

## 2023-12-21 PROCEDURE — 2500000003 HC RX 250 WO HCPCS: Performed by: STUDENT IN AN ORGANIZED HEALTH CARE EDUCATION/TRAINING PROGRAM

## 2023-12-21 PROCEDURE — 6370000000 HC RX 637 (ALT 250 FOR IP): Performed by: INTERNAL MEDICINE

## 2023-12-21 PROCEDURE — 71045 X-RAY EXAM CHEST 1 VIEW: CPT

## 2023-12-21 PROCEDURE — 94003 VENT MGMT INPAT SUBQ DAY: CPT

## 2023-12-21 PROCEDURE — 82803 BLOOD GASES ANY COMBINATION: CPT

## 2023-12-21 PROCEDURE — 82962 GLUCOSE BLOOD TEST: CPT

## 2023-12-21 PROCEDURE — 6360000002 HC RX W HCPCS: Performed by: STUDENT IN AN ORGANIZED HEALTH CARE EDUCATION/TRAINING PROGRAM

## 2023-12-21 PROCEDURE — C1894 INTRO/SHEATH, NON-LASER: HCPCS

## 2023-12-21 PROCEDURE — 6360000002 HC RX W HCPCS: Performed by: INTERNAL MEDICINE

## 2023-12-21 PROCEDURE — 86923 COMPATIBILITY TEST ELECTRIC: CPT

## 2023-12-21 PROCEDURE — 2500000003 HC RX 250 WO HCPCS: Performed by: INTERNAL MEDICINE

## 2023-12-21 PROCEDURE — 86901 BLOOD TYPING SEROLOGIC RH(D): CPT

## 2023-12-21 PROCEDURE — 36556 INSERT NON-TUNNEL CV CATH: CPT

## 2023-12-21 PROCEDURE — 99252 IP/OBS CONSLTJ NEW/EST SF 35: CPT | Performed by: PSYCHIATRY & NEUROLOGY

## 2023-12-21 PROCEDURE — 6370000000 HC RX 637 (ALT 250 FOR IP): Performed by: NURSE PRACTITIONER

## 2023-12-21 PROCEDURE — 86900 BLOOD TYPING SEROLOGIC ABO: CPT

## 2023-12-21 PROCEDURE — 86850 RBC ANTIBODY SCREEN: CPT

## 2023-12-21 PROCEDURE — 2700000000 HC OXYGEN THERAPY PER DAY

## 2023-12-21 PROCEDURE — 76937 US GUIDE VASCULAR ACCESS: CPT

## 2023-12-21 PROCEDURE — 6360000002 HC RX W HCPCS

## 2023-12-21 PROCEDURE — 6360000002 HC RX W HCPCS: Performed by: HOSPITALIST

## 2023-12-21 PROCEDURE — 36600 WITHDRAWAL OF ARTERIAL BLOOD: CPT

## 2023-12-21 PROCEDURE — 94761 N-INVAS EAR/PLS OXIMETRY MLT: CPT

## 2023-12-21 PROCEDURE — 2500000003 HC RX 250 WO HCPCS: Performed by: ANESTHESIOLOGY

## 2023-12-21 PROCEDURE — 94640 AIRWAY INHALATION TREATMENT: CPT

## 2023-12-21 PROCEDURE — 2500000003 HC RX 250 WO HCPCS

## 2023-12-21 PROCEDURE — 85025 COMPLETE CBC W/AUTO DIFF WBC: CPT

## 2023-12-21 PROCEDURE — 6360000002 HC RX W HCPCS: Performed by: PSYCHIATRY & NEUROLOGY

## 2023-12-21 PROCEDURE — 2580000003 HC RX 258: Performed by: STUDENT IN AN ORGANIZED HEALTH CARE EDUCATION/TRAINING PROGRAM

## 2023-12-21 PROCEDURE — 2000000000 HC ICU R&B

## 2023-12-21 PROCEDURE — 80048 BASIC METABOLIC PNL TOTAL CA: CPT

## 2023-12-21 PROCEDURE — 6370000000 HC RX 637 (ALT 250 FOR IP): Performed by: HOSPITALIST

## 2023-12-21 RX ORDER — MIDAZOLAM HYDROCHLORIDE 1 MG/ML
INJECTION INTRAMUSCULAR; INTRAVENOUS
Status: DISCONTINUED
Start: 2023-12-21 | End: 2023-12-21 | Stop reason: WASHOUT

## 2023-12-21 RX ORDER — MIDAZOLAM HYDROCHLORIDE 1 MG/ML
INJECTION INTRAMUSCULAR; INTRAVENOUS
Status: DISPENSED
Start: 2023-12-21 | End: 2023-12-21

## 2023-12-21 RX ORDER — METHYLPREDNISOLONE SODIUM SUCCINATE 125 MG/2ML
INJECTION, POWDER, LYOPHILIZED, FOR SOLUTION INTRAMUSCULAR; INTRAVENOUS
Status: COMPLETED
Start: 2023-12-21 | End: 2023-12-21

## 2023-12-21 RX ORDER — LEVETIRACETAM 500 MG/5ML
750 INJECTION, SOLUTION, CONCENTRATE INTRAVENOUS EVERY 12 HOURS
Status: DISCONTINUED | OUTPATIENT
Start: 2023-12-21 | End: 2023-12-21

## 2023-12-21 RX ORDER — ETOMIDATE 2 MG/ML
INJECTION INTRAVENOUS
Status: DISCONTINUED
Start: 2023-12-21 | End: 2023-12-21 | Stop reason: WASHOUT

## 2023-12-21 RX ORDER — LEVETIRACETAM 500 MG/5ML
750 INJECTION, SOLUTION, CONCENTRATE INTRAVENOUS 2 TIMES DAILY
Status: DISCONTINUED | OUTPATIENT
Start: 2023-12-21 | End: 2024-01-24 | Stop reason: HOSPADM

## 2023-12-21 RX ORDER — MAGNESIUM SULFATE IN WATER 40 MG/ML
INJECTION, SOLUTION INTRAVENOUS
Status: COMPLETED
Start: 2023-12-21 | End: 2023-12-21

## 2023-12-21 RX ORDER — HYDRALAZINE HYDROCHLORIDE 20 MG/ML
INJECTION INTRAMUSCULAR; INTRAVENOUS
Status: COMPLETED
Start: 2023-12-21 | End: 2023-12-21

## 2023-12-21 RX ORDER — CLONIDINE HYDROCHLORIDE 0.1 MG/1
0.2 TABLET ORAL EVERY 4 HOURS PRN
Status: DISCONTINUED | OUTPATIENT
Start: 2023-12-21 | End: 2023-12-24

## 2023-12-21 RX ORDER — ETOMIDATE 2 MG/ML
INJECTION INTRAVENOUS
Status: COMPLETED
Start: 2023-12-21 | End: 2023-12-21

## 2023-12-21 RX ORDER — HYDRALAZINE HYDROCHLORIDE 20 MG/ML
20 INJECTION INTRAMUSCULAR; INTRAVENOUS EVERY 4 HOURS PRN
Status: DISCONTINUED | OUTPATIENT
Start: 2023-12-21 | End: 2023-12-31

## 2023-12-21 RX ORDER — VECURONIUM BROMIDE 1 MG/ML
10 INJECTION, POWDER, LYOPHILIZED, FOR SOLUTION INTRAVENOUS ONCE
Status: COMPLETED | OUTPATIENT
Start: 2023-12-21 | End: 2023-12-21

## 2023-12-21 RX ORDER — LEVETIRACETAM 500 MG/5ML
750 INJECTION, SOLUTION, CONCENTRATE INTRAVENOUS ONCE
Status: COMPLETED | OUTPATIENT
Start: 2023-12-21 | End: 2023-12-21

## 2023-12-21 RX ORDER — DEXTROSE MONOHYDRATE, SODIUM CHLORIDE, AND POTASSIUM CHLORIDE 50; 1.49; 4.5 G/1000ML; G/1000ML; G/1000ML
INJECTION, SOLUTION INTRAVENOUS CONTINUOUS
Status: DISCONTINUED | OUTPATIENT
Start: 2023-12-21 | End: 2023-12-22

## 2023-12-21 RX ORDER — MAGNESIUM SULFATE IN WATER 40 MG/ML
2000 INJECTION, SOLUTION INTRAVENOUS ONCE
Status: COMPLETED | OUTPATIENT
Start: 2023-12-21 | End: 2023-12-21

## 2023-12-21 RX ORDER — SUCCINYLCHOLINE CHLORIDE 20 MG/ML
INJECTION INTRAMUSCULAR; INTRAVENOUS
Status: DISPENSED
Start: 2023-12-21 | End: 2023-12-21

## 2023-12-21 RX ORDER — SUCCINYLCHOLINE CHLORIDE 20 MG/ML
INJECTION INTRAMUSCULAR; INTRAVENOUS
Status: DISCONTINUED
Start: 2023-12-21 | End: 2023-12-21 | Stop reason: WASHOUT

## 2023-12-21 RX ADMIN — DEXMEDETOMIDINE HYDROCHLORIDE 1 MCG/KG/HR: 400 INJECTION, SOLUTION INTRAVENOUS at 19:11

## 2023-12-21 RX ADMIN — INSULIN LISPRO 6 UNITS: 100 INJECTION, SOLUTION INTRAVENOUS; SUBCUTANEOUS at 19:43

## 2023-12-21 RX ADMIN — PIPERACILLIN SODIUM AND TAZOBACTAM SODIUM 3375 MG: 3; .375 INJECTION, POWDER, LYOPHILIZED, FOR SOLUTION INTRAVENOUS at 15:08

## 2023-12-21 RX ADMIN — DEXMEDETOMIDINE HYDROCHLORIDE 1 MCG/KG/HR: 400 INJECTION, SOLUTION INTRAVENOUS at 14:48

## 2023-12-21 RX ADMIN — PROPOFOL 40 MCG/KG/MIN: 10 INJECTION, EMULSION INTRAVENOUS at 19:14

## 2023-12-21 RX ADMIN — MAGNESIUM SULFATE HEPTAHYDRATE: 40 INJECTION, SOLUTION INTRAVENOUS at 12:17

## 2023-12-21 RX ADMIN — LEVETIRACETAM 750 MG: 100 INJECTION, SOLUTION INTRAVENOUS at 19:46

## 2023-12-21 RX ADMIN — SODIUM CHLORIDE 10 MG/HR: 9 INJECTION, SOLUTION INTRAVENOUS at 21:59

## 2023-12-21 RX ADMIN — HYDRALAZINE HYDROCHLORIDE 10 MG: 20 INJECTION INTRAMUSCULAR; INTRAVENOUS at 03:34

## 2023-12-21 RX ADMIN — HYDRALAZINE HYDROCHLORIDE 20 MG: 20 INJECTION INTRAMUSCULAR; INTRAVENOUS at 10:20

## 2023-12-21 RX ADMIN — IPRATROPIUM BROMIDE AND ALBUTEROL SULFATE 1 DOSE: 2.5; .5 SOLUTION RESPIRATORY (INHALATION) at 14:07

## 2023-12-21 RX ADMIN — IPRATROPIUM BROMIDE AND ALBUTEROL SULFATE 1 DOSE: 2.5; .5 SOLUTION RESPIRATORY (INHALATION) at 19:45

## 2023-12-21 RX ADMIN — LEVETIRACETAM 750 MG: 100 INJECTION, SOLUTION INTRAVENOUS at 10:46

## 2023-12-21 RX ADMIN — PIPERACILLIN SODIUM AND TAZOBACTAM SODIUM 3375 MG: 3; .375 INJECTION, POWDER, LYOPHILIZED, FOR SOLUTION INTRAVENOUS at 22:26

## 2023-12-21 RX ADMIN — PROPOFOL 40 MCG/KG/MIN: 10 INJECTION, EMULSION INTRAVENOUS at 05:43

## 2023-12-21 RX ADMIN — VECURONIUM BROMIDE 10 MG: 1 INJECTION, POWDER, LYOPHILIZED, FOR SOLUTION INTRAVENOUS at 10:45

## 2023-12-21 RX ADMIN — LABETALOL HYDROCHLORIDE 200 MG: 100 TABLET, FILM COATED ORAL at 20:06

## 2023-12-21 RX ADMIN — METHYLPREDNISOLONE SODIUM SUCCINATE 125 MG: 125 INJECTION INTRAMUSCULAR; INTRAVENOUS at 10:46

## 2023-12-21 RX ADMIN — SODIUM CHLORIDE 10 MG/HR: 9 INJECTION, SOLUTION INTRAVENOUS at 19:15

## 2023-12-21 RX ADMIN — POTASSIUM CHLORIDE, DEXTROSE MONOHYDRATE AND SODIUM CHLORIDE: 150; 5; 450 INJECTION, SOLUTION INTRAVENOUS at 14:56

## 2023-12-21 RX ADMIN — SODIUM CHLORIDE, PRESERVATIVE FREE 10 ML: 5 INJECTION INTRAVENOUS at 12:16

## 2023-12-21 RX ADMIN — INSULIN GLARGINE 25 UNITS: 100 INJECTION, SOLUTION SUBCUTANEOUS at 15:15

## 2023-12-21 RX ADMIN — SODIUM CHLORIDE 7.5 MG/HR: 9 INJECTION, SOLUTION INTRAVENOUS at 16:41

## 2023-12-21 RX ADMIN — PIPERACILLIN SODIUM AND TAZOBACTAM SODIUM 3375 MG: 3; .375 INJECTION, POWDER, LYOPHILIZED, FOR SOLUTION INTRAVENOUS at 06:27

## 2023-12-21 RX ADMIN — DOXYCYCLINE 100 MG: 100 INJECTION, POWDER, LYOPHILIZED, FOR SOLUTION INTRAVENOUS at 19:45

## 2023-12-21 RX ADMIN — METHYLPREDNISOLONE: 125 INJECTION, POWDER, LYOPHILIZED, FOR SOLUTION INTRAMUSCULAR; INTRAVENOUS at 12:18

## 2023-12-21 RX ADMIN — INSULIN LISPRO 2 UNITS: 100 INJECTION, SOLUTION INTRAVENOUS; SUBCUTANEOUS at 15:15

## 2023-12-21 RX ADMIN — SODIUM CHLORIDE, PRESERVATIVE FREE 10 ML: 5 INJECTION INTRAVENOUS at 19:46

## 2023-12-21 RX ADMIN — MAGNESIUM SULFATE HEPTAHYDRATE 2000 MG: 40 INJECTION, SOLUTION INTRAVENOUS at 10:43

## 2023-12-21 RX ADMIN — LABETALOL HYDROCHLORIDE 200 MG: 100 TABLET, FILM COATED ORAL at 06:04

## 2023-12-21 RX ADMIN — ATORVASTATIN CALCIUM 40 MG: 40 TABLET, FILM COATED ORAL at 19:43

## 2023-12-21 RX ADMIN — ENOXAPARIN SODIUM 30 MG: 100 INJECTION SUBCUTANEOUS at 15:18

## 2023-12-21 RX ADMIN — SODIUM CHLORIDE 2.5 MG/HR: 9 INJECTION, SOLUTION INTRAVENOUS at 12:35

## 2023-12-21 RX ADMIN — IPRATROPIUM BROMIDE AND ALBUTEROL SULFATE 1 DOSE: 2.5; .5 SOLUTION RESPIRATORY (INHALATION) at 08:24

## 2023-12-21 RX ADMIN — DEXMEDETOMIDINE HYDROCHLORIDE 0.6 MCG/KG/HR: 400 INJECTION, SOLUTION INTRAVENOUS at 05:18

## 2023-12-21 RX ADMIN — INSULIN LISPRO 4 UNITS: 100 INJECTION, SOLUTION INTRAVENOUS; SUBCUTANEOUS at 06:04

## 2023-12-21 RX ADMIN — ETOMIDATE: 2 INJECTION INTRAVENOUS at 09:30

## 2023-12-21 RX ADMIN — POTASSIUM BICARBONATE 20 MEQ: 782 TABLET, EFFERVESCENT ORAL at 19:43

## 2023-12-21 RX ADMIN — Medication 25 MCG/HR: at 10:15

## 2023-12-21 RX ADMIN — Medication 50 MCG/HR: at 22:35

## 2023-12-21 ASSESSMENT — PULMONARY FUNCTION TESTS
PIF_VALUE: 25
PIF_VALUE: 22
PIF_VALUE: 23
PIF_VALUE: 22
PIF_VALUE: 22
PIF_VALUE: 24
PIF_VALUE: 21
PIF_VALUE: 15
PIF_VALUE: 24
PIF_VALUE: 22
PIF_VALUE: 21
PIF_VALUE: 22
PIF_VALUE: 22
PIF_VALUE: 21
PIF_VALUE: 23
PIF_VALUE: 25
PIF_VALUE: 25
PIF_VALUE: 24
PIF_VALUE: 23
PIF_VALUE: 21
PIF_VALUE: 22
PIF_VALUE: 21
PIF_VALUE: 24
PIF_VALUE: 21

## 2023-12-21 ASSESSMENT — PAIN SCALES - GENERAL
PAINLEVEL_OUTOF10: 0

## 2023-12-21 NOTE — PROCEDURES
To bedside in 269 for CVC placement.  Informed by primary RN Clara BUCKLEY, that Patients  had been reached by telephone and was not currently willing to sign consent for CVC.

## 2023-12-21 NOTE — FLOWSHEET NOTE
Pt extubated and immediately re-intubated d/t bronchospasm. See anesthesia note. ETT secured 22 @ lips.

## 2023-12-22 ENCOUNTER — APPOINTMENT (OUTPATIENT)
Facility: HOSPITAL | Age: 53
DRG: 981 | End: 2023-12-22

## 2023-12-22 LAB
ANION GAP SERPL CALC-SCNC: 7 MMOL/L (ref 5–15)
ARTERIAL PATENCY WRIST A: YES
BASE DEFICIT BLDA-SCNC: 7.1 MMOL/L
BASOPHILS # BLD: 0 K/UL (ref 0–0.1)
BASOPHILS NFR BLD: 0 % (ref 0–1)
BDY SITE: ABNORMAL
BODY TEMPERATURE: 97.6
BUN SERPL-MCNC: 64 MG/DL (ref 6–20)
BUN/CREAT SERPL: 40 (ref 12–20)
CA-I BLD-MCNC: 8.7 MG/DL (ref 8.5–10.1)
CHLORIDE SERPL-SCNC: 109 MMOL/L (ref 97–108)
CO2 SERPL-SCNC: 21 MMOL/L (ref 21–32)
COHGB MFR BLD: 0.2 % (ref 1–2)
COLLECT DATE STL: NORMAL
CREAT SERPL-MCNC: 1.61 MG/DL (ref 0.55–1.02)
DIFFERENTIAL METHOD BLD: ABNORMAL
ECHO BSA: 1.71 M2
ECHO LA AREA 4C: 12.1 CM2
ECHO LA DIAMETER INDEX: 2.03 CM/M2
ECHO LA DIAMETER: 3.5 CM
ECHO LA MAJOR AXIS: 4.2 CM
ECHO LA VOL MOD A4C: 29 ML (ref 22–52)
ECHO LA VOLUME INDEX MOD A4C: 17 ML/M2 (ref 16–34)
ECHO LV E' LATERAL VELOCITY: 9 CM/S
ECHO LV E' SEPTAL VELOCITY: 9 CM/S
ECHO LV FRACTIONAL SHORTENING: 35 % (ref 28–44)
ECHO LV INTERNAL DIMENSION DIASTOLE INDEX: 2.15 CM/M2
ECHO LV INTERNAL DIMENSION DIASTOLIC: 3.7 CM (ref 3.9–5.3)
ECHO LV INTERNAL DIMENSION SYSTOLIC INDEX: 1.4 CM/M2
ECHO LV INTERNAL DIMENSION SYSTOLIC: 2.4 CM
ECHO LV IVSD: 1.1 CM (ref 0.6–0.9)
ECHO LV MASS 2D: 138.2 G (ref 67–162)
ECHO LV MASS INDEX 2D: 80.3 G/M2 (ref 43–95)
ECHO LV POSTERIOR WALL DIASTOLIC: 1.2 CM (ref 0.6–0.9)
ECHO LV RELATIVE WALL THICKNESS RATIO: 0.65
ECHO MV A VELOCITY: 0.86 M/S
ECHO MV E DECELERATION TIME (DT): 164 MS
ECHO MV E VELOCITY: 0.64 M/S
ECHO MV E/A RATIO: 0.74
ECHO MV E/E' LATERAL: 7.11
ECHO MV E/E' RATIO (AVERAGED): 7.11
ECHO MV MAX VELOCITY: 1.2 M/S
ECHO MV MEAN GRADIENT: 3 MMHG
ECHO MV MEAN VELOCITY: 0.8 M/S
ECHO MV PEAK GRADIENT: 6 MMHG
ECHO MV REGURGITANT PEAK GRADIENT: 12 MMHG
ECHO MV REGURGITANT PEAK VELOCITY: 1.7 M/S
ECHO MV VTI: 30.4 CM
ECHO RV FREE WALL PEAK S': 15 CM/S
ECHO RV TAPSE: 2.2 CM (ref 1.7–?)
ECHO TV REGURGITANT MAX VELOCITY: 2.38 M/S
ECHO TV REGURGITANT PEAK GRADIENT: 23 MMHG
EOSINOPHIL # BLD: 0 K/UL (ref 0–0.4)
EOSINOPHIL NFR BLD: 0 % (ref 0–7)
ERYTHROCYTE [DISTWIDTH] IN BLOOD BY AUTOMATED COUNT: 14.5 % (ref 11.5–14.5)
FERRITIN SERPL-MCNC: 233 NG/ML (ref 26–388)
FIO2 ON VENT: 60 %
GAS FLOW.O2 SETTING OXYMISER: 12
GASTROCULT GAST QL: NEGATIVE
GLUCOSE BLD STRIP.AUTO-MCNC: 291 MG/DL (ref 65–100)
GLUCOSE BLD STRIP.AUTO-MCNC: 394 MG/DL (ref 65–100)
GLUCOSE BLD STRIP.AUTO-MCNC: 420 MG/DL (ref 65–100)
GLUCOSE BLD STRIP.AUTO-MCNC: 429 MG/DL (ref 65–100)
GLUCOSE BLD STRIP.AUTO-MCNC: 464 MG/DL (ref 65–100)
GLUCOSE SERPL-MCNC: 453 MG/DL (ref 65–100)
HCO3 BLDA-SCNC: 18 MMOL/L (ref 22–26)
HCT VFR BLD AUTO: 20.6 % (ref 35–47)
HCT VFR BLD AUTO: 23.8 % (ref 35–47)
HEMOCCULT SP1 STL QL: NEGATIVE
HGB BLD-MCNC: 6.5 G/DL (ref 11.5–16)
HGB BLD-MCNC: 7.5 G/DL (ref 11.5–16)
IMM GRANULOCYTES # BLD AUTO: 0.4 K/UL (ref 0–0.04)
IMM GRANULOCYTES NFR BLD AUTO: 2 % (ref 0–0.5)
IRON SATN MFR SERPL: 23 % (ref 20–50)
IRON SERPL-MCNC: 60 UG/DL (ref 35–150)
LYMPHOCYTES # BLD: 1.6 K/UL (ref 0.8–3.5)
LYMPHOCYTES NFR BLD: 9 % (ref 12–49)
MCH RBC QN AUTO: 29.7 PG (ref 26–34)
MCHC RBC AUTO-ENTMCNC: 31.6 G/DL (ref 30–36.5)
MCV RBC AUTO: 94.1 FL (ref 80–99)
METHGB MFR BLD: 0.3 % (ref 0–1.4)
MONOCYTES # BLD: 1.3 K/UL (ref 0–1)
MONOCYTES NFR BLD: 7 % (ref 5–13)
NEUTS SEG # BLD: 14.7 K/UL (ref 1.8–8)
NEUTS SEG NFR BLD: 82 % (ref 32–75)
NRBC # BLD: 0 K/UL (ref 0–0.01)
NRBC BLD-RTO: 0 PER 100 WBC
OXYHGB MFR BLD: 89 % (ref 95–99)
PCO2 BLDA: 33 MMHG (ref 35–45)
PEEP RESPIRATORY: 5
PERFORMED BY:: ABNORMAL
PH BLDA: 7.36 (ref 7.35–7.45)
PH GAST: 5 (ref 1.5–3.5)
PLATELET # BLD AUTO: 469 K/UL (ref 150–400)
PMV BLD AUTO: 10.2 FL (ref 8.9–12.9)
PO2 BLDA: 63 MMHG (ref 80–100)
POTASSIUM SERPL-SCNC: 5 MMOL/L (ref 3.5–5.1)
RBC # BLD AUTO: 2.19 M/UL (ref 3.8–5.2)
RBC MORPH BLD: ABNORMAL
SAO2 % BLD: 89 % (ref 95–99)
SAO2% DEVICE SAO2% SENSOR NAME: ABNORMAL
SODIUM SERPL-SCNC: 137 MMOL/L (ref 136–145)
SPECIMEN SITE: ABNORMAL
TIBC SERPL-MCNC: 264 UG/DL (ref 250–450)
VENTILATION MODE VENT: ABNORMAL
VT SETTING VENT: 400
WBC # BLD AUTO: 18 K/UL (ref 3.6–11)

## 2023-12-22 PROCEDURE — 82962 GLUCOSE BLOOD TEST: CPT

## 2023-12-22 PROCEDURE — 31500 INSERT EMERGENCY AIRWAY: CPT

## 2023-12-22 PROCEDURE — 85014 HEMATOCRIT: CPT

## 2023-12-22 PROCEDURE — 83540 ASSAY OF IRON: CPT

## 2023-12-22 PROCEDURE — 2500000003 HC RX 250 WO HCPCS: Performed by: STUDENT IN AN ORGANIZED HEALTH CARE EDUCATION/TRAINING PROGRAM

## 2023-12-22 PROCEDURE — 6360000002 HC RX W HCPCS: Performed by: INTERNAL MEDICINE

## 2023-12-22 PROCEDURE — 74176 CT ABD & PELVIS W/O CONTRAST: CPT

## 2023-12-22 PROCEDURE — 85018 HEMOGLOBIN: CPT

## 2023-12-22 PROCEDURE — 2580000003 HC RX 258: Performed by: STUDENT IN AN ORGANIZED HEALTH CARE EDUCATION/TRAINING PROGRAM

## 2023-12-22 PROCEDURE — 2580000003 HC RX 258: Performed by: INTERNAL MEDICINE

## 2023-12-22 PROCEDURE — 6370000000 HC RX 637 (ALT 250 FOR IP): Performed by: INTERNAL MEDICINE

## 2023-12-22 PROCEDURE — 82803 BLOOD GASES ANY COMBINATION: CPT

## 2023-12-22 PROCEDURE — 82271 OCCULT BLOOD OTHER SOURCES: CPT

## 2023-12-22 PROCEDURE — 6360000002 HC RX W HCPCS: Performed by: PSYCHIATRY & NEUROLOGY

## 2023-12-22 PROCEDURE — 36430 TRANSFUSION BLD/BLD COMPNT: CPT

## 2023-12-22 PROCEDURE — 36415 COLL VENOUS BLD VENIPUNCTURE: CPT

## 2023-12-22 PROCEDURE — 85025 COMPLETE CBC W/AUTO DIFF WBC: CPT

## 2023-12-22 PROCEDURE — 94640 AIRWAY INHALATION TREATMENT: CPT

## 2023-12-22 PROCEDURE — 6360000004 HC RX CONTRAST MEDICATION

## 2023-12-22 PROCEDURE — 6360000002 HC RX W HCPCS: Performed by: STUDENT IN AN ORGANIZED HEALTH CARE EDUCATION/TRAINING PROGRAM

## 2023-12-22 PROCEDURE — 36600 WITHDRAWAL OF ARTERIAL BLOOD: CPT

## 2023-12-22 PROCEDURE — 30233N1 TRANSFUSION OF NONAUTOLOGOUS RED BLOOD CELLS INTO PERIPHERAL VEIN, PERCUTANEOUS APPROACH: ICD-10-PCS | Performed by: INTERNAL MEDICINE

## 2023-12-22 PROCEDURE — 94003 VENT MGMT INPAT SUBQ DAY: CPT

## 2023-12-22 PROCEDURE — 2500000003 HC RX 250 WO HCPCS: Performed by: INTERNAL MEDICINE

## 2023-12-22 PROCEDURE — 6370000000 HC RX 637 (ALT 250 FOR IP): Performed by: NURSE PRACTITIONER

## 2023-12-22 PROCEDURE — 2700000000 HC OXYGEN THERAPY PER DAY

## 2023-12-22 PROCEDURE — P9016 RBC LEUKOCYTES REDUCED: HCPCS

## 2023-12-22 PROCEDURE — 82728 ASSAY OF FERRITIN: CPT

## 2023-12-22 PROCEDURE — C8924 2D TTE W OR W/O FOL W/CON,FU: HCPCS

## 2023-12-22 PROCEDURE — 6360000002 HC RX W HCPCS

## 2023-12-22 PROCEDURE — 80048 BASIC METABOLIC PNL TOTAL CA: CPT

## 2023-12-22 PROCEDURE — 2000000000 HC ICU R&B

## 2023-12-22 PROCEDURE — 82272 OCCULT BLD FECES 1-3 TESTS: CPT

## 2023-12-22 RX ORDER — DEXTROSE AND SODIUM CHLORIDE 5; .45 G/100ML; G/100ML
INJECTION, SOLUTION INTRAVENOUS CONTINUOUS
Status: DISCONTINUED | OUTPATIENT
Start: 2023-12-22 | End: 2023-12-22

## 2023-12-22 RX ORDER — SODIUM CHLORIDE 9 MG/ML
INJECTION, SOLUTION INTRAVENOUS PRN
Status: DISCONTINUED | OUTPATIENT
Start: 2023-12-22 | End: 2023-12-22

## 2023-12-22 RX ORDER — SODIUM CHLORIDE 9 MG/ML
INJECTION, SOLUTION INTRAVENOUS CONTINUOUS
Status: DISCONTINUED | OUTPATIENT
Start: 2023-12-22 | End: 2023-12-23

## 2023-12-22 RX ORDER — METHYLPREDNISOLONE SODIUM SUCCINATE 40 MG/ML
40 INJECTION, POWDER, LYOPHILIZED, FOR SOLUTION INTRAMUSCULAR; INTRAVENOUS EVERY 8 HOURS
Status: DISCONTINUED | OUTPATIENT
Start: 2023-12-22 | End: 2023-12-25

## 2023-12-22 RX ORDER — DIPHENHYDRAMINE HYDROCHLORIDE 50 MG/ML
25 INJECTION INTRAMUSCULAR; INTRAVENOUS EVERY 8 HOURS
Status: COMPLETED | OUTPATIENT
Start: 2023-12-22 | End: 2023-12-23

## 2023-12-22 RX ORDER — SODIUM CHLORIDE 9 MG/ML
INJECTION, SOLUTION INTRAVENOUS PRN
Status: DISCONTINUED | OUTPATIENT
Start: 2023-12-22 | End: 2024-01-15

## 2023-12-22 RX ORDER — LABETALOL 100 MG/1
300 TABLET, FILM COATED ORAL EVERY 8 HOURS SCHEDULED
Status: DISCONTINUED | OUTPATIENT
Start: 2023-12-22 | End: 2024-01-08

## 2023-12-22 RX ORDER — INSULIN GLARGINE 100 [IU]/ML
35 INJECTION, SOLUTION SUBCUTANEOUS DAILY
Status: DISCONTINUED | OUTPATIENT
Start: 2023-12-23 | End: 2023-12-28

## 2023-12-22 RX ORDER — FUROSEMIDE 10 MG/ML
40 INJECTION INTRAMUSCULAR; INTRAVENOUS ONCE
Status: COMPLETED | OUTPATIENT
Start: 2023-12-22 | End: 2023-12-22

## 2023-12-22 RX ORDER — AMLODIPINE BESYLATE 5 MG/1
10 TABLET ORAL DAILY
Status: DISCONTINUED | OUTPATIENT
Start: 2023-12-22 | End: 2024-01-24 | Stop reason: HOSPADM

## 2023-12-22 RX ORDER — MIDAZOLAM HYDROCHLORIDE 1 MG/ML
INJECTION INTRAMUSCULAR; INTRAVENOUS
Status: COMPLETED
Start: 2023-12-22 | End: 2023-12-22

## 2023-12-22 RX ORDER — INSULIN LISPRO 100 [IU]/ML
10 INJECTION, SOLUTION INTRAVENOUS; SUBCUTANEOUS ONCE
Status: COMPLETED | OUTPATIENT
Start: 2023-12-22 | End: 2023-12-22

## 2023-12-22 RX ADMIN — SODIUM CHLORIDE 12.5 MG/HR: 9 INJECTION, SOLUTION INTRAVENOUS at 23:42

## 2023-12-22 RX ADMIN — SODIUM CHLORIDE 12.5 MG/HR: 9 INJECTION, SOLUTION INTRAVENOUS at 11:06

## 2023-12-22 RX ADMIN — LABETALOL HYDROCHLORIDE 200 MG: 100 TABLET, FILM COATED ORAL at 04:53

## 2023-12-22 RX ADMIN — PROPOFOL 40 MCG/KG/MIN: 10 INJECTION, EMULSION INTRAVENOUS at 05:26

## 2023-12-22 RX ADMIN — SODIUM CHLORIDE 10 MG/HR: 9 INJECTION, SOLUTION INTRAVENOUS at 08:26

## 2023-12-22 RX ADMIN — SODIUM CHLORIDE 10 MG/HR: 9 INJECTION, SOLUTION INTRAVENOUS at 05:26

## 2023-12-22 RX ADMIN — SODIUM CHLORIDE 12.5 MG/HR: 9 INJECTION, SOLUTION INTRAVENOUS at 16:30

## 2023-12-22 RX ADMIN — FUROSEMIDE 40 MG: 10 INJECTION, SOLUTION INTRAMUSCULAR; INTRAVENOUS at 17:40

## 2023-12-22 RX ADMIN — PROPOFOL 40 MCG/KG/MIN: 10 INJECTION, EMULSION INTRAVENOUS at 17:36

## 2023-12-22 RX ADMIN — PERFLUTREN 2 ML: 6.52 INJECTION, SUSPENSION INTRAVENOUS at 10:55

## 2023-12-22 RX ADMIN — Medication 50 MCG/HR: at 15:08

## 2023-12-22 RX ADMIN — SODIUM CHLORIDE, PRESERVATIVE FREE 10 ML: 5 INJECTION INTRAVENOUS at 20:45

## 2023-12-22 RX ADMIN — IPRATROPIUM BROMIDE AND ALBUTEROL SULFATE 1 DOSE: 2.5; .5 SOLUTION RESPIRATORY (INHALATION) at 06:20

## 2023-12-22 RX ADMIN — SODIUM CHLORIDE: 9 INJECTION, SOLUTION INTRAVENOUS at 06:30

## 2023-12-22 RX ADMIN — WHITE PETROLATUM,ZINC OXIDE: 57; 17 PASTE TOPICAL at 16:29

## 2023-12-22 RX ADMIN — DEXMEDETOMIDINE HYDROCHLORIDE 1 MCG/KG/HR: 400 INJECTION, SOLUTION INTRAVENOUS at 05:26

## 2023-12-22 RX ADMIN — SODIUM CHLORIDE 7.5 MG/HR: 9 INJECTION, SOLUTION INTRAVENOUS at 02:43

## 2023-12-22 RX ADMIN — INSULIN LISPRO 10 UNITS: 100 INJECTION, SOLUTION INTRAVENOUS; SUBCUTANEOUS at 00:15

## 2023-12-22 RX ADMIN — DEXMEDETOMIDINE HYDROCHLORIDE 1 MCG/KG/HR: 400 INJECTION, SOLUTION INTRAVENOUS at 11:03

## 2023-12-22 RX ADMIN — SODIUM CHLORIDE: 9 INJECTION, SOLUTION INTRAVENOUS at 20:14

## 2023-12-22 RX ADMIN — SODIUM CHLORIDE 10 MG/HR: 9 INJECTION, SOLUTION INTRAVENOUS at 00:23

## 2023-12-22 RX ADMIN — PROPOFOL 40 MCG/KG/MIN: 10 INJECTION, EMULSION INTRAVENOUS at 11:03

## 2023-12-22 RX ADMIN — INSULIN LISPRO 4 UNITS: 100 INJECTION, SOLUTION INTRAVENOUS; SUBCUTANEOUS at 18:48

## 2023-12-22 RX ADMIN — SODIUM CHLORIDE 12.5 MG/HR: 9 INJECTION, SOLUTION INTRAVENOUS at 13:37

## 2023-12-22 RX ADMIN — ATORVASTATIN CALCIUM 40 MG: 40 TABLET, FILM COATED ORAL at 20:22

## 2023-12-22 RX ADMIN — DEXTROSE AND SODIUM CHLORIDE: 5; 450 INJECTION, SOLUTION INTRAVENOUS at 05:25

## 2023-12-22 RX ADMIN — WHITE PETROLATUM,ZINC OXIDE: 57; 17 PASTE TOPICAL at 21:23

## 2023-12-22 RX ADMIN — SODIUM CHLORIDE, PRESERVATIVE FREE 10 ML: 5 INJECTION INTRAVENOUS at 08:29

## 2023-12-22 RX ADMIN — INSULIN LISPRO 10 UNITS: 100 INJECTION, SOLUTION INTRAVENOUS; SUBCUTANEOUS at 04:52

## 2023-12-22 RX ADMIN — ENOXAPARIN SODIUM 30 MG: 100 INJECTION SUBCUTANEOUS at 08:49

## 2023-12-22 RX ADMIN — METHYLPREDNISOLONE SODIUM SUCCINATE 40 MG: 40 INJECTION, POWDER, LYOPHILIZED, FOR SOLUTION INTRAMUSCULAR; INTRAVENOUS at 17:18

## 2023-12-22 RX ADMIN — INSULIN GLARGINE 25 UNITS: 100 INJECTION, SOLUTION SUBCUTANEOUS at 08:49

## 2023-12-22 RX ADMIN — SODIUM CHLORIDE 12.5 MG/HR: 9 INJECTION, SOLUTION INTRAVENOUS at 18:40

## 2023-12-22 RX ADMIN — INSULIN LISPRO 8 UNITS: 100 INJECTION, SOLUTION INTRAVENOUS; SUBCUTANEOUS at 13:13

## 2023-12-22 RX ADMIN — LEVETIRACETAM 750 MG: 100 INJECTION, SOLUTION INTRAVENOUS at 08:27

## 2023-12-22 RX ADMIN — MEROPENEM 1000 MG: 1 INJECTION, POWDER, FOR SOLUTION INTRAVENOUS at 20:30

## 2023-12-22 RX ADMIN — LEVETIRACETAM 750 MG: 100 INJECTION, SOLUTION INTRAVENOUS at 20:30

## 2023-12-22 RX ADMIN — IPRATROPIUM BROMIDE AND ALBUTEROL SULFATE 1 DOSE: 2.5; .5 SOLUTION RESPIRATORY (INHALATION) at 19:47

## 2023-12-22 RX ADMIN — POTASSIUM CHLORIDE, DEXTROSE MONOHYDRATE AND SODIUM CHLORIDE: 150; 5; 450 INJECTION, SOLUTION INTRAVENOUS at 00:49

## 2023-12-22 RX ADMIN — SODIUM CHLORIDE 12.5 MG/HR: 9 INJECTION, SOLUTION INTRAVENOUS at 21:20

## 2023-12-22 RX ADMIN — DIPHENHYDRAMINE HYDROCHLORIDE 25 MG: 50 INJECTION INTRAMUSCULAR; INTRAVENOUS at 17:17

## 2023-12-22 RX ADMIN — Medication 1 MG/HR: at 17:43

## 2023-12-22 RX ADMIN — DOXYCYCLINE 100 MG: 100 INJECTION, POWDER, LYOPHILIZED, FOR SOLUTION INTRAVENOUS at 08:25

## 2023-12-22 RX ADMIN — LABETALOL HYDROCHLORIDE 200 MG: 100 TABLET, FILM COATED ORAL at 13:16

## 2023-12-22 RX ADMIN — PIPERACILLIN SODIUM AND TAZOBACTAM SODIUM 3375 MG: 3; .375 INJECTION, POWDER, LYOPHILIZED, FOR SOLUTION INTRAVENOUS at 13:13

## 2023-12-22 RX ADMIN — DEXMEDETOMIDINE HYDROCHLORIDE 1 MCG/KG/HR: 400 INJECTION, SOLUTION INTRAVENOUS at 00:23

## 2023-12-22 RX ADMIN — DEXMEDETOMIDINE HYDROCHLORIDE 1 MCG/KG/HR: 400 INJECTION, SOLUTION INTRAVENOUS at 17:37

## 2023-12-22 RX ADMIN — PIPERACILLIN SODIUM AND TAZOBACTAM SODIUM 3375 MG: 3; .375 INJECTION, POWDER, LYOPHILIZED, FOR SOLUTION INTRAVENOUS at 06:30

## 2023-12-22 RX ADMIN — IPRATROPIUM BROMIDE AND ALBUTEROL SULFATE 1 DOSE: 2.5; .5 SOLUTION RESPIRATORY (INHALATION) at 13:54

## 2023-12-22 RX ADMIN — MIDAZOLAM HYDROCHLORIDE 4 MG: 1 INJECTION, SOLUTION INTRAMUSCULAR; INTRAVENOUS at 17:13

## 2023-12-22 RX ADMIN — LABETALOL HYDROCHLORIDE 300 MG: 200 TABLET, FILM COATED ORAL at 20:22

## 2023-12-22 RX ADMIN — PROPOFOL 40 MCG/KG/MIN: 10 INJECTION, EMULSION INTRAVENOUS at 00:23

## 2023-12-22 ASSESSMENT — PULMONARY FUNCTION TESTS
PIF_VALUE: 27
PIF_VALUE: 24
PIF_VALUE: 22
PIF_VALUE: 26
PIF_VALUE: 17
PIF_VALUE: 23
PIF_VALUE: 19
PIF_VALUE: 23
PIF_VALUE: 24
PIF_VALUE: 28
PIF_VALUE: 23
PIF_VALUE: 23
PIF_VALUE: 24
PIF_VALUE: 22
PIF_VALUE: 24
PIF_VALUE: 12
PIF_VALUE: 27
PIF_VALUE: 23
PIF_VALUE: 23
PIF_VALUE: 27
PIF_VALUE: 35
PIF_VALUE: 12
PIF_VALUE: 23
PIF_VALUE: 26
PIF_VALUE: 25
PIF_VALUE: 22
PIF_VALUE: 27
PIF_VALUE: 17
PIF_VALUE: 12
PIF_VALUE: 23
PIF_VALUE: 12
PIF_VALUE: 26
PIF_VALUE: 25
PIF_VALUE: 24
PIF_VALUE: 23
PIF_VALUE: 30

## 2023-12-22 ASSESSMENT — PAIN SCALES - GENERAL
PAINLEVEL_OUTOF10: 0
PAINLEVEL_OUTOF10: 0

## 2023-12-23 ENCOUNTER — APPOINTMENT (OUTPATIENT)
Facility: HOSPITAL | Age: 53
DRG: 981 | End: 2023-12-23

## 2023-12-23 LAB
ABO + RH BLD: NORMAL
ALBUMIN SERPL-MCNC: 2.2 G/DL (ref 3.5–5)
ANION GAP SERPL CALC-SCNC: 9 MMOL/L (ref 5–15)
ARTERIAL PATENCY WRIST A: YES
BASE DEFICIT BLDA-SCNC: 7.5 MMOL/L
BDY SITE: ABNORMAL
BLD PROD TYP BPU: NORMAL
BLOOD BANK DISPENSE STATUS: NORMAL
BLOOD GROUP ANTIBODIES SERPL: NEGATIVE
BNP SERPL-MCNC: 1359 PG/ML
BODY TEMPERATURE: 96.1
BPU ID: NORMAL
BUN SERPL-MCNC: 69 MG/DL (ref 6–20)
BUN/CREAT SERPL: 49 (ref 12–20)
CA-I BLD-MCNC: 8.8 MG/DL (ref 8.5–10.1)
CHLORIDE SERPL-SCNC: 114 MMOL/L (ref 97–108)
CO2 SERPL-SCNC: 20 MMOL/L (ref 21–32)
COHGB MFR BLD: 0.3 % (ref 1–2)
CREAT SERPL-MCNC: 1.4 MG/DL (ref 0.55–1.02)
CROSSMATCH RESULT: NORMAL
ERYTHROCYTE [DISTWIDTH] IN BLOOD BY AUTOMATED COUNT: 14.3 % (ref 11.5–14.5)
ERYTHROCYTE [DISTWIDTH] IN BLOOD BY AUTOMATED COUNT: 14.6 % (ref 11.5–14.5)
FIO2 ON VENT: 100 %
GAS FLOW.O2 SETTING OXYMISER: 12
GLUCOSE BLD STRIP.AUTO-MCNC: 361 MG/DL (ref 65–100)
GLUCOSE BLD STRIP.AUTO-MCNC: 367 MG/DL (ref 65–100)
GLUCOSE BLD STRIP.AUTO-MCNC: 371 MG/DL (ref 65–100)
GLUCOSE BLD STRIP.AUTO-MCNC: 391 MG/DL (ref 65–100)
GLUCOSE BLD STRIP.AUTO-MCNC: 398 MG/DL (ref 65–100)
GLUCOSE SERPL-MCNC: 389 MG/DL (ref 65–100)
HCO3 BLDA-SCNC: 18 MMOL/L (ref 22–26)
HCT VFR BLD AUTO: 23.3 % (ref 35–47)
HCT VFR BLD AUTO: 24 % (ref 35–47)
HGB BLD-MCNC: 7.5 G/DL (ref 11.5–16)
HGB BLD-MCNC: 7.6 G/DL (ref 11.5–16)
MAGNESIUM SERPL-MCNC: 2.3 MG/DL (ref 1.6–2.4)
MCH RBC QN AUTO: 30 PG (ref 26–34)
MCH RBC QN AUTO: 30.7 PG (ref 26–34)
MCHC RBC AUTO-ENTMCNC: 31.7 G/DL (ref 30–36.5)
MCHC RBC AUTO-ENTMCNC: 32.2 G/DL (ref 30–36.5)
MCV RBC AUTO: 94.9 FL (ref 80–99)
MCV RBC AUTO: 95.5 FL (ref 80–99)
METHGB MFR BLD: 0.4 % (ref 0–1.4)
NRBC # BLD: 0 K/UL (ref 0–0.01)
NRBC # BLD: 0.02 K/UL (ref 0–0.01)
NRBC BLD-RTO: 0 PER 100 WBC
NRBC BLD-RTO: 0.1 PER 100 WBC
OXYHGB MFR BLD: 93.1 % (ref 95–99)
PCO2 BLDA: 33 MMHG (ref 35–45)
PEEP RESPIRATORY: 6
PERFORMED BY:: ABNORMAL
PH BLDA: 7.34 (ref 7.35–7.45)
PHOSPHATE SERPL-MCNC: 5.2 MG/DL (ref 2.6–4.7)
PLATELET # BLD AUTO: 473 K/UL (ref 150–400)
PLATELET # BLD AUTO: 507 K/UL (ref 150–400)
PMV BLD AUTO: 10.1 FL (ref 8.9–12.9)
PMV BLD AUTO: 10.3 FL (ref 8.9–12.9)
PO2 BLDA: 71 MMHG (ref 80–100)
POTASSIUM SERPL-SCNC: 4.5 MMOL/L (ref 3.5–5.1)
RBC # BLD AUTO: 2.44 M/UL (ref 3.8–5.2)
RBC # BLD AUTO: 2.53 M/UL (ref 3.8–5.2)
SAO2 % BLD: 94 % (ref 95–99)
SAO2% DEVICE SAO2% SENSOR NAME: ABNORMAL
SODIUM SERPL-SCNC: 143 MMOL/L (ref 136–145)
SPECIMEN EXP DATE BLD: NORMAL
SPECIMEN SITE: ABNORMAL
TRANSFUSION STATUS PATIENT QL: NORMAL
UNIT DIVISION: 0
VENTILATION MODE VENT: ABNORMAL
VT SETTING VENT: 400
WBC # BLD AUTO: 17.2 K/UL (ref 3.6–11)
WBC # BLD AUTO: 19.9 K/UL (ref 3.6–11)

## 2023-12-23 PROCEDURE — 2500000003 HC RX 250 WO HCPCS: Performed by: INTERNAL MEDICINE

## 2023-12-23 PROCEDURE — 94640 AIRWAY INHALATION TREATMENT: CPT

## 2023-12-23 PROCEDURE — 85027 COMPLETE CBC AUTOMATED: CPT

## 2023-12-23 PROCEDURE — 2000000000 HC ICU R&B

## 2023-12-23 PROCEDURE — 6370000000 HC RX 637 (ALT 250 FOR IP): Performed by: INTERNAL MEDICINE

## 2023-12-23 PROCEDURE — 80069 RENAL FUNCTION PANEL: CPT

## 2023-12-23 PROCEDURE — 31500 INSERT EMERGENCY AIRWAY: CPT

## 2023-12-23 PROCEDURE — 2580000003 HC RX 258: Performed by: INTERNAL MEDICINE

## 2023-12-23 PROCEDURE — 82962 GLUCOSE BLOOD TEST: CPT

## 2023-12-23 PROCEDURE — 83880 ASSAY OF NATRIURETIC PEPTIDE: CPT

## 2023-12-23 PROCEDURE — 2700000000 HC OXYGEN THERAPY PER DAY

## 2023-12-23 PROCEDURE — 86003 ALLG SPEC IGE CRUDE XTRC EA: CPT

## 2023-12-23 PROCEDURE — 87205 SMEAR GRAM STAIN: CPT

## 2023-12-23 PROCEDURE — 6360000002 HC RX W HCPCS: Performed by: INTERNAL MEDICINE

## 2023-12-23 PROCEDURE — 6360000002 HC RX W HCPCS: Performed by: PSYCHIATRY & NEUROLOGY

## 2023-12-23 PROCEDURE — 87070 CULTURE OTHR SPECIMN AEROBIC: CPT

## 2023-12-23 PROCEDURE — 94761 N-INVAS EAR/PLS OXIMETRY MLT: CPT

## 2023-12-23 PROCEDURE — 2500000003 HC RX 250 WO HCPCS: Performed by: STUDENT IN AN ORGANIZED HEALTH CARE EDUCATION/TRAINING PROGRAM

## 2023-12-23 PROCEDURE — 82785 ASSAY OF IGE: CPT

## 2023-12-23 PROCEDURE — 82803 BLOOD GASES ANY COMBINATION: CPT

## 2023-12-23 PROCEDURE — 2580000003 HC RX 258: Performed by: STUDENT IN AN ORGANIZED HEALTH CARE EDUCATION/TRAINING PROGRAM

## 2023-12-23 PROCEDURE — 36600 WITHDRAWAL OF ARTERIAL BLOOD: CPT

## 2023-12-23 PROCEDURE — 36415 COLL VENOUS BLD VENIPUNCTURE: CPT

## 2023-12-23 PROCEDURE — 6360000002 HC RX W HCPCS: Performed by: STUDENT IN AN ORGANIZED HEALTH CARE EDUCATION/TRAINING PROGRAM

## 2023-12-23 PROCEDURE — 71045 X-RAY EXAM CHEST 1 VIEW: CPT

## 2023-12-23 PROCEDURE — 83735 ASSAY OF MAGNESIUM: CPT

## 2023-12-23 RX ORDER — FUROSEMIDE 10 MG/ML
40 INJECTION INTRAMUSCULAR; INTRAVENOUS DAILY
Status: DISCONTINUED | OUTPATIENT
Start: 2023-12-23 | End: 2023-12-24

## 2023-12-23 RX ORDER — HYDRALAZINE HYDROCHLORIDE 25 MG/1
25 TABLET, FILM COATED ORAL EVERY 8 HOURS SCHEDULED
Status: DISCONTINUED | OUTPATIENT
Start: 2023-12-23 | End: 2023-12-24

## 2023-12-23 RX ADMIN — LEVETIRACETAM 750 MG: 100 INJECTION, SOLUTION INTRAVENOUS at 07:59

## 2023-12-23 RX ADMIN — WHITE PETROLATUM,ZINC OXIDE: 57; 17 PASTE TOPICAL at 13:45

## 2023-12-23 RX ADMIN — IPRATROPIUM BROMIDE AND ALBUTEROL SULFATE 1 DOSE: 2.5; .5 SOLUTION RESPIRATORY (INHALATION) at 13:26

## 2023-12-23 RX ADMIN — INSULIN GLARGINE 35 UNITS: 100 INJECTION, SOLUTION SUBCUTANEOUS at 08:10

## 2023-12-23 RX ADMIN — WHITE PETROLATUM,ZINC OXIDE: 57; 17 PASTE TOPICAL at 20:15

## 2023-12-23 RX ADMIN — SODIUM CHLORIDE 12.5 MG/HR: 9 INJECTION, SOLUTION INTRAVENOUS at 11:19

## 2023-12-23 RX ADMIN — PROPOFOL 40 MCG/KG/MIN: 10 INJECTION, EMULSION INTRAVENOUS at 22:20

## 2023-12-23 RX ADMIN — Medication 1 MG/HR: at 07:54

## 2023-12-23 RX ADMIN — INSULIN LISPRO 8 UNITS: 100 INJECTION, SOLUTION INTRAVENOUS; SUBCUTANEOUS at 13:44

## 2023-12-23 RX ADMIN — SODIUM CHLORIDE 12.5 MG/HR: 9 INJECTION, SOLUTION INTRAVENOUS at 04:47

## 2023-12-23 RX ADMIN — SODIUM CHLORIDE 12.5 MG/HR: 9 INJECTION, SOLUTION INTRAVENOUS at 16:35

## 2023-12-23 RX ADMIN — INSULIN LISPRO 8 UNITS: 100 INJECTION, SOLUTION INTRAVENOUS; SUBCUTANEOUS at 18:14

## 2023-12-23 RX ADMIN — INSULIN LISPRO 8 UNITS: 100 INJECTION, SOLUTION INTRAVENOUS; SUBCUTANEOUS at 00:31

## 2023-12-23 RX ADMIN — LABETALOL HYDROCHLORIDE 300 MG: 200 TABLET, FILM COATED ORAL at 05:32

## 2023-12-23 RX ADMIN — WHITE PETROLATUM,ZINC OXIDE: 57; 17 PASTE TOPICAL at 08:10

## 2023-12-23 RX ADMIN — HYDRALAZINE HYDROCHLORIDE 20 MG: 20 INJECTION INTRAMUSCULAR; INTRAVENOUS at 13:49

## 2023-12-23 RX ADMIN — SODIUM CHLORIDE 12.5 MG/HR: 9 INJECTION, SOLUTION INTRAVENOUS at 20:44

## 2023-12-23 RX ADMIN — LABETALOL HYDROCHLORIDE 300 MG: 200 TABLET, FILM COATED ORAL at 13:44

## 2023-12-23 RX ADMIN — SODIUM CHLORIDE, PRESERVATIVE FREE 10 ML: 5 INJECTION INTRAVENOUS at 20:15

## 2023-12-23 RX ADMIN — LEVETIRACETAM 750 MG: 100 INJECTION, SOLUTION INTRAVENOUS at 20:30

## 2023-12-23 RX ADMIN — METHYLPREDNISOLONE SODIUM SUCCINATE 40 MG: 40 INJECTION, POWDER, LYOPHILIZED, FOR SOLUTION INTRAMUSCULAR; INTRAVENOUS at 18:14

## 2023-12-23 RX ADMIN — DIPHENHYDRAMINE HYDROCHLORIDE 25 MG: 50 INJECTION INTRAMUSCULAR; INTRAVENOUS at 11:19

## 2023-12-23 RX ADMIN — MEROPENEM 1000 MG: 1 INJECTION, POWDER, FOR SOLUTION INTRAVENOUS at 05:30

## 2023-12-23 RX ADMIN — MEROPENEM 1000 MG: 1 INJECTION, POWDER, FOR SOLUTION INTRAVENOUS at 20:30

## 2023-12-23 RX ADMIN — HYDRALAZINE HYDROCHLORIDE 25 MG: 25 TABLET, FILM COATED ORAL at 20:14

## 2023-12-23 RX ADMIN — METHYLPREDNISOLONE SODIUM SUCCINATE 40 MG: 40 INJECTION, POWDER, LYOPHILIZED, FOR SOLUTION INTRAMUSCULAR; INTRAVENOUS at 02:12

## 2023-12-23 RX ADMIN — SODIUM CHLORIDE 12.5 MG/HR: 9 INJECTION, SOLUTION INTRAVENOUS at 23:00

## 2023-12-23 RX ADMIN — PROPOFOL 40 MCG/KG/MIN: 10 INJECTION, EMULSION INTRAVENOUS at 16:35

## 2023-12-23 RX ADMIN — MEROPENEM 1000 MG: 1 INJECTION, POWDER, FOR SOLUTION INTRAVENOUS at 11:19

## 2023-12-23 RX ADMIN — SODIUM CHLORIDE 12.5 MG/HR: 9 INJECTION, SOLUTION INTRAVENOUS at 18:53

## 2023-12-23 RX ADMIN — FUROSEMIDE 40 MG: 10 INJECTION, SOLUTION INTRAMUSCULAR; INTRAVENOUS at 16:33

## 2023-12-23 RX ADMIN — DIPHENHYDRAMINE HYDROCHLORIDE 25 MG: 50 INJECTION INTRAMUSCULAR; INTRAVENOUS at 02:13

## 2023-12-23 RX ADMIN — HYDRALAZINE HYDROCHLORIDE 25 MG: 25 TABLET, FILM COATED ORAL at 16:33

## 2023-12-23 RX ADMIN — INSULIN LISPRO 8 UNITS: 100 INJECTION, SOLUTION INTRAVENOUS; SUBCUTANEOUS at 06:37

## 2023-12-23 RX ADMIN — SODIUM CHLORIDE, PRESERVATIVE FREE 20 MG: 5 INJECTION INTRAVENOUS at 07:59

## 2023-12-23 RX ADMIN — AMLODIPINE BESYLATE 10 MG: 5 TABLET ORAL at 07:59

## 2023-12-23 RX ADMIN — SODIUM CHLORIDE 12.5 MG/HR: 9 INJECTION, SOLUTION INTRAVENOUS at 06:51

## 2023-12-23 RX ADMIN — DEXMEDETOMIDINE HYDROCHLORIDE 0.4 MCG/KG/HR: 400 INJECTION, SOLUTION INTRAVENOUS at 07:52

## 2023-12-23 RX ADMIN — METHYLPREDNISOLONE SODIUM SUCCINATE 40 MG: 40 INJECTION, POWDER, LYOPHILIZED, FOR SOLUTION INTRAMUSCULAR; INTRAVENOUS at 11:19

## 2023-12-23 RX ADMIN — PROPOFOL 40 MCG/KG/MIN: 10 INJECTION, EMULSION INTRAVENOUS at 11:18

## 2023-12-23 RX ADMIN — ENOXAPARIN SODIUM 30 MG: 100 INJECTION SUBCUTANEOUS at 07:58

## 2023-12-23 RX ADMIN — SODIUM CHLORIDE 10 MG/HR: 9 INJECTION, SOLUTION INTRAVENOUS at 02:10

## 2023-12-23 RX ADMIN — Medication 75 MCG/HR: at 23:03

## 2023-12-23 RX ADMIN — Medication 50 MCG/HR: at 07:54

## 2023-12-23 RX ADMIN — LABETALOL HYDROCHLORIDE 300 MG: 200 TABLET, FILM COATED ORAL at 20:14

## 2023-12-23 RX ADMIN — SODIUM CHLORIDE 12.5 MG/HR: 9 INJECTION, SOLUTION INTRAVENOUS at 13:43

## 2023-12-23 RX ADMIN — SODIUM CHLORIDE, PRESERVATIVE FREE 10 ML: 5 INJECTION INTRAVENOUS at 08:11

## 2023-12-23 RX ADMIN — IPRATROPIUM BROMIDE AND ALBUTEROL SULFATE 1 DOSE: 2.5; .5 SOLUTION RESPIRATORY (INHALATION) at 20:06

## 2023-12-23 RX ADMIN — ATORVASTATIN CALCIUM 40 MG: 40 TABLET, FILM COATED ORAL at 20:14

## 2023-12-23 RX ADMIN — PROPOFOL 40 MCG/KG/MIN: 10 INJECTION, EMULSION INTRAVENOUS at 05:33

## 2023-12-23 RX ADMIN — IPRATROPIUM BROMIDE AND ALBUTEROL SULFATE 1 DOSE: 2.5; .5 SOLUTION RESPIRATORY (INHALATION) at 08:04

## 2023-12-23 ASSESSMENT — PULMONARY FUNCTION TESTS
PIF_VALUE: 15
PIF_VALUE: 19
PIF_VALUE: 13
PIF_VALUE: 15
PIF_VALUE: 22
PIF_VALUE: 16
PIF_VALUE: 21
PIF_VALUE: 22
PIF_VALUE: 32
PIF_VALUE: 31
PIF_VALUE: 22
PIF_VALUE: 27
PIF_VALUE: 23
PIF_VALUE: 15
PIF_VALUE: 16

## 2023-12-23 ASSESSMENT — PAIN SCALES - GENERAL
PAINLEVEL_OUTOF10: 0

## 2023-12-24 ENCOUNTER — APPOINTMENT (OUTPATIENT)
Facility: HOSPITAL | Age: 53
DRG: 981 | End: 2023-12-24

## 2023-12-24 LAB
ALBUMIN SERPL-MCNC: 2.1 G/DL (ref 3.5–5)
ANION GAP SERPL CALC-SCNC: 6 MMOL/L (ref 5–15)
ANION GAP SERPL CALC-SCNC: 7 MMOL/L (ref 5–15)
ARTERIAL PATENCY WRIST A: YES
BASE DEFICIT BLDA-SCNC: 7.1 MMOL/L
BASOPHILS # BLD: 0 K/UL (ref 0–0.1)
BASOPHILS NFR BLD: 0 % (ref 0–1)
BDY SITE: ABNORMAL
BODY TEMPERATURE: 97.9
BUN SERPL-MCNC: 64 MG/DL (ref 6–20)
BUN SERPL-MCNC: 68 MG/DL (ref 6–20)
BUN/CREAT SERPL: 48 (ref 12–20)
BUN/CREAT SERPL: 55 (ref 12–20)
CA-I BLD-MCNC: 8.1 MG/DL (ref 8.5–10.1)
CA-I BLD-MCNC: 8.3 MG/DL (ref 8.5–10.1)
CHLORIDE SERPL-SCNC: 119 MMOL/L (ref 97–108)
CHLORIDE SERPL-SCNC: 121 MMOL/L (ref 97–108)
CO2 SERPL-SCNC: 19 MMOL/L (ref 21–32)
CO2 SERPL-SCNC: 23 MMOL/L (ref 21–32)
COHGB MFR BLD: 0.4 % (ref 1–2)
CREAT SERPL-MCNC: 1.23 MG/DL (ref 0.55–1.02)
CREAT SERPL-MCNC: 1.33 MG/DL (ref 0.55–1.02)
DIFFERENTIAL METHOD BLD: ABNORMAL
EOSINOPHIL # BLD: 0 K/UL (ref 0–0.4)
EOSINOPHIL NFR BLD: 0 % (ref 0–7)
ERYTHROCYTE [DISTWIDTH] IN BLOOD BY AUTOMATED COUNT: 14.7 % (ref 11.5–14.5)
FIO2 ON VENT: 80 %
GAS FLOW.O2 SETTING OXYMISER: 12
GLUCOSE BLD STRIP.AUTO-MCNC: 339 MG/DL (ref 65–100)
GLUCOSE BLD STRIP.AUTO-MCNC: 345 MG/DL (ref 65–100)
GLUCOSE BLD STRIP.AUTO-MCNC: 358 MG/DL (ref 65–100)
GLUCOSE BLD STRIP.AUTO-MCNC: 361 MG/DL (ref 65–100)
GLUCOSE BLD STRIP.AUTO-MCNC: 371 MG/DL (ref 65–100)
GLUCOSE BLD STRIP.AUTO-MCNC: 392 MG/DL (ref 65–100)
GLUCOSE SERPL-MCNC: 338 MG/DL (ref 65–100)
GLUCOSE SERPL-MCNC: 356 MG/DL (ref 65–100)
HCO3 BLDA-SCNC: 19 MMOL/L (ref 22–26)
HCT VFR BLD AUTO: 24.2 % (ref 35–47)
HGB BLD-MCNC: 7.6 G/DL (ref 11.5–16)
IMM GRANULOCYTES # BLD AUTO: 0 K/UL
IMM GRANULOCYTES NFR BLD AUTO: 0 %
LACTATE SERPL-SCNC: 1.2 MMOL/L (ref 0.4–2)
LYMPHOCYTES # BLD: 1 K/UL (ref 0.8–3.5)
LYMPHOCYTES NFR BLD: 5 % (ref 12–49)
MCH RBC QN AUTO: 30.6 PG (ref 26–34)
MCHC RBC AUTO-ENTMCNC: 31.4 G/DL (ref 30–36.5)
MCV RBC AUTO: 97.6 FL (ref 80–99)
METHGB MFR BLD: 0.4 % (ref 0–1.4)
MONOCYTES # BLD: 0.2 K/UL (ref 0–1)
MONOCYTES NFR BLD: 1 % (ref 5–13)
MRSA DNA SPEC QL NAA+PROBE: NOT DETECTED
NEUTS SEG # BLD: 19.3 K/UL (ref 1.8–8)
NEUTS SEG NFR BLD: 94 % (ref 32–75)
NRBC # BLD: 0.02 K/UL (ref 0–0.01)
NRBC BLD-RTO: 0.1 PER 100 WBC
OXYHGB MFR BLD: 94.6 % (ref 95–99)
PCO2 BLDA: 41 MMHG (ref 35–45)
PEEP RESPIRATORY: 6
PERFORMED BY:: ABNORMAL
PH BLDA: 7.28 (ref 7.35–7.45)
PHOSPHATE SERPL-MCNC: 4 MG/DL (ref 2.6–4.7)
PLATELET # BLD AUTO: 529 K/UL (ref 150–400)
PMV BLD AUTO: 10.5 FL (ref 8.9–12.9)
PO2 BLDA: 87 MMHG (ref 80–100)
POTASSIUM SERPL-SCNC: 3.5 MMOL/L (ref 3.5–5.1)
POTASSIUM SERPL-SCNC: 3.9 MMOL/L (ref 3.5–5.1)
RBC # BLD AUTO: 2.48 M/UL (ref 3.8–5.2)
RBC MORPH BLD: ABNORMAL
RBC MORPH BLD: ABNORMAL
SAO2 % BLD: 95 % (ref 95–99)
SAO2% DEVICE SAO2% SENSOR NAME: ABNORMAL
SODIUM SERPL-SCNC: 146 MMOL/L (ref 136–145)
SODIUM SERPL-SCNC: 149 MMOL/L (ref 136–145)
SPECIMEN SITE: ABNORMAL
VENTILATION MODE VENT: ABNORMAL
VT SETTING VENT: 400
WBC # BLD AUTO: 20.5 K/UL (ref 3.6–11)

## 2023-12-24 PROCEDURE — 2580000003 HC RX 258: Performed by: INTERNAL MEDICINE

## 2023-12-24 PROCEDURE — 80048 BASIC METABOLIC PNL TOTAL CA: CPT

## 2023-12-24 PROCEDURE — 6360000002 HC RX W HCPCS: Performed by: PSYCHIATRY & NEUROLOGY

## 2023-12-24 PROCEDURE — 6370000000 HC RX 637 (ALT 250 FOR IP): Performed by: INTERNAL MEDICINE

## 2023-12-24 PROCEDURE — 2500000003 HC RX 250 WO HCPCS: Performed by: STUDENT IN AN ORGANIZED HEALTH CARE EDUCATION/TRAINING PROGRAM

## 2023-12-24 PROCEDURE — 94003 VENT MGMT INPAT SUBQ DAY: CPT

## 2023-12-24 PROCEDURE — 2500000003 HC RX 250 WO HCPCS: Performed by: INTERNAL MEDICINE

## 2023-12-24 PROCEDURE — 2700000000 HC OXYGEN THERAPY PER DAY

## 2023-12-24 PROCEDURE — 71045 X-RAY EXAM CHEST 1 VIEW: CPT

## 2023-12-24 PROCEDURE — 36600 WITHDRAWAL OF ARTERIAL BLOOD: CPT

## 2023-12-24 PROCEDURE — 83605 ASSAY OF LACTIC ACID: CPT

## 2023-12-24 PROCEDURE — 85025 COMPLETE CBC W/AUTO DIFF WBC: CPT

## 2023-12-24 PROCEDURE — 80069 RENAL FUNCTION PANEL: CPT

## 2023-12-24 PROCEDURE — 6360000002 HC RX W HCPCS: Performed by: INTERNAL MEDICINE

## 2023-12-24 PROCEDURE — 2000000000 HC ICU R&B

## 2023-12-24 PROCEDURE — 94761 N-INVAS EAR/PLS OXIMETRY MLT: CPT

## 2023-12-24 PROCEDURE — 6360000002 HC RX W HCPCS: Performed by: STUDENT IN AN ORGANIZED HEALTH CARE EDUCATION/TRAINING PROGRAM

## 2023-12-24 PROCEDURE — 82962 GLUCOSE BLOOD TEST: CPT

## 2023-12-24 PROCEDURE — 31500 INSERT EMERGENCY AIRWAY: CPT

## 2023-12-24 PROCEDURE — 82803 BLOOD GASES ANY COMBINATION: CPT

## 2023-12-24 PROCEDURE — 36415 COLL VENOUS BLD VENIPUNCTURE: CPT

## 2023-12-24 PROCEDURE — 2580000003 HC RX 258: Performed by: STUDENT IN AN ORGANIZED HEALTH CARE EDUCATION/TRAINING PROGRAM

## 2023-12-24 PROCEDURE — 6370000000 HC RX 637 (ALT 250 FOR IP): Performed by: SPECIALIST

## 2023-12-24 PROCEDURE — 87641 MR-STAPH DNA AMP PROBE: CPT

## 2023-12-24 PROCEDURE — 6370000000 HC RX 637 (ALT 250 FOR IP): Performed by: HOSPITALIST

## 2023-12-24 PROCEDURE — 94640 AIRWAY INHALATION TREATMENT: CPT

## 2023-12-24 RX ORDER — FUROSEMIDE 10 MG/ML
60 INJECTION INTRAMUSCULAR; INTRAVENOUS 2 TIMES DAILY
Status: DISCONTINUED | OUTPATIENT
Start: 2023-12-24 | End: 2023-12-25

## 2023-12-24 RX ORDER — HYDRALAZINE HYDROCHLORIDE 50 MG/1
50 TABLET, FILM COATED ORAL EVERY 8 HOURS SCHEDULED
Status: DISCONTINUED | OUTPATIENT
Start: 2023-12-24 | End: 2023-12-24

## 2023-12-24 RX ORDER — FUROSEMIDE 10 MG/ML
60 INJECTION INTRAMUSCULAR; INTRAVENOUS ONCE
Status: COMPLETED | OUTPATIENT
Start: 2023-12-24 | End: 2023-12-24

## 2023-12-24 RX ORDER — HYDRALAZINE HYDROCHLORIDE 50 MG/1
50 TABLET, FILM COATED ORAL EVERY 6 HOURS SCHEDULED
Status: DISCONTINUED | OUTPATIENT
Start: 2023-12-24 | End: 2023-12-29

## 2023-12-24 RX ORDER — CLONIDINE HYDROCHLORIDE 0.1 MG/1
0.2 TABLET ORAL 4 TIMES DAILY
Status: DISCONTINUED | OUTPATIENT
Start: 2023-12-24 | End: 2023-12-29

## 2023-12-24 RX ADMIN — INSULIN GLARGINE 35 UNITS: 100 INJECTION, SOLUTION SUBCUTANEOUS at 10:33

## 2023-12-24 RX ADMIN — MEROPENEM 1000 MG: 1 INJECTION, POWDER, FOR SOLUTION INTRAVENOUS at 05:30

## 2023-12-24 RX ADMIN — IPRATROPIUM BROMIDE AND ALBUTEROL SULFATE 1 DOSE: 2.5; .5 SOLUTION RESPIRATORY (INHALATION) at 19:58

## 2023-12-24 RX ADMIN — FUROSEMIDE 60 MG: 10 INJECTION, SOLUTION INTRAMUSCULAR; INTRAVENOUS at 17:21

## 2023-12-24 RX ADMIN — IPRATROPIUM BROMIDE AND ALBUTEROL SULFATE 1 DOSE: 2.5; .5 SOLUTION RESPIRATORY (INHALATION) at 13:24

## 2023-12-24 RX ADMIN — CLONIDINE HYDROCHLORIDE 0.2 MG: 0.1 TABLET ORAL at 01:39

## 2023-12-24 RX ADMIN — METHYLPREDNISOLONE SODIUM SUCCINATE 40 MG: 40 INJECTION, POWDER, LYOPHILIZED, FOR SOLUTION INTRAMUSCULAR; INTRAVENOUS at 17:21

## 2023-12-24 RX ADMIN — INSULIN LISPRO 8 UNITS: 100 INJECTION, SOLUTION INTRAVENOUS; SUBCUTANEOUS at 06:43

## 2023-12-24 RX ADMIN — DEXMEDETOMIDINE HYDROCHLORIDE 0.4 MCG/KG/HR: 400 INJECTION, SOLUTION INTRAVENOUS at 16:09

## 2023-12-24 RX ADMIN — ATORVASTATIN CALCIUM 40 MG: 40 TABLET, FILM COATED ORAL at 20:50

## 2023-12-24 RX ADMIN — PROPOFOL 50 MCG/KG/MIN: 10 INJECTION, EMULSION INTRAVENOUS at 07:27

## 2023-12-24 RX ADMIN — WHITE PETROLATUM,ZINC OXIDE: 57; 17 PASTE TOPICAL at 21:05

## 2023-12-24 RX ADMIN — MEROPENEM 1000 MG: 1 INJECTION, POWDER, FOR SOLUTION INTRAVENOUS at 20:58

## 2023-12-24 RX ADMIN — SODIUM CHLORIDE 15 MG/HR: 9 INJECTION, SOLUTION INTRAVENOUS at 05:42

## 2023-12-24 RX ADMIN — LEVETIRACETAM 750 MG: 100 INJECTION, SOLUTION INTRAVENOUS at 20:51

## 2023-12-24 RX ADMIN — INSULIN LISPRO 8 UNITS: 100 INJECTION, SOLUTION INTRAVENOUS; SUBCUTANEOUS at 18:58

## 2023-12-24 RX ADMIN — LABETALOL HYDROCHLORIDE 300 MG: 200 TABLET, FILM COATED ORAL at 22:21

## 2023-12-24 RX ADMIN — SODIUM CHLORIDE 12.5 MG/HR: 9 INJECTION, SOLUTION INTRAVENOUS at 18:36

## 2023-12-24 RX ADMIN — VANCOMYCIN HYDROCHLORIDE 1500 MG: 750 INJECTION, POWDER, LYOPHILIZED, FOR SOLUTION INTRAVENOUS at 12:06

## 2023-12-24 RX ADMIN — CLONIDINE HYDROCHLORIDE 0.2 MG: 0.1 TABLET ORAL at 15:26

## 2023-12-24 RX ADMIN — LABETALOL HYDROCHLORIDE 300 MG: 200 TABLET, FILM COATED ORAL at 15:26

## 2023-12-24 RX ADMIN — LEVETIRACETAM 750 MG: 100 INJECTION, SOLUTION INTRAVENOUS at 10:33

## 2023-12-24 RX ADMIN — INSULIN LISPRO 6 UNITS: 100 INJECTION, SOLUTION INTRAVENOUS; SUBCUTANEOUS at 00:35

## 2023-12-24 RX ADMIN — SODIUM CHLORIDE 12.5 MG/HR: 9 INJECTION, SOLUTION INTRAVENOUS at 12:21

## 2023-12-24 RX ADMIN — Medication 4 MG/HR: at 07:27

## 2023-12-24 RX ADMIN — ENOXAPARIN SODIUM 30 MG: 100 INJECTION SUBCUTANEOUS at 10:33

## 2023-12-24 RX ADMIN — PROPOFOL 50 MCG/KG/MIN: 10 INJECTION, EMULSION INTRAVENOUS at 15:24

## 2023-12-24 RX ADMIN — WHITE PETROLATUM,ZINC OXIDE: 57; 17 PASTE TOPICAL at 10:41

## 2023-12-24 RX ADMIN — SODIUM CHLORIDE 15 MG/HR: 9 INJECTION, SOLUTION INTRAVENOUS at 20:48

## 2023-12-24 RX ADMIN — SODIUM CHLORIDE, PRESERVATIVE FREE 10 ML: 5 INJECTION INTRAVENOUS at 21:04

## 2023-12-24 RX ADMIN — SODIUM CHLORIDE 15 MG/HR: 9 INJECTION, SOLUTION INTRAVENOUS at 09:00

## 2023-12-24 RX ADMIN — IPRATROPIUM BROMIDE AND ALBUTEROL SULFATE 1 DOSE: 2.5; .5 SOLUTION RESPIRATORY (INHALATION) at 07:58

## 2023-12-24 RX ADMIN — LABETALOL HYDROCHLORIDE 300 MG: 200 TABLET, FILM COATED ORAL at 05:27

## 2023-12-24 RX ADMIN — INSULIN LISPRO 8 UNITS: 100 INJECTION, SOLUTION INTRAVENOUS; SUBCUTANEOUS at 15:25

## 2023-12-24 RX ADMIN — AMLODIPINE BESYLATE 10 MG: 5 TABLET ORAL at 10:40

## 2023-12-24 RX ADMIN — CLONIDINE HYDROCHLORIDE 0.2 MG: 0.1 TABLET ORAL at 20:50

## 2023-12-24 RX ADMIN — Medication 4 MG/HR: at 17:22

## 2023-12-24 RX ADMIN — SODIUM CHLORIDE, PRESERVATIVE FREE 10 ML: 5 INJECTION INTRAVENOUS at 10:41

## 2023-12-24 RX ADMIN — PROPOFOL 50 MCG/KG/MIN: 10 INJECTION, EMULSION INTRAVENOUS at 05:26

## 2023-12-24 RX ADMIN — FUROSEMIDE 60 MG: 10 INJECTION, SOLUTION INTRAMUSCULAR; INTRAVENOUS at 12:06

## 2023-12-24 RX ADMIN — CLONIDINE HYDROCHLORIDE 0.2 MG: 0.1 TABLET ORAL at 17:21

## 2023-12-24 RX ADMIN — SODIUM BICARBONATE 100 MEQ: 84 INJECTION, SOLUTION INTRAVENOUS at 05:26

## 2023-12-24 RX ADMIN — MEROPENEM 1000 MG: 1 INJECTION, POWDER, FOR SOLUTION INTRAVENOUS at 15:25

## 2023-12-24 RX ADMIN — SODIUM CHLORIDE, PRESERVATIVE FREE 20 MG: 5 INJECTION INTRAVENOUS at 10:39

## 2023-12-24 RX ADMIN — SODIUM CHLORIDE 15 MG/HR: 9 INJECTION, SOLUTION INTRAVENOUS at 03:46

## 2023-12-24 RX ADMIN — METHYLPREDNISOLONE SODIUM SUCCINATE 40 MG: 40 INJECTION, POWDER, LYOPHILIZED, FOR SOLUTION INTRAMUSCULAR; INTRAVENOUS at 01:39

## 2023-12-24 RX ADMIN — SODIUM CHLORIDE 12.5 MG/HR: 9 INJECTION, SOLUTION INTRAVENOUS at 01:30

## 2023-12-24 RX ADMIN — SODIUM CHLORIDE 10 MG/HR: 9 INJECTION, SOLUTION INTRAVENOUS at 23:59

## 2023-12-24 RX ADMIN — HYDRALAZINE HYDROCHLORIDE 20 MG: 20 INJECTION INTRAMUSCULAR; INTRAVENOUS at 10:34

## 2023-12-24 RX ADMIN — Medication 50 MCG/HR: at 12:11

## 2023-12-24 RX ADMIN — HYDRALAZINE HYDROCHLORIDE 50 MG: 50 TABLET, FILM COATED ORAL at 17:22

## 2023-12-24 RX ADMIN — METHYLPREDNISOLONE SODIUM SUCCINATE 40 MG: 40 INJECTION, POWDER, LYOPHILIZED, FOR SOLUTION INTRAMUSCULAR; INTRAVENOUS at 10:52

## 2023-12-24 RX ADMIN — PROPOFOL 40 MCG/KG/MIN: 10 INJECTION, EMULSION INTRAVENOUS at 19:32

## 2023-12-24 RX ADMIN — WHITE PETROLATUM,ZINC OXIDE: 57; 17 PASTE TOPICAL at 15:30

## 2023-12-24 ASSESSMENT — PULMONARY FUNCTION TESTS
PIF_VALUE: 23
PIF_VALUE: 32
PIF_VALUE: 27
PIF_VALUE: 25
PIF_VALUE: 33
PIF_VALUE: 22
PIF_VALUE: 31
PIF_VALUE: 25
PIF_VALUE: 23
PIF_VALUE: 24
PIF_VALUE: 33
PIF_VALUE: 23
PIF_VALUE: 24
PIF_VALUE: 17
PIF_VALUE: 21
PIF_VALUE: 15
PIF_VALUE: 23
PIF_VALUE: 18
PIF_VALUE: 25
PIF_VALUE: 32
PIF_VALUE: 22

## 2023-12-25 ENCOUNTER — APPOINTMENT (OUTPATIENT)
Facility: HOSPITAL | Age: 53
DRG: 981 | End: 2023-12-25

## 2023-12-25 LAB
ALBUMIN SERPL-MCNC: 2.1 G/DL (ref 3.5–5)
ANION GAP SERPL CALC-SCNC: 4 MMOL/L (ref 5–15)
ARTERIAL PATENCY WRIST A: YES
BASE DEFICIT BLDA-SCNC: 4.9 MMOL/L
BASOPHILS # BLD: 0 K/UL (ref 0–0.1)
BASOPHILS NFR BLD: 0 % (ref 0–1)
BDY SITE: ABNORMAL
BODY TEMPERATURE: 98
BUN SERPL-MCNC: 62 MG/DL (ref 6–20)
BUN/CREAT SERPL: 52 (ref 12–20)
CA-I BLD-MCNC: 1.18 MMOL/L (ref 1.13–1.32)
CA-I BLD-MCNC: 8.3 MG/DL (ref 8.5–10.1)
CHLORIDE SERPL-SCNC: 121 MMOL/L (ref 97–108)
CO2 SERPL-SCNC: 25 MMOL/L (ref 21–32)
COHGB MFR BLD: 0.3 % (ref 1–2)
CREAT SERPL-MCNC: 1.19 MG/DL (ref 0.55–1.02)
DIFFERENTIAL METHOD BLD: ABNORMAL
EOSINOPHIL # BLD: 0 K/UL (ref 0–0.4)
EOSINOPHIL NFR BLD: 0 % (ref 0–7)
ERYTHROCYTE [DISTWIDTH] IN BLOOD BY AUTOMATED COUNT: 14.6 % (ref 11.5–14.5)
FIO2 ON VENT: 65 %
GAS FLOW.O2 SETTING OXYMISER: 12
GLUCOSE BLD STRIP.AUTO-MCNC: 280 MG/DL (ref 65–100)
GLUCOSE BLD STRIP.AUTO-MCNC: 330 MG/DL (ref 65–100)
GLUCOSE BLD STRIP.AUTO-MCNC: 331 MG/DL (ref 65–100)
GLUCOSE BLD STRIP.AUTO-MCNC: 341 MG/DL (ref 65–100)
GLUCOSE BLD STRIP.AUTO-MCNC: 378 MG/DL (ref 65–100)
GLUCOSE SERPL-MCNC: 299 MG/DL (ref 65–100)
HCO3 BLDA-SCNC: 19 MMOL/L (ref 22–26)
HCT VFR BLD AUTO: 24.1 % (ref 35–47)
HGB BLD-MCNC: 7.6 G/DL (ref 11.5–16)
IMM GRANULOCYTES # BLD AUTO: 0 K/UL
IMM GRANULOCYTES NFR BLD AUTO: 0 %
LYMPHOCYTES # BLD: 0.5 K/UL (ref 0.8–3.5)
LYMPHOCYTES NFR BLD: 2 % (ref 12–49)
MCH RBC QN AUTO: 30.2 PG (ref 26–34)
MCHC RBC AUTO-ENTMCNC: 31.5 G/DL (ref 30–36.5)
MCV RBC AUTO: 95.6 FL (ref 80–99)
METAMYELOCYTES NFR BLD MANUAL: 1 %
METHGB MFR BLD: 0.3 % (ref 0–1.4)
MONOCYTES # BLD: 1.2 K/UL (ref 0–1)
MONOCYTES NFR BLD: 5 % (ref 5–13)
NEUTS SEG # BLD: 21.4 K/UL (ref 1.8–8)
NEUTS SEG NFR BLD: 92 % (ref 32–75)
NRBC # BLD: 0.04 K/UL (ref 0–0.01)
NRBC BLD-RTO: 0.2 PER 100 WBC
OXYHGB MFR BLD: 95.1 % (ref 95–99)
PCO2 BLDA: 32 MMHG (ref 35–45)
PEEP RESPIRATORY: 6
PERFORMED BY:: ABNORMAL
PH BLDA: 7.4 (ref 7.35–7.45)
PHOSPHATE SERPL-MCNC: 3.1 MG/DL (ref 2.6–4.7)
PLATELET # BLD AUTO: 565 K/UL (ref 150–400)
PMV BLD AUTO: 10 FL (ref 8.9–12.9)
PO2 BLDA: 84 MMHG (ref 80–100)
POTASSIUM SERPL-SCNC: 3.2 MMOL/L (ref 3.5–5.1)
RBC # BLD AUTO: 2.52 M/UL (ref 3.8–5.2)
RBC MORPH BLD: ABNORMAL
SAO2 % BLD: 96 % (ref 95–99)
SAO2% DEVICE SAO2% SENSOR NAME: ABNORMAL
SODIUM SERPL-SCNC: 150 MMOL/L (ref 136–145)
SPECIMEN SITE: ABNORMAL
VT SETTING VENT: 400
WBC # BLD AUTO: 23.3 K/UL (ref 3.6–11)

## 2023-12-25 PROCEDURE — 2580000003 HC RX 258: Performed by: INTERNAL MEDICINE

## 2023-12-25 PROCEDURE — 6360000002 HC RX W HCPCS: Performed by: INTERNAL MEDICINE

## 2023-12-25 PROCEDURE — 6360000002 HC RX W HCPCS: Performed by: STUDENT IN AN ORGANIZED HEALTH CARE EDUCATION/TRAINING PROGRAM

## 2023-12-25 PROCEDURE — 6370000000 HC RX 637 (ALT 250 FOR IP): Performed by: INTERNAL MEDICINE

## 2023-12-25 PROCEDURE — 6360000002 HC RX W HCPCS: Performed by: PSYCHIATRY & NEUROLOGY

## 2023-12-25 PROCEDURE — 94640 AIRWAY INHALATION TREATMENT: CPT

## 2023-12-25 PROCEDURE — 82803 BLOOD GASES ANY COMBINATION: CPT

## 2023-12-25 PROCEDURE — 2500000003 HC RX 250 WO HCPCS: Performed by: INTERNAL MEDICINE

## 2023-12-25 PROCEDURE — 31500 INSERT EMERGENCY AIRWAY: CPT

## 2023-12-25 PROCEDURE — 94003 VENT MGMT INPAT SUBQ DAY: CPT

## 2023-12-25 PROCEDURE — 2580000003 HC RX 258: Performed by: STUDENT IN AN ORGANIZED HEALTH CARE EDUCATION/TRAINING PROGRAM

## 2023-12-25 PROCEDURE — 80069 RENAL FUNCTION PANEL: CPT

## 2023-12-25 PROCEDURE — 2500000003 HC RX 250 WO HCPCS: Performed by: STUDENT IN AN ORGANIZED HEALTH CARE EDUCATION/TRAINING PROGRAM

## 2023-12-25 PROCEDURE — 71045 X-RAY EXAM CHEST 1 VIEW: CPT

## 2023-12-25 PROCEDURE — 6370000000 HC RX 637 (ALT 250 FOR IP): Performed by: SPECIALIST

## 2023-12-25 PROCEDURE — 82330 ASSAY OF CALCIUM: CPT

## 2023-12-25 PROCEDURE — 85025 COMPLETE CBC W/AUTO DIFF WBC: CPT

## 2023-12-25 PROCEDURE — 36600 WITHDRAWAL OF ARTERIAL BLOOD: CPT

## 2023-12-25 PROCEDURE — 36415 COLL VENOUS BLD VENIPUNCTURE: CPT

## 2023-12-25 PROCEDURE — 82962 GLUCOSE BLOOD TEST: CPT

## 2023-12-25 PROCEDURE — 2000000000 HC ICU R&B

## 2023-12-25 RX ORDER — METHYLPREDNISOLONE SODIUM SUCCINATE 40 MG/ML
40 INJECTION, POWDER, LYOPHILIZED, FOR SOLUTION INTRAMUSCULAR; INTRAVENOUS EVERY 12 HOURS
Status: DISCONTINUED | OUTPATIENT
Start: 2023-12-25 | End: 2023-12-27

## 2023-12-25 RX ORDER — POTASSIUM CHLORIDE 1.5 G/1.58G
40 POWDER, FOR SOLUTION ORAL EVERY 6 HOURS
Status: DISCONTINUED | OUTPATIENT
Start: 2023-12-25 | End: 2023-12-25

## 2023-12-25 RX ORDER — FUROSEMIDE 10 MG/ML
40 INJECTION INTRAMUSCULAR; INTRAVENOUS 2 TIMES DAILY
Status: DISCONTINUED | OUTPATIENT
Start: 2023-12-25 | End: 2023-12-27

## 2023-12-25 RX ADMIN — PROPOFOL 50 MCG/KG/MIN: 10 INJECTION, EMULSION INTRAVENOUS at 18:27

## 2023-12-25 RX ADMIN — SODIUM CHLORIDE, PRESERVATIVE FREE 10 ML: 5 INJECTION INTRAVENOUS at 21:41

## 2023-12-25 RX ADMIN — VANCOMYCIN HYDROCHLORIDE 1250 MG: 1.25 INJECTION, POWDER, LYOPHILIZED, FOR SOLUTION INTRAVENOUS at 09:44

## 2023-12-25 RX ADMIN — MEROPENEM 1000 MG: 1 INJECTION, POWDER, FOR SOLUTION INTRAVENOUS at 05:08

## 2023-12-25 RX ADMIN — MEROPENEM 1000 MG: 1 INJECTION, POWDER, FOR SOLUTION INTRAVENOUS at 21:38

## 2023-12-25 RX ADMIN — HYDRALAZINE HYDROCHLORIDE 50 MG: 50 TABLET, FILM COATED ORAL at 06:19

## 2023-12-25 RX ADMIN — WHITE PETROLATUM,ZINC OXIDE: 57; 17 PASTE TOPICAL at 11:25

## 2023-12-25 RX ADMIN — Medication 50 MCG/HR: at 09:13

## 2023-12-25 RX ADMIN — WHITE PETROLATUM,ZINC OXIDE: 57; 17 PASTE TOPICAL at 13:24

## 2023-12-25 RX ADMIN — CLONIDINE HYDROCHLORIDE 0.2 MG: 0.1 TABLET ORAL at 13:39

## 2023-12-25 RX ADMIN — CLONIDINE HYDROCHLORIDE 0.2 MG: 0.1 TABLET ORAL at 16:09

## 2023-12-25 RX ADMIN — HYDRALAZINE HYDROCHLORIDE 50 MG: 50 TABLET, FILM COATED ORAL at 18:14

## 2023-12-25 RX ADMIN — HYDRALAZINE HYDROCHLORIDE 50 MG: 50 TABLET, FILM COATED ORAL at 13:39

## 2023-12-25 RX ADMIN — METHYLPREDNISOLONE SODIUM SUCCINATE 40 MG: 40 INJECTION, POWDER, LYOPHILIZED, FOR SOLUTION INTRAMUSCULAR; INTRAVENOUS at 09:54

## 2023-12-25 RX ADMIN — ENOXAPARIN SODIUM 30 MG: 100 INJECTION SUBCUTANEOUS at 09:55

## 2023-12-25 RX ADMIN — SODIUM CHLORIDE, PRESERVATIVE FREE 10 ML: 5 INJECTION INTRAVENOUS at 11:25

## 2023-12-25 RX ADMIN — PROPOFOL 40 MCG/KG/MIN: 10 INJECTION, EMULSION INTRAVENOUS at 11:36

## 2023-12-25 RX ADMIN — METHYLPREDNISOLONE SODIUM SUCCINATE 40 MG: 40 INJECTION, POWDER, LYOPHILIZED, FOR SOLUTION INTRAMUSCULAR; INTRAVENOUS at 02:58

## 2023-12-25 RX ADMIN — HYDRALAZINE HYDROCHLORIDE 20 MG: 20 INJECTION INTRAMUSCULAR; INTRAVENOUS at 13:39

## 2023-12-25 RX ADMIN — INSULIN LISPRO 6 UNITS: 100 INJECTION, SOLUTION INTRAVENOUS; SUBCUTANEOUS at 06:32

## 2023-12-25 RX ADMIN — PROPOFOL 30 MCG/KG/MIN: 10 INJECTION, EMULSION INTRAVENOUS at 03:55

## 2023-12-25 RX ADMIN — CLONIDINE HYDROCHLORIDE 0.2 MG: 0.1 TABLET ORAL at 21:36

## 2023-12-25 RX ADMIN — HYDRALAZINE HYDROCHLORIDE 50 MG: 50 TABLET, FILM COATED ORAL at 00:39

## 2023-12-25 RX ADMIN — MEROPENEM 1000 MG: 1 INJECTION, POWDER, FOR SOLUTION INTRAVENOUS at 13:39

## 2023-12-25 RX ADMIN — INSULIN LISPRO 6 UNITS: 100 INJECTION, SOLUTION INTRAVENOUS; SUBCUTANEOUS at 18:15

## 2023-12-25 RX ADMIN — CLONIDINE HYDROCHLORIDE 0.2 MG: 0.1 TABLET ORAL at 10:00

## 2023-12-25 RX ADMIN — LEVETIRACETAM 750 MG: 100 INJECTION, SOLUTION INTRAVENOUS at 09:55

## 2023-12-25 RX ADMIN — FUROSEMIDE 60 MG: 10 INJECTION, SOLUTION INTRAMUSCULAR; INTRAVENOUS at 09:55

## 2023-12-25 RX ADMIN — PROPOFOL 50 MCG/KG/MIN: 10 INJECTION, EMULSION INTRAVENOUS at 16:00

## 2023-12-25 RX ADMIN — SODIUM CHLORIDE, PRESERVATIVE FREE 20 MG: 5 INJECTION INTRAVENOUS at 09:55

## 2023-12-25 RX ADMIN — INSULIN GLARGINE 35 UNITS: 100 INJECTION, SOLUTION SUBCUTANEOUS at 09:54

## 2023-12-25 RX ADMIN — LEVETIRACETAM 750 MG: 100 INJECTION, SOLUTION INTRAVENOUS at 21:35

## 2023-12-25 RX ADMIN — SODIUM CHLORIDE 5 MG/HR: 9 INJECTION, SOLUTION INTRAVENOUS at 04:34

## 2023-12-25 RX ADMIN — POTASSIUM BICARBONATE 40 MEQ: 782 TABLET, EFFERVESCENT ORAL at 22:02

## 2023-12-25 RX ADMIN — POTASSIUM BICARBONATE 40 MEQ: 782 TABLET, EFFERVESCENT ORAL at 16:09

## 2023-12-25 RX ADMIN — LABETALOL HYDROCHLORIDE 300 MG: 200 TABLET, FILM COATED ORAL at 13:30

## 2023-12-25 RX ADMIN — LABETALOL HYDROCHLORIDE 300 MG: 200 TABLET, FILM COATED ORAL at 22:02

## 2023-12-25 RX ADMIN — INSULIN LISPRO 6 UNITS: 100 INJECTION, SOLUTION INTRAVENOUS; SUBCUTANEOUS at 00:40

## 2023-12-25 RX ADMIN — POTASSIUM BICARBONATE 40 MEQ: 782 TABLET, EFFERVESCENT ORAL at 09:54

## 2023-12-25 RX ADMIN — ATORVASTATIN CALCIUM 40 MG: 40 TABLET, FILM COATED ORAL at 21:36

## 2023-12-25 RX ADMIN — INSULIN LISPRO 8 UNITS: 100 INJECTION, SOLUTION INTRAVENOUS; SUBCUTANEOUS at 14:09

## 2023-12-25 RX ADMIN — AMLODIPINE BESYLATE 10 MG: 5 TABLET ORAL at 09:56

## 2023-12-25 RX ADMIN — FUROSEMIDE 40 MG: 10 INJECTION, SOLUTION INTRAMUSCULAR; INTRAVENOUS at 18:14

## 2023-12-25 RX ADMIN — IPRATROPIUM BROMIDE AND ALBUTEROL SULFATE 1 DOSE: 2.5; .5 SOLUTION RESPIRATORY (INHALATION) at 18:19

## 2023-12-25 RX ADMIN — IPRATROPIUM BROMIDE AND ALBUTEROL SULFATE 1 DOSE: 2.5; .5 SOLUTION RESPIRATORY (INHALATION) at 06:35

## 2023-12-25 RX ADMIN — LABETALOL HYDROCHLORIDE 300 MG: 200 TABLET, FILM COATED ORAL at 06:19

## 2023-12-25 RX ADMIN — DEXMEDETOMIDINE HYDROCHLORIDE 0.2 MCG/KG/HR: 400 INJECTION, SOLUTION INTRAVENOUS at 16:00

## 2023-12-25 RX ADMIN — METHYLPREDNISOLONE SODIUM SUCCINATE 40 MG: 40 INJECTION INTRAMUSCULAR; INTRAVENOUS at 21:35

## 2023-12-25 RX ADMIN — WHITE PETROLATUM,ZINC OXIDE: 57; 17 PASTE TOPICAL at 21:39

## 2023-12-25 RX ADMIN — IPRATROPIUM BROMIDE AND ALBUTEROL SULFATE 1 DOSE: 2.5; .5 SOLUTION RESPIRATORY (INHALATION) at 13:48

## 2023-12-25 ASSESSMENT — PULMONARY FUNCTION TESTS
PIF_VALUE: 24
PIF_VALUE: 29
PIF_VALUE: 33
PIF_VALUE: 21
PIF_VALUE: 24
PIF_VALUE: 23
PIF_VALUE: 30
PIF_VALUE: 22
PIF_VALUE: 25
PIF_VALUE: 23
PIF_VALUE: 30
PIF_VALUE: 31
PIF_VALUE: 21
PIF_VALUE: 25
PIF_VALUE: 32

## 2023-12-25 ASSESSMENT — PAIN SCALES - GENERAL
PAINLEVEL_OUTOF10: 0

## 2023-12-26 ENCOUNTER — APPOINTMENT (OUTPATIENT)
Facility: HOSPITAL | Age: 53
DRG: 981 | End: 2023-12-26

## 2023-12-26 PROBLEM — I21.4 NSTEMI (NON-ST ELEVATED MYOCARDIAL INFARCTION) (HCC): Status: ACTIVE | Noted: 2023-12-26

## 2023-12-26 PROBLEM — R19.7 DIARRHEA: Status: ACTIVE | Noted: 2023-12-26

## 2023-12-26 PROBLEM — I16.1 HYPERTENSIVE EMERGENCY: Status: ACTIVE | Noted: 2023-12-26

## 2023-12-26 PROBLEM — J96.02 ACUTE RESPIRATORY FAILURE WITH HYPOXIA AND HYPERCARBIA (HCC): Status: ACTIVE | Noted: 2023-12-13

## 2023-12-26 LAB
ALBUMIN SERPL-MCNC: 2.4 G/DL (ref 3.5–5)
ANION GAP SERPL CALC-SCNC: 6 MMOL/L (ref 5–15)
ARTERIAL PATENCY WRIST A: YES
BACTERIA SPEC CULT: NORMAL
BASE EXCESS BLDA CALC-SCNC: 5.7 MMOL/L (ref 0–3)
BASOPHILS # BLD: 0 K/UL (ref 0–0.1)
BASOPHILS NFR BLD: 0 % (ref 0–1)
BDY SITE: ABNORMAL
BNP SERPL-MCNC: 2206 PG/ML
BODY TEMPERATURE: 98.6
BUN SERPL-MCNC: 61 MG/DL (ref 6–20)
BUN/CREAT SERPL: 55 (ref 12–20)
CA-I BLD-MCNC: 9.1 MG/DL (ref 8.5–10.1)
CHLORIDE SERPL-SCNC: 111 MMOL/L (ref 97–108)
CO2 SERPL-SCNC: 32 MMOL/L (ref 21–32)
COHGB MFR BLD: 0.3 % (ref 1–2)
CREAT SERPL-MCNC: 1.11 MG/DL (ref 0.55–1.02)
CRP SERPL-MCNC: 5.06 MG/DL (ref 0–0.6)
DIFFERENTIAL METHOD BLD: ABNORMAL
EOSINOPHIL # BLD: 0 K/UL (ref 0–0.4)
EOSINOPHIL NFR BLD: 0 % (ref 0–7)
ERYTHROCYTE [DISTWIDTH] IN BLOOD BY AUTOMATED COUNT: 14.9 % (ref 11.5–14.5)
FIO2 ON VENT: 50 %
GAS FLOW.O2 SETTING OXYMISER: 12
GLUCOSE BLD STRIP.AUTO-MCNC: 317 MG/DL (ref 65–100)
GLUCOSE BLD STRIP.AUTO-MCNC: 334 MG/DL (ref 65–100)
GLUCOSE BLD STRIP.AUTO-MCNC: 348 MG/DL (ref 65–100)
GLUCOSE BLD STRIP.AUTO-MCNC: 381 MG/DL (ref 65–100)
GLUCOSE SERPL-MCNC: 325 MG/DL (ref 65–100)
GRAM STN SPEC: NORMAL
HCO3 BLDA-SCNC: 28 MMOL/L (ref 22–26)
HCT VFR BLD AUTO: 27.7 % (ref 35–47)
HGB BLD-MCNC: 8.9 G/DL (ref 11.5–16)
IMM GRANULOCYTES # BLD AUTO: 0 K/UL (ref 0–0.04)
IMM GRANULOCYTES NFR BLD AUTO: 0 % (ref 0–0.5)
LACTATE SERPL-SCNC: 0.9 MMOL/L (ref 0.4–2)
LYMPHOCYTES # BLD: 2.9 K/UL (ref 0.8–3.5)
LYMPHOCYTES NFR BLD: 10 % (ref 12–49)
Lab: NORMAL
MAGNESIUM SERPL-MCNC: 2.2 MG/DL (ref 1.6–2.4)
MCH RBC QN AUTO: 30.6 PG (ref 26–34)
MCHC RBC AUTO-ENTMCNC: 32.1 G/DL (ref 30–36.5)
MCV RBC AUTO: 95.2 FL (ref 80–99)
METHGB MFR BLD: 0.4 % (ref 0–1.4)
MONOCYTES # BLD: 2.3 K/UL (ref 0–1)
MONOCYTES NFR BLD: 8 % (ref 5–13)
NEUTS SEG # BLD: 23.7 K/UL (ref 1.8–8)
NEUTS SEG NFR BLD: 82 % (ref 32–75)
NRBC # BLD: 0.1 K/UL (ref 0–0.01)
NRBC BLD-RTO: 0.3 PER 100 WBC
OXYHGB MFR BLD: 95.3 % (ref 95–99)
PCO2 BLDA: 35 MMHG (ref 35–45)
PEEP RESPIRATORY: 6
PERFORMED BY:: ABNORMAL
PH BLDA: 7.53 (ref 7.35–7.45)
PHOSPHATE SERPL-MCNC: 2.7 MG/DL (ref 2.6–4.7)
PLATELET # BLD AUTO: 631 K/UL (ref 150–400)
PMV BLD AUTO: 10.5 FL (ref 8.9–12.9)
PO2 BLDA: 82 MMHG (ref 80–100)
POTASSIUM SERPL-SCNC: 4.2 MMOL/L (ref 3.5–5.1)
PROCALCITONIN SERPL-MCNC: 0.3 NG/ML
RBC # BLD AUTO: 2.91 M/UL (ref 3.8–5.2)
RBC MORPH BLD: ABNORMAL
SAO2 % BLD: 96 % (ref 95–99)
SAO2% DEVICE SAO2% SENSOR NAME: ABNORMAL
SODIUM SERPL-SCNC: 149 MMOL/L (ref 136–145)
SPECIMEN SITE: ABNORMAL
VANCOMYCIN SERPL-MCNC: 12.4 UG/ML
VENTILATION MODE VENT: ABNORMAL
VT SETTING VENT: 400
WBC # BLD AUTO: 28.9 K/UL (ref 3.6–11)

## 2023-12-26 PROCEDURE — 6370000000 HC RX 637 (ALT 250 FOR IP): Performed by: INTERNAL MEDICINE

## 2023-12-26 PROCEDURE — 94003 VENT MGMT INPAT SUBQ DAY: CPT

## 2023-12-26 PROCEDURE — 2000000000 HC ICU R&B

## 2023-12-26 PROCEDURE — 80202 ASSAY OF VANCOMYCIN: CPT

## 2023-12-26 PROCEDURE — 82962 GLUCOSE BLOOD TEST: CPT

## 2023-12-26 PROCEDURE — 85025 COMPLETE CBC W/AUTO DIFF WBC: CPT

## 2023-12-26 PROCEDURE — 2580000003 HC RX 258: Performed by: INTERNAL MEDICINE

## 2023-12-26 PROCEDURE — 2500000003 HC RX 250 WO HCPCS: Performed by: INTERNAL MEDICINE

## 2023-12-26 PROCEDURE — 6360000002 HC RX W HCPCS: Performed by: INTERNAL MEDICINE

## 2023-12-26 PROCEDURE — 86140 C-REACTIVE PROTEIN: CPT

## 2023-12-26 PROCEDURE — 83735 ASSAY OF MAGNESIUM: CPT

## 2023-12-26 PROCEDURE — 80069 RENAL FUNCTION PANEL: CPT

## 2023-12-26 PROCEDURE — 83605 ASSAY OF LACTIC ACID: CPT

## 2023-12-26 PROCEDURE — 6360000002 HC RX W HCPCS: Performed by: PSYCHIATRY & NEUROLOGY

## 2023-12-26 PROCEDURE — 99232 SBSQ HOSP IP/OBS MODERATE 35: CPT | Performed by: INTERNAL MEDICINE

## 2023-12-26 PROCEDURE — 84145 PROCALCITONIN (PCT): CPT

## 2023-12-26 PROCEDURE — 6360000002 HC RX W HCPCS: Performed by: STUDENT IN AN ORGANIZED HEALTH CARE EDUCATION/TRAINING PROGRAM

## 2023-12-26 PROCEDURE — 82803 BLOOD GASES ANY COMBINATION: CPT

## 2023-12-26 PROCEDURE — 36600 WITHDRAWAL OF ARTERIAL BLOOD: CPT

## 2023-12-26 PROCEDURE — 6370000000 HC RX 637 (ALT 250 FOR IP): Performed by: SPECIALIST

## 2023-12-26 PROCEDURE — 36415 COLL VENOUS BLD VENIPUNCTURE: CPT

## 2023-12-26 PROCEDURE — 94640 AIRWAY INHALATION TREATMENT: CPT

## 2023-12-26 PROCEDURE — 83880 ASSAY OF NATRIURETIC PEPTIDE: CPT

## 2023-12-26 PROCEDURE — 31500 INSERT EMERGENCY AIRWAY: CPT

## 2023-12-26 PROCEDURE — 2700000000 HC OXYGEN THERAPY PER DAY

## 2023-12-26 PROCEDURE — 71045 X-RAY EXAM CHEST 1 VIEW: CPT

## 2023-12-26 RX ADMIN — ATORVASTATIN CALCIUM 40 MG: 40 TABLET, FILM COATED ORAL at 21:20

## 2023-12-26 RX ADMIN — HYDRALAZINE HYDROCHLORIDE 50 MG: 50 TABLET, FILM COATED ORAL at 00:03

## 2023-12-26 RX ADMIN — INSULIN LISPRO 6 UNITS: 100 INJECTION, SOLUTION INTRAVENOUS; SUBCUTANEOUS at 00:13

## 2023-12-26 RX ADMIN — HYDRALAZINE HYDROCHLORIDE 50 MG: 50 TABLET, FILM COATED ORAL at 05:58

## 2023-12-26 RX ADMIN — ENOXAPARIN SODIUM 30 MG: 100 INJECTION SUBCUTANEOUS at 08:50

## 2023-12-26 RX ADMIN — LEVETIRACETAM 750 MG: 100 INJECTION, SOLUTION INTRAVENOUS at 21:20

## 2023-12-26 RX ADMIN — CLONIDINE HYDROCHLORIDE 0.2 MG: 0.1 TABLET ORAL at 08:52

## 2023-12-26 RX ADMIN — METHYLPREDNISOLONE SODIUM SUCCINATE 40 MG: 40 INJECTION INTRAMUSCULAR; INTRAVENOUS at 23:12

## 2023-12-26 RX ADMIN — INSULIN LISPRO 8 UNITS: 100 INJECTION, SOLUTION INTRAVENOUS; SUBCUTANEOUS at 18:28

## 2023-12-26 RX ADMIN — MEROPENEM 1000 MG: 1 INJECTION, POWDER, FOR SOLUTION INTRAVENOUS at 13:16

## 2023-12-26 RX ADMIN — METHYLPREDNISOLONE SODIUM SUCCINATE 40 MG: 40 INJECTION INTRAMUSCULAR; INTRAVENOUS at 08:51

## 2023-12-26 RX ADMIN — IPRATROPIUM BROMIDE AND ALBUTEROL SULFATE 1 DOSE: 2.5; .5 SOLUTION RESPIRATORY (INHALATION) at 08:22

## 2023-12-26 RX ADMIN — DEXMEDETOMIDINE HYDROCHLORIDE 0.4 MCG/KG/HR: 400 INJECTION, SOLUTION INTRAVENOUS at 03:11

## 2023-12-26 RX ADMIN — VANCOMYCIN HYDROCHLORIDE 1250 MG: 1.25 INJECTION, POWDER, LYOPHILIZED, FOR SOLUTION INTRAVENOUS at 13:10

## 2023-12-26 RX ADMIN — WHITE PETROLATUM,ZINC OXIDE: 57; 17 PASTE TOPICAL at 21:21

## 2023-12-26 RX ADMIN — LABETALOL HYDROCHLORIDE 300 MG: 200 TABLET, FILM COATED ORAL at 05:57

## 2023-12-26 RX ADMIN — MEROPENEM 1000 MG: 1 INJECTION, POWDER, FOR SOLUTION INTRAVENOUS at 04:57

## 2023-12-26 RX ADMIN — HYDRALAZINE HYDROCHLORIDE 20 MG: 20 INJECTION INTRAMUSCULAR; INTRAVENOUS at 03:31

## 2023-12-26 RX ADMIN — INSULIN GLARGINE 35 UNITS: 100 INJECTION, SOLUTION SUBCUTANEOUS at 08:50

## 2023-12-26 RX ADMIN — AMLODIPINE BESYLATE 10 MG: 5 TABLET ORAL at 08:52

## 2023-12-26 RX ADMIN — PROPOFOL 40 MCG/KG/MIN: 10 INJECTION, EMULSION INTRAVENOUS at 18:26

## 2023-12-26 RX ADMIN — LABETALOL HYDROCHLORIDE 300 MG: 200 TABLET, FILM COATED ORAL at 23:12

## 2023-12-26 RX ADMIN — CLONIDINE HYDROCHLORIDE 0.2 MG: 0.1 TABLET ORAL at 18:26

## 2023-12-26 RX ADMIN — MEROPENEM 1000 MG: 1 INJECTION, POWDER, FOR SOLUTION INTRAVENOUS at 21:20

## 2023-12-26 RX ADMIN — WHITE PETROLATUM,ZINC OXIDE: 57; 17 PASTE TOPICAL at 13:22

## 2023-12-26 RX ADMIN — FUROSEMIDE 40 MG: 10 INJECTION, SOLUTION INTRAMUSCULAR; INTRAVENOUS at 08:51

## 2023-12-26 RX ADMIN — LABETALOL HYDROCHLORIDE 300 MG: 200 TABLET, FILM COATED ORAL at 13:21

## 2023-12-26 RX ADMIN — WHITE PETROLATUM,ZINC OXIDE: 57; 17 PASTE TOPICAL at 08:52

## 2023-12-26 RX ADMIN — INSULIN LISPRO 6 UNITS: 100 INJECTION, SOLUTION INTRAVENOUS; SUBCUTANEOUS at 13:21

## 2023-12-26 RX ADMIN — SODIUM CHLORIDE, PRESERVATIVE FREE 20 MG: 5 INJECTION INTRAVENOUS at 08:51

## 2023-12-26 RX ADMIN — DEXMEDETOMIDINE HYDROCHLORIDE 0.4 MCG/KG/HR: 400 INJECTION, SOLUTION INTRAVENOUS at 18:27

## 2023-12-26 RX ADMIN — LEVETIRACETAM 750 MG: 100 INJECTION, SOLUTION INTRAVENOUS at 08:50

## 2023-12-26 RX ADMIN — PROPOFOL 40 MCG/KG/MIN: 10 INJECTION, EMULSION INTRAVENOUS at 02:07

## 2023-12-26 RX ADMIN — SODIUM CHLORIDE, PRESERVATIVE FREE 10 ML: 5 INJECTION INTRAVENOUS at 21:20

## 2023-12-26 RX ADMIN — CLONIDINE HYDROCHLORIDE 0.2 MG: 0.1 TABLET ORAL at 13:21

## 2023-12-26 RX ADMIN — FUROSEMIDE 40 MG: 10 INJECTION, SOLUTION INTRAMUSCULAR; INTRAVENOUS at 18:19

## 2023-12-26 RX ADMIN — INSULIN LISPRO 6 UNITS: 100 INJECTION, SOLUTION INTRAVENOUS; SUBCUTANEOUS at 06:38

## 2023-12-26 RX ADMIN — HYDRALAZINE HYDROCHLORIDE 50 MG: 50 TABLET, FILM COATED ORAL at 13:21

## 2023-12-26 RX ADMIN — HYDRALAZINE HYDROCHLORIDE 50 MG: 50 TABLET, FILM COATED ORAL at 18:19

## 2023-12-26 RX ADMIN — SODIUM CHLORIDE, PRESERVATIVE FREE 10 ML: 5 INJECTION INTRAVENOUS at 08:52

## 2023-12-26 RX ADMIN — Medication 50 MCG/HR: at 04:47

## 2023-12-26 RX ADMIN — CLONIDINE HYDROCHLORIDE 0.2 MG: 0.1 TABLET ORAL at 21:20

## 2023-12-26 RX ADMIN — PROPOFOL 40 MCG/KG/MIN: 10 INJECTION, EMULSION INTRAVENOUS at 08:49

## 2023-12-26 RX ADMIN — IPRATROPIUM BROMIDE AND ALBUTEROL SULFATE 1 DOSE: 2.5; .5 SOLUTION RESPIRATORY (INHALATION) at 18:07

## 2023-12-26 RX ADMIN — Medication 50 MCG/HR: at 19:12

## 2023-12-26 RX ADMIN — IPRATROPIUM BROMIDE AND ALBUTEROL SULFATE 1 DOSE: 2.5; .5 SOLUTION RESPIRATORY (INHALATION) at 13:27

## 2023-12-26 ASSESSMENT — PAIN SCALES - GENERAL
PAINLEVEL_OUTOF10: 0

## 2023-12-26 ASSESSMENT — PULMONARY FUNCTION TESTS
PIF_VALUE: 21
PIF_VALUE: 29
PIF_VALUE: 22
PIF_VALUE: 20
PIF_VALUE: 18
PIF_VALUE: 16
PIF_VALUE: 16
PIF_VALUE: 21
PIF_VALUE: 18

## 2023-12-26 NOTE — CARE COORDINATION
CM reviewed Pt medicals, Pt is on the vent. Will need PT/OT eval/recommendations when Pt is medically stable.

## 2023-12-27 ENCOUNTER — APPOINTMENT (OUTPATIENT)
Facility: HOSPITAL | Age: 53
DRG: 981 | End: 2023-12-27

## 2023-12-27 PROBLEM — J96.01 ACUTE RESPIRATORY FAILURE WITH HYPOXIA (HCC): Status: ACTIVE | Noted: 2023-12-27

## 2023-12-27 LAB
ALBUMIN SERPL-MCNC: 2.4 G/DL (ref 3.5–5)
ANION GAP SERPL CALC-SCNC: 6 MMOL/L (ref 5–15)
ARTERIAL PATENCY WRIST A: YES
BASE EXCESS BLDA CALC-SCNC: 7.6 MMOL/L (ref 0–3)
BASOPHILS # BLD: 0 K/UL (ref 0–0.1)
BASOPHILS NFR BLD: 0 % (ref 0–1)
BDY SITE: ABNORMAL
BODY TEMPERATURE: 100
BUN SERPL-MCNC: 67 MG/DL (ref 6–20)
BUN/CREAT SERPL: 55 (ref 12–20)
CA-I BLD-MCNC: 9.2 MG/DL (ref 8.5–10.1)
CHLORIDE SERPL-SCNC: 105 MMOL/L (ref 97–108)
CO2 SERPL-SCNC: 32 MMOL/L (ref 21–32)
COHGB MFR BLD: 0.3 % (ref 1–2)
CREAT SERPL-MCNC: 1.22 MG/DL (ref 0.55–1.02)
CRP SERPL-MCNC: 4.94 MG/DL (ref 0–0.6)
DIFFERENTIAL METHOD BLD: ABNORMAL
EOSINOPHIL # BLD: 0 K/UL (ref 0–0.4)
EOSINOPHIL NFR BLD: 0 % (ref 0–7)
ERYTHROCYTE [DISTWIDTH] IN BLOOD BY AUTOMATED COUNT: 14.6 % (ref 11.5–14.5)
FIO2 ON VENT: 40 %
GAS FLOW.O2 SETTING OXYMISER: 6
GLUCOSE BLD STRIP.AUTO-MCNC: 325 MG/DL (ref 65–100)
GLUCOSE BLD STRIP.AUTO-MCNC: 330 MG/DL (ref 65–100)
GLUCOSE BLD STRIP.AUTO-MCNC: 346 MG/DL (ref 65–100)
GLUCOSE BLD STRIP.AUTO-MCNC: 382 MG/DL (ref 65–100)
GLUCOSE SERPL-MCNC: 352 MG/DL (ref 65–100)
HCO3 BLDA-SCNC: 30 MMOL/L (ref 22–26)
HCT VFR BLD AUTO: 29.4 % (ref 35–47)
HGB BLD-MCNC: 9.4 G/DL (ref 11.5–16)
IGE SERPL-ACNC: 70 IU/ML (ref 6–495)
IMM GRANULOCYTES # BLD AUTO: 0.5 K/UL (ref 0–0.04)
IMM GRANULOCYTES NFR BLD AUTO: 2 % (ref 0–0.5)
LYMPHOCYTES # BLD: 1.6 K/UL (ref 0.8–3.5)
LYMPHOCYTES NFR BLD: 7 % (ref 12–49)
MAGNESIUM SERPL-MCNC: 2.3 MG/DL (ref 1.6–2.4)
MCH RBC QN AUTO: 30.2 PG (ref 26–34)
MCHC RBC AUTO-ENTMCNC: 32 G/DL (ref 30–36.5)
MCV RBC AUTO: 94.5 FL (ref 80–99)
METHGB MFR BLD: 0.4 % (ref 0–1.4)
MONOCYTES # BLD: 1.2 K/UL (ref 0–1)
MONOCYTES NFR BLD: 5 % (ref 5–13)
NEUTS SEG # BLD: 20.2 K/UL (ref 1.8–8)
NEUTS SEG NFR BLD: 86 % (ref 32–75)
NRBC # BLD: 0.07 K/UL (ref 0–0.01)
NRBC BLD-RTO: 0.3 PER 100 WBC
OXYHGB MFR BLD: 92 % (ref 95–99)
PCO2 BLDA: 37 MMHG (ref 35–45)
PEEP RESPIRATORY: 6
PERFORMED BY:: ABNORMAL
PH BLDA: 7.54 (ref 7.35–7.45)
PHOSPHATE SERPL-MCNC: 4.1 MG/DL (ref 2.6–4.7)
PLATELET # BLD AUTO: 623 K/UL (ref 150–400)
PMV BLD AUTO: 10.6 FL (ref 8.9–12.9)
PO2 BLDA: 68 MMHG (ref 80–100)
POTASSIUM SERPL-SCNC: 4.2 MMOL/L (ref 3.5–5.1)
PRESSURE SUPPORT SETTING VENT: 15
PROCALCITONIN SERPL-MCNC: 0.38 NG/ML
RBC # BLD AUTO: 3.11 M/UL (ref 3.8–5.2)
RBC MORPH BLD: ABNORMAL
SAO2 % BLD: 93 % (ref 95–99)
SAO2% DEVICE SAO2% SENSOR NAME: ABNORMAL
SODIUM SERPL-SCNC: 143 MMOL/L (ref 136–145)
SPECIMEN SITE: ABNORMAL
VENTILATION MODE VENT: ABNORMAL
VT SETTING VENT: 400
WBC # BLD AUTO: 23.5 K/UL (ref 3.6–11)

## 2023-12-27 PROCEDURE — 6360000002 HC RX W HCPCS: Performed by: INTERNAL MEDICINE

## 2023-12-27 PROCEDURE — 6370000000 HC RX 637 (ALT 250 FOR IP): Performed by: INTERNAL MEDICINE

## 2023-12-27 PROCEDURE — 2500000003 HC RX 250 WO HCPCS: Performed by: INTERNAL MEDICINE

## 2023-12-27 PROCEDURE — 6360000002 HC RX W HCPCS: Performed by: PSYCHIATRY & NEUROLOGY

## 2023-12-27 PROCEDURE — 94003 VENT MGMT INPAT SUBQ DAY: CPT

## 2023-12-27 PROCEDURE — 86140 C-REACTIVE PROTEIN: CPT

## 2023-12-27 PROCEDURE — 94761 N-INVAS EAR/PLS OXIMETRY MLT: CPT

## 2023-12-27 PROCEDURE — 82962 GLUCOSE BLOOD TEST: CPT

## 2023-12-27 PROCEDURE — 80069 RENAL FUNCTION PANEL: CPT

## 2023-12-27 PROCEDURE — 2580000003 HC RX 258: Performed by: INTERNAL MEDICINE

## 2023-12-27 PROCEDURE — 99232 SBSQ HOSP IP/OBS MODERATE 35: CPT | Performed by: INTERNAL MEDICINE

## 2023-12-27 PROCEDURE — 82803 BLOOD GASES ANY COMBINATION: CPT

## 2023-12-27 PROCEDURE — 84145 PROCALCITONIN (PCT): CPT

## 2023-12-27 PROCEDURE — 6370000000 HC RX 637 (ALT 250 FOR IP): Performed by: SPECIALIST

## 2023-12-27 PROCEDURE — 94640 AIRWAY INHALATION TREATMENT: CPT

## 2023-12-27 PROCEDURE — 2000000000 HC ICU R&B

## 2023-12-27 PROCEDURE — 71045 X-RAY EXAM CHEST 1 VIEW: CPT

## 2023-12-27 PROCEDURE — 2700000000 HC OXYGEN THERAPY PER DAY

## 2023-12-27 PROCEDURE — 85025 COMPLETE CBC W/AUTO DIFF WBC: CPT

## 2023-12-27 PROCEDURE — 83735 ASSAY OF MAGNESIUM: CPT

## 2023-12-27 PROCEDURE — 6360000002 HC RX W HCPCS: Performed by: STUDENT IN AN ORGANIZED HEALTH CARE EDUCATION/TRAINING PROGRAM

## 2023-12-27 PROCEDURE — 87449 NOS EACH ORGANISM AG IA: CPT

## 2023-12-27 PROCEDURE — 87324 CLOSTRIDIUM AG IA: CPT

## 2023-12-27 PROCEDURE — 36600 WITHDRAWAL OF ARTERIAL BLOOD: CPT

## 2023-12-27 RX ORDER — BUDESONIDE 0.5 MG/2ML
0.5 INHALANT ORAL
Status: DISCONTINUED | OUTPATIENT
Start: 2023-12-27 | End: 2024-01-24 | Stop reason: HOSPADM

## 2023-12-27 RX ORDER — ACETAZOLAMIDE 250 MG/1
500 TABLET ORAL EVERY 6 HOURS
Status: COMPLETED | OUTPATIENT
Start: 2023-12-27 | End: 2023-12-27

## 2023-12-27 RX ORDER — FUROSEMIDE 10 MG/ML
40 INJECTION INTRAMUSCULAR; INTRAVENOUS DAILY
Status: DISCONTINUED | OUTPATIENT
Start: 2023-12-27 | End: 2024-01-24 | Stop reason: HOSPADM

## 2023-12-27 RX ORDER — METHYLPREDNISOLONE SODIUM SUCCINATE 40 MG/ML
20 INJECTION, POWDER, LYOPHILIZED, FOR SOLUTION INTRAMUSCULAR; INTRAVENOUS EVERY 12 HOURS
Status: COMPLETED | OUTPATIENT
Start: 2023-12-27 | End: 2023-12-28

## 2023-12-27 RX ADMIN — LABETALOL HYDROCHLORIDE 300 MG: 200 TABLET, FILM COATED ORAL at 05:51

## 2023-12-27 RX ADMIN — PROPOFOL 40 MCG/KG/MIN: 10 INJECTION, EMULSION INTRAVENOUS at 02:02

## 2023-12-27 RX ADMIN — SODIUM CHLORIDE, PRESERVATIVE FREE 10 ML: 5 INJECTION INTRAVENOUS at 09:33

## 2023-12-27 RX ADMIN — BUDESONIDE INHALATION 500 MCG: 0.5 SUSPENSION RESPIRATORY (INHALATION) at 20:46

## 2023-12-27 RX ADMIN — INSULIN LISPRO 6 UNITS: 100 INJECTION, SOLUTION INTRAVENOUS; SUBCUTANEOUS at 00:35

## 2023-12-27 RX ADMIN — DEXMEDETOMIDINE HYDROCHLORIDE 0.4 MCG/KG/HR: 400 INJECTION, SOLUTION INTRAVENOUS at 09:36

## 2023-12-27 RX ADMIN — CLONIDINE HYDROCHLORIDE 0.2 MG: 0.1 TABLET ORAL at 21:12

## 2023-12-27 RX ADMIN — MEROPENEM 1000 MG: 1 INJECTION, POWDER, FOR SOLUTION INTRAVENOUS at 05:50

## 2023-12-27 RX ADMIN — SODIUM CHLORIDE, PRESERVATIVE FREE 10 ML: 5 INJECTION INTRAVENOUS at 21:12

## 2023-12-27 RX ADMIN — HYDRALAZINE HYDROCHLORIDE 20 MG: 20 INJECTION INTRAMUSCULAR; INTRAVENOUS at 03:05

## 2023-12-27 RX ADMIN — IPRATROPIUM BROMIDE AND ALBUTEROL SULFATE 1 DOSE: 2.5; .5 SOLUTION RESPIRATORY (INHALATION) at 14:05

## 2023-12-27 RX ADMIN — LABETALOL HYDROCHLORIDE 300 MG: 200 TABLET, FILM COATED ORAL at 22:36

## 2023-12-27 RX ADMIN — ACETAMINOPHEN 650 MG: 325 TABLET ORAL at 00:39

## 2023-12-27 RX ADMIN — ACETAZOLAMIDE 500 MG: 250 TABLET ORAL at 14:28

## 2023-12-27 RX ADMIN — INSULIN LISPRO 6 UNITS: 100 INJECTION, SOLUTION INTRAVENOUS; SUBCUTANEOUS at 17:28

## 2023-12-27 RX ADMIN — CLONIDINE HYDROCHLORIDE 0.2 MG: 0.1 TABLET ORAL at 09:29

## 2023-12-27 RX ADMIN — PROPOFOL 40 MCG/KG/MIN: 10 INJECTION, EMULSION INTRAVENOUS at 12:38

## 2023-12-27 RX ADMIN — INSULIN LISPRO 8 UNITS: 100 INJECTION, SOLUTION INTRAVENOUS; SUBCUTANEOUS at 12:31

## 2023-12-27 RX ADMIN — CLONIDINE HYDROCHLORIDE 0.2 MG: 0.1 TABLET ORAL at 12:32

## 2023-12-27 RX ADMIN — ACETAZOLAMIDE 500 MG: 250 TABLET ORAL at 09:29

## 2023-12-27 RX ADMIN — LABETALOL HYDROCHLORIDE 300 MG: 200 TABLET, FILM COATED ORAL at 14:27

## 2023-12-27 RX ADMIN — SODIUM CHLORIDE, PRESERVATIVE FREE 20 MG: 5 INJECTION INTRAVENOUS at 09:29

## 2023-12-27 RX ADMIN — IPRATROPIUM BROMIDE AND ALBUTEROL SULFATE 1 DOSE: 2.5; .5 SOLUTION RESPIRATORY (INHALATION) at 20:46

## 2023-12-27 RX ADMIN — HYDRALAZINE HYDROCHLORIDE 50 MG: 50 TABLET, FILM COATED ORAL at 05:51

## 2023-12-27 RX ADMIN — HYDRALAZINE HYDROCHLORIDE 50 MG: 50 TABLET, FILM COATED ORAL at 00:35

## 2023-12-27 RX ADMIN — IPRATROPIUM BROMIDE AND ALBUTEROL SULFATE 1 DOSE: 2.5; .5 SOLUTION RESPIRATORY (INHALATION) at 08:16

## 2023-12-27 RX ADMIN — LEVETIRACETAM 750 MG: 100 INJECTION, SOLUTION INTRAVENOUS at 21:05

## 2023-12-27 RX ADMIN — HYDRALAZINE HYDROCHLORIDE 50 MG: 50 TABLET, FILM COATED ORAL at 17:28

## 2023-12-27 RX ADMIN — HYDRALAZINE HYDROCHLORIDE 50 MG: 50 TABLET, FILM COATED ORAL at 10:42

## 2023-12-27 RX ADMIN — Medication 50 MCG/HR: at 14:29

## 2023-12-27 RX ADMIN — INSULIN GLARGINE 35 UNITS: 100 INJECTION, SOLUTION SUBCUTANEOUS at 09:26

## 2023-12-27 RX ADMIN — AMLODIPINE BESYLATE 10 MG: 5 TABLET ORAL at 09:29

## 2023-12-27 RX ADMIN — LEVETIRACETAM 750 MG: 100 INJECTION, SOLUTION INTRAVENOUS at 09:23

## 2023-12-27 RX ADMIN — WHITE PETROLATUM,ZINC OXIDE: 57; 17 PASTE TOPICAL at 09:34

## 2023-12-27 RX ADMIN — PROPOFOL 40 MCG/KG/MIN: 10 INJECTION, EMULSION INTRAVENOUS at 07:00

## 2023-12-27 RX ADMIN — METHYLPREDNISOLONE SODIUM SUCCINATE 20 MG: 40 INJECTION INTRAMUSCULAR; INTRAVENOUS at 09:26

## 2023-12-27 RX ADMIN — INSULIN LISPRO 6 UNITS: 100 INJECTION, SOLUTION INTRAVENOUS; SUBCUTANEOUS at 06:10

## 2023-12-27 RX ADMIN — MEROPENEM 1000 MG: 1 INJECTION, POWDER, FOR SOLUTION INTRAVENOUS at 12:32

## 2023-12-27 RX ADMIN — MEROPENEM 1000 MG: 1 INJECTION, POWDER, FOR SOLUTION INTRAVENOUS at 21:01

## 2023-12-27 RX ADMIN — BUDESONIDE INHALATION 500 MCG: 0.5 SUSPENSION RESPIRATORY (INHALATION) at 08:25

## 2023-12-27 RX ADMIN — ENOXAPARIN SODIUM 30 MG: 100 INJECTION SUBCUTANEOUS at 09:23

## 2023-12-27 RX ADMIN — METHYLPREDNISOLONE SODIUM SUCCINATE 20 MG: 40 INJECTION INTRAMUSCULAR; INTRAVENOUS at 22:36

## 2023-12-27 RX ADMIN — VANCOMYCIN HYDROCHLORIDE 1250 MG: 1.25 INJECTION, POWDER, LYOPHILIZED, FOR SOLUTION INTRAVENOUS at 09:28

## 2023-12-27 RX ADMIN — WHITE PETROLATUM,ZINC OXIDE: 57; 17 PASTE TOPICAL at 14:35

## 2023-12-27 RX ADMIN — FUROSEMIDE 40 MG: 10 INJECTION, SOLUTION INTRAMUSCULAR; INTRAVENOUS at 09:24

## 2023-12-27 RX ADMIN — CLONIDINE HYDROCHLORIDE 0.2 MG: 0.1 TABLET ORAL at 17:28

## 2023-12-27 RX ADMIN — PROPOFOL 40 MCG/KG/MIN: 10 INJECTION, EMULSION INTRAVENOUS at 17:30

## 2023-12-27 RX ADMIN — ATORVASTATIN CALCIUM 40 MG: 40 TABLET, FILM COATED ORAL at 21:12

## 2023-12-27 RX ADMIN — WHITE PETROLATUM,ZINC OXIDE: 57; 17 PASTE TOPICAL at 21:12

## 2023-12-27 ASSESSMENT — PULMONARY FUNCTION TESTS
PIF_VALUE: 17
PIF_VALUE: 23
PIF_VALUE: 21
PIF_VALUE: 18
PIF_VALUE: 21
PIF_VALUE: 21
PIF_VALUE: 18
PIF_VALUE: 21
PIF_VALUE: 22
PIF_VALUE: 19
PIF_VALUE: 21
PIF_VALUE: 23
PIF_VALUE: 20
PIF_VALUE: 18
PIF_VALUE: 21
PIF_VALUE: 17
PIF_VALUE: 23
PIF_VALUE: 17
PIF_VALUE: 21

## 2023-12-28 ENCOUNTER — APPOINTMENT (OUTPATIENT)
Facility: HOSPITAL | Age: 53
DRG: 981 | End: 2023-12-28

## 2023-12-28 LAB
ALBUMIN SERPL-MCNC: 2.1 G/DL (ref 3.5–5)
ANION GAP SERPL CALC-SCNC: 7 MMOL/L (ref 5–15)
ARTERIAL PATENCY WRIST A: YES
BASE DEFICIT BLDA-SCNC: 1.2 MMOL/L
BASOPHILS # BLD: 0 K/UL (ref 0–0.1)
BASOPHILS NFR BLD: 0 % (ref 0–1)
BDY SITE: ABNORMAL
BODY TEMPERATURE: 99.7
BUN SERPL-MCNC: 69 MG/DL (ref 6–20)
BUN/CREAT SERPL: 52 (ref 12–20)
C DIFF GDH STL QL: NEGATIVE
C DIFF TOX A+B STL QL IA: NEGATIVE
C DIFF TOXIN INTERPRETATION: NORMAL
CA-I BLD-MCNC: 8.3 MG/DL (ref 8.5–10.1)
CHLORIDE SERPL-SCNC: 108 MMOL/L (ref 97–108)
CO2 SERPL-SCNC: 24 MMOL/L (ref 21–32)
COHGB MFR BLD: 0.3 % (ref 1–2)
CREAT SERPL-MCNC: 1.33 MG/DL (ref 0.55–1.02)
CRP SERPL-MCNC: 3.54 MG/DL (ref 0–0.6)
D DIMER PPP FEU-MCNC: 2.16 UG/ML(FEU)
DIFFERENTIAL METHOD BLD: ABNORMAL
EOSINOPHIL # BLD: 0.1 K/UL (ref 0–0.4)
EOSINOPHIL NFR BLD: 0 % (ref 0–7)
ERYTHROCYTE [DISTWIDTH] IN BLOOD BY AUTOMATED COUNT: 14 % (ref 11.5–14.5)
ERYTHROCYTE [SEDIMENTATION RATE] IN BLOOD: 128 MM/HR (ref 0–30)
FIO2 ON VENT: 35 %
GLUCOSE BLD STRIP.AUTO-MCNC: 310 MG/DL (ref 65–100)
GLUCOSE BLD STRIP.AUTO-MCNC: 364 MG/DL (ref 65–100)
GLUCOSE BLD STRIP.AUTO-MCNC: 421 MG/DL (ref 65–100)
GLUCOSE BLD STRIP.AUTO-MCNC: 449 MG/DL (ref 65–100)
GLUCOSE SERPL-MCNC: 410 MG/DL (ref 65–100)
HCO3 BLDA-SCNC: 22 MMOL/L (ref 22–26)
HCT VFR BLD AUTO: 30 % (ref 35–47)
HGB BLD-MCNC: 9.4 G/DL (ref 11.5–16)
IMM GRANULOCYTES # BLD AUTO: 0.3 K/UL (ref 0–0.04)
IMM GRANULOCYTES NFR BLD AUTO: 1 % (ref 0–0.5)
LYMPHOCYTES # BLD: 2 K/UL (ref 0.8–3.5)
LYMPHOCYTES NFR BLD: 9 % (ref 12–49)
MAGNESIUM SERPL-MCNC: 2.6 MG/DL (ref 1.6–2.4)
MCH RBC QN AUTO: 29.8 PG (ref 26–34)
MCHC RBC AUTO-ENTMCNC: 31.3 G/DL (ref 30–36.5)
MCV RBC AUTO: 95.2 FL (ref 80–99)
METHGB MFR BLD: 0.5 % (ref 0–1.4)
MONOCYTES # BLD: 1.1 K/UL (ref 0–1)
MONOCYTES NFR BLD: 5 % (ref 5–13)
NEUTS SEG # BLD: 17.7 K/UL (ref 1.8–8)
NEUTS SEG NFR BLD: 85 % (ref 32–75)
NRBC # BLD: 0.02 K/UL (ref 0–0.01)
NRBC BLD-RTO: 0.1 PER 100 WBC
OXYHGB MFR BLD: 94.3 % (ref 95–99)
PCO2 BLDA: 33 MMHG (ref 35–45)
PEEP RESPIRATORY: 5
PERFORMED BY:: ABNORMAL
PH BLDA: 7.45 (ref 7.35–7.45)
PHOSPHATE SERPL-MCNC: 5.3 MG/DL (ref 2.6–4.7)
PLATELET # BLD AUTO: 586 K/UL (ref 150–400)
PMV BLD AUTO: 10.8 FL (ref 8.9–12.9)
PO2 BLDA: 81 MMHG (ref 80–100)
POTASSIUM SERPL-SCNC: 4.3 MMOL/L (ref 3.5–5.1)
PRESSURE SUPPORT SETTING VENT: 12
PROCALCITONIN SERPL-MCNC: 0.93 NG/ML
RBC # BLD AUTO: 3.15 M/UL (ref 3.8–5.2)
SAO2 % BLD: 95 % (ref 95–99)
SAO2% DEVICE SAO2% SENSOR NAME: ABNORMAL
SODIUM SERPL-SCNC: 139 MMOL/L (ref 136–145)
SPECIMEN SITE: ABNORMAL
VENTILATION MODE VENT: ABNORMAL
WBC # BLD AUTO: 21.2 K/UL (ref 3.6–11)

## 2023-12-28 PROCEDURE — 86140 C-REACTIVE PROTEIN: CPT

## 2023-12-28 PROCEDURE — 85652 RBC SED RATE AUTOMATED: CPT

## 2023-12-28 PROCEDURE — 6360000002 HC RX W HCPCS: Performed by: INTERNAL MEDICINE

## 2023-12-28 PROCEDURE — 2500000003 HC RX 250 WO HCPCS: Performed by: INTERNAL MEDICINE

## 2023-12-28 PROCEDURE — 6370000000 HC RX 637 (ALT 250 FOR IP): Performed by: SPECIALIST

## 2023-12-28 PROCEDURE — 2000000000 HC ICU R&B

## 2023-12-28 PROCEDURE — 85379 FIBRIN DEGRADATION QUANT: CPT

## 2023-12-28 PROCEDURE — 82962 GLUCOSE BLOOD TEST: CPT

## 2023-12-28 PROCEDURE — 94640 AIRWAY INHALATION TREATMENT: CPT

## 2023-12-28 PROCEDURE — 36600 WITHDRAWAL OF ARTERIAL BLOOD: CPT

## 2023-12-28 PROCEDURE — 84145 PROCALCITONIN (PCT): CPT

## 2023-12-28 PROCEDURE — 85025 COMPLETE CBC W/AUTO DIFF WBC: CPT

## 2023-12-28 PROCEDURE — 2580000003 HC RX 258: Performed by: INTERNAL MEDICINE

## 2023-12-28 PROCEDURE — 6370000000 HC RX 637 (ALT 250 FOR IP): Performed by: INTERNAL MEDICINE

## 2023-12-28 PROCEDURE — 36415 COLL VENOUS BLD VENIPUNCTURE: CPT

## 2023-12-28 PROCEDURE — 6370000000 HC RX 637 (ALT 250 FOR IP): Performed by: HOSPITALIST

## 2023-12-28 PROCEDURE — 6360000002 HC RX W HCPCS: Performed by: PSYCHIATRY & NEUROLOGY

## 2023-12-28 PROCEDURE — 99232 SBSQ HOSP IP/OBS MODERATE 35: CPT | Performed by: INTERNAL MEDICINE

## 2023-12-28 PROCEDURE — 6360000002 HC RX W HCPCS: Performed by: STUDENT IN AN ORGANIZED HEALTH CARE EDUCATION/TRAINING PROGRAM

## 2023-12-28 PROCEDURE — 83735 ASSAY OF MAGNESIUM: CPT

## 2023-12-28 PROCEDURE — 94761 N-INVAS EAR/PLS OXIMETRY MLT: CPT

## 2023-12-28 PROCEDURE — 82803 BLOOD GASES ANY COMBINATION: CPT

## 2023-12-28 PROCEDURE — 2700000000 HC OXYGEN THERAPY PER DAY

## 2023-12-28 PROCEDURE — 80069 RENAL FUNCTION PANEL: CPT

## 2023-12-28 PROCEDURE — 71045 X-RAY EXAM CHEST 1 VIEW: CPT

## 2023-12-28 PROCEDURE — 94003 VENT MGMT INPAT SUBQ DAY: CPT

## 2023-12-28 RX ORDER — INSULIN GLARGINE 100 [IU]/ML
25 INJECTION, SOLUTION SUBCUTANEOUS 2 TIMES DAILY
Status: DISCONTINUED | OUTPATIENT
Start: 2023-12-28 | End: 2023-12-30

## 2023-12-28 RX ORDER — LACTOBACILLUS RHAMNOSUS GG 10B CELL
1 CAPSULE ORAL 2 TIMES DAILY
Status: DISCONTINUED | OUTPATIENT
Start: 2023-12-28 | End: 2024-01-24 | Stop reason: HOSPADM

## 2023-12-28 RX ORDER — L. ACIDOPHILUS/PECTIN, CITRUS 25MM-100MG
1 TABLET ORAL 2 TIMES DAILY
Status: DISCONTINUED | OUTPATIENT
Start: 2023-12-28 | End: 2023-12-28

## 2023-12-28 RX ORDER — FLUCONAZOLE 2 MG/ML
200 INJECTION, SOLUTION INTRAVENOUS EVERY 24 HOURS
Status: DISCONTINUED | OUTPATIENT
Start: 2023-12-28 | End: 2024-01-02

## 2023-12-28 RX ORDER — INSULIN LISPRO 100 [IU]/ML
0-16 INJECTION, SOLUTION INTRAVENOUS; SUBCUTANEOUS EVERY 6 HOURS SCHEDULED
Status: DISCONTINUED | OUTPATIENT
Start: 2023-12-28 | End: 2024-01-24 | Stop reason: HOSPADM

## 2023-12-28 RX ORDER — INSULIN LISPRO 100 [IU]/ML
0-4 INJECTION, SOLUTION INTRAVENOUS; SUBCUTANEOUS NIGHTLY
Status: DISCONTINUED | OUTPATIENT
Start: 2023-12-28 | End: 2023-12-28

## 2023-12-28 RX ORDER — INSULIN LISPRO 100 [IU]/ML
0-16 INJECTION, SOLUTION INTRAVENOUS; SUBCUTANEOUS
Status: DISCONTINUED | OUTPATIENT
Start: 2023-12-28 | End: 2023-12-28

## 2023-12-28 RX ORDER — LACTOBACILLUS RHAMNOSUS GG 10B CELL
1 CAPSULE ORAL 2 TIMES DAILY
Status: DISCONTINUED | OUTPATIENT
Start: 2023-12-28 | End: 2023-12-28

## 2023-12-28 RX ADMIN — WHITE PETROLATUM,ZINC OXIDE: 57; 17 PASTE TOPICAL at 21:30

## 2023-12-28 RX ADMIN — FUROSEMIDE 40 MG: 10 INJECTION, SOLUTION INTRAMUSCULAR; INTRAVENOUS at 08:53

## 2023-12-28 RX ADMIN — WHITE PETROLATUM,ZINC OXIDE: 57; 17 PASTE TOPICAL at 08:57

## 2023-12-28 RX ADMIN — MEROPENEM 1000 MG: 1 INJECTION, POWDER, FOR SOLUTION INTRAVENOUS at 21:29

## 2023-12-28 RX ADMIN — ACETAMINOPHEN 650 MG: 325 TABLET ORAL at 00:11

## 2023-12-28 RX ADMIN — SODIUM CHLORIDE, PRESERVATIVE FREE 20 MG: 5 INJECTION INTRAVENOUS at 08:52

## 2023-12-28 RX ADMIN — DEXMEDETOMIDINE HYDROCHLORIDE 0.4 MCG/KG/HR: 400 INJECTION, SOLUTION INTRAVENOUS at 00:22

## 2023-12-28 RX ADMIN — WHITE PETROLATUM,ZINC OXIDE: 57; 17 PASTE TOPICAL at 14:47

## 2023-12-28 RX ADMIN — LABETALOL HYDROCHLORIDE 300 MG: 200 TABLET, FILM COATED ORAL at 06:41

## 2023-12-28 RX ADMIN — IPRATROPIUM BROMIDE AND ALBUTEROL SULFATE 1 DOSE: 2.5; .5 SOLUTION RESPIRATORY (INHALATION) at 21:19

## 2023-12-28 RX ADMIN — Medication 50 MCG/HR: at 09:02

## 2023-12-28 RX ADMIN — LEVETIRACETAM 750 MG: 100 INJECTION, SOLUTION INTRAVENOUS at 21:27

## 2023-12-28 RX ADMIN — SODIUM CHLORIDE, PRESERVATIVE FREE 10 ML: 5 INJECTION INTRAVENOUS at 21:30

## 2023-12-28 RX ADMIN — SODIUM CHLORIDE, PRESERVATIVE FREE 10 ML: 5 INJECTION INTRAVENOUS at 08:58

## 2023-12-28 RX ADMIN — CLONIDINE HYDROCHLORIDE 0.2 MG: 0.1 TABLET ORAL at 16:34

## 2023-12-28 RX ADMIN — PROPOFOL 35 MCG/KG/MIN: 10 INJECTION, EMULSION INTRAVENOUS at 06:38

## 2023-12-28 RX ADMIN — HYDRALAZINE HYDROCHLORIDE 50 MG: 50 TABLET, FILM COATED ORAL at 00:12

## 2023-12-28 RX ADMIN — Medication 1 CAPSULE: at 21:36

## 2023-12-28 RX ADMIN — CLONIDINE HYDROCHLORIDE 0.2 MG: 0.1 TABLET ORAL at 08:56

## 2023-12-28 RX ADMIN — INSULIN LISPRO 8 UNITS: 100 INJECTION, SOLUTION INTRAVENOUS; SUBCUTANEOUS at 06:37

## 2023-12-28 RX ADMIN — INSULIN GLARGINE 35 UNITS: 100 INJECTION, SOLUTION SUBCUTANEOUS at 09:01

## 2023-12-28 RX ADMIN — PROPOFOL 35 MCG/KG/MIN: 10 INJECTION, EMULSION INTRAVENOUS at 14:00

## 2023-12-28 RX ADMIN — METHYLPREDNISOLONE SODIUM SUCCINATE 20 MG: 40 INJECTION INTRAMUSCULAR; INTRAVENOUS at 08:51

## 2023-12-28 RX ADMIN — ACETAMINOPHEN 650 MG: 325 TABLET ORAL at 21:47

## 2023-12-28 RX ADMIN — INSULIN LISPRO 12 UNITS: 100 INJECTION, SOLUTION INTRAVENOUS; SUBCUTANEOUS at 16:43

## 2023-12-28 RX ADMIN — MEROPENEM 1000 MG: 1 INJECTION, POWDER, FOR SOLUTION INTRAVENOUS at 12:00

## 2023-12-28 RX ADMIN — BUDESONIDE INHALATION 500 MCG: 0.5 SUSPENSION RESPIRATORY (INHALATION) at 08:28

## 2023-12-28 RX ADMIN — CLONIDINE HYDROCHLORIDE 0.2 MG: 0.1 TABLET ORAL at 21:29

## 2023-12-28 RX ADMIN — IPRATROPIUM BROMIDE AND ALBUTEROL SULFATE 1 DOSE: 2.5; .5 SOLUTION RESPIRATORY (INHALATION) at 08:28

## 2023-12-28 RX ADMIN — INSULIN LISPRO 16 UNITS: 100 INJECTION, SOLUTION INTRAVENOUS; SUBCUTANEOUS at 12:01

## 2023-12-28 RX ADMIN — ENOXAPARIN SODIUM 30 MG: 100 INJECTION SUBCUTANEOUS at 08:55

## 2023-12-28 RX ADMIN — ATORVASTATIN CALCIUM 40 MG: 40 TABLET, FILM COATED ORAL at 21:28

## 2023-12-28 RX ADMIN — INSULIN LISPRO 8 UNITS: 100 INJECTION, SOLUTION INTRAVENOUS; SUBCUTANEOUS at 00:11

## 2023-12-28 RX ADMIN — IPRATROPIUM BROMIDE AND ALBUTEROL SULFATE 1 DOSE: 2.5; .5 SOLUTION RESPIRATORY (INHALATION) at 13:04

## 2023-12-28 RX ADMIN — MEROPENEM 1000 MG: 1 INJECTION, POWDER, FOR SOLUTION INTRAVENOUS at 05:08

## 2023-12-28 RX ADMIN — PROPOFOL 35 MCG/KG/MIN: 10 INJECTION, EMULSION INTRAVENOUS at 19:40

## 2023-12-28 RX ADMIN — LABETALOL HYDROCHLORIDE 300 MG: 200 TABLET, FILM COATED ORAL at 21:42

## 2023-12-28 RX ADMIN — INSULIN GLARGINE 25 UNITS: 100 INJECTION, SOLUTION SUBCUTANEOUS at 21:29

## 2023-12-28 RX ADMIN — BUDESONIDE INHALATION 500 MCG: 0.5 SUSPENSION RESPIRATORY (INHALATION) at 21:19

## 2023-12-28 RX ADMIN — AMLODIPINE BESYLATE 10 MG: 5 TABLET ORAL at 08:56

## 2023-12-28 RX ADMIN — HYDRALAZINE HYDROCHLORIDE 50 MG: 50 TABLET, FILM COATED ORAL at 06:39

## 2023-12-28 RX ADMIN — HYDRALAZINE HYDROCHLORIDE 50 MG: 50 TABLET, FILM COATED ORAL at 16:34

## 2023-12-28 RX ADMIN — DEXMEDETOMIDINE HYDROCHLORIDE 0.4 MCG/KG/HR: 400 INJECTION, SOLUTION INTRAVENOUS at 16:33

## 2023-12-28 RX ADMIN — CLONIDINE HYDROCHLORIDE 0.2 MG: 0.1 TABLET ORAL at 12:01

## 2023-12-28 RX ADMIN — LEVETIRACETAM 750 MG: 100 INJECTION, SOLUTION INTRAVENOUS at 08:57

## 2023-12-28 RX ADMIN — FLUCONAZOLE 200 MG: 200 INJECTION, SOLUTION INTRAVENOUS at 18:46

## 2023-12-28 RX ADMIN — HYDRALAZINE HYDROCHLORIDE 50 MG: 50 TABLET, FILM COATED ORAL at 12:01

## 2023-12-28 RX ADMIN — LABETALOL HYDROCHLORIDE 300 MG: 200 TABLET, FILM COATED ORAL at 14:46

## 2023-12-28 RX ADMIN — PROPOFOL 40 MCG/KG/MIN: 10 INJECTION, EMULSION INTRAVENOUS at 00:12

## 2023-12-28 ASSESSMENT — PULMONARY FUNCTION TESTS
PIF_VALUE: 18
PIF_VALUE: 18
PIF_VALUE: 17
PIF_VALUE: 18
PIF_VALUE: 17
PIF_VALUE: 18
PIF_VALUE: 17
PIF_VALUE: 18
PIF_VALUE: 17

## 2023-12-29 ENCOUNTER — APPOINTMENT (OUTPATIENT)
Facility: HOSPITAL | Age: 53
DRG: 981 | End: 2023-12-29

## 2023-12-29 LAB
ALBUMIN SERPL-MCNC: 2.2 G/DL (ref 3.5–5)
ANION GAP SERPL CALC-SCNC: 8 MMOL/L (ref 5–15)
ARTERIAL PATENCY WRIST A: YES
BASE DEFICIT BLDA-SCNC: 2.4 MMOL/L
BASOPHILS # BLD: 0 K/UL (ref 0–0.1)
BASOPHILS NFR BLD: 0 % (ref 0–1)
BDY SITE: ABNORMAL
BODY TEMPERATURE: 99.7
BUN SERPL-MCNC: 79 MG/DL (ref 6–20)
BUN/CREAT SERPL: 64 (ref 12–20)
CA-I BLD-MCNC: 8.8 MG/DL (ref 8.5–10.1)
CHLORIDE SERPL-SCNC: 112 MMOL/L (ref 97–108)
CO2 SERPL-SCNC: 25 MMOL/L (ref 21–32)
COHGB MFR BLD: 0.3 % (ref 1–2)
CREAT SERPL-MCNC: 1.23 MG/DL (ref 0.55–1.02)
DIFFERENTIAL METHOD BLD: ABNORMAL
EOSINOPHIL # BLD: 0.4 K/UL (ref 0–0.4)
EOSINOPHIL NFR BLD: 2 % (ref 0–7)
ERYTHROCYTE [DISTWIDTH] IN BLOOD BY AUTOMATED COUNT: 13.8 % (ref 11.5–14.5)
FIO2 ON VENT: 30 %
GLUCOSE BLD STRIP.AUTO-MCNC: 148 MG/DL (ref 65–100)
GLUCOSE BLD STRIP.AUTO-MCNC: 241 MG/DL (ref 65–100)
GLUCOSE BLD STRIP.AUTO-MCNC: 258 MG/DL (ref 65–100)
GLUCOSE BLD STRIP.AUTO-MCNC: 292 MG/DL (ref 65–100)
GLUCOSE SERPL-MCNC: 242 MG/DL (ref 65–100)
HCO3 BLDA-SCNC: 22 MMOL/L (ref 22–26)
HCT VFR BLD AUTO: 28.9 % (ref 35–47)
HGB BLD-MCNC: 9.2 G/DL (ref 11.5–16)
IMM GRANULOCYTES # BLD AUTO: 0.2 K/UL (ref 0–0.04)
IMM GRANULOCYTES NFR BLD AUTO: 1 % (ref 0–0.5)
LYMPHOCYTES # BLD: 3.6 K/UL (ref 0.8–3.5)
LYMPHOCYTES NFR BLD: 17 % (ref 12–49)
MCH RBC QN AUTO: 30.2 PG (ref 26–34)
MCHC RBC AUTO-ENTMCNC: 31.8 G/DL (ref 30–36.5)
MCV RBC AUTO: 94.8 FL (ref 80–99)
METHGB MFR BLD: 0.6 % (ref 0–1.4)
MONOCYTES # BLD: 1.2 K/UL (ref 0–1)
MONOCYTES NFR BLD: 6 % (ref 5–13)
NEUTS SEG # BLD: 15.7 K/UL (ref 1.8–8)
NEUTS SEG NFR BLD: 74 % (ref 32–75)
NRBC # BLD: 0 K/UL (ref 0–0.01)
NRBC BLD-RTO: 0 PER 100 WBC
OXYHGB MFR BLD: 91.4 % (ref 95–99)
PCO2 BLDA: 33 MMHG (ref 35–45)
PEEP RESPIRATORY: 5
PERFORMED BY:: ABNORMAL
PH BLDA: 7.43 (ref 7.35–7.45)
PHOSPHATE SERPL-MCNC: 4.7 MG/DL (ref 2.6–4.7)
PLATELET # BLD AUTO: 553 K/UL (ref 150–400)
PMV BLD AUTO: 10.9 FL (ref 8.9–12.9)
PO2 BLDA: 69 MMHG (ref 80–100)
POTASSIUM SERPL-SCNC: 3.4 MMOL/L (ref 3.5–5.1)
PRESSURE SUPPORT SETTING VENT: 6
RBC # BLD AUTO: 3.05 M/UL (ref 3.8–5.2)
SAO2 % BLD: 92 % (ref 95–99)
SAO2% DEVICE SAO2% SENSOR NAME: ABNORMAL
SODIUM SERPL-SCNC: 145 MMOL/L (ref 136–145)
SPECIMEN SITE: ABNORMAL
VENTILATION MODE VENT: ABNORMAL
WBC # BLD AUTO: 21.1 K/UL (ref 3.6–11)

## 2023-12-29 PROCEDURE — 36600 WITHDRAWAL OF ARTERIAL BLOOD: CPT

## 2023-12-29 PROCEDURE — 99232 SBSQ HOSP IP/OBS MODERATE 35: CPT | Performed by: INTERNAL MEDICINE

## 2023-12-29 PROCEDURE — 6370000000 HC RX 637 (ALT 250 FOR IP): Performed by: SPECIALIST

## 2023-12-29 PROCEDURE — 6370000000 HC RX 637 (ALT 250 FOR IP): Performed by: HOSPITALIST

## 2023-12-29 PROCEDURE — 6360000002 HC RX W HCPCS: Performed by: INTERNAL MEDICINE

## 2023-12-29 PROCEDURE — 2500000003 HC RX 250 WO HCPCS: Performed by: INTERNAL MEDICINE

## 2023-12-29 PROCEDURE — 87070 CULTURE OTHR SPECIMN AEROBIC: CPT

## 2023-12-29 PROCEDURE — 82962 GLUCOSE BLOOD TEST: CPT

## 2023-12-29 PROCEDURE — 6370000000 HC RX 637 (ALT 250 FOR IP): Performed by: NURSE PRACTITIONER

## 2023-12-29 PROCEDURE — 6360000002 HC RX W HCPCS: Performed by: STUDENT IN AN ORGANIZED HEALTH CARE EDUCATION/TRAINING PROGRAM

## 2023-12-29 PROCEDURE — 94761 N-INVAS EAR/PLS OXIMETRY MLT: CPT

## 2023-12-29 PROCEDURE — 6370000000 HC RX 637 (ALT 250 FOR IP): Performed by: INTERNAL MEDICINE

## 2023-12-29 PROCEDURE — 31500 INSERT EMERGENCY AIRWAY: CPT

## 2023-12-29 PROCEDURE — 80069 RENAL FUNCTION PANEL: CPT

## 2023-12-29 PROCEDURE — 87205 SMEAR GRAM STAIN: CPT

## 2023-12-29 PROCEDURE — 82803 BLOOD GASES ANY COMBINATION: CPT

## 2023-12-29 PROCEDURE — 85025 COMPLETE CBC W/AUTO DIFF WBC: CPT

## 2023-12-29 PROCEDURE — 94640 AIRWAY INHALATION TREATMENT: CPT

## 2023-12-29 PROCEDURE — 2580000003 HC RX 258: Performed by: INTERNAL MEDICINE

## 2023-12-29 PROCEDURE — 2000000000 HC ICU R&B

## 2023-12-29 PROCEDURE — 6360000002 HC RX W HCPCS: Performed by: PSYCHIATRY & NEUROLOGY

## 2023-12-29 PROCEDURE — 71045 X-RAY EXAM CHEST 1 VIEW: CPT

## 2023-12-29 PROCEDURE — 2700000000 HC OXYGEN THERAPY PER DAY

## 2023-12-29 PROCEDURE — 36415 COLL VENOUS BLD VENIPUNCTURE: CPT

## 2023-12-29 RX ORDER — DEXMEDETOMIDINE HYDROCHLORIDE 4 UG/ML
.1-1.5 INJECTION, SOLUTION INTRAVENOUS CONTINUOUS
Status: DISCONTINUED | OUTPATIENT
Start: 2023-12-29 | End: 2023-12-30

## 2023-12-29 RX ORDER — HYDRALAZINE HYDROCHLORIDE 25 MG/1
50 TABLET, FILM COATED ORAL EVERY 8 HOURS SCHEDULED
Status: DISCONTINUED | OUTPATIENT
Start: 2023-12-29 | End: 2024-01-08

## 2023-12-29 RX ORDER — PROPOFOL 10 MG/ML
5-50 INJECTION, EMULSION INTRAVENOUS CONTINUOUS
Status: DISCONTINUED | OUTPATIENT
Start: 2023-12-29 | End: 2023-12-30

## 2023-12-29 RX ORDER — POTASSIUM CHLORIDE 1.5 G/1.58G
40 POWDER, FOR SOLUTION ORAL ONCE
Status: DISCONTINUED | OUTPATIENT
Start: 2023-12-29 | End: 2023-12-29

## 2023-12-29 RX ORDER — CLONIDINE HYDROCHLORIDE 0.1 MG/1
0.2 TABLET ORAL 3 TIMES DAILY
Status: DISCONTINUED | OUTPATIENT
Start: 2023-12-29 | End: 2023-12-31

## 2023-12-29 RX ADMIN — Medication 50 MCG/HR: at 04:05

## 2023-12-29 RX ADMIN — ATORVASTATIN CALCIUM 40 MG: 40 TABLET, FILM COATED ORAL at 22:03

## 2023-12-29 RX ADMIN — INSULIN GLARGINE 25 UNITS: 100 INJECTION, SOLUTION SUBCUTANEOUS at 21:00

## 2023-12-29 RX ADMIN — LABETALOL HYDROCHLORIDE 300 MG: 200 TABLET, FILM COATED ORAL at 22:04

## 2023-12-29 RX ADMIN — INSULIN GLARGINE 25 UNITS: 100 INJECTION, SOLUTION SUBCUTANEOUS at 09:26

## 2023-12-29 RX ADMIN — DEXMEDETOMIDINE HYDROCHLORIDE 0.2 MCG/KG/HR: 400 INJECTION, SOLUTION INTRAVENOUS at 22:13

## 2023-12-29 RX ADMIN — IPRATROPIUM BROMIDE AND ALBUTEROL SULFATE 1 DOSE: 2.5; .5 SOLUTION RESPIRATORY (INHALATION) at 13:26

## 2023-12-29 RX ADMIN — LABETALOL HYDROCHLORIDE 300 MG: 200 TABLET, FILM COATED ORAL at 05:15

## 2023-12-29 RX ADMIN — INSULIN LISPRO 8 UNITS: 100 INJECTION, SOLUTION INTRAVENOUS; SUBCUTANEOUS at 00:36

## 2023-12-29 RX ADMIN — MEROPENEM 1000 MG: 1 INJECTION, POWDER, FOR SOLUTION INTRAVENOUS at 21:00

## 2023-12-29 RX ADMIN — FLUCONAZOLE 200 MG: 200 INJECTION, SOLUTION INTRAVENOUS at 17:30

## 2023-12-29 RX ADMIN — HYDRALAZINE HYDROCHLORIDE 50 MG: 50 TABLET, FILM COATED ORAL at 05:15

## 2023-12-29 RX ADMIN — HYDRALAZINE HYDROCHLORIDE 50 MG: 50 TABLET, FILM COATED ORAL at 00:36

## 2023-12-29 RX ADMIN — ACETAMINOPHEN 650 MG: 325 TABLET ORAL at 14:48

## 2023-12-29 RX ADMIN — WHITE PETROLATUM,ZINC OXIDE: 57; 17 PASTE TOPICAL at 09:28

## 2023-12-29 RX ADMIN — WHITE PETROLATUM,ZINC OXIDE: 57; 17 PASTE TOPICAL at 14:51

## 2023-12-29 RX ADMIN — Medication 1 CAPSULE: at 09:27

## 2023-12-29 RX ADMIN — Medication 1 CAPSULE: at 22:07

## 2023-12-29 RX ADMIN — INSULIN LISPRO 8 UNITS: 100 INJECTION, SOLUTION INTRAVENOUS; SUBCUTANEOUS at 14:44

## 2023-12-29 RX ADMIN — SODIUM CHLORIDE, PRESERVATIVE FREE 10 ML: 5 INJECTION INTRAVENOUS at 09:27

## 2023-12-29 RX ADMIN — LABETALOL HYDROCHLORIDE 300 MG: 200 TABLET, FILM COATED ORAL at 14:44

## 2023-12-29 RX ADMIN — LEVETIRACETAM 750 MG: 100 INJECTION, SOLUTION INTRAVENOUS at 09:26

## 2023-12-29 RX ADMIN — CLONIDINE HYDROCHLORIDE 0.2 MG: 0.1 TABLET ORAL at 14:44

## 2023-12-29 RX ADMIN — IPRATROPIUM BROMIDE AND ALBUTEROL SULFATE 1 DOSE: 2.5; .5 SOLUTION RESPIRATORY (INHALATION) at 06:20

## 2023-12-29 RX ADMIN — FUROSEMIDE 40 MG: 10 INJECTION, SOLUTION INTRAMUSCULAR; INTRAVENOUS at 09:26

## 2023-12-29 RX ADMIN — AMLODIPINE BESYLATE 10 MG: 5 TABLET ORAL at 09:26

## 2023-12-29 RX ADMIN — WHITE PETROLATUM,ZINC OXIDE: 57; 17 PASTE TOPICAL at 22:05

## 2023-12-29 RX ADMIN — PROPOFOL 35 MCG/KG/MIN: 10 INJECTION, EMULSION INTRAVENOUS at 01:19

## 2023-12-29 RX ADMIN — SODIUM CHLORIDE, PRESERVATIVE FREE 20 MG: 5 INJECTION INTRAVENOUS at 09:26

## 2023-12-29 RX ADMIN — LEVETIRACETAM 750 MG: 100 INJECTION, SOLUTION INTRAVENOUS at 21:00

## 2023-12-29 RX ADMIN — IPRATROPIUM BROMIDE AND ALBUTEROL SULFATE 1 DOSE: 2.5; .5 SOLUTION RESPIRATORY (INHALATION) at 20:42

## 2023-12-29 RX ADMIN — BUDESONIDE INHALATION 500 MCG: 0.5 SUSPENSION RESPIRATORY (INHALATION) at 20:42

## 2023-12-29 RX ADMIN — INSULIN LISPRO 4 UNITS: 100 INJECTION, SOLUTION INTRAVENOUS; SUBCUTANEOUS at 05:14

## 2023-12-29 RX ADMIN — HYDRALAZINE HYDROCHLORIDE 50 MG: 50 TABLET, FILM COATED ORAL at 22:05

## 2023-12-29 RX ADMIN — SODIUM CHLORIDE, PRESERVATIVE FREE 10 ML: 5 INJECTION INTRAVENOUS at 22:05

## 2023-12-29 RX ADMIN — MEROPENEM 1000 MG: 1 INJECTION, POWDER, FOR SOLUTION INTRAVENOUS at 17:29

## 2023-12-29 RX ADMIN — POTASSIUM BICARBONATE 40 MEQ: 782 TABLET, EFFERVESCENT ORAL at 09:26

## 2023-12-29 RX ADMIN — CLONIDINE HYDROCHLORIDE 0.2 MG: 0.1 TABLET ORAL at 21:00

## 2023-12-29 RX ADMIN — DEXMEDETOMIDINE HYDROCHLORIDE 0.4 MCG/KG/HR: 400 INJECTION, SOLUTION INTRAVENOUS at 03:59

## 2023-12-29 RX ADMIN — MEROPENEM 1000 MG: 1 INJECTION, POWDER, FOR SOLUTION INTRAVENOUS at 05:15

## 2023-12-29 RX ADMIN — ENOXAPARIN SODIUM 30 MG: 100 INJECTION SUBCUTANEOUS at 09:26

## 2023-12-29 RX ADMIN — ACETAMINOPHEN 650 MG: 325 TABLET ORAL at 22:03

## 2023-12-29 RX ADMIN — BUDESONIDE INHALATION 500 MCG: 0.5 SUSPENSION RESPIRATORY (INHALATION) at 06:20

## 2023-12-29 ASSESSMENT — PULMONARY FUNCTION TESTS
PIF_VALUE: 17
PIF_VALUE: 12
PIF_VALUE: 11
PIF_VALUE: 11
PIF_VALUE: 12
PIF_VALUE: 12
PIF_VALUE: 14
PIF_VALUE: 12
PIF_VALUE: 11
PIF_VALUE: 12
PIF_VALUE: 14
PIF_VALUE: 11
PIF_VALUE: 14
PIF_VALUE: 11
PIF_VALUE: 14
PIF_VALUE: 12
PIF_VALUE: 14
PIF_VALUE: 12

## 2023-12-29 ASSESSMENT — PAIN SCALES - GENERAL
PAINLEVEL_OUTOF10: 0

## 2023-12-29 NOTE — CARE COORDINATION
CM reviewed Pt medicals, per doctors note, Tolerating ventilator on spontaneous settings will hold propofol and attempt weaning of ventilator today.    Will need PT/OT eval/recommendations when medically stable.

## 2023-12-30 ENCOUNTER — APPOINTMENT (OUTPATIENT)
Facility: HOSPITAL | Age: 53
DRG: 981 | End: 2023-12-30

## 2023-12-30 LAB
ALBUMIN SERPL-MCNC: 2.1 G/DL (ref 3.5–5)
ANION GAP SERPL CALC-SCNC: 5 MMOL/L (ref 5–15)
ARTERIAL PATENCY WRIST A: YES
BASE DEFICIT BLDA-SCNC: 0.5 MMOL/L
BASOPHILS # BLD: 0 K/UL (ref 0–0.1)
BASOPHILS NFR BLD: 0 % (ref 0–1)
BDY SITE: ABNORMAL
BODY TEMPERATURE: 100.3
BUN SERPL-MCNC: 76 MG/DL (ref 6–20)
BUN/CREAT SERPL: 58 (ref 12–20)
CA-I BLD-MCNC: 1.16 MMOL/L (ref 1.13–1.32)
CA-I BLD-MCNC: 8.8 MG/DL (ref 8.5–10.1)
CHLORIDE SERPL-SCNC: 115 MMOL/L (ref 97–108)
CO2 SERPL-SCNC: 25 MMOL/L (ref 21–32)
COHGB MFR BLD: 0.2 % (ref 1–2)
CREAT SERPL-MCNC: 1.32 MG/DL (ref 0.55–1.02)
CRP SERPL-MCNC: 6.03 MG/DL (ref 0–0.6)
DIFFERENTIAL METHOD BLD: ABNORMAL
EOSINOPHIL # BLD: 0.4 K/UL (ref 0–0.4)
EOSINOPHIL NFR BLD: 2 % (ref 0–7)
ERYTHROCYTE [DISTWIDTH] IN BLOOD BY AUTOMATED COUNT: 14 % (ref 11.5–14.5)
FIO2 ON VENT: 30 %
GLUCOSE BLD STRIP.AUTO-MCNC: 251 MG/DL (ref 65–100)
GLUCOSE BLD STRIP.AUTO-MCNC: 263 MG/DL (ref 65–100)
GLUCOSE BLD STRIP.AUTO-MCNC: 307 MG/DL (ref 65–100)
GLUCOSE BLD STRIP.AUTO-MCNC: 314 MG/DL (ref 65–100)
GLUCOSE BLD STRIP.AUTO-MCNC: 346 MG/DL (ref 65–100)
GLUCOSE BLD STRIP.AUTO-MCNC: 361 MG/DL (ref 65–100)
GLUCOSE SERPL-MCNC: 229 MG/DL (ref 65–100)
HCO3 BLDA-SCNC: 23 MMOL/L (ref 22–26)
HCT VFR BLD AUTO: 27.8 % (ref 35–47)
HGB BLD-MCNC: 8.8 G/DL (ref 11.5–16)
IMM GRANULOCYTES # BLD AUTO: 0.2 K/UL (ref 0–0.04)
IMM GRANULOCYTES NFR BLD AUTO: 1 % (ref 0–0.5)
LYMPHOCYTES # BLD: 2.2 K/UL (ref 0.8–3.5)
LYMPHOCYTES NFR BLD: 9 % (ref 12–49)
MCH RBC QN AUTO: 29.7 PG (ref 26–34)
MCHC RBC AUTO-ENTMCNC: 31.7 G/DL (ref 30–36.5)
MCV RBC AUTO: 93.9 FL (ref 80–99)
METHGB MFR BLD: 0.5 % (ref 0–1.4)
MONOCYTES # BLD: 1.4 K/UL (ref 0–1)
MONOCYTES NFR BLD: 6 % (ref 5–13)
NEUTS SEG # BLD: 18.9 K/UL (ref 1.8–8)
NEUTS SEG NFR BLD: 82 % (ref 32–75)
NRBC # BLD: 0 K/UL (ref 0–0.01)
NRBC BLD-RTO: 0 PER 100 WBC
OXYHGB MFR BLD: 94.7 % (ref 95–99)
PCO2 BLDA: 33 MMHG (ref 35–45)
PEEP RESPIRATORY: 5
PERFORMED BY:: ABNORMAL
PH BLDA: 7.46 (ref 7.35–7.45)
PHOSPHATE SERPL-MCNC: 4.6 MG/DL (ref 2.6–4.7)
PLATELET # BLD AUTO: 499 K/UL (ref 150–400)
PMV BLD AUTO: 11.2 FL (ref 8.9–12.9)
PO2 BLDA: 80 MMHG (ref 80–100)
POTASSIUM SERPL-SCNC: 3.9 MMOL/L (ref 3.5–5.1)
PRESSURE SUPPORT SETTING VENT: 6
PROCALCITONIN SERPL-MCNC: 1.77 NG/ML
RBC # BLD AUTO: 2.96 M/UL (ref 3.8–5.2)
SAO2 % BLD: 95 % (ref 95–99)
SAO2% DEVICE SAO2% SENSOR NAME: ABNORMAL
SODIUM SERPL-SCNC: 145 MMOL/L (ref 136–145)
SPECIMEN SITE: ABNORMAL
VENTILATION MODE VENT: ABNORMAL
WBC # BLD AUTO: 23 K/UL (ref 3.6–11)

## 2023-12-30 PROCEDURE — 6360000002 HC RX W HCPCS: Performed by: INTERNAL MEDICINE

## 2023-12-30 PROCEDURE — 6360000002 HC RX W HCPCS: Performed by: PSYCHIATRY & NEUROLOGY

## 2023-12-30 PROCEDURE — 2500000003 HC RX 250 WO HCPCS: Performed by: INTERNAL MEDICINE

## 2023-12-30 PROCEDURE — 6370000000 HC RX 637 (ALT 250 FOR IP): Performed by: INTERNAL MEDICINE

## 2023-12-30 PROCEDURE — 36600 WITHDRAWAL OF ARTERIAL BLOOD: CPT

## 2023-12-30 PROCEDURE — 2700000000 HC OXYGEN THERAPY PER DAY

## 2023-12-30 PROCEDURE — 6370000000 HC RX 637 (ALT 250 FOR IP): Performed by: NURSE PRACTITIONER

## 2023-12-30 PROCEDURE — 2580000003 HC RX 258: Performed by: INTERNAL MEDICINE

## 2023-12-30 PROCEDURE — 6370000000 HC RX 637 (ALT 250 FOR IP): Performed by: HOSPITALIST

## 2023-12-30 PROCEDURE — 36415 COLL VENOUS BLD VENIPUNCTURE: CPT

## 2023-12-30 PROCEDURE — 82803 BLOOD GASES ANY COMBINATION: CPT

## 2023-12-30 PROCEDURE — 94640 AIRWAY INHALATION TREATMENT: CPT

## 2023-12-30 PROCEDURE — 84145 PROCALCITONIN (PCT): CPT

## 2023-12-30 PROCEDURE — 80069 RENAL FUNCTION PANEL: CPT

## 2023-12-30 PROCEDURE — 82962 GLUCOSE BLOOD TEST: CPT

## 2023-12-30 PROCEDURE — 82330 ASSAY OF CALCIUM: CPT

## 2023-12-30 PROCEDURE — 94003 VENT MGMT INPAT SUBQ DAY: CPT

## 2023-12-30 PROCEDURE — 85025 COMPLETE CBC W/AUTO DIFF WBC: CPT

## 2023-12-30 PROCEDURE — 2000000000 HC ICU R&B

## 2023-12-30 PROCEDURE — 86140 C-REACTIVE PROTEIN: CPT

## 2023-12-30 PROCEDURE — 71045 X-RAY EXAM CHEST 1 VIEW: CPT

## 2023-12-30 RX ORDER — METHYLPREDNISOLONE SODIUM SUCCINATE 40 MG/ML
40 INJECTION, POWDER, LYOPHILIZED, FOR SOLUTION INTRAMUSCULAR; INTRAVENOUS EVERY 6 HOURS
Status: DISCONTINUED | OUTPATIENT
Start: 2023-12-30 | End: 2024-01-01

## 2023-12-30 RX ORDER — METOPROLOL TARTRATE 1 MG/ML
5 INJECTION, SOLUTION INTRAVENOUS EVERY 6 HOURS PRN
Status: DISCONTINUED | OUTPATIENT
Start: 2023-12-30 | End: 2024-01-24 | Stop reason: HOSPADM

## 2023-12-30 RX ORDER — INSULIN GLARGINE 100 [IU]/ML
35 INJECTION, SOLUTION SUBCUTANEOUS 2 TIMES DAILY
Status: DISCONTINUED | OUTPATIENT
Start: 2023-12-30 | End: 2024-01-01

## 2023-12-30 RX ADMIN — IPRATROPIUM BROMIDE AND ALBUTEROL SULFATE 1 DOSE: 2.5; .5 SOLUTION RESPIRATORY (INHALATION) at 20:58

## 2023-12-30 RX ADMIN — WHITE PETROLATUM,ZINC OXIDE: 57; 17 PASTE TOPICAL at 16:22

## 2023-12-30 RX ADMIN — RACEPINEPHRINE HYDROCHLORIDE 0.5 ML: 11.25 SOLUTION RESPIRATORY (INHALATION) at 10:33

## 2023-12-30 RX ADMIN — SODIUM CHLORIDE, PRESERVATIVE FREE 20 MG: 5 INJECTION INTRAVENOUS at 08:14

## 2023-12-30 RX ADMIN — HYDRALAZINE HYDROCHLORIDE 20 MG: 20 INJECTION INTRAMUSCULAR; INTRAVENOUS at 17:03

## 2023-12-30 RX ADMIN — MEROPENEM 1000 MG: 1 INJECTION, POWDER, FOR SOLUTION INTRAVENOUS at 12:41

## 2023-12-30 RX ADMIN — INSULIN LISPRO 8 UNITS: 100 INJECTION, SOLUTION INTRAVENOUS; SUBCUTANEOUS at 05:15

## 2023-12-30 RX ADMIN — CLONIDINE HYDROCHLORIDE 0.2 MG: 0.1 TABLET ORAL at 08:13

## 2023-12-30 RX ADMIN — BUDESONIDE INHALATION 500 MCG: 0.5 SUSPENSION RESPIRATORY (INHALATION) at 20:58

## 2023-12-30 RX ADMIN — FLUCONAZOLE 200 MG: 200 INJECTION, SOLUTION INTRAVENOUS at 18:14

## 2023-12-30 RX ADMIN — LABETALOL HYDROCHLORIDE 300 MG: 200 TABLET, FILM COATED ORAL at 05:16

## 2023-12-30 RX ADMIN — WHITE PETROLATUM,ZINC OXIDE: 57; 17 PASTE TOPICAL at 21:49

## 2023-12-30 RX ADMIN — MEROPENEM 1000 MG: 1 INJECTION, POWDER, FOR SOLUTION INTRAVENOUS at 21:00

## 2023-12-30 RX ADMIN — BUDESONIDE INHALATION 500 MCG: 0.5 SUSPENSION RESPIRATORY (INHALATION) at 09:57

## 2023-12-30 RX ADMIN — INSULIN GLARGINE 25 UNITS: 100 INJECTION, SOLUTION SUBCUTANEOUS at 08:14

## 2023-12-30 RX ADMIN — FUROSEMIDE 40 MG: 10 INJECTION, SOLUTION INTRAMUSCULAR; INTRAVENOUS at 08:14

## 2023-12-30 RX ADMIN — SODIUM CHLORIDE, PRESERVATIVE FREE 10 ML: 5 INJECTION INTRAVENOUS at 21:41

## 2023-12-30 RX ADMIN — METHYLPREDNISOLONE SODIUM SUCCINATE 125 MG: 125 INJECTION INTRAMUSCULAR; INTRAVENOUS at 10:29

## 2023-12-30 RX ADMIN — WHITE PETROLATUM,ZINC OXIDE: 57; 17 PASTE TOPICAL at 08:15

## 2023-12-30 RX ADMIN — LEVETIRACETAM 750 MG: 100 INJECTION, SOLUTION INTRAVENOUS at 21:00

## 2023-12-30 RX ADMIN — AMLODIPINE BESYLATE 10 MG: 5 TABLET ORAL at 08:15

## 2023-12-30 RX ADMIN — MEROPENEM 1000 MG: 1 INJECTION, POWDER, FOR SOLUTION INTRAVENOUS at 05:16

## 2023-12-30 RX ADMIN — LEVETIRACETAM 750 MG: 100 INJECTION, SOLUTION INTRAVENOUS at 08:13

## 2023-12-30 RX ADMIN — METHYLPREDNISOLONE SODIUM SUCCINATE 40 MG: 40 INJECTION INTRAMUSCULAR; INTRAVENOUS at 21:57

## 2023-12-30 RX ADMIN — INSULIN LISPRO 8 UNITS: 100 INJECTION, SOLUTION INTRAVENOUS; SUBCUTANEOUS at 00:15

## 2023-12-30 RX ADMIN — HYDRALAZINE HYDROCHLORIDE 50 MG: 50 TABLET, FILM COATED ORAL at 05:16

## 2023-12-30 RX ADMIN — HYDRALAZINE HYDROCHLORIDE 20 MG: 20 INJECTION INTRAMUSCULAR; INTRAVENOUS at 10:30

## 2023-12-30 RX ADMIN — IPRATROPIUM BROMIDE AND ALBUTEROL SULFATE 1 DOSE: 2.5; .5 SOLUTION RESPIRATORY (INHALATION) at 14:48

## 2023-12-30 RX ADMIN — IPRATROPIUM BROMIDE AND ALBUTEROL SULFATE 1 DOSE: 2.5; .5 SOLUTION RESPIRATORY (INHALATION) at 09:55

## 2023-12-30 RX ADMIN — SODIUM CHLORIDE, PRESERVATIVE FREE 10 ML: 5 INJECTION INTRAVENOUS at 12:09

## 2023-12-30 RX ADMIN — METHYLPREDNISOLONE SODIUM SUCCINATE 40 MG: 40 INJECTION INTRAMUSCULAR; INTRAVENOUS at 17:03

## 2023-12-30 RX ADMIN — ENOXAPARIN SODIUM 30 MG: 100 INJECTION SUBCUTANEOUS at 08:13

## 2023-12-30 ASSESSMENT — PULMONARY FUNCTION TESTS
PIF_VALUE: 12
PIF_VALUE: 11
PIF_VALUE: 12
PIF_VALUE: 12
PIF_VALUE: 11
PIF_VALUE: 13
PIF_VALUE: 14
PIF_VALUE: 12

## 2023-12-30 ASSESSMENT — PAIN SCALES - GENERAL
PAINLEVEL_OUTOF10: 0

## 2023-12-31 ENCOUNTER — APPOINTMENT (OUTPATIENT)
Facility: HOSPITAL | Age: 53
DRG: 981 | End: 2023-12-31

## 2023-12-31 ENCOUNTER — APPOINTMENT (OUTPATIENT)
Facility: HOSPITAL | Age: 53
DRG: 981 | End: 2023-12-31
Attending: HOSPITALIST

## 2023-12-31 LAB
ALBUMIN SERPL-MCNC: 2.6 G/DL (ref 3.5–5)
ALBUMIN/GLOB SERPL: 0.5 (ref 1.1–2.2)
ALP SERPL-CCNC: 215 U/L (ref 45–117)
ALT SERPL-CCNC: 106 U/L (ref 12–78)
ANION GAP SERPL CALC-SCNC: 6 MMOL/L (ref 5–15)
AST SERPL W P-5'-P-CCNC: 40 U/L (ref 15–37)
BASOPHILS # BLD: 0 K/UL (ref 0–0.1)
BASOPHILS NFR BLD: 0 % (ref 0–1)
BILIRUB SERPL-MCNC: 0.3 MG/DL (ref 0.2–1)
BUN SERPL-MCNC: 77 MG/DL (ref 6–20)
BUN/CREAT SERPL: 63 (ref 12–20)
CA-I BLD-MCNC: 9.8 MG/DL (ref 8.5–10.1)
CHLORIDE SERPL-SCNC: 119 MMOL/L (ref 97–108)
CO2 SERPL-SCNC: 26 MMOL/L (ref 21–32)
CREAT SERPL-MCNC: 1.23 MG/DL (ref 0.55–1.02)
DIFFERENTIAL METHOD BLD: ABNORMAL
EOSINOPHIL # BLD: 0 K/UL (ref 0–0.4)
EOSINOPHIL NFR BLD: 0 % (ref 0–7)
ERYTHROCYTE [DISTWIDTH] IN BLOOD BY AUTOMATED COUNT: 14 % (ref 11.5–14.5)
GLOBULIN SER CALC-MCNC: 5.5 G/DL (ref 2–4)
GLUCOSE BLD STRIP.AUTO-MCNC: 216 MG/DL (ref 65–100)
GLUCOSE BLD STRIP.AUTO-MCNC: 242 MG/DL (ref 65–100)
GLUCOSE BLD STRIP.AUTO-MCNC: 248 MG/DL (ref 65–100)
GLUCOSE BLD STRIP.AUTO-MCNC: 286 MG/DL (ref 65–100)
GLUCOSE BLD STRIP.AUTO-MCNC: 360 MG/DL (ref 65–100)
GLUCOSE SERPL-MCNC: 252 MG/DL (ref 65–100)
HCT VFR BLD AUTO: 30.1 % (ref 35–47)
HGB BLD-MCNC: 9.4 G/DL (ref 11.5–16)
IMM GRANULOCYTES # BLD AUTO: 0.2 K/UL (ref 0–0.04)
IMM GRANULOCYTES NFR BLD AUTO: 1 % (ref 0–0.5)
LYMPHOCYTES # BLD: 0.9 K/UL (ref 0.8–3.5)
LYMPHOCYTES NFR BLD: 4 % (ref 12–49)
MCH RBC QN AUTO: 29.4 PG (ref 26–34)
MCHC RBC AUTO-ENTMCNC: 31.2 G/DL (ref 30–36.5)
MCV RBC AUTO: 94.1 FL (ref 80–99)
MONOCYTES # BLD: 0.9 K/UL (ref 0–1)
MONOCYTES NFR BLD: 4 % (ref 5–13)
NEUTS SEG # BLD: 20.9 K/UL (ref 1.8–8)
NEUTS SEG NFR BLD: 91 % (ref 32–75)
NRBC # BLD: 0 K/UL (ref 0–0.01)
NRBC BLD-RTO: 0 PER 100 WBC
PERFORMED BY:: ABNORMAL
PLATELET # BLD AUTO: 543 K/UL (ref 150–400)
PMV BLD AUTO: 11.3 FL (ref 8.9–12.9)
POTASSIUM SERPL-SCNC: 4 MMOL/L (ref 3.5–5.1)
PROT SERPL-MCNC: 8.1 G/DL (ref 6.4–8.2)
RBC # BLD AUTO: 3.2 M/UL (ref 3.8–5.2)
RBC MORPH BLD: ABNORMAL
SODIUM SERPL-SCNC: 151 MMOL/L (ref 136–145)
WBC # BLD AUTO: 22.9 K/UL (ref 3.6–11)

## 2023-12-31 PROCEDURE — 6360000002 HC RX W HCPCS: Performed by: HOSPITALIST

## 2023-12-31 PROCEDURE — 92610 EVALUATE SWALLOWING FUNCTION: CPT

## 2023-12-31 PROCEDURE — 71045 X-RAY EXAM CHEST 1 VIEW: CPT

## 2023-12-31 PROCEDURE — 80053 COMPREHEN METABOLIC PANEL: CPT

## 2023-12-31 PROCEDURE — 1100000000 HC RM PRIVATE

## 2023-12-31 PROCEDURE — 6370000000 HC RX 637 (ALT 250 FOR IP): Performed by: INTERNAL MEDICINE

## 2023-12-31 PROCEDURE — 2500000003 HC RX 250 WO HCPCS: Performed by: HOSPITALIST

## 2023-12-31 PROCEDURE — 6370000000 HC RX 637 (ALT 250 FOR IP): Performed by: HOSPITALIST

## 2023-12-31 PROCEDURE — 6360000002 HC RX W HCPCS: Performed by: INTERNAL MEDICINE

## 2023-12-31 PROCEDURE — 6360000002 HC RX W HCPCS: Performed by: PSYCHIATRY & NEUROLOGY

## 2023-12-31 PROCEDURE — 82962 GLUCOSE BLOOD TEST: CPT

## 2023-12-31 PROCEDURE — 2580000003 HC RX 258: Performed by: INTERNAL MEDICINE

## 2023-12-31 PROCEDURE — 2500000003 HC RX 250 WO HCPCS: Performed by: INTERNAL MEDICINE

## 2023-12-31 PROCEDURE — 94761 N-INVAS EAR/PLS OXIMETRY MLT: CPT

## 2023-12-31 PROCEDURE — 94640 AIRWAY INHALATION TREATMENT: CPT

## 2023-12-31 PROCEDURE — 36415 COLL VENOUS BLD VENIPUNCTURE: CPT

## 2023-12-31 PROCEDURE — 85025 COMPLETE CBC W/AUTO DIFF WBC: CPT

## 2023-12-31 PROCEDURE — 2700000000 HC OXYGEN THERAPY PER DAY

## 2023-12-31 PROCEDURE — 99232 SBSQ HOSP IP/OBS MODERATE 35: CPT | Performed by: INTERNAL MEDICINE

## 2023-12-31 PROCEDURE — 6370000000 HC RX 637 (ALT 250 FOR IP): Performed by: NURSE PRACTITIONER

## 2023-12-31 RX ORDER — DEXTROSE MONOHYDRATE 50 MG/ML
INJECTION, SOLUTION INTRAVENOUS CONTINUOUS
Status: DISCONTINUED | OUTPATIENT
Start: 2023-12-31 | End: 2024-01-01

## 2023-12-31 RX ORDER — CLONIDINE HYDROCHLORIDE 0.1 MG/1
0.3 TABLET ORAL 3 TIMES DAILY
Status: DISCONTINUED | OUTPATIENT
Start: 2023-12-31 | End: 2024-01-08

## 2023-12-31 RX ORDER — HYDRALAZINE HYDROCHLORIDE 20 MG/ML
20 INJECTION INTRAMUSCULAR; INTRAVENOUS EVERY 4 HOURS PRN
Status: DISCONTINUED | OUTPATIENT
Start: 2023-12-31 | End: 2024-01-24 | Stop reason: HOSPADM

## 2023-12-31 RX ADMIN — SODIUM CHLORIDE, PRESERVATIVE FREE 20 MG: 5 INJECTION INTRAVENOUS at 08:51

## 2023-12-31 RX ADMIN — MEROPENEM 1000 MG: 1 INJECTION, POWDER, FOR SOLUTION INTRAVENOUS at 13:53

## 2023-12-31 RX ADMIN — SODIUM CHLORIDE, PRESERVATIVE FREE 10 ML: 5 INJECTION INTRAVENOUS at 08:53

## 2023-12-31 RX ADMIN — HYDRALAZINE HYDROCHLORIDE 20 MG: 20 INJECTION, SOLUTION INTRAMUSCULAR; INTRAVENOUS at 18:01

## 2023-12-31 RX ADMIN — Medication 1 CAPSULE: at 22:18

## 2023-12-31 RX ADMIN — INSULIN GLARGINE 35 UNITS: 100 INJECTION, SOLUTION SUBCUTANEOUS at 20:36

## 2023-12-31 RX ADMIN — WHITE PETROLATUM,ZINC OXIDE: 57; 17 PASTE TOPICAL at 17:46

## 2023-12-31 RX ADMIN — MEROPENEM 1000 MG: 1 INJECTION, POWDER, FOR SOLUTION INTRAVENOUS at 20:38

## 2023-12-31 RX ADMIN — INSULIN LISPRO 4 UNITS: 100 INJECTION, SOLUTION INTRAVENOUS; SUBCUTANEOUS at 23:36

## 2023-12-31 RX ADMIN — HYDRALAZINE HYDROCHLORIDE 20 MG: 20 INJECTION INTRAMUSCULAR; INTRAVENOUS at 09:36

## 2023-12-31 RX ADMIN — FLUCONAZOLE 200 MG: 200 INJECTION, SOLUTION INTRAVENOUS at 18:01

## 2023-12-31 RX ADMIN — LEVETIRACETAM 750 MG: 100 INJECTION, SOLUTION INTRAVENOUS at 08:51

## 2023-12-31 RX ADMIN — METHYLPREDNISOLONE SODIUM SUCCINATE 40 MG: 40 INJECTION INTRAMUSCULAR; INTRAVENOUS at 18:01

## 2023-12-31 RX ADMIN — INSULIN LISPRO 16 UNITS: 100 INJECTION, SOLUTION INTRAVENOUS; SUBCUTANEOUS at 13:51

## 2023-12-31 RX ADMIN — WHITE PETROLATUM,ZINC OXIDE: 57; 17 PASTE TOPICAL at 21:30

## 2023-12-31 RX ADMIN — LEVETIRACETAM 750 MG: 100 INJECTION, SOLUTION INTRAVENOUS at 20:37

## 2023-12-31 RX ADMIN — METHYLPREDNISOLONE SODIUM SUCCINATE 40 MG: 40 INJECTION INTRAMUSCULAR; INTRAVENOUS at 04:56

## 2023-12-31 RX ADMIN — MEROPENEM 1000 MG: 1 INJECTION, POWDER, FOR SOLUTION INTRAVENOUS at 04:56

## 2023-12-31 RX ADMIN — METOPROLOL TARTRATE 5 MG: 5 INJECTION INTRAVENOUS at 09:36

## 2023-12-31 RX ADMIN — BUDESONIDE INHALATION 500 MCG: 0.5 SUSPENSION RESPIRATORY (INHALATION) at 20:47

## 2023-12-31 RX ADMIN — ATORVASTATIN CALCIUM 40 MG: 40 TABLET, FILM COATED ORAL at 20:36

## 2023-12-31 RX ADMIN — INSULIN LISPRO 12 UNITS: 100 INJECTION, SOLUTION INTRAVENOUS; SUBCUTANEOUS at 18:01

## 2023-12-31 RX ADMIN — WHITE PETROLATUM,ZINC OXIDE: 57; 17 PASTE TOPICAL at 08:53

## 2023-12-31 RX ADMIN — ENOXAPARIN SODIUM 30 MG: 100 INJECTION SUBCUTANEOUS at 08:51

## 2023-12-31 RX ADMIN — INSULIN LISPRO 12 UNITS: 100 INJECTION, SOLUTION INTRAVENOUS; SUBCUTANEOUS at 00:02

## 2023-12-31 RX ADMIN — IPRATROPIUM BROMIDE AND ALBUTEROL SULFATE 1 DOSE: 2.5; .5 SOLUTION RESPIRATORY (INHALATION) at 20:47

## 2023-12-31 RX ADMIN — METHYLPREDNISOLONE SODIUM SUCCINATE 40 MG: 40 INJECTION INTRAMUSCULAR; INTRAVENOUS at 08:51

## 2023-12-31 RX ADMIN — LABETALOL HYDROCHLORIDE 300 MG: 200 TABLET, FILM COATED ORAL at 20:35

## 2023-12-31 RX ADMIN — BUDESONIDE INHALATION 500 MCG: 0.5 SUSPENSION RESPIRATORY (INHALATION) at 08:37

## 2023-12-31 RX ADMIN — INSULIN LISPRO 4 UNITS: 100 INJECTION, SOLUTION INTRAVENOUS; SUBCUTANEOUS at 05:20

## 2023-12-31 RX ADMIN — CLONIDINE HYDROCHLORIDE 0.3 MG: 0.2 TABLET ORAL at 20:37

## 2023-12-31 RX ADMIN — HYDRALAZINE HYDROCHLORIDE 50 MG: 50 TABLET, FILM COATED ORAL at 20:36

## 2023-12-31 RX ADMIN — SODIUM CHLORIDE, PRESERVATIVE FREE 10 ML: 5 INJECTION INTRAVENOUS at 20:38

## 2023-12-31 RX ADMIN — IPRATROPIUM BROMIDE AND ALBUTEROL SULFATE 1 DOSE: 2.5; .5 SOLUTION RESPIRATORY (INHALATION) at 08:37

## 2023-12-31 RX ADMIN — METHYLPREDNISOLONE SODIUM SUCCINATE 40 MG: 40 INJECTION INTRAMUSCULAR; INTRAVENOUS at 22:18

## 2023-12-31 RX ADMIN — DEXTROSE MONOHYDRATE: 50 INJECTION, SOLUTION INTRAVENOUS at 09:46

## 2023-12-31 ASSESSMENT — PAIN SCALES - GENERAL
PAINLEVEL_OUTOF10: 0

## 2024-01-01 ENCOUNTER — APPOINTMENT (OUTPATIENT)
Facility: HOSPITAL | Age: 54
DRG: 981 | End: 2024-01-01

## 2024-01-01 LAB
A ALTERNATA IGE QN: <0.1 KU/L
A FUMIGATUS IGE QN: <0.1 KU/L
ALBUMIN SERPL-MCNC: 2.6 G/DL (ref 3.5–5)
ANION GAP SERPL CALC-SCNC: 7 MMOL/L (ref 5–15)
BACTERIA SPEC CULT: NORMAL
BASOPHILS # BLD: 0 K/UL (ref 0–0.1)
BASOPHILS NFR BLD: 0 % (ref 0–1)
BERMUDA GRASS IGE QN: <0.1 KU/L
BOXELDER IGE QN: <0.1 KU/L
BUN SERPL-MCNC: 86 MG/DL (ref 6–20)
BUN/CREAT SERPL: 69 (ref 12–20)
C HERBARUM IGE QN: <0.1 KU/L
CA-I BLD-MCNC: 9.7 MG/DL (ref 8.5–10.1)
CAT DANDER IGG QN: <0.1 KU/L
CHLORIDE SERPL-SCNC: 120 MMOL/L (ref 97–108)
CO2 SERPL-SCNC: 22 MMOL/L (ref 21–32)
COMMON RAGWEED IGE QN: <0.1 KU/L
COTTONWOOD IGE QN: <0.1 KU/L
CREAT SERPL-MCNC: 1.24 MG/DL (ref 0.55–1.02)
D FARINAE IGE QN: 1.24 KU/L
D PTERONYSS IGE QN: 1.57 KU/L
DEPRECATED IGE QN: <0.1 KU/L
DIFFERENTIAL METHOD BLD: ABNORMAL
DOG DANDER IGE QN: <0.1 KU/L
EOSINOPHIL # BLD: 0 K/UL (ref 0–0.4)
EOSINOPHIL NFR BLD: 0 % (ref 0–7)
ERYTHROCYTE [DISTWIDTH] IN BLOOD BY AUTOMATED COUNT: 14.5 % (ref 11.5–14.5)
GLUCOSE BLD STRIP.AUTO-MCNC: 253 MG/DL (ref 65–100)
GLUCOSE BLD STRIP.AUTO-MCNC: 313 MG/DL (ref 65–100)
GLUCOSE BLD STRIP.AUTO-MCNC: 382 MG/DL (ref 65–100)
GLUCOSE BLD STRIP.AUTO-MCNC: 384 MG/DL (ref 65–100)
GLUCOSE BLD STRIP.AUTO-MCNC: 417 MG/DL (ref 65–100)
GLUCOSE SERPL-MCNC: 431 MG/DL (ref 65–100)
GRAM STN SPEC: NORMAL
HCT VFR BLD AUTO: 30.2 % (ref 35–47)
HGB BLD-MCNC: 9.3 G/DL (ref 11.5–16)
IGE SERPL-ACNC: 68 IU/ML (ref 6–495)
IMM GRANULOCYTES # BLD AUTO: 0.2 K/UL (ref 0–0.04)
IMM GRANULOCYTES NFR BLD AUTO: 1 % (ref 0–0.5)
JOHNSON GRASS IGE QN: <0.1 KU/L
LYMPHOCYTES # BLD: 0.8 K/UL (ref 0.8–3.5)
LYMPHOCYTES NFR BLD: 4 % (ref 12–49)
Lab: ABNORMAL
Lab: NORMAL
MAGNESIUM SERPL-MCNC: 2.8 MG/DL (ref 1.6–2.4)
MCH RBC QN AUTO: 30.5 PG (ref 26–34)
MCHC RBC AUTO-ENTMCNC: 30.8 G/DL (ref 30–36.5)
MCV RBC AUTO: 99 FL (ref 80–99)
MONOCYTES # BLD: 0.9 K/UL (ref 0–1)
MONOCYTES NFR BLD: 5 % (ref 5–13)
MOUSE URINE PROT IGE QN: <0.1 KU/L
MT JUNIPER IGE QN: <0.1 KU/L
NEUTS SEG # BLD: 17.9 K/UL (ref 1.8–8)
NEUTS SEG NFR BLD: 90 % (ref 32–75)
NRBC # BLD: 0 K/UL (ref 0–0.01)
NRBC BLD-RTO: 0 PER 100 WBC
P NOTATUM IGE QN: <0.1 KU/L
PECAN/HICK TREE IGE QN: <0.1 KU/L
PERFORMED BY:: ABNORMAL
PHOSPHATE SERPL-MCNC: 4 MG/DL (ref 2.6–4.7)
PLATELET # BLD AUTO: 509 K/UL (ref 150–400)
PMV BLD AUTO: 12.7 FL (ref 8.9–12.9)
POTASSIUM SERPL-SCNC: 4 MMOL/L (ref 3.5–5.1)
RBC # BLD AUTO: 3.05 M/UL (ref 3.8–5.2)
ROACH IGG QN: <0.1 KU/L
SHEEP SORREL IGE QN: <0.1 KU/L
SODIUM SERPL-SCNC: 149 MMOL/L (ref 136–145)
TIMOTHY IGE QN: <0.1 KU/L
WBC # BLD AUTO: 19.7 K/UL (ref 3.6–11)
WHITE BIRCH IGE QN: <0.1 KU/L
WHITE ELM IGG QN: <0.1 KU/L
WHITE MULBERRY IGE QN: <0.1 KU/L
WHITE OAK IGE QN: <0.1 KU/L

## 2024-01-01 PROCEDURE — 6360000002 HC RX W HCPCS: Performed by: INTERNAL MEDICINE

## 2024-01-01 PROCEDURE — 85025 COMPLETE CBC W/AUTO DIFF WBC: CPT

## 2024-01-01 PROCEDURE — 6370000000 HC RX 637 (ALT 250 FOR IP): Performed by: NURSE PRACTITIONER

## 2024-01-01 PROCEDURE — 36415 COLL VENOUS BLD VENIPUNCTURE: CPT

## 2024-01-01 PROCEDURE — 99232 SBSQ HOSP IP/OBS MODERATE 35: CPT | Performed by: INTERNAL MEDICINE

## 2024-01-01 PROCEDURE — 6370000000 HC RX 637 (ALT 250 FOR IP): Performed by: HOSPITALIST

## 2024-01-01 PROCEDURE — 2700000000 HC OXYGEN THERAPY PER DAY

## 2024-01-01 PROCEDURE — 6370000000 HC RX 637 (ALT 250 FOR IP): Performed by: INTERNAL MEDICINE

## 2024-01-01 PROCEDURE — 2580000003 HC RX 258: Performed by: INTERNAL MEDICINE

## 2024-01-01 PROCEDURE — 94640 AIRWAY INHALATION TREATMENT: CPT

## 2024-01-01 PROCEDURE — 83735 ASSAY OF MAGNESIUM: CPT

## 2024-01-01 PROCEDURE — 80069 RENAL FUNCTION PANEL: CPT

## 2024-01-01 PROCEDURE — 1100000000 HC RM PRIVATE

## 2024-01-01 PROCEDURE — 6360000002 HC RX W HCPCS: Performed by: PSYCHIATRY & NEUROLOGY

## 2024-01-01 PROCEDURE — 2500000003 HC RX 250 WO HCPCS: Performed by: INTERNAL MEDICINE

## 2024-01-01 PROCEDURE — 82962 GLUCOSE BLOOD TEST: CPT

## 2024-01-01 PROCEDURE — 71045 X-RAY EXAM CHEST 1 VIEW: CPT

## 2024-01-01 PROCEDURE — 94761 N-INVAS EAR/PLS OXIMETRY MLT: CPT

## 2024-01-01 RX ORDER — INSULIN LISPRO 100 [IU]/ML
2 INJECTION, SOLUTION INTRAVENOUS; SUBCUTANEOUS ONCE
Status: COMPLETED | OUTPATIENT
Start: 2024-01-01 | End: 2024-01-01

## 2024-01-01 RX ORDER — INSULIN GLARGINE 100 [IU]/ML
40 INJECTION, SOLUTION SUBCUTANEOUS 2 TIMES DAILY
Status: DISCONTINUED | OUTPATIENT
Start: 2024-01-01 | End: 2024-01-06

## 2024-01-01 RX ORDER — METHYLPREDNISOLONE SODIUM SUCCINATE 40 MG/ML
20 INJECTION, POWDER, LYOPHILIZED, FOR SOLUTION INTRAMUSCULAR; INTRAVENOUS EVERY 12 HOURS
Status: DISCONTINUED | OUTPATIENT
Start: 2024-01-01 | End: 2024-01-04

## 2024-01-01 RX ORDER — INSULIN LISPRO 100 [IU]/ML
8 INJECTION, SOLUTION INTRAVENOUS; SUBCUTANEOUS ONCE
Status: DISCONTINUED | OUTPATIENT
Start: 2024-01-01 | End: 2024-01-24 | Stop reason: HOSPADM

## 2024-01-01 RX ADMIN — CLONIDINE HYDROCHLORIDE 0.3 MG: 0.2 TABLET ORAL at 22:38

## 2024-01-01 RX ADMIN — WHITE PETROLATUM,ZINC OXIDE: 57; 17 PASTE TOPICAL at 18:27

## 2024-01-01 RX ADMIN — LABETALOL HYDROCHLORIDE 300 MG: 200 TABLET, FILM COATED ORAL at 06:29

## 2024-01-01 RX ADMIN — Medication 1 CAPSULE: at 11:35

## 2024-01-01 RX ADMIN — HYDRALAZINE HYDROCHLORIDE 50 MG: 50 TABLET, FILM COATED ORAL at 11:35

## 2024-01-01 RX ADMIN — SODIUM CHLORIDE, PRESERVATIVE FREE 10 ML: 5 INJECTION INTRAVENOUS at 22:38

## 2024-01-01 RX ADMIN — METHYLPREDNISOLONE SODIUM SUCCINATE 40 MG: 40 INJECTION INTRAMUSCULAR; INTRAVENOUS at 11:51

## 2024-01-01 RX ADMIN — INSULIN LISPRO 2 UNITS: 100 INJECTION, SOLUTION INTRAVENOUS; SUBCUTANEOUS at 06:28

## 2024-01-01 RX ADMIN — ENOXAPARIN SODIUM 30 MG: 100 INJECTION SUBCUTANEOUS at 11:36

## 2024-01-01 RX ADMIN — INSULIN LISPRO 16 UNITS: 100 INJECTION, SOLUTION INTRAVENOUS; SUBCUTANEOUS at 11:39

## 2024-01-01 RX ADMIN — INSULIN GLARGINE 35 UNITS: 100 INJECTION, SOLUTION SUBCUTANEOUS at 11:38

## 2024-01-01 RX ADMIN — DEXTROSE MONOHYDRATE: 50 INJECTION, SOLUTION INTRAVENOUS at 04:53

## 2024-01-01 RX ADMIN — SODIUM CHLORIDE, PRESERVATIVE FREE 10 ML: 5 INJECTION INTRAVENOUS at 11:39

## 2024-01-01 RX ADMIN — CLONIDINE HYDROCHLORIDE 0.3 MG: 0.2 TABLET ORAL at 11:37

## 2024-01-01 RX ADMIN — INSULIN LISPRO 16 UNITS: 100 INJECTION, SOLUTION INTRAVENOUS; SUBCUTANEOUS at 06:28

## 2024-01-01 RX ADMIN — LEVETIRACETAM 750 MG: 100 INJECTION, SOLUTION INTRAVENOUS at 22:31

## 2024-01-01 RX ADMIN — BUDESONIDE INHALATION 500 MCG: 0.5 SUSPENSION RESPIRATORY (INHALATION) at 20:02

## 2024-01-01 RX ADMIN — ATORVASTATIN CALCIUM 40 MG: 40 TABLET, FILM COATED ORAL at 22:37

## 2024-01-01 RX ADMIN — MEROPENEM 1000 MG: 1 INJECTION, POWDER, FOR SOLUTION INTRAVENOUS at 04:51

## 2024-01-01 RX ADMIN — IPRATROPIUM BROMIDE AND ALBUTEROL SULFATE 1 DOSE: 2.5; .5 SOLUTION RESPIRATORY (INHALATION) at 14:45

## 2024-01-01 RX ADMIN — FLUCONAZOLE 200 MG: 200 INJECTION, SOLUTION INTRAVENOUS at 18:26

## 2024-01-01 RX ADMIN — IPRATROPIUM BROMIDE AND ALBUTEROL SULFATE 1 DOSE: 2.5; .5 SOLUTION RESPIRATORY (INHALATION) at 08:37

## 2024-01-01 RX ADMIN — LEVETIRACETAM 750 MG: 100 INJECTION, SOLUTION INTRAVENOUS at 11:36

## 2024-01-01 RX ADMIN — VASOPRESSIN: 20 INJECTION, SOLUTION INTRAVENOUS at 14:54

## 2024-01-01 RX ADMIN — MEROPENEM 1000 MG: 1 INJECTION, POWDER, FOR SOLUTION INTRAVENOUS at 22:28

## 2024-01-01 RX ADMIN — HYDRALAZINE HYDROCHLORIDE 50 MG: 50 TABLET, FILM COATED ORAL at 18:26

## 2024-01-01 RX ADMIN — AMLODIPINE BESYLATE 10 MG: 5 TABLET ORAL at 11:34

## 2024-01-01 RX ADMIN — INSULIN GLARGINE 40 UNITS: 100 INJECTION, SOLUTION SUBCUTANEOUS at 22:30

## 2024-01-01 RX ADMIN — MEROPENEM 1000 MG: 1 INJECTION, POWDER, FOR SOLUTION INTRAVENOUS at 14:34

## 2024-01-01 RX ADMIN — WHITE PETROLATUM,ZINC OXIDE: 57; 17 PASTE TOPICAL at 11:40

## 2024-01-01 RX ADMIN — INSULIN LISPRO 24 UNITS: 100 INJECTION, SOLUTION INTRAVENOUS; SUBCUTANEOUS at 18:26

## 2024-01-01 RX ADMIN — BUDESONIDE INHALATION 500 MCG: 0.5 SUSPENSION RESPIRATORY (INHALATION) at 08:37

## 2024-01-01 RX ADMIN — WHITE PETROLATUM,ZINC OXIDE: 57; 17 PASTE TOPICAL at 22:38

## 2024-01-01 RX ADMIN — LABETALOL HYDROCHLORIDE 300 MG: 200 TABLET, FILM COATED ORAL at 14:33

## 2024-01-01 RX ADMIN — METHYLPREDNISOLONE SODIUM SUCCINATE 40 MG: 40 INJECTION INTRAMUSCULAR; INTRAVENOUS at 04:15

## 2024-01-01 RX ADMIN — SODIUM CHLORIDE, PRESERVATIVE FREE 20 MG: 5 INJECTION INTRAVENOUS at 11:35

## 2024-01-01 RX ADMIN — IPRATROPIUM BROMIDE AND ALBUTEROL SULFATE 1 DOSE: 2.5; .5 SOLUTION RESPIRATORY (INHALATION) at 20:02

## 2024-01-01 RX ADMIN — LABETALOL HYDROCHLORIDE 300 MG: 200 TABLET, FILM COATED ORAL at 22:38

## 2024-01-01 RX ADMIN — Medication 1 CAPSULE: at 22:37

## 2024-01-01 ASSESSMENT — PAIN SCALES - GENERAL
PAINLEVEL_OUTOF10: 0

## 2024-01-02 ENCOUNTER — APPOINTMENT (OUTPATIENT)
Facility: HOSPITAL | Age: 54
DRG: 981 | End: 2024-01-02

## 2024-01-02 PROBLEM — R73.9 HYPERGLYCEMIA: Status: ACTIVE | Noted: 2024-01-02

## 2024-01-02 PROBLEM — E27.8 ADRENAL MASS GREATER THAN 4 CM IN DIAMETER (HCC): Status: ACTIVE | Noted: 2024-01-02

## 2024-01-02 LAB
ANION GAP SERPL CALC-SCNC: 7 MMOL/L (ref 5–15)
BACTERIA SPEC CULT: NORMAL
BUN SERPL-MCNC: 73 MG/DL (ref 6–20)
BUN/CREAT SERPL: 73 (ref 12–20)
CA-I BLD-MCNC: 9.5 MG/DL (ref 8.5–10.1)
CHLORIDE SERPL-SCNC: 119 MMOL/L (ref 97–108)
CO2 SERPL-SCNC: 22 MMOL/L (ref 21–32)
CREAT SERPL-MCNC: 1 MG/DL (ref 0.55–1.02)
ERYTHROCYTE [DISTWIDTH] IN BLOOD BY AUTOMATED COUNT: 14 % (ref 11.5–14.5)
GLUCOSE BLD STRIP.AUTO-MCNC: 231 MG/DL (ref 65–100)
GLUCOSE BLD STRIP.AUTO-MCNC: 271 MG/DL (ref 65–100)
GLUCOSE BLD STRIP.AUTO-MCNC: 311 MG/DL (ref 65–100)
GLUCOSE BLD STRIP.AUTO-MCNC: 329 MG/DL (ref 65–100)
GLUCOSE SERPL-MCNC: 322 MG/DL (ref 65–100)
HCT VFR BLD AUTO: 29.7 % (ref 35–47)
HGB BLD-MCNC: 9.3 G/DL (ref 11.5–16)
Lab: NORMAL
MCH RBC QN AUTO: 29.9 PG (ref 26–34)
MCHC RBC AUTO-ENTMCNC: 31.3 G/DL (ref 30–36.5)
MCV RBC AUTO: 95.5 FL (ref 80–99)
METANEPH FREE SERPL-MCNC: 193.9 PG/ML (ref 0–88)
NORMETANEPHRINE SERPL-MCNC: 1878.5 PG/ML (ref 0–244)
NRBC # BLD: 0 K/UL (ref 0–0.01)
NRBC BLD-RTO: 0 PER 100 WBC
PERFORMED BY:: ABNORMAL
PLATELET # BLD AUTO: 493 K/UL (ref 150–400)
PMV BLD AUTO: 12.7 FL (ref 8.9–12.9)
POTASSIUM SERPL-SCNC: 4 MMOL/L (ref 3.5–5.1)
RBC # BLD AUTO: 3.11 M/UL (ref 3.8–5.2)
SODIUM SERPL-SCNC: 148 MMOL/L (ref 136–145)
WBC # BLD AUTO: 15.5 K/UL (ref 3.6–11)

## 2024-01-02 PROCEDURE — 2580000003 HC RX 258: Performed by: INTERNAL MEDICINE

## 2024-01-02 PROCEDURE — 99222 1ST HOSP IP/OBS MODERATE 55: CPT | Performed by: STUDENT IN AN ORGANIZED HEALTH CARE EDUCATION/TRAINING PROGRAM

## 2024-01-02 PROCEDURE — 1100000000 HC RM PRIVATE

## 2024-01-02 PROCEDURE — 97530 THERAPEUTIC ACTIVITIES: CPT

## 2024-01-02 PROCEDURE — 6370000000 HC RX 637 (ALT 250 FOR IP): Performed by: NURSE PRACTITIONER

## 2024-01-02 PROCEDURE — 71045 X-RAY EXAM CHEST 1 VIEW: CPT

## 2024-01-02 PROCEDURE — 6360000002 HC RX W HCPCS: Performed by: INTERNAL MEDICINE

## 2024-01-02 PROCEDURE — 6370000000 HC RX 637 (ALT 250 FOR IP): Performed by: INTERNAL MEDICINE

## 2024-01-02 PROCEDURE — 6360000002 HC RX W HCPCS: Performed by: PSYCHIATRY & NEUROLOGY

## 2024-01-02 PROCEDURE — 94640 AIRWAY INHALATION TREATMENT: CPT

## 2024-01-02 PROCEDURE — 97161 PT EVAL LOW COMPLEX 20 MIN: CPT

## 2024-01-02 PROCEDURE — 94761 N-INVAS EAR/PLS OXIMETRY MLT: CPT

## 2024-01-02 PROCEDURE — 2700000000 HC OXYGEN THERAPY PER DAY

## 2024-01-02 PROCEDURE — 99254 IP/OBS CNSLTJ NEW/EST MOD 60: CPT | Performed by: UROLOGY

## 2024-01-02 PROCEDURE — 80048 BASIC METABOLIC PNL TOTAL CA: CPT

## 2024-01-02 PROCEDURE — 99232 SBSQ HOSP IP/OBS MODERATE 35: CPT | Performed by: INTERNAL MEDICINE

## 2024-01-02 PROCEDURE — 6370000000 HC RX 637 (ALT 250 FOR IP): Performed by: HOSPITALIST

## 2024-01-02 PROCEDURE — 36415 COLL VENOUS BLD VENIPUNCTURE: CPT

## 2024-01-02 PROCEDURE — 82962 GLUCOSE BLOOD TEST: CPT

## 2024-01-02 PROCEDURE — 31575 DIAGNOSTIC LARYNGOSCOPY: CPT | Performed by: STUDENT IN AN ORGANIZED HEALTH CARE EDUCATION/TRAINING PROGRAM

## 2024-01-02 PROCEDURE — 85027 COMPLETE CBC AUTOMATED: CPT

## 2024-01-02 PROCEDURE — 2500000003 HC RX 250 WO HCPCS: Performed by: INTERNAL MEDICINE

## 2024-01-02 RX ORDER — IPRATROPIUM BROMIDE AND ALBUTEROL SULFATE 2.5; .5 MG/3ML; MG/3ML
1 SOLUTION RESPIRATORY (INHALATION)
Status: DISCONTINUED | OUTPATIENT
Start: 2024-01-02 | End: 2024-01-17

## 2024-01-02 RX ADMIN — VASOPRESSIN: 20 INJECTION, SOLUTION INTRAVENOUS at 04:03

## 2024-01-02 RX ADMIN — MEROPENEM 1000 MG: 1 INJECTION, POWDER, FOR SOLUTION INTRAVENOUS at 13:40

## 2024-01-02 RX ADMIN — SODIUM CHLORIDE, PRESERVATIVE FREE 10 ML: 5 INJECTION INTRAVENOUS at 10:35

## 2024-01-02 RX ADMIN — LEVETIRACETAM 750 MG: 100 INJECTION, SOLUTION INTRAVENOUS at 10:34

## 2024-01-02 RX ADMIN — IPRATROPIUM BROMIDE AND ALBUTEROL SULFATE 1 DOSE: 2.5; .5 SOLUTION RESPIRATORY (INHALATION) at 07:48

## 2024-01-02 RX ADMIN — VASOPRESSIN: 20 INJECTION, SOLUTION INTRAVENOUS at 16:12

## 2024-01-02 RX ADMIN — CLONIDINE HYDROCHLORIDE 0.3 MG: 0.2 TABLET ORAL at 13:40

## 2024-01-02 RX ADMIN — WHITE PETROLATUM,ZINC OXIDE: 57; 17 PASTE TOPICAL at 10:35

## 2024-01-02 RX ADMIN — LEVETIRACETAM 750 MG: 100 INJECTION, SOLUTION INTRAVENOUS at 21:01

## 2024-01-02 RX ADMIN — LABETALOL HYDROCHLORIDE 300 MG: 200 TABLET, FILM COATED ORAL at 13:46

## 2024-01-02 RX ADMIN — HYDRALAZINE HYDROCHLORIDE 50 MG: 50 TABLET, FILM COATED ORAL at 10:35

## 2024-01-02 RX ADMIN — Medication 1 CAPSULE: at 10:35

## 2024-01-02 RX ADMIN — INSULIN GLARGINE 40 UNITS: 100 INJECTION, SOLUTION SUBCUTANEOUS at 21:01

## 2024-01-02 RX ADMIN — INSULIN LISPRO 8 UNITS: 100 INJECTION, SOLUTION INTRAVENOUS; SUBCUTANEOUS at 23:51

## 2024-01-02 RX ADMIN — IPRATROPIUM BROMIDE AND ALBUTEROL SULFATE 1 DOSE: 2.5; .5 SOLUTION RESPIRATORY (INHALATION) at 19:52

## 2024-01-02 RX ADMIN — INSULIN LISPRO 4 UNITS: 100 INJECTION, SOLUTION INTRAVENOUS; SUBCUTANEOUS at 18:13

## 2024-01-02 RX ADMIN — ATORVASTATIN CALCIUM 40 MG: 40 TABLET, FILM COATED ORAL at 20:58

## 2024-01-02 RX ADMIN — INSULIN LISPRO 12 UNITS: 100 INJECTION, SOLUTION INTRAVENOUS; SUBCUTANEOUS at 13:41

## 2024-01-02 RX ADMIN — HYDRALAZINE HYDROCHLORIDE 50 MG: 50 TABLET, FILM COATED ORAL at 16:12

## 2024-01-02 RX ADMIN — INSULIN GLARGINE 40 UNITS: 100 INJECTION, SOLUTION SUBCUTANEOUS at 10:41

## 2024-01-02 RX ADMIN — BUDESONIDE INHALATION 500 MCG: 0.5 SUSPENSION RESPIRATORY (INHALATION) at 07:48

## 2024-01-02 RX ADMIN — WHITE PETROLATUM,ZINC OXIDE: 57; 17 PASTE TOPICAL at 13:47

## 2024-01-02 RX ADMIN — AMLODIPINE BESYLATE 10 MG: 5 TABLET ORAL at 10:35

## 2024-01-02 RX ADMIN — LABETALOL HYDROCHLORIDE 300 MG: 200 TABLET, FILM COATED ORAL at 06:24

## 2024-01-02 RX ADMIN — MEROPENEM 1000 MG: 1 INJECTION, POWDER, FOR SOLUTION INTRAVENOUS at 21:02

## 2024-01-02 RX ADMIN — ENOXAPARIN SODIUM 30 MG: 100 INJECTION SUBCUTANEOUS at 10:41

## 2024-01-02 RX ADMIN — BUDESONIDE INHALATION 500 MCG: 0.5 SUSPENSION RESPIRATORY (INHALATION) at 19:51

## 2024-01-02 RX ADMIN — METHYLPREDNISOLONE SODIUM SUCCINATE 20 MG: 40 INJECTION INTRAMUSCULAR; INTRAVENOUS at 23:46

## 2024-01-02 RX ADMIN — SODIUM CHLORIDE, PRESERVATIVE FREE 20 MG: 5 INJECTION INTRAVENOUS at 10:36

## 2024-01-02 RX ADMIN — CLONIDINE HYDROCHLORIDE 0.3 MG: 0.2 TABLET ORAL at 10:35

## 2024-01-02 RX ADMIN — WHITE PETROLATUM,ZINC OXIDE: 57; 17 PASTE TOPICAL at 21:03

## 2024-01-02 RX ADMIN — SODIUM CHLORIDE, PRESERVATIVE FREE 10 ML: 5 INJECTION INTRAVENOUS at 21:03

## 2024-01-02 RX ADMIN — CLONIDINE HYDROCHLORIDE 0.3 MG: 0.2 TABLET ORAL at 20:58

## 2024-01-02 RX ADMIN — Medication 1 CAPSULE: at 21:00

## 2024-01-02 RX ADMIN — METHYLPREDNISOLONE SODIUM SUCCINATE 20 MG: 40 INJECTION INTRAMUSCULAR; INTRAVENOUS at 10:49

## 2024-01-02 RX ADMIN — INSULIN LISPRO 12 UNITS: 100 INJECTION, SOLUTION INTRAVENOUS; SUBCUTANEOUS at 06:23

## 2024-01-02 RX ADMIN — LABETALOL HYDROCHLORIDE 300 MG: 200 TABLET, FILM COATED ORAL at 21:00

## 2024-01-02 RX ADMIN — MEROPENEM 1000 MG: 1 INJECTION, POWDER, FOR SOLUTION INTRAVENOUS at 05:35

## 2024-01-02 RX ADMIN — METHYLPREDNISOLONE SODIUM SUCCINATE 20 MG: 40 INJECTION INTRAMUSCULAR; INTRAVENOUS at 00:57

## 2024-01-02 ASSESSMENT — PAIN SCALES - GENERAL: PAINLEVEL_OUTOF10: 0

## 2024-01-03 LAB
ALBUMIN SERPL-MCNC: 2.7 G/DL (ref 3.5–5)
ANION GAP SERPL CALC-SCNC: 6 MMOL/L (ref 5–15)
BUN SERPL-MCNC: 62 MG/DL (ref 6–20)
BUN/CREAT SERPL: 70 (ref 12–20)
CA-I BLD-MCNC: 9.5 MG/DL (ref 8.5–10.1)
CHLORIDE SERPL-SCNC: 115 MMOL/L (ref 97–108)
CO2 SERPL-SCNC: 23 MMOL/L (ref 21–32)
CORTIS AM PEAK SERPL-MCNC: 4.8 UG/DL (ref 4.3–22.45)
CREAT SERPL-MCNC: 0.88 MG/DL (ref 0.55–1.02)
CRP SERPL-MCNC: 0.36 MG/DL (ref 0–0.6)
ERYTHROCYTE [DISTWIDTH] IN BLOOD BY AUTOMATED COUNT: 13.5 % (ref 11.5–14.5)
GLUCOSE BLD STRIP.AUTO-MCNC: 198 MG/DL (ref 65–100)
GLUCOSE BLD STRIP.AUTO-MCNC: 221 MG/DL (ref 65–100)
GLUCOSE BLD STRIP.AUTO-MCNC: 225 MG/DL (ref 65–100)
GLUCOSE SERPL-MCNC: 263 MG/DL (ref 65–100)
HCT VFR BLD AUTO: 31.7 % (ref 35–47)
HGB BLD-MCNC: 10.2 G/DL (ref 11.5–16)
MCH RBC QN AUTO: 29.7 PG (ref 26–34)
MCHC RBC AUTO-ENTMCNC: 32.2 G/DL (ref 30–36.5)
MCV RBC AUTO: 92.4 FL (ref 80–99)
NRBC # BLD: 0 K/UL (ref 0–0.01)
NRBC BLD-RTO: 0 PER 100 WBC
PERFORMED BY:: ABNORMAL
PHOSPHATE SERPL-MCNC: 3.4 MG/DL (ref 2.6–4.7)
PLATELET # BLD AUTO: 524 K/UL (ref 150–400)
PMV BLD AUTO: 12.5 FL (ref 8.9–12.9)
POTASSIUM SERPL-SCNC: 4.9 MMOL/L (ref 3.5–5.1)
PROCALCITONIN SERPL-MCNC: 0.18 NG/ML
RBC # BLD AUTO: 3.43 M/UL (ref 3.8–5.2)
SODIUM SERPL-SCNC: 144 MMOL/L (ref 136–145)
WBC # BLD AUTO: 19.3 K/UL (ref 3.6–11)

## 2024-01-03 PROCEDURE — 1100000000 HC RM PRIVATE

## 2024-01-03 PROCEDURE — 87205 SMEAR GRAM STAIN: CPT

## 2024-01-03 PROCEDURE — 87070 CULTURE OTHR SPECIMN AEROBIC: CPT

## 2024-01-03 PROCEDURE — 6360000002 HC RX W HCPCS: Performed by: INTERNAL MEDICINE

## 2024-01-03 PROCEDURE — 6370000000 HC RX 637 (ALT 250 FOR IP): Performed by: NURSE PRACTITIONER

## 2024-01-03 PROCEDURE — 6360000002 HC RX W HCPCS: Performed by: HOSPITALIST

## 2024-01-03 PROCEDURE — 36415 COLL VENOUS BLD VENIPUNCTURE: CPT

## 2024-01-03 PROCEDURE — 82962 GLUCOSE BLOOD TEST: CPT

## 2024-01-03 PROCEDURE — 86140 C-REACTIVE PROTEIN: CPT

## 2024-01-03 PROCEDURE — 6360000002 HC RX W HCPCS: Performed by: PSYCHIATRY & NEUROLOGY

## 2024-01-03 PROCEDURE — 6370000000 HC RX 637 (ALT 250 FOR IP): Performed by: INTERNAL MEDICINE

## 2024-01-03 PROCEDURE — 85027 COMPLETE CBC AUTOMATED: CPT

## 2024-01-03 PROCEDURE — 2580000003 HC RX 258: Performed by: INTERNAL MEDICINE

## 2024-01-03 PROCEDURE — 99232 SBSQ HOSP IP/OBS MODERATE 35: CPT | Performed by: INTERNAL MEDICINE

## 2024-01-03 PROCEDURE — 2500000003 HC RX 250 WO HCPCS: Performed by: INTERNAL MEDICINE

## 2024-01-03 PROCEDURE — 94761 N-INVAS EAR/PLS OXIMETRY MLT: CPT

## 2024-01-03 PROCEDURE — 82533 TOTAL CORTISOL: CPT

## 2024-01-03 PROCEDURE — 80069 RENAL FUNCTION PANEL: CPT

## 2024-01-03 PROCEDURE — 94640 AIRWAY INHALATION TREATMENT: CPT

## 2024-01-03 PROCEDURE — 82384 ASSAY THREE CATECHOLAMINES: CPT

## 2024-01-03 PROCEDURE — 84145 PROCALCITONIN (PCT): CPT

## 2024-01-03 PROCEDURE — 6370000000 HC RX 637 (ALT 250 FOR IP): Performed by: HOSPITALIST

## 2024-01-03 PROCEDURE — 2580000003 HC RX 258

## 2024-01-03 RX ORDER — SODIUM CHLORIDE 450 MG/100ML
INJECTION, SOLUTION INTRAVENOUS CONTINUOUS
Status: DISCONTINUED | OUTPATIENT
Start: 2024-01-03 | End: 2024-01-04

## 2024-01-03 RX ORDER — INSULIN LISPRO 100 [IU]/ML
5 INJECTION, SOLUTION INTRAVENOUS; SUBCUTANEOUS
Status: DISCONTINUED | OUTPATIENT
Start: 2024-01-03 | End: 2024-01-24 | Stop reason: HOSPADM

## 2024-01-03 RX ADMIN — HYDRALAZINE HYDROCHLORIDE 50 MG: 50 TABLET, FILM COATED ORAL at 01:07

## 2024-01-03 RX ADMIN — CLONIDINE HYDROCHLORIDE 0.3 MG: 0.2 TABLET ORAL at 13:11

## 2024-01-03 RX ADMIN — INSULIN LISPRO 5 UNITS: 100 INJECTION, SOLUTION INTRAVENOUS; SUBCUTANEOUS at 13:11

## 2024-01-03 RX ADMIN — BUDESONIDE INHALATION 500 MCG: 0.5 SUSPENSION RESPIRATORY (INHALATION) at 09:43

## 2024-01-03 RX ADMIN — ATORVASTATIN CALCIUM 40 MG: 40 TABLET, FILM COATED ORAL at 21:08

## 2024-01-03 RX ADMIN — WHITE PETROLATUM,ZINC OXIDE: 57; 17 PASTE TOPICAL at 10:14

## 2024-01-03 RX ADMIN — IPRATROPIUM BROMIDE AND ALBUTEROL SULFATE 1 DOSE: 2.5; .5 SOLUTION RESPIRATORY (INHALATION) at 09:43

## 2024-01-03 RX ADMIN — BUDESONIDE INHALATION 500 MCG: 0.5 SUSPENSION RESPIRATORY (INHALATION) at 19:56

## 2024-01-03 RX ADMIN — INSULIN GLARGINE 40 UNITS: 100 INJECTION, SOLUTION SUBCUTANEOUS at 10:12

## 2024-01-03 RX ADMIN — INSULIN LISPRO 5 UNITS: 100 INJECTION, SOLUTION INTRAVENOUS; SUBCUTANEOUS at 19:03

## 2024-01-03 RX ADMIN — AMLODIPINE BESYLATE 10 MG: 5 TABLET ORAL at 10:16

## 2024-01-03 RX ADMIN — INSULIN LISPRO 8 UNITS: 100 INJECTION, SOLUTION INTRAVENOUS; SUBCUTANEOUS at 05:47

## 2024-01-03 RX ADMIN — LABETALOL HYDROCHLORIDE 300 MG: 200 TABLET, FILM COATED ORAL at 21:08

## 2024-01-03 RX ADMIN — LABETALOL HYDROCHLORIDE 300 MG: 200 TABLET, FILM COATED ORAL at 13:16

## 2024-01-03 RX ADMIN — SODIUM CHLORIDE, PRESERVATIVE FREE 10 ML: 5 INJECTION INTRAVENOUS at 21:22

## 2024-01-03 RX ADMIN — LEVETIRACETAM 750 MG: 100 INJECTION, SOLUTION INTRAVENOUS at 21:08

## 2024-01-03 RX ADMIN — HYDRALAZINE HYDROCHLORIDE 50 MG: 50 TABLET, FILM COATED ORAL at 10:17

## 2024-01-03 RX ADMIN — Medication 1 CAPSULE: at 21:08

## 2024-01-03 RX ADMIN — INSULIN GLARGINE 40 UNITS: 100 INJECTION, SOLUTION SUBCUTANEOUS at 21:09

## 2024-01-03 RX ADMIN — LABETALOL HYDROCHLORIDE 300 MG: 200 TABLET, FILM COATED ORAL at 05:40

## 2024-01-03 RX ADMIN — INSULIN LISPRO 2 UNITS: 100 INJECTION, SOLUTION INTRAVENOUS; SUBCUTANEOUS at 13:10

## 2024-01-03 RX ADMIN — HYDRALAZINE HYDROCHLORIDE 20 MG: 20 INJECTION, SOLUTION INTRAMUSCULAR; INTRAVENOUS at 21:09

## 2024-01-03 RX ADMIN — WHITE PETROLATUM,ZINC OXIDE: 57; 17 PASTE TOPICAL at 15:00

## 2024-01-03 RX ADMIN — MEROPENEM 1000 MG: 1 INJECTION, POWDER, FOR SOLUTION INTRAVENOUS at 05:38

## 2024-01-03 RX ADMIN — Medication 1 CAPSULE: at 10:17

## 2024-01-03 RX ADMIN — MEROPENEM 1000 MG: 1 INJECTION, POWDER, FOR SOLUTION INTRAVENOUS at 13:11

## 2024-01-03 RX ADMIN — SODIUM CHLORIDE: 4.5 INJECTION, SOLUTION INTRAVENOUS at 13:24

## 2024-01-03 RX ADMIN — CLONIDINE HYDROCHLORIDE 0.3 MG: 0.2 TABLET ORAL at 21:07

## 2024-01-03 RX ADMIN — CLONIDINE HYDROCHLORIDE 0.3 MG: 0.2 TABLET ORAL at 10:16

## 2024-01-03 RX ADMIN — METHYLPREDNISOLONE SODIUM SUCCINATE 20 MG: 40 INJECTION INTRAMUSCULAR; INTRAVENOUS at 13:11

## 2024-01-03 RX ADMIN — IPRATROPIUM BROMIDE AND ALBUTEROL SULFATE 1 DOSE: 2.5; .5 SOLUTION RESPIRATORY (INHALATION) at 19:56

## 2024-01-03 RX ADMIN — VASOPRESSIN: 20 INJECTION, SOLUTION INTRAVENOUS at 10:58

## 2024-01-03 RX ADMIN — SODIUM CHLORIDE, PRESERVATIVE FREE 10 ML: 5 INJECTION INTRAVENOUS at 10:14

## 2024-01-03 RX ADMIN — ENOXAPARIN SODIUM 30 MG: 100 INJECTION SUBCUTANEOUS at 10:12

## 2024-01-03 RX ADMIN — SODIUM CHLORIDE, PRESERVATIVE FREE 20 MG: 5 INJECTION INTRAVENOUS at 10:17

## 2024-01-03 RX ADMIN — WHITE PETROLATUM,ZINC OXIDE: 57; 17 PASTE TOPICAL at 21:20

## 2024-01-03 RX ADMIN — HYDRALAZINE HYDROCHLORIDE 50 MG: 50 TABLET, FILM COATED ORAL at 19:05

## 2024-01-03 RX ADMIN — LEVETIRACETAM 750 MG: 100 INJECTION, SOLUTION INTRAVENOUS at 10:16

## 2024-01-03 ASSESSMENT — PAIN SCALES - GENERAL
PAINLEVEL_OUTOF10: 0

## 2024-01-03 NOTE — CARE COORDINATION
CM reviewed Pt medicals, PT recommending SNF, will meet with Pt for choice.     2:40  CM reviewed Pt medicals, Pt does not have insurance. CM called Pt  and left a VM requesting a return call to discuss D/C planning.    CM will discuss with Pt  about Pt going to [facility] with michael.

## 2024-01-04 PROBLEM — D35.02 PHEOCHROMOCYTOMA OF LEFT ADRENAL GLAND: Status: ACTIVE | Noted: 2024-01-04

## 2024-01-04 LAB
ANION GAP SERPL CALC-SCNC: 8 MMOL/L (ref 5–15)
BUN SERPL-MCNC: 56 MG/DL (ref 6–20)
BUN/CREAT SERPL: 67 (ref 12–20)
CA-I BLD-MCNC: 9.1 MG/DL (ref 8.5–10.1)
CHLORIDE SERPL-SCNC: 111 MMOL/L (ref 97–108)
CO2 SERPL-SCNC: 22 MMOL/L (ref 21–32)
CREAT SERPL-MCNC: 0.83 MG/DL (ref 0.55–1.02)
ERYTHROCYTE [DISTWIDTH] IN BLOOD BY AUTOMATED COUNT: 13.4 % (ref 11.5–14.5)
GLUCOSE BLD STRIP.AUTO-MCNC: 128 MG/DL (ref 65–100)
GLUCOSE BLD STRIP.AUTO-MCNC: 158 MG/DL (ref 65–100)
GLUCOSE BLD STRIP.AUTO-MCNC: 173 MG/DL (ref 65–100)
GLUCOSE BLD STRIP.AUTO-MCNC: 184 MG/DL (ref 65–100)
GLUCOSE BLD STRIP.AUTO-MCNC: 188 MG/DL (ref 65–100)
GLUCOSE BLD STRIP.AUTO-MCNC: 237 MG/DL (ref 65–100)
GLUCOSE SERPL-MCNC: 207 MG/DL (ref 65–100)
HCT VFR BLD AUTO: 29.3 % (ref 35–47)
HGB BLD-MCNC: 9.3 G/DL (ref 11.5–16)
MCH RBC QN AUTO: 29.4 PG (ref 26–34)
MCHC RBC AUTO-ENTMCNC: 31.7 G/DL (ref 30–36.5)
MCV RBC AUTO: 92.7 FL (ref 80–99)
NRBC # BLD: 0 K/UL (ref 0–0.01)
NRBC BLD-RTO: 0 PER 100 WBC
PERFORMED BY:: ABNORMAL
PLATELET # BLD AUTO: 444 K/UL (ref 150–400)
PMV BLD AUTO: 13 FL (ref 8.9–12.9)
POTASSIUM SERPL-SCNC: 4.2 MMOL/L (ref 3.5–5.1)
RBC # BLD AUTO: 3.16 M/UL (ref 3.8–5.2)
SODIUM SERPL-SCNC: 141 MMOL/L (ref 136–145)
WBC # BLD AUTO: 19 K/UL (ref 3.6–11)

## 2024-01-04 PROCEDURE — 6360000002 HC RX W HCPCS: Performed by: PSYCHIATRY & NEUROLOGY

## 2024-01-04 PROCEDURE — 6360000002 HC RX W HCPCS: Performed by: INTERNAL MEDICINE

## 2024-01-04 PROCEDURE — 2580000003 HC RX 258: Performed by: INTERNAL MEDICINE

## 2024-01-04 PROCEDURE — 1100000000 HC RM PRIVATE

## 2024-01-04 PROCEDURE — 94640 AIRWAY INHALATION TREATMENT: CPT

## 2024-01-04 PROCEDURE — 6370000000 HC RX 637 (ALT 250 FOR IP): Performed by: INTERNAL MEDICINE

## 2024-01-04 PROCEDURE — 2500000003 HC RX 250 WO HCPCS: Performed by: INTERNAL MEDICINE

## 2024-01-04 PROCEDURE — 6370000000 HC RX 637 (ALT 250 FOR IP): Performed by: HOSPITALIST

## 2024-01-04 PROCEDURE — 80048 BASIC METABOLIC PNL TOTAL CA: CPT

## 2024-01-04 PROCEDURE — 85027 COMPLETE CBC AUTOMATED: CPT

## 2024-01-04 PROCEDURE — 82962 GLUCOSE BLOOD TEST: CPT

## 2024-01-04 PROCEDURE — 99232 SBSQ HOSP IP/OBS MODERATE 35: CPT | Performed by: UROLOGY

## 2024-01-04 PROCEDURE — 99232 SBSQ HOSP IP/OBS MODERATE 35: CPT | Performed by: INTERNAL MEDICINE

## 2024-01-04 PROCEDURE — 94761 N-INVAS EAR/PLS OXIMETRY MLT: CPT

## 2024-01-04 PROCEDURE — 6370000000 HC RX 637 (ALT 250 FOR IP): Performed by: NURSE PRACTITIONER

## 2024-01-04 PROCEDURE — 36415 COLL VENOUS BLD VENIPUNCTURE: CPT

## 2024-01-04 RX ORDER — PREDNISONE 20 MG/1
20 TABLET ORAL DAILY
Status: COMPLETED | OUTPATIENT
Start: 2024-01-05 | End: 2024-01-07

## 2024-01-04 RX ORDER — PREDNISONE 20 MG/1
20 TABLET ORAL DAILY
Status: DISCONTINUED | OUTPATIENT
Start: 2024-01-04 | End: 2024-01-04

## 2024-01-04 RX ADMIN — INSULIN LISPRO 5 UNITS: 100 INJECTION, SOLUTION INTRAVENOUS; SUBCUTANEOUS at 09:35

## 2024-01-04 RX ADMIN — Medication 1 CAPSULE: at 21:44

## 2024-01-04 RX ADMIN — BUDESONIDE INHALATION 500 MCG: 0.5 SUSPENSION RESPIRATORY (INHALATION) at 21:00

## 2024-01-04 RX ADMIN — WHITE PETROLATUM,ZINC OXIDE: 57; 17 PASTE TOPICAL at 16:37

## 2024-01-04 RX ADMIN — HYDRALAZINE HYDROCHLORIDE 50 MG: 50 TABLET, FILM COATED ORAL at 01:24

## 2024-01-04 RX ADMIN — CLONIDINE HYDROCHLORIDE 0.3 MG: 0.2 TABLET ORAL at 12:59

## 2024-01-04 RX ADMIN — INSULIN LISPRO 5 UNITS: 100 INJECTION, SOLUTION INTRAVENOUS; SUBCUTANEOUS at 12:58

## 2024-01-04 RX ADMIN — SODIUM CHLORIDE, PRESERVATIVE FREE 10 ML: 5 INJECTION INTRAVENOUS at 09:32

## 2024-01-04 RX ADMIN — METHYLPREDNISOLONE SODIUM SUCCINATE 20 MG: 40 INJECTION INTRAMUSCULAR; INTRAVENOUS at 00:15

## 2024-01-04 RX ADMIN — HYDRALAZINE HYDROCHLORIDE 50 MG: 50 TABLET, FILM COATED ORAL at 16:36

## 2024-01-04 RX ADMIN — HYDRALAZINE HYDROCHLORIDE 50 MG: 50 TABLET, FILM COATED ORAL at 09:31

## 2024-01-04 RX ADMIN — WHITE PETROLATUM,ZINC OXIDE: 57; 17 PASTE TOPICAL at 09:33

## 2024-01-04 RX ADMIN — ENOXAPARIN SODIUM 30 MG: 100 INJECTION SUBCUTANEOUS at 09:30

## 2024-01-04 RX ADMIN — LABETALOL HYDROCHLORIDE 300 MG: 200 TABLET, FILM COATED ORAL at 05:57

## 2024-01-04 RX ADMIN — LEVETIRACETAM 750 MG: 100 INJECTION, SOLUTION INTRAVENOUS at 21:43

## 2024-01-04 RX ADMIN — Medication 1 CAPSULE: at 09:31

## 2024-01-04 RX ADMIN — LABETALOL HYDROCHLORIDE 300 MG: 200 TABLET, FILM COATED ORAL at 21:44

## 2024-01-04 RX ADMIN — WHITE PETROLATUM,ZINC OXIDE: 57; 17 PASTE TOPICAL at 21:46

## 2024-01-04 RX ADMIN — CLONIDINE HYDROCHLORIDE 0.3 MG: 0.2 TABLET ORAL at 21:43

## 2024-01-04 RX ADMIN — IPRATROPIUM BROMIDE AND ALBUTEROL SULFATE 1 DOSE: 2.5; .5 SOLUTION RESPIRATORY (INHALATION) at 09:05

## 2024-01-04 RX ADMIN — METHYLPREDNISOLONE SODIUM SUCCINATE 20 MG: 40 INJECTION INTRAMUSCULAR; INTRAVENOUS at 12:59

## 2024-01-04 RX ADMIN — IPRATROPIUM BROMIDE AND ALBUTEROL SULFATE 1 DOSE: 2.5; .5 SOLUTION RESPIRATORY (INHALATION) at 21:00

## 2024-01-04 RX ADMIN — CLONIDINE HYDROCHLORIDE 0.3 MG: 0.2 TABLET ORAL at 09:31

## 2024-01-04 RX ADMIN — ATORVASTATIN CALCIUM 40 MG: 40 TABLET, FILM COATED ORAL at 21:44

## 2024-01-04 RX ADMIN — INSULIN GLARGINE 40 UNITS: 100 INJECTION, SOLUTION SUBCUTANEOUS at 21:44

## 2024-01-04 RX ADMIN — INSULIN LISPRO 5 UNITS: 100 INJECTION, SOLUTION INTRAVENOUS; SUBCUTANEOUS at 16:36

## 2024-01-04 RX ADMIN — LABETALOL HYDROCHLORIDE 300 MG: 200 TABLET, FILM COATED ORAL at 13:00

## 2024-01-04 RX ADMIN — LEVETIRACETAM 750 MG: 100 INJECTION, SOLUTION INTRAVENOUS at 09:30

## 2024-01-04 RX ADMIN — AMLODIPINE BESYLATE 10 MG: 5 TABLET ORAL at 09:31

## 2024-01-04 RX ADMIN — BUDESONIDE INHALATION 500 MCG: 0.5 SUSPENSION RESPIRATORY (INHALATION) at 09:05

## 2024-01-04 RX ADMIN — INSULIN LISPRO 4 UNITS: 100 INJECTION, SOLUTION INTRAVENOUS; SUBCUTANEOUS at 00:11

## 2024-01-04 RX ADMIN — INSULIN GLARGINE 40 UNITS: 100 INJECTION, SOLUTION SUBCUTANEOUS at 09:29

## 2024-01-04 RX ADMIN — SODIUM CHLORIDE, PRESERVATIVE FREE 10 ML: 5 INJECTION INTRAVENOUS at 21:47

## 2024-01-04 RX ADMIN — SODIUM CHLORIDE, PRESERVATIVE FREE 20 MG: 5 INJECTION INTRAVENOUS at 09:30

## 2024-01-04 ASSESSMENT — PAIN SCALES - WONG BAKER: WONGBAKER_NUMERICALRESPONSE: 0

## 2024-01-04 ASSESSMENT — PAIN SCALES - GENERAL
PAINLEVEL_OUTOF10: 0

## 2024-01-04 NOTE — CARE COORDINATION
0825: Chart reviewed.    Per notes; patient on IV Steroids and tube feeding via NGT followed via cardiology, dietitian, ID, nephrology and urology.    PT recs for SNF noted.    Patient is self-pay.    CM will follow-up with spouse regarding DCP.    CM will continue to follow patient and recs of medical team.    1200: CM attempted to contact patient's spouse, Tomi Andres leaving a detailed VM requesting a return call.

## 2024-01-05 ENCOUNTER — APPOINTMENT (OUTPATIENT)
Facility: HOSPITAL | Age: 54
DRG: 981 | End: 2024-01-05
Attending: ANESTHESIOLOGY

## 2024-01-05 ENCOUNTER — TELEPHONE (OUTPATIENT)
Facility: HOSPITAL | Age: 54
End: 2024-01-05

## 2024-01-05 ENCOUNTER — ANESTHESIA EVENT (OUTPATIENT)
Facility: HOSPITAL | Age: 54
End: 2024-01-05

## 2024-01-05 ENCOUNTER — APPOINTMENT (OUTPATIENT)
Facility: HOSPITAL | Age: 54
DRG: 981 | End: 2024-01-05

## 2024-01-05 LAB
ANION GAP SERPL CALC-SCNC: 9 MMOL/L (ref 5–15)
BUN SERPL-MCNC: 55 MG/DL (ref 6–20)
BUN/CREAT SERPL: 68 (ref 12–20)
CA-I BLD-MCNC: 9.1 MG/DL (ref 8.5–10.1)
CHLORIDE SERPL-SCNC: 109 MMOL/L (ref 97–108)
CO2 SERPL-SCNC: 21 MMOL/L (ref 21–32)
CREAT SERPL-MCNC: 0.81 MG/DL (ref 0.55–1.02)
CRP SERPL-MCNC: <0.29 MG/DL (ref 0–0.6)
ECHO AO ASC DIAM: 2.6 CM
ECHO AO ASCENDING AORTA INDEX: 1.5 CM/M2
ECHO AO ROOT DIAM: 3 CM
ECHO AO ROOT INDEX: 1.73 CM/M2
ECHO AV AREA PEAK VELOCITY: 2.1 CM2
ECHO AV AREA VTI: 2.3 CM2
ECHO AV AREA/BSA PEAK VELOCITY: 1.2 CM2/M2
ECHO AV AREA/BSA VTI: 1.3 CM2/M2
ECHO AV CUSP MM: 1.7 CM
ECHO AV MEAN GRADIENT: 6 MMHG
ECHO AV MEAN VELOCITY: 1.1 M/S
ECHO AV PEAK GRADIENT: 11 MMHG
ECHO AV PEAK VELOCITY: 1.7 M/S
ECHO AV VELOCITY RATIO: 0.65
ECHO AV VTI: 25.6 CM
ECHO BSA: 1.71 M2
ECHO EST RA PRESSURE: 5 MMHG
ECHO LA AREA 2C: 10.8 CM2
ECHO LA AREA 4C: 11.4 CM2
ECHO LA DIAMETER INDEX: 1.68 CM/M2
ECHO LA DIAMETER: 2.9 CM
ECHO LA MAJOR AXIS: 4.7 CM
ECHO LA MINOR AXIS: 3.9 CM
ECHO LA TO AORTIC ROOT RATIO: 0.97
ECHO LA VOL BP: 26 ML (ref 22–52)
ECHO LA VOL MOD A2C: 24 ML (ref 22–52)
ECHO LA VOL MOD A4C: 23 ML (ref 22–52)
ECHO LA VOL/BSA BIPLANE: 15 ML/M2 (ref 16–34)
ECHO LA VOLUME INDEX MOD A2C: 14 ML/M2 (ref 16–34)
ECHO LA VOLUME INDEX MOD A4C: 13 ML/M2 (ref 16–34)
ECHO LV E' LATERAL VELOCITY: 6 CM/S
ECHO LV E' SEPTAL VELOCITY: 7 CM/S
ECHO LV EDV A2C: 47 ML
ECHO LV EDV A4C: 58 ML
ECHO LV EDV INDEX A4C: 34 ML/M2
ECHO LV EDV NDEX A2C: 27 ML/M2
ECHO LV EJECTION FRACTION A2C: 68 %
ECHO LV EJECTION FRACTION A4C: 78 %
ECHO LV EJECTION FRACTION BIPLANE: 74 % (ref 55–100)
ECHO LV ESV A2C: 15 ML
ECHO LV ESV A4C: 13 ML
ECHO LV ESV INDEX A2C: 9 ML/M2
ECHO LV ESV INDEX A4C: 8 ML/M2
ECHO LV FRACTIONAL SHORTENING: 36 % (ref 28–44)
ECHO LV INTERNAL DIMENSION DIASTOLE INDEX: 1.91 CM/M2
ECHO LV INTERNAL DIMENSION DIASTOLIC MMODE: 3.9 CM (ref 3.9–5.3)
ECHO LV INTERNAL DIMENSION DIASTOLIC: 3.3 CM (ref 3.9–5.3)
ECHO LV INTERNAL DIMENSION SYSTOLIC INDEX: 1.21 CM/M2
ECHO LV INTERNAL DIMENSION SYSTOLIC MMODE: 2.5 CM
ECHO LV INTERNAL DIMENSION SYSTOLIC: 2.1 CM
ECHO LV IVSD MMODE: 1.3 CM (ref 0.6–0.9)
ECHO LV IVSD: 1.6 CM (ref 0.6–0.9)
ECHO LV MASS 2D: 188.8 G (ref 67–162)
ECHO LV MASS INDEX 2D: 109.1 G/M2 (ref 43–95)
ECHO LV POSTERIOR WALL DIASTOLIC MMODE: 1.3 CM (ref 0.6–0.9)
ECHO LV POSTERIOR WALL DIASTOLIC: 1.5 CM (ref 0.6–0.9)
ECHO LV RELATIVE WALL THICKNESS RATIO: 0.91
ECHO LVOT AREA: 3.1 CM2
ECHO LVOT AV VTI INDEX: 0.73
ECHO LVOT DIAM: 2 CM
ECHO LVOT MEAN GRADIENT: 3 MMHG
ECHO LVOT PEAK GRADIENT: 5 MMHG
ECHO LVOT PEAK VELOCITY: 1.1 M/S
ECHO LVOT STROKE VOLUME INDEX: 33.9 ML/M2
ECHO LVOT SV: 58.7 ML
ECHO LVOT VTI: 18.7 CM
ECHO MV A VELOCITY: 0.72 M/S
ECHO MV E DECELERATION TIME (DT): 259 MS
ECHO MV E VELOCITY: 0.82 M/S
ECHO MV E/A RATIO: 1.14
ECHO MV E/E' LATERAL: 13.67
ECHO MV E/E' RATIO (AVERAGED): 12.69
ECHO PV MAX VELOCITY: 1.3 M/S
ECHO PV PEAK GRADIENT: 6 MMHG
ECHO RA AREA 4C: 10.5 CM2
ECHO RA END SYSTOLIC VOLUME APICAL 4 CHAMBER INDEX BSA: 13 ML/M2
ECHO RA VOLUME: 23 ML
ECHO RIGHT VENTRICULAR SYSTOLIC PRESSURE (RVSP): 15 MMHG
ECHO RV BASAL DIMENSION: 3.7 CM
ECHO RV LONGITUDINAL DIMENSION: 6.7 CM
ECHO RV MID DIMENSION: 2.9 CM
ECHO RV TAPSE: 2.1 CM (ref 1.7–?)
ECHO RVOT MEAN GRADIENT: 3 MMHG
ECHO RVOT PEAK GRADIENT: 6 MMHG
ECHO RVOT PEAK VELOCITY: 1.2 M/S
ECHO RVOT VTI: 21.3 CM
ECHO TV REGURGITANT MAX VELOCITY: 1.62 M/S
ECHO TV REGURGITANT PEAK GRADIENT: 10 MMHG
ERYTHROCYTE [DISTWIDTH] IN BLOOD BY AUTOMATED COUNT: 13.4 % (ref 11.5–14.5)
GLUCOSE BLD STRIP.AUTO-MCNC: 137 MG/DL (ref 65–100)
GLUCOSE BLD STRIP.AUTO-MCNC: 176 MG/DL (ref 65–100)
GLUCOSE BLD STRIP.AUTO-MCNC: 79 MG/DL (ref 65–100)
GLUCOSE BLD STRIP.AUTO-MCNC: 87 MG/DL (ref 65–100)
GLUCOSE BLD STRIP.AUTO-MCNC: 94 MG/DL (ref 65–100)
GLUCOSE SERPL-MCNC: 149 MG/DL (ref 65–100)
HCT VFR BLD AUTO: 29.6 % (ref 35–47)
HGB BLD-MCNC: 9.5 G/DL (ref 11.5–16)
MCH RBC QN AUTO: 29.6 PG (ref 26–34)
MCHC RBC AUTO-ENTMCNC: 32.1 G/DL (ref 30–36.5)
MCV RBC AUTO: 92.2 FL (ref 80–99)
NRBC # BLD: 0 K/UL (ref 0–0.01)
NRBC BLD-RTO: 0 PER 100 WBC
PERFORMED BY:: ABNORMAL
PERFORMED BY:: ABNORMAL
PERFORMED BY:: NORMAL
PLATELET # BLD AUTO: 357 K/UL (ref 150–400)
PMV BLD AUTO: 13 FL (ref 8.9–12.9)
POTASSIUM SERPL-SCNC: 4.1 MMOL/L (ref 3.5–5.1)
PROCALCITONIN SERPL-MCNC: 0.53 NG/ML
RBC # BLD AUTO: 3.21 M/UL (ref 3.8–5.2)
SODIUM SERPL-SCNC: 139 MMOL/L (ref 136–145)
WBC # BLD AUTO: 19.1 K/UL (ref 3.6–11)

## 2024-01-05 PROCEDURE — 94640 AIRWAY INHALATION TREATMENT: CPT

## 2024-01-05 PROCEDURE — 93308 TTE F-UP OR LMTD: CPT

## 2024-01-05 PROCEDURE — 6370000000 HC RX 637 (ALT 250 FOR IP): Performed by: HOSPITALIST

## 2024-01-05 PROCEDURE — 93005 ELECTROCARDIOGRAM TRACING: CPT | Performed by: ANESTHESIOLOGY

## 2024-01-05 PROCEDURE — 6360000002 HC RX W HCPCS: Performed by: INTERNAL MEDICINE

## 2024-01-05 PROCEDURE — 6370000000 HC RX 637 (ALT 250 FOR IP): Performed by: INTERNAL MEDICINE

## 2024-01-05 PROCEDURE — 82962 GLUCOSE BLOOD TEST: CPT

## 2024-01-05 PROCEDURE — 6370000000 HC RX 637 (ALT 250 FOR IP): Performed by: NURSE PRACTITIONER

## 2024-01-05 PROCEDURE — 85027 COMPLETE CBC AUTOMATED: CPT

## 2024-01-05 PROCEDURE — 2500000003 HC RX 250 WO HCPCS: Performed by: INTERNAL MEDICINE

## 2024-01-05 PROCEDURE — 99233 SBSQ HOSP IP/OBS HIGH 50: CPT | Performed by: UROLOGY

## 2024-01-05 PROCEDURE — 99232 SBSQ HOSP IP/OBS MODERATE 35: CPT | Performed by: INTERNAL MEDICINE

## 2024-01-05 PROCEDURE — 2580000003 HC RX 258: Performed by: INTERNAL MEDICINE

## 2024-01-05 PROCEDURE — 36415 COLL VENOUS BLD VENIPUNCTURE: CPT

## 2024-01-05 PROCEDURE — 1100000000 HC RM PRIVATE

## 2024-01-05 PROCEDURE — 80048 BASIC METABOLIC PNL TOTAL CA: CPT

## 2024-01-05 PROCEDURE — 6360000002 HC RX W HCPCS: Performed by: PSYCHIATRY & NEUROLOGY

## 2024-01-05 PROCEDURE — 86140 C-REACTIVE PROTEIN: CPT

## 2024-01-05 PROCEDURE — 84145 PROCALCITONIN (PCT): CPT

## 2024-01-05 RX ADMIN — ENOXAPARIN SODIUM 30 MG: 100 INJECTION SUBCUTANEOUS at 09:37

## 2024-01-05 RX ADMIN — ATORVASTATIN CALCIUM 40 MG: 40 TABLET, FILM COATED ORAL at 21:30

## 2024-01-05 RX ADMIN — BUDESONIDE INHALATION 500 MCG: 0.5 SUSPENSION RESPIRATORY (INHALATION) at 09:18

## 2024-01-05 RX ADMIN — CLONIDINE HYDROCHLORIDE 0.3 MG: 0.2 TABLET ORAL at 21:30

## 2024-01-05 RX ADMIN — LABETALOL HYDROCHLORIDE 300 MG: 200 TABLET, FILM COATED ORAL at 14:20

## 2024-01-05 RX ADMIN — IPRATROPIUM BROMIDE AND ALBUTEROL SULFATE 1 DOSE: 2.5; .5 SOLUTION RESPIRATORY (INHALATION) at 19:51

## 2024-01-05 RX ADMIN — PREDNISONE 20 MG: 20 TABLET ORAL at 09:37

## 2024-01-05 RX ADMIN — SODIUM CHLORIDE, PRESERVATIVE FREE 20 MG: 5 INJECTION INTRAVENOUS at 09:38

## 2024-01-05 RX ADMIN — BUDESONIDE INHALATION 500 MCG: 0.5 SUSPENSION RESPIRATORY (INHALATION) at 19:51

## 2024-01-05 RX ADMIN — Medication 1 CAPSULE: at 21:30

## 2024-01-05 RX ADMIN — SODIUM CHLORIDE, PRESERVATIVE FREE 10 ML: 5 INJECTION INTRAVENOUS at 21:31

## 2024-01-05 RX ADMIN — HYDRALAZINE HYDROCHLORIDE 50 MG: 50 TABLET, FILM COATED ORAL at 01:20

## 2024-01-05 RX ADMIN — WHITE PETROLATUM,ZINC OXIDE: 57; 17 PASTE TOPICAL at 09:41

## 2024-01-05 RX ADMIN — LEVETIRACETAM 750 MG: 100 INJECTION, SOLUTION INTRAVENOUS at 21:29

## 2024-01-05 RX ADMIN — WHITE PETROLATUM,ZINC OXIDE: 57; 17 PASTE TOPICAL at 14:21

## 2024-01-05 RX ADMIN — AMLODIPINE BESYLATE 10 MG: 5 TABLET ORAL at 09:38

## 2024-01-05 RX ADMIN — HYDRALAZINE HYDROCHLORIDE 50 MG: 50 TABLET, FILM COATED ORAL at 16:46

## 2024-01-05 RX ADMIN — INSULIN LISPRO 5 UNITS: 100 INJECTION, SOLUTION INTRAVENOUS; SUBCUTANEOUS at 12:21

## 2024-01-05 RX ADMIN — LEVETIRACETAM 750 MG: 100 INJECTION, SOLUTION INTRAVENOUS at 09:40

## 2024-01-05 RX ADMIN — HYDRALAZINE HYDROCHLORIDE 50 MG: 50 TABLET, FILM COATED ORAL at 09:40

## 2024-01-05 RX ADMIN — SODIUM CHLORIDE, PRESERVATIVE FREE 10 ML: 5 INJECTION INTRAVENOUS at 09:40

## 2024-01-05 RX ADMIN — IPRATROPIUM BROMIDE AND ALBUTEROL SULFATE 1 DOSE: 2.5; .5 SOLUTION RESPIRATORY (INHALATION) at 09:18

## 2024-01-05 RX ADMIN — Medication 1 CAPSULE: at 09:38

## 2024-01-05 RX ADMIN — CLONIDINE HYDROCHLORIDE 0.3 MG: 0.2 TABLET ORAL at 14:10

## 2024-01-05 RX ADMIN — LABETALOL HYDROCHLORIDE 300 MG: 200 TABLET, FILM COATED ORAL at 21:30

## 2024-01-05 RX ADMIN — CLONIDINE HYDROCHLORIDE 0.3 MG: 0.2 TABLET ORAL at 09:38

## 2024-01-05 RX ADMIN — LABETALOL HYDROCHLORIDE 300 MG: 200 TABLET, FILM COATED ORAL at 05:39

## 2024-01-05 RX ADMIN — INSULIN LISPRO 5 UNITS: 100 INJECTION, SOLUTION INTRAVENOUS; SUBCUTANEOUS at 09:39

## 2024-01-05 RX ADMIN — WHITE PETROLATUM,ZINC OXIDE: 57; 17 PASTE TOPICAL at 21:45

## 2024-01-05 RX ADMIN — INSULIN GLARGINE 40 UNITS: 100 INJECTION, SOLUTION SUBCUTANEOUS at 09:39

## 2024-01-05 ASSESSMENT — PAIN SCALES - GENERAL
PAINLEVEL_OUTOF10: 0

## 2024-01-05 ASSESSMENT — PAIN SCALES - WONG BAKER: WONGBAKER_NUMERICALRESPONSE: 0

## 2024-01-05 NOTE — TELEPHONE ENCOUNTER
Dr. Eduardo wants to schedule patient for Monday Jan 8 for left adrenalectomy, robot assisted.  Can you please arrange that?    DX: left retroperitoneal mass or left adrenal mass    He will need 2.5 hours.  She is inpatient.    He will consent , Tomi Andres [phone]

## 2024-01-05 NOTE — CARE COORDINATION
0882: Chart reviewed.     Per notes; patient on tube feeding via NGT followed via cardiology, dietitian, ID, nephrology and urology.     PT recs for SNF noted.     Patient is self-pay.     CM will again attempt to follow-up with spouse regarding DCP.     CM will continue to follow patient and recs of medical team.    1405: CM attempted to reach patient's spouse, Tomi leaving a  requesting a return call.    CM attempted to reach patient's daughterSharmin: subscriber not in service message received.    CM attempted to reach patient's sonIsidro leaving a  requesting a return call.

## 2024-01-06 ENCOUNTER — APPOINTMENT (OUTPATIENT)
Facility: HOSPITAL | Age: 54
DRG: 981 | End: 2024-01-06

## 2024-01-06 LAB
ANION GAP SERPL CALC-SCNC: 8 MMOL/L (ref 5–15)
BACTERIA SPEC CULT: NORMAL
BUN SERPL-MCNC: 48 MG/DL (ref 6–20)
BUN/CREAT SERPL: 61 (ref 12–20)
CA-I BLD-MCNC: 8.9 MG/DL (ref 8.5–10.1)
CHLORIDE SERPL-SCNC: 105 MMOL/L (ref 97–108)
CO2 SERPL-SCNC: 26 MMOL/L (ref 21–32)
CREAT SERPL-MCNC: 0.79 MG/DL (ref 0.55–1.02)
ERYTHROCYTE [DISTWIDTH] IN BLOOD BY AUTOMATED COUNT: 13.5 % (ref 11.5–14.5)
GLUCOSE BLD STRIP.AUTO-MCNC: 108 MG/DL (ref 65–100)
GLUCOSE BLD STRIP.AUTO-MCNC: 121 MG/DL (ref 65–100)
GLUCOSE BLD STRIP.AUTO-MCNC: 161 MG/DL (ref 65–100)
GLUCOSE BLD STRIP.AUTO-MCNC: 171 MG/DL (ref 65–100)
GLUCOSE BLD STRIP.AUTO-MCNC: 197 MG/DL (ref 65–100)
GLUCOSE BLD STRIP.AUTO-MCNC: 231 MG/DL (ref 65–100)
GLUCOSE SERPL-MCNC: 96 MG/DL (ref 65–100)
GRAM STN SPEC: NORMAL
GRAM STN SPEC: NORMAL
HCT VFR BLD AUTO: 27 % (ref 35–47)
HGB BLD-MCNC: 9 G/DL (ref 11.5–16)
Lab: NORMAL
MCH RBC QN AUTO: 30.5 PG (ref 26–34)
MCHC RBC AUTO-ENTMCNC: 33.3 G/DL (ref 30–36.5)
MCV RBC AUTO: 91.5 FL (ref 80–99)
NRBC # BLD: 0 K/UL (ref 0–0.01)
NRBC BLD-RTO: 0 PER 100 WBC
PERFORMED BY:: ABNORMAL
PLATELET # BLD AUTO: 402 K/UL (ref 150–400)
PMV BLD AUTO: 12.2 FL (ref 8.9–12.9)
POTASSIUM SERPL-SCNC: 3.9 MMOL/L (ref 3.5–5.1)
RBC # BLD AUTO: 2.95 M/UL (ref 3.8–5.2)
SODIUM SERPL-SCNC: 139 MMOL/L (ref 136–145)
TROPONIN I SERPL HS-MCNC: 44 NG/L (ref 0–51)
WBC # BLD AUTO: 15.7 K/UL (ref 3.6–11)

## 2024-01-06 PROCEDURE — 6370000000 HC RX 637 (ALT 250 FOR IP): Performed by: INTERNAL MEDICINE

## 2024-01-06 PROCEDURE — 6360000002 HC RX W HCPCS: Performed by: INTERNAL MEDICINE

## 2024-01-06 PROCEDURE — 2580000003 HC RX 258: Performed by: INTERNAL MEDICINE

## 2024-01-06 PROCEDURE — 6370000000 HC RX 637 (ALT 250 FOR IP): Performed by: HOSPITALIST

## 2024-01-06 PROCEDURE — 99233 SBSQ HOSP IP/OBS HIGH 50: CPT | Performed by: UROLOGY

## 2024-01-06 PROCEDURE — 6360000002 HC RX W HCPCS: Performed by: PSYCHIATRY & NEUROLOGY

## 2024-01-06 PROCEDURE — 85027 COMPLETE CBC AUTOMATED: CPT

## 2024-01-06 PROCEDURE — 6370000000 HC RX 637 (ALT 250 FOR IP): Performed by: NURSE PRACTITIONER

## 2024-01-06 PROCEDURE — 84484 ASSAY OF TROPONIN QUANT: CPT

## 2024-01-06 PROCEDURE — 1100000000 HC RM PRIVATE

## 2024-01-06 PROCEDURE — 74183 MRI ABD W/O CNTR FLWD CNTR: CPT

## 2024-01-06 PROCEDURE — 6360000004 HC RX CONTRAST MEDICATION: Performed by: UROLOGY

## 2024-01-06 PROCEDURE — 82962 GLUCOSE BLOOD TEST: CPT

## 2024-01-06 PROCEDURE — A9577 INJ MULTIHANCE: HCPCS | Performed by: UROLOGY

## 2024-01-06 PROCEDURE — 80048 BASIC METABOLIC PNL TOTAL CA: CPT

## 2024-01-06 PROCEDURE — 36415 COLL VENOUS BLD VENIPUNCTURE: CPT

## 2024-01-06 PROCEDURE — 6370000000 HC RX 637 (ALT 250 FOR IP): Performed by: STUDENT IN AN ORGANIZED HEALTH CARE EDUCATION/TRAINING PROGRAM

## 2024-01-06 PROCEDURE — 2500000003 HC RX 250 WO HCPCS: Performed by: INTERNAL MEDICINE

## 2024-01-06 PROCEDURE — 94640 AIRWAY INHALATION TREATMENT: CPT

## 2024-01-06 RX ORDER — INSULIN GLARGINE 100 [IU]/ML
40 INJECTION, SOLUTION SUBCUTANEOUS DAILY
Status: DISCONTINUED | OUTPATIENT
Start: 2024-01-06 | End: 2024-01-09

## 2024-01-06 RX ADMIN — AMLODIPINE BESYLATE 10 MG: 5 TABLET ORAL at 10:44

## 2024-01-06 RX ADMIN — SODIUM CHLORIDE, PRESERVATIVE FREE 10 ML: 5 INJECTION INTRAVENOUS at 10:45

## 2024-01-06 RX ADMIN — CLONIDINE HYDROCHLORIDE 0.3 MG: 0.2 TABLET ORAL at 13:11

## 2024-01-06 RX ADMIN — LABETALOL HYDROCHLORIDE 300 MG: 200 TABLET, FILM COATED ORAL at 21:22

## 2024-01-06 RX ADMIN — CLONIDINE HYDROCHLORIDE 0.3 MG: 0.2 TABLET ORAL at 21:22

## 2024-01-06 RX ADMIN — LEVETIRACETAM 750 MG: 100 INJECTION, SOLUTION INTRAVENOUS at 21:23

## 2024-01-06 RX ADMIN — HYDRALAZINE HYDROCHLORIDE 50 MG: 50 TABLET, FILM COATED ORAL at 10:44

## 2024-01-06 RX ADMIN — HYDRALAZINE HYDROCHLORIDE 50 MG: 50 TABLET, FILM COATED ORAL at 23:10

## 2024-01-06 RX ADMIN — HYDRALAZINE HYDROCHLORIDE 50 MG: 50 TABLET, FILM COATED ORAL at 17:40

## 2024-01-06 RX ADMIN — WHITE PETROLATUM,ZINC OXIDE: 57; 17 PASTE TOPICAL at 13:50

## 2024-01-06 RX ADMIN — PREDNISONE 20 MG: 20 TABLET ORAL at 10:44

## 2024-01-06 RX ADMIN — Medication 1 CAPSULE: at 21:22

## 2024-01-06 RX ADMIN — ENOXAPARIN SODIUM 30 MG: 100 INJECTION SUBCUTANEOUS at 10:46

## 2024-01-06 RX ADMIN — SODIUM CHLORIDE, PRESERVATIVE FREE 20 MG: 5 INJECTION INTRAVENOUS at 10:44

## 2024-01-06 RX ADMIN — Medication 1 CAPSULE: at 10:44

## 2024-01-06 RX ADMIN — HYDRALAZINE HYDROCHLORIDE 50 MG: 50 TABLET, FILM COATED ORAL at 01:44

## 2024-01-06 RX ADMIN — BUDESONIDE INHALATION 500 MCG: 0.5 SUSPENSION RESPIRATORY (INHALATION) at 20:03

## 2024-01-06 RX ADMIN — SODIUM CHLORIDE, PRESERVATIVE FREE 10 ML: 5 INJECTION INTRAVENOUS at 21:23

## 2024-01-06 RX ADMIN — LABETALOL HYDROCHLORIDE 300 MG: 200 TABLET, FILM COATED ORAL at 04:51

## 2024-01-06 RX ADMIN — INSULIN GLARGINE 40 UNITS: 100 INJECTION, SOLUTION SUBCUTANEOUS at 22:07

## 2024-01-06 RX ADMIN — WHITE PETROLATUM,ZINC OXIDE: 57; 17 PASTE TOPICAL at 21:22

## 2024-01-06 RX ADMIN — INSULIN GLARGINE 40 UNITS: 100 INJECTION, SOLUTION SUBCUTANEOUS at 10:55

## 2024-01-06 RX ADMIN — LABETALOL HYDROCHLORIDE 300 MG: 200 TABLET, FILM COATED ORAL at 13:10

## 2024-01-06 RX ADMIN — WHITE PETROLATUM,ZINC OXIDE: 57; 17 PASTE TOPICAL at 10:46

## 2024-01-06 RX ADMIN — INSULIN LISPRO 5 UNITS: 100 INJECTION, SOLUTION INTRAVENOUS; SUBCUTANEOUS at 08:00

## 2024-01-06 RX ADMIN — INSULIN LISPRO 4 UNITS: 100 INJECTION, SOLUTION INTRAVENOUS; SUBCUTANEOUS at 23:10

## 2024-01-06 RX ADMIN — IPRATROPIUM BROMIDE AND ALBUTEROL SULFATE 1 DOSE: 2.5; .5 SOLUTION RESPIRATORY (INHALATION) at 20:03

## 2024-01-06 RX ADMIN — ATORVASTATIN CALCIUM 40 MG: 40 TABLET, FILM COATED ORAL at 21:23

## 2024-01-06 RX ADMIN — INSULIN LISPRO 5 UNITS: 100 INJECTION, SOLUTION INTRAVENOUS; SUBCUTANEOUS at 13:08

## 2024-01-06 RX ADMIN — LEVETIRACETAM 750 MG: 100 INJECTION, SOLUTION INTRAVENOUS at 10:44

## 2024-01-06 RX ADMIN — CLONIDINE HYDROCHLORIDE 0.3 MG: 0.2 TABLET ORAL at 10:45

## 2024-01-06 RX ADMIN — GADOBENATE DIMEGLUMINE 14 ML: 529 INJECTION, SOLUTION INTRAVENOUS at 09:46

## 2024-01-06 ASSESSMENT — PAIN SCALES - GENERAL
PAINLEVEL_OUTOF10: 0

## 2024-01-06 ASSESSMENT — PAIN SCALES - WONG BAKER: WONGBAKER_NUMERICALRESPONSE: 0

## 2024-01-07 LAB
ANION GAP SERPL CALC-SCNC: 8 MMOL/L (ref 5–15)
BUN SERPL-MCNC: 45 MG/DL (ref 6–20)
BUN/CREAT SERPL: 60 (ref 12–20)
CA-I BLD-MCNC: 8.8 MG/DL (ref 8.5–10.1)
CHLORIDE SERPL-SCNC: 103 MMOL/L (ref 97–108)
CO2 SERPL-SCNC: 25 MMOL/L (ref 21–32)
CREAT SERPL-MCNC: 0.75 MG/DL (ref 0.55–1.02)
ERYTHROCYTE [DISTWIDTH] IN BLOOD BY AUTOMATED COUNT: 13.6 % (ref 11.5–14.5)
GLUCOSE BLD STRIP.AUTO-MCNC: 134 MG/DL (ref 65–100)
GLUCOSE BLD STRIP.AUTO-MCNC: 158 MG/DL (ref 65–100)
GLUCOSE BLD STRIP.AUTO-MCNC: 172 MG/DL (ref 65–100)
GLUCOSE BLD STRIP.AUTO-MCNC: 193 MG/DL (ref 65–100)
GLUCOSE BLD STRIP.AUTO-MCNC: 230 MG/DL (ref 65–100)
GLUCOSE BLD STRIP.AUTO-MCNC: 321 MG/DL (ref 65–100)
GLUCOSE SERPL-MCNC: 158 MG/DL (ref 65–100)
HCT VFR BLD AUTO: 28.4 % (ref 35–47)
HGB BLD-MCNC: 9.3 G/DL (ref 11.5–16)
MCH RBC QN AUTO: 29.7 PG (ref 26–34)
MCHC RBC AUTO-ENTMCNC: 32.7 G/DL (ref 30–36.5)
MCV RBC AUTO: 90.7 FL (ref 80–99)
NRBC # BLD: 0 K/UL (ref 0–0.01)
NRBC BLD-RTO: 0 PER 100 WBC
PERFORMED BY:: ABNORMAL
PLATELET # BLD AUTO: 406 K/UL (ref 150–400)
PMV BLD AUTO: 12.6 FL (ref 8.9–12.9)
POTASSIUM SERPL-SCNC: 3.8 MMOL/L (ref 3.5–5.1)
RBC # BLD AUTO: 3.13 M/UL (ref 3.8–5.2)
SODIUM SERPL-SCNC: 136 MMOL/L (ref 136–145)
WBC # BLD AUTO: 13.7 K/UL (ref 3.6–11)

## 2024-01-07 PROCEDURE — 6360000002 HC RX W HCPCS: Performed by: INTERNAL MEDICINE

## 2024-01-07 PROCEDURE — 2580000003 HC RX 258: Performed by: INTERNAL MEDICINE

## 2024-01-07 PROCEDURE — 80048 BASIC METABOLIC PNL TOTAL CA: CPT

## 2024-01-07 PROCEDURE — 6370000000 HC RX 637 (ALT 250 FOR IP): Performed by: NURSE PRACTITIONER

## 2024-01-07 PROCEDURE — 6370000000 HC RX 637 (ALT 250 FOR IP): Performed by: INTERNAL MEDICINE

## 2024-01-07 PROCEDURE — 85027 COMPLETE CBC AUTOMATED: CPT

## 2024-01-07 PROCEDURE — 6370000000 HC RX 637 (ALT 250 FOR IP): Performed by: STUDENT IN AN ORGANIZED HEALTH CARE EDUCATION/TRAINING PROGRAM

## 2024-01-07 PROCEDURE — 99232 SBSQ HOSP IP/OBS MODERATE 35: CPT | Performed by: UROLOGY

## 2024-01-07 PROCEDURE — 1100000000 HC RM PRIVATE

## 2024-01-07 PROCEDURE — 6370000000 HC RX 637 (ALT 250 FOR IP): Performed by: HOSPITALIST

## 2024-01-07 PROCEDURE — 6360000002 HC RX W HCPCS: Performed by: PSYCHIATRY & NEUROLOGY

## 2024-01-07 PROCEDURE — 36415 COLL VENOUS BLD VENIPUNCTURE: CPT

## 2024-01-07 PROCEDURE — 94640 AIRWAY INHALATION TREATMENT: CPT

## 2024-01-07 PROCEDURE — 2500000003 HC RX 250 WO HCPCS: Performed by: INTERNAL MEDICINE

## 2024-01-07 PROCEDURE — 82962 GLUCOSE BLOOD TEST: CPT

## 2024-01-07 RX ADMIN — INSULIN LISPRO 12 UNITS: 100 INJECTION, SOLUTION INTRAVENOUS; SUBCUTANEOUS at 18:04

## 2024-01-07 RX ADMIN — BUDESONIDE INHALATION 500 MCG: 0.5 SUSPENSION RESPIRATORY (INHALATION) at 09:36

## 2024-01-07 RX ADMIN — HYDRALAZINE HYDROCHLORIDE 50 MG: 50 TABLET, FILM COATED ORAL at 18:04

## 2024-01-07 RX ADMIN — Medication 1 CAPSULE: at 20:58

## 2024-01-07 RX ADMIN — LABETALOL HYDROCHLORIDE 300 MG: 200 TABLET, FILM COATED ORAL at 05:51

## 2024-01-07 RX ADMIN — WHITE PETROLATUM,ZINC OXIDE: 57; 17 PASTE TOPICAL at 20:59

## 2024-01-07 RX ADMIN — INSULIN LISPRO 5 UNITS: 100 INJECTION, SOLUTION INTRAVENOUS; SUBCUTANEOUS at 18:04

## 2024-01-07 RX ADMIN — LEVETIRACETAM 750 MG: 100 INJECTION, SOLUTION INTRAVENOUS at 20:58

## 2024-01-07 RX ADMIN — INSULIN GLARGINE 40 UNITS: 100 INJECTION, SOLUTION SUBCUTANEOUS at 20:58

## 2024-01-07 RX ADMIN — IPRATROPIUM BROMIDE AND ALBUTEROL SULFATE 1 DOSE: 2.5; .5 SOLUTION RESPIRATORY (INHALATION) at 09:36

## 2024-01-07 RX ADMIN — Medication 1 CAPSULE: at 10:47

## 2024-01-07 RX ADMIN — LABETALOL HYDROCHLORIDE 300 MG: 200 TABLET, FILM COATED ORAL at 14:15

## 2024-01-07 RX ADMIN — WHITE PETROLATUM,ZINC OXIDE: 57; 17 PASTE TOPICAL at 10:49

## 2024-01-07 RX ADMIN — INSULIN LISPRO 5 UNITS: 100 INJECTION, SOLUTION INTRAVENOUS; SUBCUTANEOUS at 10:48

## 2024-01-07 RX ADMIN — LABETALOL HYDROCHLORIDE 300 MG: 200 TABLET, FILM COATED ORAL at 20:58

## 2024-01-07 RX ADMIN — SODIUM CHLORIDE, PRESERVATIVE FREE 10 ML: 5 INJECTION INTRAVENOUS at 10:49

## 2024-01-07 RX ADMIN — SODIUM CHLORIDE, PRESERVATIVE FREE 20 MG: 5 INJECTION INTRAVENOUS at 10:47

## 2024-01-07 RX ADMIN — BUDESONIDE INHALATION 500 MCG: 0.5 SUSPENSION RESPIRATORY (INHALATION) at 18:10

## 2024-01-07 RX ADMIN — AMLODIPINE BESYLATE 10 MG: 5 TABLET ORAL at 10:46

## 2024-01-07 RX ADMIN — IPRATROPIUM BROMIDE AND ALBUTEROL SULFATE 1 DOSE: 2.5; .5 SOLUTION RESPIRATORY (INHALATION) at 18:10

## 2024-01-07 RX ADMIN — LEVETIRACETAM 750 MG: 100 INJECTION, SOLUTION INTRAVENOUS at 10:47

## 2024-01-07 RX ADMIN — CLONIDINE HYDROCHLORIDE 0.3 MG: 0.2 TABLET ORAL at 20:58

## 2024-01-07 RX ADMIN — CLONIDINE HYDROCHLORIDE 0.3 MG: 0.2 TABLET ORAL at 14:14

## 2024-01-07 RX ADMIN — PREDNISONE 20 MG: 20 TABLET ORAL at 10:47

## 2024-01-07 RX ADMIN — ATORVASTATIN CALCIUM 40 MG: 40 TABLET, FILM COATED ORAL at 20:58

## 2024-01-07 RX ADMIN — HYDRALAZINE HYDROCHLORIDE 50 MG: 50 TABLET, FILM COATED ORAL at 10:46

## 2024-01-07 RX ADMIN — SODIUM CHLORIDE, PRESERVATIVE FREE 10 ML: 5 INJECTION INTRAVENOUS at 20:58

## 2024-01-07 ASSESSMENT — PAIN SCALES - GENERAL
PAINLEVEL_OUTOF10: 0

## 2024-01-07 ASSESSMENT — PAIN SCALES - WONG BAKER: WONGBAKER_NUMERICALRESPONSE: 0

## 2024-01-08 ENCOUNTER — APPOINTMENT (OUTPATIENT)
Facility: HOSPITAL | Age: 54
DRG: 981 | End: 2024-01-08

## 2024-01-08 ENCOUNTER — ANESTHESIA (OUTPATIENT)
Facility: HOSPITAL | Age: 54
End: 2024-01-08

## 2024-01-08 LAB
ARTERIAL PATENCY WRIST A: YES
BASE DEFICIT BLDA-SCNC: 0.2 MMOL/L
BDY SITE: ABNORMAL
BODY TEMPERATURE: 98
CA-I BLD-MCNC: 1.15 MMOL/L (ref 1.13–1.32)
COHGB MFR BLD: 0.3 % (ref 1–2)
EKG ATRIAL RATE: 85 BPM
EKG DIAGNOSIS: NORMAL
EKG P AXIS: 47 DEGREES
EKG P-R INTERVAL: 130 MS
EKG Q-T INTERVAL: 404 MS
EKG QRS DURATION: 88 MS
EKG QTC CALCULATION (BAZETT): 480 MS
EKG R AXIS: -13 DEGREES
EKG T AXIS: 71 DEGREES
EKG VENTRICULAR RATE: 85 BPM
FIO2 ON VENT: 21 %
GLUCOSE BLD STRIP.AUTO-MCNC: 101 MG/DL (ref 65–100)
GLUCOSE BLD STRIP.AUTO-MCNC: 108 MG/DL (ref 65–100)
GLUCOSE BLD STRIP.AUTO-MCNC: 128 MG/DL (ref 65–100)
GLUCOSE BLD STRIP.AUTO-MCNC: 154 MG/DL (ref 65–100)
GLUCOSE BLD STRIP.AUTO-MCNC: 260 MG/DL (ref 65–100)
GLUCOSE BLD STRIP.AUTO-MCNC: 37 MG/DL (ref 65–100)
GLUCOSE BLD STRIP.AUTO-MCNC: 46 MG/DL (ref 65–100)
HCO3 BLDA-SCNC: 23 MMOL/L (ref 22–26)
METHGB MFR BLD: 0.5 % (ref 0–1.4)
OXYHGB MFR BLD: 95.1 % (ref 95–99)
PCO2 BLDA: 30 MMHG (ref 35–45)
PERFORMED BY:: ABNORMAL
PH BLDA: 7.49 (ref 7.35–7.45)
PO2 BLDA: 88 MMHG (ref 80–100)
SAO2 % BLD: 96 % (ref 95–99)
SAO2% DEVICE SAO2% SENSOR NAME: ABNORMAL
SPECIMEN SITE: ABNORMAL

## 2024-01-08 PROCEDURE — 6370000000 HC RX 637 (ALT 250 FOR IP): Performed by: HOSPITALIST

## 2024-01-08 PROCEDURE — 6360000002 HC RX W HCPCS: Performed by: UROLOGY

## 2024-01-08 PROCEDURE — 2500000003 HC RX 250 WO HCPCS: Performed by: HOSPITALIST

## 2024-01-08 PROCEDURE — 2580000003 HC RX 258: Performed by: HOSPITALIST

## 2024-01-08 PROCEDURE — 2720000010 HC SURG SUPPLY STERILE: Performed by: UROLOGY

## 2024-01-08 PROCEDURE — 8E0W4CZ ROBOTIC ASSISTED PROCEDURE OF TRUNK REGION, PERCUTANEOUS ENDOSCOPIC APPROACH: ICD-10-PCS | Performed by: UROLOGY

## 2024-01-08 PROCEDURE — 2000000000 HC ICU R&B

## 2024-01-08 PROCEDURE — 2580000003 HC RX 258

## 2024-01-08 PROCEDURE — P9045 ALBUMIN (HUMAN), 5%, 250 ML: HCPCS

## 2024-01-08 PROCEDURE — 94761 N-INVAS EAR/PLS OXIMETRY MLT: CPT

## 2024-01-08 PROCEDURE — 82330 ASSAY OF CALCIUM: CPT

## 2024-01-08 PROCEDURE — 6360000002 HC RX W HCPCS: Performed by: PSYCHIATRY & NEUROLOGY

## 2024-01-08 PROCEDURE — 6370000000 HC RX 637 (ALT 250 FOR IP): Performed by: NURSE PRACTITIONER

## 2024-01-08 PROCEDURE — 2580000003 HC RX 258: Performed by: INTERNAL MEDICINE

## 2024-01-08 PROCEDURE — 88341 IMHCHEM/IMCYTCHM EA ADD ANTB: CPT

## 2024-01-08 PROCEDURE — 6370000000 HC RX 637 (ALT 250 FOR IP): Performed by: INTERNAL MEDICINE

## 2024-01-08 PROCEDURE — 7100000001 HC PACU RECOVERY - ADDTL 15 MIN: Performed by: UROLOGY

## 2024-01-08 PROCEDURE — 2709999900 HC NON-CHARGEABLE SUPPLY: Performed by: UROLOGY

## 2024-01-08 PROCEDURE — 2500000003 HC RX 250 WO HCPCS: Performed by: INTERNAL MEDICINE

## 2024-01-08 PROCEDURE — 7100000000 HC PACU RECOVERY - FIRST 15 MIN: Performed by: UROLOGY

## 2024-01-08 PROCEDURE — 6360000002 HC RX W HCPCS

## 2024-01-08 PROCEDURE — 94640 AIRWAY INHALATION TREATMENT: CPT

## 2024-01-08 PROCEDURE — 88307 TISSUE EXAM BY PATHOLOGIST: CPT

## 2024-01-08 PROCEDURE — 82803 BLOOD GASES ANY COMBINATION: CPT

## 2024-01-08 PROCEDURE — 88342 IMHCHEM/IMCYTCHM 1ST ANTB: CPT

## 2024-01-08 PROCEDURE — 82962 GLUCOSE BLOOD TEST: CPT

## 2024-01-08 PROCEDURE — 6360000002 HC RX W HCPCS: Performed by: INTERNAL MEDICINE

## 2024-01-08 PROCEDURE — 3700000000 HC ANESTHESIA ATTENDED CARE: Performed by: UROLOGY

## 2024-01-08 PROCEDURE — 99232 SBSQ HOSP IP/OBS MODERATE 35: CPT | Performed by: INTERNAL MEDICINE

## 2024-01-08 PROCEDURE — 71045 X-RAY EXAM CHEST 1 VIEW: CPT

## 2024-01-08 PROCEDURE — 3600000009 HC SURGERY ROBOT BASE: Performed by: UROLOGY

## 2024-01-08 PROCEDURE — 87086 URINE CULTURE/COLONY COUNT: CPT

## 2024-01-08 PROCEDURE — 3600000019 HC SURGERY ROBOT ADDTL 15MIN: Performed by: UROLOGY

## 2024-01-08 PROCEDURE — 3E033XZ INTRODUCTION OF VASOPRESSOR INTO PERIPHERAL VEIN, PERCUTANEOUS APPROACH: ICD-10-PCS | Performed by: HOSPITALIST

## 2024-01-08 PROCEDURE — 36600 WITHDRAWAL OF ARTERIAL BLOOD: CPT

## 2024-01-08 PROCEDURE — 3700000001 HC ADD 15 MINUTES (ANESTHESIA): Performed by: UROLOGY

## 2024-01-08 PROCEDURE — 2500000003 HC RX 250 WO HCPCS

## 2024-01-08 PROCEDURE — 2700000000 HC OXYGEN THERAPY PER DAY

## 2024-01-08 PROCEDURE — 99232 SBSQ HOSP IP/OBS MODERATE 35: CPT | Performed by: UROLOGY

## 2024-01-08 PROCEDURE — S2900 ROBOTIC SURGICAL SYSTEM: HCPCS | Performed by: UROLOGY

## 2024-01-08 PROCEDURE — 0GB24ZZ EXCISION OF LEFT ADRENAL GLAND, PERCUTANEOUS ENDOSCOPIC APPROACH: ICD-10-PCS | Performed by: UROLOGY

## 2024-01-08 RX ORDER — DEXAMETHASONE SODIUM PHOSPHATE 4 MG/ML
INJECTION, SOLUTION INTRA-ARTICULAR; INTRALESIONAL; INTRAMUSCULAR; INTRAVENOUS; SOFT TISSUE PRN
Status: DISCONTINUED | OUTPATIENT
Start: 2024-01-08 | End: 2024-01-08 | Stop reason: SDUPTHER

## 2024-01-08 RX ORDER — FENTANYL CITRATE 50 UG/ML
50 INJECTION, SOLUTION INTRAMUSCULAR; INTRAVENOUS EVERY 5 MIN PRN
Status: DISCONTINUED | OUTPATIENT
Start: 2024-01-08 | End: 2024-01-08 | Stop reason: HOSPADM

## 2024-01-08 RX ORDER — 0.9 % SODIUM CHLORIDE 0.9 %
500 INTRAVENOUS SOLUTION INTRAVENOUS ONCE
Status: COMPLETED | OUTPATIENT
Start: 2024-01-08 | End: 2024-01-08

## 2024-01-08 RX ORDER — DEXMEDETOMIDINE HYDROCHLORIDE 100 UG/ML
INJECTION, SOLUTION INTRAVENOUS PRN
Status: DISCONTINUED | OUTPATIENT
Start: 2024-01-08 | End: 2024-01-08 | Stop reason: SDUPTHER

## 2024-01-08 RX ORDER — NITROGLYCERIN 20 MG/100ML
INJECTION INTRAVENOUS PRN
Status: DISCONTINUED | OUTPATIENT
Start: 2024-01-08 | End: 2024-01-08 | Stop reason: SDUPTHER

## 2024-01-08 RX ORDER — ESMOLOL HYDROCHLORIDE 10 MG/ML
25-300 INJECTION, SOLUTION INTRAVENOUS CONTINUOUS
Status: DISCONTINUED | OUTPATIENT
Start: 2024-01-08 | End: 2024-01-09

## 2024-01-08 RX ORDER — DIPHENHYDRAMINE HYDROCHLORIDE 50 MG/ML
12.5 INJECTION INTRAMUSCULAR; INTRAVENOUS
Status: DISCONTINUED | OUTPATIENT
Start: 2024-01-08 | End: 2024-01-08 | Stop reason: HOSPADM

## 2024-01-08 RX ORDER — HYDRALAZINE HYDROCHLORIDE 20 MG/ML
10 INJECTION INTRAMUSCULAR; INTRAVENOUS
Status: DISCONTINUED | OUTPATIENT
Start: 2024-01-08 | End: 2024-01-08 | Stop reason: HOSPADM

## 2024-01-08 RX ORDER — ONDANSETRON 2 MG/ML
4 INJECTION INTRAMUSCULAR; INTRAVENOUS
Status: DISCONTINUED | OUTPATIENT
Start: 2024-01-08 | End: 2024-01-08 | Stop reason: HOSPADM

## 2024-01-08 RX ORDER — SODIUM CHLORIDE, SODIUM LACTATE, POTASSIUM CHLORIDE, CALCIUM CHLORIDE 600; 310; 30; 20 MG/100ML; MG/100ML; MG/100ML; MG/100ML
INJECTION, SOLUTION INTRAVENOUS CONTINUOUS PRN
Status: DISCONTINUED | OUTPATIENT
Start: 2024-01-08 | End: 2024-01-08 | Stop reason: SDUPTHER

## 2024-01-08 RX ORDER — MEPERIDINE HYDROCHLORIDE 25 MG/ML
12.5 INJECTION INTRAMUSCULAR; INTRAVENOUS; SUBCUTANEOUS EVERY 5 MIN PRN
Status: DISCONTINUED | OUTPATIENT
Start: 2024-01-08 | End: 2024-01-08 | Stop reason: HOSPADM

## 2024-01-08 RX ORDER — NOREPINEPHRINE BITARTRATE/D5W 4MG/250ML
PLASTIC BAG, INJECTION (ML) INTRAVENOUS CONTINUOUS PRN
Status: DISCONTINUED | OUTPATIENT
Start: 2024-01-08 | End: 2024-01-08 | Stop reason: SDUPTHER

## 2024-01-08 RX ORDER — MIDAZOLAM HYDROCHLORIDE 2 MG/2ML
INJECTION, SOLUTION INTRAMUSCULAR; INTRAVENOUS PRN
Status: DISCONTINUED | OUTPATIENT
Start: 2024-01-08 | End: 2024-01-08 | Stop reason: SDUPTHER

## 2024-01-08 RX ORDER — DEXTROSE MONOHYDRATE 100 MG/ML
INJECTION, SOLUTION INTRAVENOUS CONTINUOUS PRN
Status: DISCONTINUED | OUTPATIENT
Start: 2024-01-08 | End: 2024-01-08 | Stop reason: HOSPADM

## 2024-01-08 RX ORDER — LABETALOL HYDROCHLORIDE 5 MG/ML
10 INJECTION, SOLUTION INTRAVENOUS
Status: DISCONTINUED | OUTPATIENT
Start: 2024-01-08 | End: 2024-01-08 | Stop reason: HOSPADM

## 2024-01-08 RX ORDER — NITROGLYCERIN 20 MG/100ML
INJECTION INTRAVENOUS
Status: COMPLETED
Start: 2024-01-08 | End: 2024-01-08

## 2024-01-08 RX ORDER — NOREPINEPHRINE BITARTRATE 0.06 MG/ML
1-100 INJECTION, SOLUTION INTRAVENOUS CONTINUOUS
Status: DISCONTINUED | OUTPATIENT
Start: 2024-01-08 | End: 2024-01-09

## 2024-01-08 RX ORDER — LORAZEPAM 2 MG/ML
0.5 INJECTION INTRAMUSCULAR
Status: DISCONTINUED | OUTPATIENT
Start: 2024-01-08 | End: 2024-01-08 | Stop reason: HOSPADM

## 2024-01-08 RX ORDER — CEFAZOLIN SODIUM 1 G/3ML
INJECTION, POWDER, FOR SOLUTION INTRAMUSCULAR; INTRAVENOUS PRN
Status: DISCONTINUED | OUTPATIENT
Start: 2024-01-08 | End: 2024-01-08 | Stop reason: SDUPTHER

## 2024-01-08 RX ORDER — ROCURONIUM BROMIDE 10 MG/ML
INJECTION, SOLUTION INTRAVENOUS PRN
Status: DISCONTINUED | OUTPATIENT
Start: 2024-01-08 | End: 2024-01-08 | Stop reason: SDUPTHER

## 2024-01-08 RX ORDER — PHENTOLAMINE MESYLATE 5 MG/1
15 INJECTION INTRAMUSCULAR; INTRAVENOUS ONCE
Status: COMPLETED | OUTPATIENT
Start: 2024-01-08 | End: 2024-01-08

## 2024-01-08 RX ORDER — SODIUM CHLORIDE 0.9 % (FLUSH) 0.9 %
5-40 SYRINGE (ML) INJECTION EVERY 12 HOURS SCHEDULED
Status: DISCONTINUED | OUTPATIENT
Start: 2024-01-08 | End: 2024-01-08 | Stop reason: HOSPADM

## 2024-01-08 RX ORDER — HYDROMORPHONE HYDROCHLORIDE 1 MG/ML
0.5 INJECTION, SOLUTION INTRAMUSCULAR; INTRAVENOUS; SUBCUTANEOUS EVERY 5 MIN PRN
Status: DISCONTINUED | OUTPATIENT
Start: 2024-01-08 | End: 2024-01-08 | Stop reason: HOSPADM

## 2024-01-08 RX ORDER — PHENYLEPHRINE HCL IN 0.9% NACL 1 MG/10 ML
SYRINGE (ML) INTRAVENOUS PRN
Status: DISCONTINUED | OUTPATIENT
Start: 2024-01-08 | End: 2024-01-08 | Stop reason: SDUPTHER

## 2024-01-08 RX ORDER — ALBUMIN, HUMAN INJ 5% 5 %
SOLUTION INTRAVENOUS PRN
Status: DISCONTINUED | OUTPATIENT
Start: 2024-01-08 | End: 2024-01-08 | Stop reason: SDUPTHER

## 2024-01-08 RX ORDER — CALCIUM GLUCONATE 94 MG/ML
INJECTION, SOLUTION INTRAVENOUS PRN
Status: DISCONTINUED | OUTPATIENT
Start: 2024-01-08 | End: 2024-01-08 | Stop reason: SDUPTHER

## 2024-01-08 RX ORDER — GLUCAGON 1 MG/ML
1 KIT INJECTION PRN
Status: DISCONTINUED | OUTPATIENT
Start: 2024-01-08 | End: 2024-01-08 | Stop reason: HOSPADM

## 2024-01-08 RX ORDER — BUPIVACAINE HYDROCHLORIDE 2.5 MG/ML
INJECTION, SOLUTION EPIDURAL; INFILTRATION; INTRACAUDAL PRN
Status: DISCONTINUED | OUTPATIENT
Start: 2024-01-08 | End: 2024-01-08 | Stop reason: ALTCHOICE

## 2024-01-08 RX ORDER — FENTANYL CITRATE 50 UG/ML
INJECTION, SOLUTION INTRAMUSCULAR; INTRAVENOUS PRN
Status: DISCONTINUED | OUTPATIENT
Start: 2024-01-08 | End: 2024-01-08 | Stop reason: SDUPTHER

## 2024-01-08 RX ORDER — ONDANSETRON 2 MG/ML
INJECTION INTRAMUSCULAR; INTRAVENOUS PRN
Status: DISCONTINUED | OUTPATIENT
Start: 2024-01-08 | End: 2024-01-08 | Stop reason: SDUPTHER

## 2024-01-08 RX ORDER — NOREPINEPHRINE BITARTRATE 1 MG/ML
INJECTION, SOLUTION INTRAVENOUS PRN
Status: DISCONTINUED | OUTPATIENT
Start: 2024-01-08 | End: 2024-01-08 | Stop reason: SDUPTHER

## 2024-01-08 RX ORDER — SODIUM CHLORIDE, SODIUM LACTATE, POTASSIUM CHLORIDE, CALCIUM CHLORIDE 600; 310; 30; 20 MG/100ML; MG/100ML; MG/100ML; MG/100ML
INJECTION, SOLUTION INTRAVENOUS ONCE
Status: DISCONTINUED | OUTPATIENT
Start: 2024-01-08 | End: 2024-01-08 | Stop reason: HOSPADM

## 2024-01-08 RX ORDER — SODIUM CHLORIDE 0.9 % (FLUSH) 0.9 %
5-40 SYRINGE (ML) INJECTION PRN
Status: DISCONTINUED | OUTPATIENT
Start: 2024-01-08 | End: 2024-01-08 | Stop reason: HOSPADM

## 2024-01-08 RX ORDER — LIDOCAINE HYDROCHLORIDE 20 MG/ML
INJECTION, SOLUTION EPIDURAL; INFILTRATION; INTRACAUDAL; PERINEURAL PRN
Status: DISCONTINUED | OUTPATIENT
Start: 2024-01-08 | End: 2024-01-08 | Stop reason: SDUPTHER

## 2024-01-08 RX ORDER — PROPOFOL 10 MG/ML
INJECTION, EMULSION INTRAVENOUS PRN
Status: DISCONTINUED | OUTPATIENT
Start: 2024-01-08 | End: 2024-01-08 | Stop reason: SDUPTHER

## 2024-01-08 RX ORDER — METOCLOPRAMIDE HYDROCHLORIDE 5 MG/ML
10 INJECTION INTRAMUSCULAR; INTRAVENOUS
Status: DISCONTINUED | OUTPATIENT
Start: 2024-01-08 | End: 2024-01-08 | Stop reason: HOSPADM

## 2024-01-08 RX ORDER — SODIUM CHLORIDE 9 MG/ML
INJECTION, SOLUTION INTRAVENOUS PRN
Status: DISCONTINUED | OUTPATIENT
Start: 2024-01-08 | End: 2024-01-08 | Stop reason: HOSPADM

## 2024-01-08 RX ORDER — IPRATROPIUM BROMIDE AND ALBUTEROL SULFATE 2.5; .5 MG/3ML; MG/3ML
1 SOLUTION RESPIRATORY (INHALATION)
Status: DISCONTINUED | OUTPATIENT
Start: 2024-01-08 | End: 2024-01-08 | Stop reason: HOSPADM

## 2024-01-08 RX ADMIN — CLONIDINE HYDROCHLORIDE 0.3 MG: 0.2 TABLET ORAL at 20:45

## 2024-01-08 RX ADMIN — Medication 0.03 MCG/KG/MIN: at 11:05

## 2024-01-08 RX ADMIN — NITROGLYCERIN 20 MCG: 20 INJECTION INTRAVENOUS at 12:09

## 2024-01-08 RX ADMIN — CLONIDINE HYDROCHLORIDE 0.3 MG: 0.2 TABLET ORAL at 07:46

## 2024-01-08 RX ADMIN — NOREPINEPHRINE BITARTRATE 8 MCG: 1 INJECTION, SOLUTION, CONCENTRATE INTRAVENOUS at 11:07

## 2024-01-08 RX ADMIN — IPRATROPIUM BROMIDE AND ALBUTEROL SULFATE 1 DOSE: 2.5; .5 SOLUTION RESPIRATORY (INHALATION) at 08:34

## 2024-01-08 RX ADMIN — SODIUM CHLORIDE, PRESERVATIVE FREE 20 MG: 5 INJECTION INTRAVENOUS at 07:46

## 2024-01-08 RX ADMIN — Medication 5 MCG/MIN: at 21:51

## 2024-01-08 RX ADMIN — ALBUMIN, HUMAN INJ 5% 250 G: 5 SOLUTION at 12:27

## 2024-01-08 RX ADMIN — DEXAMETHASONE SODIUM PHOSPHATE 8 MG: 4 INJECTION, SOLUTION INTRA-ARTICULAR; INTRALESIONAL; INTRAMUSCULAR; INTRAVENOUS; SOFT TISSUE at 11:06

## 2024-01-08 RX ADMIN — ALBUMIN, HUMAN INJ 5% 250 G: 5 SOLUTION at 10:30

## 2024-01-08 RX ADMIN — WHITE PETROLATUM,ZINC OXIDE: 57; 17 PASTE TOPICAL at 20:55

## 2024-01-08 RX ADMIN — PROPOFOL 150 MG: 10 INJECTION, EMULSION INTRAVENOUS at 10:59

## 2024-01-08 RX ADMIN — LIDOCAINE HYDROCHLORIDE 80 MG: 20 INJECTION, SOLUTION EPIDURAL; INFILTRATION; INTRACAUDAL; PERINEURAL at 10:59

## 2024-01-08 RX ADMIN — Medication 100 MCG: at 11:05

## 2024-01-08 RX ADMIN — CEFAZOLIN SODIUM 2000 G: 1 INJECTION, POWDER, FOR SOLUTION INTRAMUSCULAR; INTRAVENOUS at 11:03

## 2024-01-08 RX ADMIN — SODIUM CHLORIDE 500 ML: 9 INJECTION, SOLUTION INTRAVENOUS at 21:51

## 2024-01-08 RX ADMIN — IPRATROPIUM BROMIDE AND ALBUTEROL SULFATE 1 DOSE: 2.5; .5 SOLUTION RESPIRATORY (INHALATION) at 20:23

## 2024-01-08 RX ADMIN — PHENTOLAMINE MESYLATE 1 MG: 5 INJECTION INTRAMUSCULAR; INTRAVENOUS at 11:48

## 2024-01-08 RX ADMIN — AMLODIPINE BESYLATE 10 MG: 5 TABLET ORAL at 07:46

## 2024-01-08 RX ADMIN — SODIUM CHLORIDE, POTASSIUM CHLORIDE, SODIUM LACTATE AND CALCIUM CHLORIDE: 600; 310; 30; 20 INJECTION, SOLUTION INTRAVENOUS at 10:20

## 2024-01-08 RX ADMIN — FENTANYL CITRATE 50 MCG: 50 INJECTION, SOLUTION INTRAMUSCULAR; INTRAVENOUS at 10:20

## 2024-01-08 RX ADMIN — MIDAZOLAM HYDROCHLORIDE 2 MG: 1 INJECTION, SOLUTION INTRAMUSCULAR; INTRAVENOUS at 10:20

## 2024-01-08 RX ADMIN — NITROGLYCERIN 20 MCG: 20 INJECTION INTRAVENOUS at 12:12

## 2024-01-08 RX ADMIN — ALBUMIN, HUMAN INJ 5% 250 G: 5 SOLUTION at 10:55

## 2024-01-08 RX ADMIN — HYDRALAZINE HYDROCHLORIDE 50 MG: 50 TABLET, FILM COATED ORAL at 07:47

## 2024-01-08 RX ADMIN — SODIUM CHLORIDE, PRESERVATIVE FREE 10 ML: 5 INJECTION INTRAVENOUS at 20:48

## 2024-01-08 RX ADMIN — Medication 1 CAPSULE: at 20:45

## 2024-01-08 RX ADMIN — LEVETIRACETAM 750 MG: 100 INJECTION, SOLUTION INTRAVENOUS at 20:43

## 2024-01-08 RX ADMIN — Medication 100 MCG: at 11:15

## 2024-01-08 RX ADMIN — BUDESONIDE INHALATION 500 MCG: 0.5 SUSPENSION RESPIRATORY (INHALATION) at 20:23

## 2024-01-08 RX ADMIN — FENTANYL CITRATE 50 MCG: 50 INJECTION, SOLUTION INTRAMUSCULAR; INTRAVENOUS at 10:59

## 2024-01-08 RX ADMIN — LABETALOL HYDROCHLORIDE 300 MG: 200 TABLET, FILM COATED ORAL at 20:46

## 2024-01-08 RX ADMIN — ROCURONIUM BROMIDE 30 MG: 10 INJECTION, SOLUTION INTRAVENOUS at 10:59

## 2024-01-08 RX ADMIN — PHENTOLAMINE MESYLATE 1 MG: 5 INJECTION INTRAMUSCULAR; INTRAVENOUS at 11:53

## 2024-01-08 RX ADMIN — Medication 1 CAPSULE: at 07:46

## 2024-01-08 RX ADMIN — DEXTROSE MONOHYDRATE 250 ML: 100 INJECTION, SOLUTION INTRAVENOUS at 09:48

## 2024-01-08 RX ADMIN — PHENTOLAMINE MESYLATE 2 MG: 5 INJECTION INTRAMUSCULAR; INTRAVENOUS at 12:06

## 2024-01-08 RX ADMIN — PHENYLEPHRINE HYDROCHLORIDE 7.2 MCG/MIN: 10 INJECTION INTRAVENOUS at 11:05

## 2024-01-08 RX ADMIN — DEXMEDETOMIDINE 50 MCG: 100 INJECTION, SOLUTION INTRAVENOUS at 11:49

## 2024-01-08 RX ADMIN — PROPOFOL 50 MG: 10 INJECTION, EMULSION INTRAVENOUS at 11:53

## 2024-01-08 RX ADMIN — NITROGLYCERIN 10 MCG: 20 INJECTION INTRAVENOUS at 11:53

## 2024-01-08 RX ADMIN — NITROGLYCERIN 20 MCG: 20 INJECTION INTRAVENOUS at 12:06

## 2024-01-08 RX ADMIN — SODIUM CHLORIDE, SODIUM LACTATE, POTASSIUM CHLORIDE, CALCIUM CHLORIDE: 600; 310; 30; 20 INJECTION, SOLUTION INTRAVENOUS at 11:13

## 2024-01-08 RX ADMIN — BUDESONIDE INHALATION 500 MCG: 0.5 SUSPENSION RESPIRATORY (INHALATION) at 08:34

## 2024-01-08 RX ADMIN — LEVETIRACETAM 750 MG: 100 INJECTION, SOLUTION INTRAVENOUS at 07:47

## 2024-01-08 RX ADMIN — CALCIUM GLUCONATE 250 G: 94 INJECTION, SOLUTION INTRAVENOUS at 12:27

## 2024-01-08 RX ADMIN — HYDRALAZINE HYDROCHLORIDE 50 MG: 50 TABLET, FILM COATED ORAL at 17:30

## 2024-01-08 RX ADMIN — ONDANSETRON 4 MG: 2 INJECTION INTRAMUSCULAR; INTRAVENOUS at 11:08

## 2024-01-08 RX ADMIN — ATORVASTATIN CALCIUM 40 MG: 40 TABLET, FILM COATED ORAL at 20:45

## 2024-01-08 ASSESSMENT — PAIN SCALES - GENERAL
PAINLEVEL_OUTOF10: 0

## 2024-01-08 NOTE — OP NOTE
Operative Note      Patient: Lucretia Andres  YOB: 1970  MRN: [id number]    Date of Procedure: 1/8/2024    Pre-Op Diagnosis Codes:     * Retroperitoneal mass [R19.00], pheochromocytoma    Post-Op Diagnosis: Same       Procedure(s):  ROBOTIC ASSISTED LAPAROSCOPIC LEFT ADRENALECTOMY    Surgeon(s):  Yovany Eduardo MD    Assistant:   Surgical Assistant: Earnest Mcnally    Anesthesia: General    Estimated Blood Loss (mL): Minimal    Complications: None    Specimens:   ID Type Source Tests Collected by Time Destination   1 : Adrenal tumor Tissue Adrenal SURGICAL PATHOLOGY Yovany Eduardo MD 1/8/2024 1140    A : Urine Culture Urine Urine, indwelling catheter CULTURE, URINE Yovany Eduardo MD 1/8/2024 1101        Implants:  * No implants in log *      Drains:   NG/OG/NJ/NE Tube Nasogastric Left nostril (Active)   Surrounding Skin Clean, dry & intact 01/08/24 0735   Securement device Adhesive based tejada 01/08/24 0735   Status Clamped 01/08/24 0735   Placement Verified X-Ray (Initial) 01/08/24 0735   Tube Feeding Diabetic 01/08/24 0735   Tube feeding/verify rate (mL/hr) 50 mL/hr 01/07/24 1945   Tube Feeding Intake (mL) 0 ml 01/08/24 0735   Free Water/Flush (mL) 40 mL 01/08/24 0735   Output (mL) 0 ml 01/08/24 0735   Residual Volume (ml) 0 ml 01/08/24 0735       Urinary Catheter 01/08/24 2 Way (Active)       External Urinary Catheter (Active)   Site Assessment Clean,dry & intact 01/08/24 0728   Placement Replaced 01/08/24 0330   Securement Method Other (Comment) 01/08/24 0330   Catheter Care Catheter/Wick replaced 01/08/24 0330   Perineal Care Yes 01/08/24 0330   Suction 40 mmgHg continuous 01/08/24 0330   Urine Color Rosanne 01/08/24 0330   Urine Appearance Clear 01/08/24 0330   Urine Odor Other (Comment) 01/08/24 0330   Output (mL) 600 mL 01/08/24 0330       Fecal Management System 12/22/23 (Active)   Stool Appearance Loose 01/08/24 0330   Stool Color Brown 01/08/24 0330   Position of Indicator Line Visible 01/08/24

## 2024-01-08 NOTE — ANESTHESIA POSTPROCEDURE EVALUATION
Department of Anesthesiology  Postprocedure Note    Patient: Lucretia nAdres  MRN: [id number]  YOB: 1970  Date of evaluation: 1/8/2024    Procedure Summary       Date: 01/08/24 Room / Location: [facility] MAIN OR 06 / [facility] MAIN OR    Anesthesia Start: 1020 Anesthesia Stop: 1304    Procedure: ROBOTIC ASSISTED LAPAROSCOPIC LEFT ADRENALECTOMY (Kidney) Diagnosis:       Retroperitoneal mass      (Retroperitoneal mass [R19.00])    Surgeons: Yovany Eduardo MD Responsible Provider: Lucille Cade MD    Anesthesia Type: General ASA Status: 4 - Emergent            Anesthesia Type: General    Alexandrea Phase I: Alexadnrea Score: 7    Alexandrea Phase II:      Anesthesia Post Evaluation    Patient location during evaluation: PACU  Patient participation: complete - patient participated  Level of consciousness: awake  Airway patency: patent  Nausea & Vomiting: no nausea and no vomiting  Cardiovascular status: hemodynamically stable  Respiratory status: acceptable  Hydration status: euvolemic  Pain management: adequate    No notable events documented.

## 2024-01-08 NOTE — ANESTHESIA PRE PROCEDURE
intravenous.  arterial line and central line  MIPS: Postoperative opioids intended and Prophylactic antiemetics administered.  Anesthetic plan and risks discussed with spouse (and family, if present.).      Plan discussed with CRNA.                Lucille Cade MD   1/8/2024

## 2024-01-09 LAB
ABO + RH BLD: NORMAL
ANION GAP SERPL CALC-SCNC: 7 MMOL/L (ref 5–15)
BACTERIA SPEC CULT: NORMAL
BASOPHILS # BLD: 0 K/UL (ref 0–0.1)
BASOPHILS NFR BLD: 0 % (ref 0–1)
BLOOD GROUP ANTIBODIES SERPL: NEGATIVE
BUN SERPL-MCNC: 28 MG/DL (ref 6–20)
BUN/CREAT SERPL: 48 (ref 12–20)
CA-I BLD-MCNC: 8.4 MG/DL (ref 8.5–10.1)
CHLORIDE SERPL-SCNC: 110 MMOL/L (ref 97–108)
CO2 SERPL-SCNC: 23 MMOL/L (ref 21–32)
CREAT SERPL-MCNC: 0.58 MG/DL (ref 0.55–1.02)
CRP SERPL-MCNC: 1.42 MG/DL (ref 0–0.6)
DIFFERENTIAL METHOD BLD: ABNORMAL
DOPAMINE SERPL-MCNC: <30 PG/ML (ref 0–48)
EOSINOPHIL # BLD: 0 K/UL (ref 0–0.4)
EOSINOPHIL NFR BLD: 0 % (ref 0–7)
EPINEPH PLAS-MCNC: 1930 PG/ML (ref 0–62)
ERYTHROCYTE [DISTWIDTH] IN BLOOD BY AUTOMATED COUNT: 14.1 % (ref 11.5–14.5)
GLUCOSE BLD STRIP.AUTO-MCNC: 104 MG/DL (ref 65–100)
GLUCOSE BLD STRIP.AUTO-MCNC: 105 MG/DL (ref 65–100)
GLUCOSE BLD STRIP.AUTO-MCNC: 126 MG/DL (ref 65–100)
GLUCOSE BLD STRIP.AUTO-MCNC: 85 MG/DL (ref 65–100)
GLUCOSE BLD STRIP.AUTO-MCNC: 86 MG/DL (ref 65–100)
GLUCOSE BLD STRIP.AUTO-MCNC: 97 MG/DL (ref 65–100)
GLUCOSE SERPL-MCNC: 106 MG/DL (ref 65–100)
HCT VFR BLD AUTO: 21.7 % (ref 35–47)
HCT VFR BLD AUTO: 23.5 % (ref 35–47)
HGB BLD-MCNC: 7.2 G/DL (ref 11.5–16)
HGB BLD-MCNC: 7.6 G/DL (ref 11.5–16)
IMM GRANULOCYTES # BLD AUTO: 0.1 K/UL (ref 0–0.04)
IMM GRANULOCYTES NFR BLD AUTO: 1 % (ref 0–0.5)
LYMPHOCYTES # BLD: 1.7 K/UL (ref 0.8–3.5)
LYMPHOCYTES NFR BLD: 12 % (ref 12–49)
Lab: NORMAL
MCH RBC QN AUTO: 31 PG (ref 26–34)
MCHC RBC AUTO-ENTMCNC: 33.2 G/DL (ref 30–36.5)
MCV RBC AUTO: 93.5 FL (ref 80–99)
MONOCYTES # BLD: 0.7 K/UL (ref 0–1)
MONOCYTES NFR BLD: 5 % (ref 5–13)
NEUTS SEG # BLD: 11 K/UL (ref 1.8–8)
NEUTS SEG NFR BLD: 82 % (ref 32–75)
NOREPINEPH PLAS-MCNC: ABNORMAL PG/ML (ref 0–874)
NRBC # BLD: 0 K/UL (ref 0–0.01)
NRBC BLD-RTO: 0 PER 100 WBC
PERFORMED BY:: ABNORMAL
PERFORMED BY:: NORMAL
PLATELET # BLD AUTO: 285 K/UL (ref 150–400)
PMV BLD AUTO: 12.3 FL (ref 8.9–12.9)
POTASSIUM SERPL-SCNC: 3.9 MMOL/L (ref 3.5–5.1)
PROCALCITONIN SERPL-MCNC: 0.35 NG/ML
RBC # BLD AUTO: 2.32 M/UL (ref 3.8–5.2)
SODIUM SERPL-SCNC: 140 MMOL/L (ref 136–145)
SPECIMEN EXP DATE BLD: NORMAL
WBC # BLD AUTO: 13.4 K/UL (ref 3.6–11)

## 2024-01-09 PROCEDURE — 2700000000 HC OXYGEN THERAPY PER DAY

## 2024-01-09 PROCEDURE — 2580000003 HC RX 258: Performed by: INTERNAL MEDICINE

## 2024-01-09 PROCEDURE — 84145 PROCALCITONIN (PCT): CPT

## 2024-01-09 PROCEDURE — 86140 C-REACTIVE PROTEIN: CPT

## 2024-01-09 PROCEDURE — 80048 BASIC METABOLIC PNL TOTAL CA: CPT

## 2024-01-09 PROCEDURE — 6360000002 HC RX W HCPCS: Performed by: INTERNAL MEDICINE

## 2024-01-09 PROCEDURE — 6360000002 HC RX W HCPCS: Performed by: PSYCHIATRY & NEUROLOGY

## 2024-01-09 PROCEDURE — 1100000000 HC RM PRIVATE

## 2024-01-09 PROCEDURE — 2500000003 HC RX 250 WO HCPCS: Performed by: INTERNAL MEDICINE

## 2024-01-09 PROCEDURE — 94761 N-INVAS EAR/PLS OXIMETRY MLT: CPT

## 2024-01-09 PROCEDURE — 85018 HEMOGLOBIN: CPT

## 2024-01-09 PROCEDURE — 2580000003 HC RX 258: Performed by: HOSPITALIST

## 2024-01-09 PROCEDURE — 86901 BLOOD TYPING SEROLOGIC RH(D): CPT

## 2024-01-09 PROCEDURE — 86850 RBC ANTIBODY SCREEN: CPT

## 2024-01-09 PROCEDURE — 85014 HEMATOCRIT: CPT

## 2024-01-09 PROCEDURE — 86900 BLOOD TYPING SEROLOGIC ABO: CPT

## 2024-01-09 PROCEDURE — 6370000000 HC RX 637 (ALT 250 FOR IP): Performed by: INTERNAL MEDICINE

## 2024-01-09 PROCEDURE — 85025 COMPLETE CBC W/AUTO DIFF WBC: CPT

## 2024-01-09 PROCEDURE — 82962 GLUCOSE BLOOD TEST: CPT

## 2024-01-09 PROCEDURE — 94640 AIRWAY INHALATION TREATMENT: CPT

## 2024-01-09 PROCEDURE — APPNB30 APP NON BILLABLE TIME 0-30 MINS: Performed by: NURSE PRACTITIONER

## 2024-01-09 PROCEDURE — 99232 SBSQ HOSP IP/OBS MODERATE 35: CPT | Performed by: INTERNAL MEDICINE

## 2024-01-09 PROCEDURE — 36415 COLL VENOUS BLD VENIPUNCTURE: CPT

## 2024-01-09 RX ORDER — INSULIN GLARGINE 100 [IU]/ML
10 INJECTION, SOLUTION SUBCUTANEOUS NIGHTLY
Status: DISCONTINUED | OUTPATIENT
Start: 2024-01-09 | End: 2024-01-24 | Stop reason: HOSPADM

## 2024-01-09 RX ORDER — DEXTROSE, SODIUM CHLORIDE, SODIUM LACTATE, POTASSIUM CHLORIDE, AND CALCIUM CHLORIDE 5; .6; .31; .03; .02 G/100ML; G/100ML; G/100ML; G/100ML; G/100ML
INJECTION, SOLUTION INTRAVENOUS CONTINUOUS
Status: DISCONTINUED | OUTPATIENT
Start: 2024-01-09 | End: 2024-01-11

## 2024-01-09 RX ADMIN — SODIUM CHLORIDE, SODIUM LACTATE, POTASSIUM CHLORIDE, CALCIUM CHLORIDE AND DEXTROSE MONOHYDRATE: 5; 600; 310; 30; 20 INJECTION, SOLUTION INTRAVENOUS at 22:00

## 2024-01-09 RX ADMIN — LEVETIRACETAM 750 MG: 100 INJECTION, SOLUTION INTRAVENOUS at 20:54

## 2024-01-09 RX ADMIN — BUDESONIDE INHALATION 500 MCG: 0.5 SUSPENSION RESPIRATORY (INHALATION) at 08:58

## 2024-01-09 RX ADMIN — SODIUM CHLORIDE, PRESERVATIVE FREE 20 MG: 5 INJECTION INTRAVENOUS at 08:02

## 2024-01-09 RX ADMIN — SODIUM CHLORIDE, PRESERVATIVE FREE 10 ML: 5 INJECTION INTRAVENOUS at 20:54

## 2024-01-09 RX ADMIN — WHITE PETROLATUM,ZINC OXIDE: 57; 17 PASTE TOPICAL at 08:22

## 2024-01-09 RX ADMIN — GLYCERIN 1 DROP: .002; .002; .01 SOLUTION/ DROPS OPHTHALMIC at 08:02

## 2024-01-09 RX ADMIN — LEVETIRACETAM 750 MG: 100 INJECTION, SOLUTION INTRAVENOUS at 08:00

## 2024-01-09 RX ADMIN — IPRATROPIUM BROMIDE AND ALBUTEROL SULFATE 1 DOSE: 2.5; .5 SOLUTION RESPIRATORY (INHALATION) at 08:58

## 2024-01-09 RX ADMIN — BUDESONIDE INHALATION 500 MCG: 0.5 SUSPENSION RESPIRATORY (INHALATION) at 22:36

## 2024-01-09 RX ADMIN — SODIUM CHLORIDE, PRESERVATIVE FREE 40 ML: 5 INJECTION INTRAVENOUS at 08:22

## 2024-01-09 RX ADMIN — IPRATROPIUM BROMIDE AND ALBUTEROL SULFATE 1 DOSE: 2.5; .5 SOLUTION RESPIRATORY (INHALATION) at 22:36

## 2024-01-09 ASSESSMENT — PAIN SCALES - GENERAL
PAINLEVEL_OUTOF10: 0

## 2024-01-09 NOTE — WOUND CARE
IP WOUND CONSULT    Lucretia Andres  MEDICAL RECORD NUMBER:  [id number]  AGE: 53 y.o.   GENDER: female  : 1970  TODAY'S DATE:  2023    GENERAL     [] Follow-up   [x] New Consult    Lucretia Andres is a 53 y.o. female referred by:   [x] Physician  [] Nursing  [] Other:         PAST MEDICAL HISTORY    Past Medical History:   Diagnosis Date    CKD (chronic kidney disease), stage III (HCC)     CVA (cerebral vascular accident) (HCC)     Diabetes mellitus (HCC)     Dyslipidemia     Hypertension     Seizure disorder (HCC)         PAST SURGICAL HISTORY    Past Surgical History:   Procedure Laterality Date    IR NONTUNNELED VASCULAR CATHETER  2023    IR NONTUNNELED VASCULAR CATHETER 2023 Jonathan Edge Jr., APRN - NP [facility] RAD ANGIO IR       FAMILY HISTORY    History reviewed. No pertinent family history.      ALLERGIES    No Known Allergies    MEDICATIONS    No current facility-administered medications on file prior to encounter.     Current Outpatient Medications on File Prior to Encounter   Medication Sig Dispense Refill    insulin 70-30 (HUMULIN;NOVOLIN) (70-30) 100 UNIT per ML injection vial Inject 11 Units into the skin 2 times daily (with meals) Okay to substitute Humulin 70-30 or ReliOn 70-30. 10 mL 2    blood glucose monitor strips Test 3-4 times a day BEFORE MEALS and BEDTIME & as needed for symptoms of irregular blood glucose. Dispense sufficient amount for indicated testing frequency plus additional to accommodate PRN testing needs. (Patient not taking: Reported on 2023) 100 strip 1    Insulin Syringe-Needle U-100 30G X 5/16\" 0.3 ML MISC Use as directed for insulin injections.  Use a NEW needle with each insulin injection (Patient not taking: Reported on 2023) 100 each 1    Blood Glucose Monitoring Suppl (BLOOD GLUCOSE MONITOR SYSTEM) w/Device KIT Pharmacist to identify preferred meter and strips. (Patient not taking: Reported on 2023) 1 kit 1    Lancets 30G MISC Test 3 times 
Patient off unit for procedure in OR.  N will follow up tomorrow for re-assessment of sacrum and buttocks.   
Patient remains unstable at this time per RN.  WCN will follow up tomorrow morning for assessment attempt of sacrum and buttocks.   
Patient unstable for turning per RN.  Extubated and re-intubated.  Will follow up this afternoon.   
Raise foot of bed to help prevent friction and shear injury from sliding down in the bed.  Will attempt to follow weekly while in-patient.      Discharge Wound Care Needs: TBD    Teaching completed with:   [] Patient           [] Family member       [] Caregiver          [] Nursing  [] Other    Patient/Caregiver Teaching:  Level of patient/caregiver understanding able to:   [] Indicates understanding       [] Needs reinforcement  [] Unsuccessful      [] Verbal Understanding  [] Demonstrated understanding       [] No evidence of learning  [] Refused teaching         [] N/A       Electronically signed by Melissa Adames RN on 1/9/2024 at 2:56 PM

## 2024-01-10 ENCOUNTER — ANESTHESIA EVENT (OUTPATIENT)
Facility: HOSPITAL | Age: 54
End: 2024-01-10

## 2024-01-10 ENCOUNTER — ANESTHESIA (OUTPATIENT)
Facility: HOSPITAL | Age: 54
End: 2024-01-10

## 2024-01-10 LAB
ANION GAP SERPL CALC-SCNC: 6 MMOL/L (ref 5–15)
ANION GAP SERPL CALC-SCNC: 6 MMOL/L (ref 5–15)
BASOPHILS # BLD: 0 K/UL (ref 0–0.1)
BASOPHILS NFR BLD: 0 % (ref 0–1)
BUN SERPL-MCNC: 11 MG/DL (ref 6–20)
BUN SERPL-MCNC: 15 MG/DL (ref 6–20)
BUN/CREAT SERPL: 24 (ref 12–20)
BUN/CREAT SERPL: 33 (ref 12–20)
CA-I BLD-MCNC: 8.5 MG/DL (ref 8.5–10.1)
CA-I BLD-MCNC: 8.7 MG/DL (ref 8.5–10.1)
CHLORIDE SERPL-SCNC: 109 MMOL/L (ref 97–108)
CHLORIDE SERPL-SCNC: 110 MMOL/L (ref 97–108)
CO2 SERPL-SCNC: 26 MMOL/L (ref 21–32)
CO2 SERPL-SCNC: 26 MMOL/L (ref 21–32)
CREAT SERPL-MCNC: 0.45 MG/DL (ref 0.55–1.02)
CREAT SERPL-MCNC: 0.46 MG/DL (ref 0.55–1.02)
DIFFERENTIAL METHOD BLD: ABNORMAL
EOSINOPHIL # BLD: 0.2 K/UL (ref 0–0.4)
EOSINOPHIL NFR BLD: 2 % (ref 0–7)
ERYTHROCYTE [DISTWIDTH] IN BLOOD BY AUTOMATED COUNT: 14.2 % (ref 11.5–14.5)
GLUCOSE BLD STRIP.AUTO-MCNC: 105 MG/DL (ref 65–100)
GLUCOSE BLD STRIP.AUTO-MCNC: 117 MG/DL (ref 65–100)
GLUCOSE BLD STRIP.AUTO-MCNC: 126 MG/DL (ref 65–100)
GLUCOSE SERPL-MCNC: 123 MG/DL (ref 65–100)
GLUCOSE SERPL-MCNC: 123 MG/DL (ref 65–100)
HCT VFR BLD AUTO: 23 % (ref 35–47)
HCT VFR BLD AUTO: 24.6 % (ref 35–47)
HGB BLD-MCNC: 7.5 G/DL (ref 11.5–16)
HGB BLD-MCNC: 7.9 G/DL (ref 11.5–16)
IMM GRANULOCYTES # BLD AUTO: 0 K/UL (ref 0–0.04)
IMM GRANULOCYTES NFR BLD AUTO: 1 % (ref 0–0.5)
LYMPHOCYTES # BLD: 2.1 K/UL (ref 0.8–3.5)
LYMPHOCYTES NFR BLD: 24 % (ref 12–49)
MCH RBC QN AUTO: 30.5 PG (ref 26–34)
MCHC RBC AUTO-ENTMCNC: 32.6 G/DL (ref 30–36.5)
MCV RBC AUTO: 93.5 FL (ref 80–99)
MONOCYTES # BLD: 0.9 K/UL (ref 0–1)
MONOCYTES NFR BLD: 10 % (ref 5–13)
NEUTS SEG # BLD: 5.6 K/UL (ref 1.8–8)
NEUTS SEG NFR BLD: 63 % (ref 32–75)
NRBC # BLD: 0 K/UL (ref 0–0.01)
NRBC BLD-RTO: 0 PER 100 WBC
PERFORMED BY:: ABNORMAL
PLATELET # BLD AUTO: 272 K/UL (ref 150–400)
PMV BLD AUTO: 12.1 FL (ref 8.9–12.9)
POTASSIUM SERPL-SCNC: 3.2 MMOL/L (ref 3.5–5.1)
POTASSIUM SERPL-SCNC: 4.1 MMOL/L (ref 3.5–5.1)
RBC # BLD AUTO: 2.46 M/UL (ref 3.8–5.2)
SODIUM SERPL-SCNC: 141 MMOL/L (ref 136–145)
SODIUM SERPL-SCNC: 142 MMOL/L (ref 136–145)
WBC # BLD AUTO: 8.7 K/UL (ref 3.6–11)

## 2024-01-10 PROCEDURE — 6360000002 HC RX W HCPCS: Performed by: PSYCHIATRY & NEUROLOGY

## 2024-01-10 PROCEDURE — 2500000003 HC RX 250 WO HCPCS: Performed by: INTERNAL MEDICINE

## 2024-01-10 PROCEDURE — 3700000001 HC ADD 15 MINUTES (ANESTHESIA): Performed by: INTERNAL MEDICINE

## 2024-01-10 PROCEDURE — 0DH63UZ INSERTION OF FEEDING DEVICE INTO STOMACH, PERCUTANEOUS APPROACH: ICD-10-PCS | Performed by: INTERNAL MEDICINE

## 2024-01-10 PROCEDURE — 6360000002 HC RX W HCPCS: Performed by: UROLOGY

## 2024-01-10 PROCEDURE — 97165 OT EVAL LOW COMPLEX 30 MIN: CPT

## 2024-01-10 PROCEDURE — 80048 BASIC METABOLIC PNL TOTAL CA: CPT

## 2024-01-10 PROCEDURE — 85018 HEMOGLOBIN: CPT

## 2024-01-10 PROCEDURE — 85025 COMPLETE CBC W/AUTO DIFF WBC: CPT

## 2024-01-10 PROCEDURE — 7100000010 HC PHASE II RECOVERY - FIRST 15 MIN: Performed by: INTERNAL MEDICINE

## 2024-01-10 PROCEDURE — 2580000003 HC RX 258: Performed by: NURSE ANESTHETIST, CERTIFIED REGISTERED

## 2024-01-10 PROCEDURE — 7100000011 HC PHASE II RECOVERY - ADDTL 15 MIN: Performed by: INTERNAL MEDICINE

## 2024-01-10 PROCEDURE — 82962 GLUCOSE BLOOD TEST: CPT

## 2024-01-10 PROCEDURE — 2500000003 HC RX 250 WO HCPCS: Performed by: NURSE ANESTHETIST, CERTIFIED REGISTERED

## 2024-01-10 PROCEDURE — 3700000000 HC ANESTHESIA ATTENDED CARE: Performed by: INTERNAL MEDICINE

## 2024-01-10 PROCEDURE — 3600007512: Performed by: INTERNAL MEDICINE

## 2024-01-10 PROCEDURE — 93005 ELECTROCARDIOGRAM TRACING: CPT | Performed by: HOSPITALIST

## 2024-01-10 PROCEDURE — 6370000000 HC RX 637 (ALT 250 FOR IP): Performed by: INTERNAL MEDICINE

## 2024-01-10 PROCEDURE — 6360000002 HC RX W HCPCS: Performed by: INTERNAL MEDICINE

## 2024-01-10 PROCEDURE — 97530 THERAPEUTIC ACTIVITIES: CPT

## 2024-01-10 PROCEDURE — 6360000002 HC RX W HCPCS: Performed by: NURSE ANESTHETIST, CERTIFIED REGISTERED

## 2024-01-10 PROCEDURE — 3600007502: Performed by: INTERNAL MEDICINE

## 2024-01-10 PROCEDURE — 2709999900 HC NON-CHARGEABLE SUPPLY: Performed by: INTERNAL MEDICINE

## 2024-01-10 PROCEDURE — 2580000003 HC RX 258: Performed by: HOSPITALIST

## 2024-01-10 PROCEDURE — 2700000000 HC OXYGEN THERAPY PER DAY

## 2024-01-10 PROCEDURE — 2500000003 HC RX 250 WO HCPCS: Performed by: HOSPITALIST

## 2024-01-10 PROCEDURE — 2580000003 HC RX 258: Performed by: INTERNAL MEDICINE

## 2024-01-10 PROCEDURE — 99232 SBSQ HOSP IP/OBS MODERATE 35: CPT | Performed by: UROLOGY

## 2024-01-10 PROCEDURE — 85014 HEMATOCRIT: CPT

## 2024-01-10 PROCEDURE — 6370000000 HC RX 637 (ALT 250 FOR IP): Performed by: HOSPITALIST

## 2024-01-10 PROCEDURE — 99232 SBSQ HOSP IP/OBS MODERATE 35: CPT | Performed by: INTERNAL MEDICINE

## 2024-01-10 PROCEDURE — 36415 COLL VENOUS BLD VENIPUNCTURE: CPT

## 2024-01-10 PROCEDURE — 94640 AIRWAY INHALATION TREATMENT: CPT

## 2024-01-10 PROCEDURE — 1100000000 HC RM PRIVATE

## 2024-01-10 RX ORDER — CEFAZOLIN SODIUM 1 G/3ML
INJECTION, POWDER, FOR SOLUTION INTRAMUSCULAR; INTRAVENOUS
Status: DISPENSED
Start: 2024-01-10 | End: 2024-01-11

## 2024-01-10 RX ORDER — POTASSIUM CHLORIDE 7.45 MG/ML
10 INJECTION INTRAVENOUS
Status: DISPENSED | OUTPATIENT
Start: 2024-01-10 | End: 2024-01-10

## 2024-01-10 RX ORDER — POTASSIUM CHLORIDE 20 MEQ/1
40 TABLET, EXTENDED RELEASE ORAL
Status: DISCONTINUED | OUTPATIENT
Start: 2024-01-10 | End: 2024-01-10

## 2024-01-10 RX ORDER — SODIUM CHLORIDE, SODIUM LACTATE, POTASSIUM CHLORIDE, CALCIUM CHLORIDE 600; 310; 30; 20 MG/100ML; MG/100ML; MG/100ML; MG/100ML
INJECTION, SOLUTION INTRAVENOUS CONTINUOUS PRN
Status: DISCONTINUED | OUTPATIENT
Start: 2024-01-10 | End: 2024-01-10 | Stop reason: SDUPTHER

## 2024-01-10 RX ADMIN — BUDESONIDE INHALATION 500 MCG: 0.5 SUSPENSION RESPIRATORY (INHALATION) at 22:26

## 2024-01-10 RX ADMIN — LEVETIRACETAM 750 MG: 100 INJECTION, SOLUTION INTRAVENOUS at 11:03

## 2024-01-10 RX ADMIN — PROPOFOL 30 MG: 10 INJECTION, EMULSION INTRAVENOUS at 13:25

## 2024-01-10 RX ADMIN — PROPOFOL 30 MG: 10 INJECTION, EMULSION INTRAVENOUS at 13:30

## 2024-01-10 RX ADMIN — Medication 1 CAPSULE: at 20:57

## 2024-01-10 RX ADMIN — METOPROLOL TARTRATE 5 MG: 5 INJECTION INTRAVENOUS at 06:31

## 2024-01-10 RX ADMIN — SODIUM CHLORIDE, PRESERVATIVE FREE 10 ML: 5 INJECTION INTRAVENOUS at 20:57

## 2024-01-10 RX ADMIN — LIDOCAINE HYDROCHLORIDE 100 MG: 20 INJECTION, SOLUTION EPIDURAL; INFILTRATION; INTRACAUDAL; PERINEURAL at 13:26

## 2024-01-10 RX ADMIN — AMLODIPINE BESYLATE 10 MG: 5 TABLET ORAL at 11:23

## 2024-01-10 RX ADMIN — SODIUM CHLORIDE, PRESERVATIVE FREE 20 MG: 5 INJECTION INTRAVENOUS at 11:04

## 2024-01-10 RX ADMIN — SODIUM CHLORIDE, SODIUM LACTATE, POTASSIUM CHLORIDE, CALCIUM CHLORIDE AND DEXTROSE MONOHYDRATE: 5; 600; 310; 30; 20 INJECTION, SOLUTION INTRAVENOUS at 19:54

## 2024-01-10 RX ADMIN — PROPOFOL 30 MG: 10 INJECTION, EMULSION INTRAVENOUS at 13:27

## 2024-01-10 RX ADMIN — WHITE PETROLATUM,ZINC OXIDE: 57; 17 PASTE TOPICAL at 20:57

## 2024-01-10 RX ADMIN — POTASSIUM CHLORIDE 10 MEQ: 7.46 INJECTION, SOLUTION INTRAVENOUS at 14:35

## 2024-01-10 RX ADMIN — POTASSIUM CHLORIDE 10 MEQ: 7.46 INJECTION, SOLUTION INTRAVENOUS at 16:06

## 2024-01-10 RX ADMIN — POTASSIUM CHLORIDE 10 MEQ: 7.46 INJECTION, SOLUTION INTRAVENOUS at 10:54

## 2024-01-10 RX ADMIN — SODIUM CHLORIDE, SODIUM LACTATE, POTASSIUM CHLORIDE, CALCIUM CHLORIDE AND DEXTROSE MONOHYDRATE: 5; 600; 310; 30; 20 INJECTION, SOLUTION INTRAVENOUS at 10:53

## 2024-01-10 RX ADMIN — BUDESONIDE INHALATION 500 MCG: 0.5 SUSPENSION RESPIRATORY (INHALATION) at 08:54

## 2024-01-10 RX ADMIN — IPRATROPIUM BROMIDE AND ALBUTEROL SULFATE 1 DOSE: 2.5; .5 SOLUTION RESPIRATORY (INHALATION) at 22:26

## 2024-01-10 RX ADMIN — SODIUM CHLORIDE, PRESERVATIVE FREE 10 ML: 5 INJECTION INTRAVENOUS at 08:19

## 2024-01-10 RX ADMIN — LEVETIRACETAM 750 MG: 100 INJECTION, SOLUTION INTRAVENOUS at 20:56

## 2024-01-10 RX ADMIN — WHITE PETROLATUM,ZINC OXIDE: 57; 17 PASTE TOPICAL at 11:21

## 2024-01-10 RX ADMIN — INSULIN GLARGINE 10 UNITS: 100 INJECTION, SOLUTION SUBCUTANEOUS at 21:00

## 2024-01-10 RX ADMIN — ATORVASTATIN CALCIUM 40 MG: 40 TABLET, FILM COATED ORAL at 20:57

## 2024-01-10 RX ADMIN — CEFAZOLIN SODIUM 2000 MG: 1 INJECTION, POWDER, FOR SOLUTION INTRAMUSCULAR; INTRAVENOUS at 13:22

## 2024-01-10 RX ADMIN — SODIUM CHLORIDE, PRESERVATIVE FREE 10 ML: 5 INJECTION INTRAVENOUS at 06:33

## 2024-01-10 RX ADMIN — POTASSIUM CHLORIDE 10 MEQ: 7.46 INJECTION, SOLUTION INTRAVENOUS at 17:08

## 2024-01-10 RX ADMIN — SODIUM CHLORIDE, POTASSIUM CHLORIDE, SODIUM LACTATE AND CALCIUM CHLORIDE: 600; 310; 30; 20 INJECTION, SOLUTION INTRAVENOUS at 13:21

## 2024-01-10 RX ADMIN — IPRATROPIUM BROMIDE AND ALBUTEROL SULFATE 1 DOSE: 2.5; .5 SOLUTION RESPIRATORY (INHALATION) at 08:54

## 2024-01-10 RX ADMIN — POTASSIUM CHLORIDE 10 MEQ: 7.46 INJECTION, SOLUTION INTRAVENOUS at 11:57

## 2024-01-10 ASSESSMENT — PAIN - FUNCTIONAL ASSESSMENT: PAIN_FUNCTIONAL_ASSESSMENT: FACE, LEGS, ACTIVITY, CRY, AND CONSOLABILITY (FLACC)

## 2024-01-10 ASSESSMENT — PAIN SCALES - GENERAL: PAINLEVEL_OUTOF10: 0

## 2024-01-10 NOTE — PERIOP NOTE
Attempted to reach pt , Tmoi Andres, at [phone] [phone] for peg tube placement consent. Voicemail left for  to return our call as soon as possible. Primary nurse Sandra made aware that we do not have consent at this time.

## 2024-01-10 NOTE — ANESTHESIA PRE PROCEDURE
RVSP is 15 mmHg. Mildly elevated RVSP. Est RA pressure is 5 mmHg. The estimated RVSP is 15 mmHg.  ·  Left Atrium: Left atrium is smaller than normal. Left atrial volume index is normal (16-34 mL/m2). LA Vol Index is  15 ml/m2.  ·  Image quality is good. Technically difficult study and procedure performed with the patient in a supine position.       Neuro/Psych:   (+) seizures:, CVA:            GI/Hepatic/Renal:             Endo/Other:    (+) DiabetesType I DM, poorly controlled, using insulin.                  ROS comment: Pheochromocytoma    Hypoglycemic in preop holding, getting D10 then will check. BS Abdominal:             Vascular:          Other Findings:           Anesthesia Plan      MAC and TIVA     ASA 4     (Risks benefits d/w patient in very details. He understands anesthesia risks and benefits. Agrees to proceed.)  Induction: intravenous.  continuous noninvasive hemodynamic monitor    Anesthetic plan and risks discussed with spouse (and family, if present.).      Plan discussed with CRNA.    Attending anesthesiologist reviewed and agrees with Preprocedure content              Alfonso Hollins MD   1/10/2024

## 2024-01-10 NOTE — CARE COORDINATION
Confirmed with Public benefits patient does NOT qualify for Medicaid due to being over income.                Met with patient and  at bedside to discuss discharge planning. Explained without insurance not much of, if anything we can do. Explained we will have PT/OT come back and re-evaluate to see if IRF is appropriate for michael.  is receptive. Also informed him public benefits has been waiting to hear back from him about assistance with billing. Patient  stated he is working on getting accounts and pay stubs.     Informed Public benefits.     Referral being sent to [facility] to put them on the radar.     Also confirmed patient  phone number. Stated he can text as well.

## 2024-01-10 NOTE — ANESTHESIA POSTPROCEDURE EVALUATION
Department of Anesthesiology  Postprocedure Note    Patient: Lucretia Andres  MRN: [id number]  YOB: 1970  Date of evaluation: 1/10/2024    Procedure Summary       Date: 01/10/24 Room / Location: [facility] 04 / [facility] ENDOSCOPY    Anesthesia Start: 1321 Anesthesia Stop: 1336    Procedure: EGD ESOPHAGOGASTRODUODENOSCOPY PEG TUBE INSERTION Diagnosis: Dysphagia, unspecified type    Surgeons: Carissa Shaikh MD Responsible Provider: Alfonso Hollins MD    Anesthesia Type: MAC ASA Status: 4            Anesthesia Type: MAC    Alexandrea Phase I: Alexandrea Score: 7    Alexandrea Phase II:      Anesthesia Post Evaluation    Patient location during evaluation: bedside (Endoscopy Unit)  Patient participation: complete - patient participated  Level of consciousness: sleepy but conscious  Pain score: 0  Airway patency: patent  Nausea & Vomiting: no nausea and no vomiting  Cardiovascular status: hemodynamically stable  Respiratory status: acceptable  Hydration status: stable  Comments: This patient remained on the stretcher.  The patient was handed off to the endoscopy nursing team.  All questions regarding pre-, intra-, and postoperative care were answered.  Multimodal analgesia pain management approach    No notable events documented.

## 2024-01-11 LAB
ANION GAP SERPL CALC-SCNC: 6 MMOL/L (ref 5–15)
BASOPHILS # BLD: 0 K/UL (ref 0–0.1)
BASOPHILS NFR BLD: 0 % (ref 0–1)
BUN SERPL-MCNC: 9 MG/DL (ref 6–20)
BUN/CREAT SERPL: 18 (ref 12–20)
CA-I BLD-MCNC: 8.6 MG/DL (ref 8.5–10.1)
CHLORIDE SERPL-SCNC: 108 MMOL/L (ref 97–108)
CO2 SERPL-SCNC: 27 MMOL/L (ref 21–32)
CREAT SERPL-MCNC: 0.49 MG/DL (ref 0.55–1.02)
DIFFERENTIAL METHOD BLD: ABNORMAL
EKG ATRIAL RATE: 127 BPM
EKG DIAGNOSIS: NORMAL
EKG P AXIS: -6 DEGREES
EKG P-R INTERVAL: 142 MS
EKG Q-T INTERVAL: 404 MS
EKG QRS DURATION: 70 MS
EKG QTC CALCULATION (BAZETT): 587 MS
EKG R AXIS: 35 DEGREES
EKG T AXIS: -28 DEGREES
EKG VENTRICULAR RATE: 127 BPM
EOSINOPHIL # BLD: 0.2 K/UL (ref 0–0.4)
EOSINOPHIL NFR BLD: 2 % (ref 0–7)
ERYTHROCYTE [DISTWIDTH] IN BLOOD BY AUTOMATED COUNT: 14.4 % (ref 11.5–14.5)
GLUCOSE BLD STRIP.AUTO-MCNC: 100 MG/DL (ref 65–100)
GLUCOSE BLD STRIP.AUTO-MCNC: 111 MG/DL (ref 65–100)
GLUCOSE BLD STRIP.AUTO-MCNC: 114 MG/DL (ref 65–100)
GLUCOSE BLD STRIP.AUTO-MCNC: 119 MG/DL (ref 65–100)
GLUCOSE BLD STRIP.AUTO-MCNC: 128 MG/DL (ref 65–100)
GLUCOSE BLD STRIP.AUTO-MCNC: 132 MG/DL (ref 65–100)
GLUCOSE SERPL-MCNC: 130 MG/DL (ref 65–100)
HCT VFR BLD AUTO: 26.4 % (ref 35–47)
HGB BLD-MCNC: 8.6 G/DL (ref 11.5–16)
IMM GRANULOCYTES # BLD AUTO: 0 K/UL (ref 0–0.04)
IMM GRANULOCYTES NFR BLD AUTO: 0 % (ref 0–0.5)
LYMPHOCYTES # BLD: 2.8 K/UL (ref 0.8–3.5)
LYMPHOCYTES NFR BLD: 31 % (ref 12–49)
MCH RBC QN AUTO: 30.3 PG (ref 26–34)
MCHC RBC AUTO-ENTMCNC: 32.6 G/DL (ref 30–36.5)
MCV RBC AUTO: 93 FL (ref 80–99)
MONOCYTES # BLD: 1.1 K/UL (ref 0–1)
MONOCYTES NFR BLD: 12 % (ref 5–13)
NEUTS SEG # BLD: 5 K/UL (ref 1.8–8)
NEUTS SEG NFR BLD: 55 % (ref 32–75)
NRBC # BLD: 0 K/UL (ref 0–0.01)
NRBC BLD-RTO: 0 PER 100 WBC
PERFORMED BY:: ABNORMAL
PERFORMED BY:: NORMAL
PLATELET # BLD AUTO: 234 K/UL (ref 150–400)
PMV BLD AUTO: 11.8 FL (ref 8.9–12.9)
POTASSIUM SERPL-SCNC: 3.7 MMOL/L (ref 3.5–5.1)
RBC # BLD AUTO: 2.84 M/UL (ref 3.8–5.2)
SODIUM SERPL-SCNC: 141 MMOL/L (ref 136–145)
WBC # BLD AUTO: 9.1 K/UL (ref 3.6–11)

## 2024-01-11 PROCEDURE — 80048 BASIC METABOLIC PNL TOTAL CA: CPT

## 2024-01-11 PROCEDURE — 85025 COMPLETE CBC W/AUTO DIFF WBC: CPT

## 2024-01-11 PROCEDURE — 2580000003 HC RX 258: Performed by: INTERNAL MEDICINE

## 2024-01-11 PROCEDURE — 82962 GLUCOSE BLOOD TEST: CPT

## 2024-01-11 PROCEDURE — 92526 ORAL FUNCTION THERAPY: CPT

## 2024-01-11 PROCEDURE — 2500000003 HC RX 250 WO HCPCS: Performed by: HOSPITALIST

## 2024-01-11 PROCEDURE — 6360000002 HC RX W HCPCS: Performed by: INTERNAL MEDICINE

## 2024-01-11 PROCEDURE — 6370000000 HC RX 637 (ALT 250 FOR IP): Performed by: INTERNAL MEDICINE

## 2024-01-11 PROCEDURE — 6370000000 HC RX 637 (ALT 250 FOR IP): Performed by: UROLOGY

## 2024-01-11 PROCEDURE — 2500000003 HC RX 250 WO HCPCS: Performed by: INTERNAL MEDICINE

## 2024-01-11 PROCEDURE — 94762 N-INVAS EAR/PLS OXIMTRY CONT: CPT

## 2024-01-11 PROCEDURE — 36415 COLL VENOUS BLD VENIPUNCTURE: CPT

## 2024-01-11 PROCEDURE — 6370000000 HC RX 637 (ALT 250 FOR IP): Performed by: HOSPITALIST

## 2024-01-11 PROCEDURE — 99232 SBSQ HOSP IP/OBS MODERATE 35: CPT | Performed by: UROLOGY

## 2024-01-11 PROCEDURE — 92610 EVALUATE SWALLOWING FUNCTION: CPT

## 2024-01-11 PROCEDURE — 1100000000 HC RM PRIVATE

## 2024-01-11 PROCEDURE — 6360000002 HC RX W HCPCS: Performed by: PSYCHIATRY & NEUROLOGY

## 2024-01-11 PROCEDURE — 94640 AIRWAY INHALATION TREATMENT: CPT

## 2024-01-11 PROCEDURE — 2700000000 HC OXYGEN THERAPY PER DAY

## 2024-01-11 RX ORDER — POTASSIUM CHLORIDE 20 MEQ/1
20 TABLET, EXTENDED RELEASE ORAL ONCE
Status: DISCONTINUED | OUTPATIENT
Start: 2024-01-11 | End: 2024-01-11

## 2024-01-11 RX ORDER — POTASSIUM CHLORIDE 7.45 MG/ML
10 INJECTION INTRAVENOUS
Status: COMPLETED | OUTPATIENT
Start: 2024-01-11 | End: 2024-01-11

## 2024-01-11 RX ORDER — MORPHINE SULFATE 2 MG/ML
2 INJECTION, SOLUTION INTRAMUSCULAR; INTRAVENOUS EVERY 4 HOURS PRN
Status: DISCONTINUED | OUTPATIENT
Start: 2024-01-11 | End: 2024-01-24 | Stop reason: HOSPADM

## 2024-01-11 RX ORDER — HYDROCODONE BITARTRATE AND ACETAMINOPHEN 5; 325 MG/1; MG/1
1 TABLET ORAL EVERY 6 HOURS PRN
Status: DISCONTINUED | OUTPATIENT
Start: 2024-01-11 | End: 2024-01-24 | Stop reason: HOSPADM

## 2024-01-11 RX ORDER — CARBOXYMETHYLCELLULOSE SODIUM 10 MG/ML
1 GEL OPHTHALMIC 2 TIMES DAILY
Status: DISCONTINUED | OUTPATIENT
Start: 2024-01-11 | End: 2024-01-24 | Stop reason: HOSPADM

## 2024-01-11 RX ADMIN — BUDESONIDE INHALATION 500 MCG: 0.5 SUSPENSION RESPIRATORY (INHALATION) at 08:52

## 2024-01-11 RX ADMIN — SODIUM CHLORIDE, PRESERVATIVE FREE 10 ML: 5 INJECTION INTRAVENOUS at 21:36

## 2024-01-11 RX ADMIN — Medication 1 CAPSULE: at 21:27

## 2024-01-11 RX ADMIN — HYDROCODONE BITARTRATE AND ACETAMINOPHEN 1 TABLET: 5; 325 TABLET ORAL at 21:27

## 2024-01-11 RX ADMIN — AMLODIPINE BESYLATE 10 MG: 5 TABLET ORAL at 08:31

## 2024-01-11 RX ADMIN — SODIUM CHLORIDE, PRESERVATIVE FREE 20 MG: 5 INJECTION INTRAVENOUS at 08:35

## 2024-01-11 RX ADMIN — WHITE PETROLATUM,ZINC OXIDE: 57; 17 PASTE TOPICAL at 08:31

## 2024-01-11 RX ADMIN — POTASSIUM CHLORIDE 10 MEQ: 7.45 INJECTION INTRAVENOUS at 12:52

## 2024-01-11 RX ADMIN — IPRATROPIUM BROMIDE AND ALBUTEROL SULFATE 1 DOSE: 2.5; .5 SOLUTION RESPIRATORY (INHALATION) at 19:59

## 2024-01-11 RX ADMIN — WHITE PETROLATUM,ZINC OXIDE: 57; 17 PASTE TOPICAL at 09:00

## 2024-01-11 RX ADMIN — POTASSIUM CHLORIDE 10 MEQ: 7.45 INJECTION INTRAVENOUS at 11:37

## 2024-01-11 RX ADMIN — POLYETHYLENE GLYCOL 3350 17 G: 17 POWDER, FOR SOLUTION ORAL at 21:29

## 2024-01-11 RX ADMIN — CARBOXYMETHYLCELLULOSE SODIUM 1 DROP: 10 GEL OPHTHALMIC at 21:39

## 2024-01-11 RX ADMIN — ATORVASTATIN CALCIUM 40 MG: 40 TABLET, FILM COATED ORAL at 21:27

## 2024-01-11 RX ADMIN — ACETAMINOPHEN 650 MG: 325 TABLET ORAL at 08:31

## 2024-01-11 RX ADMIN — ENOXAPARIN SODIUM 30 MG: 100 INJECTION SUBCUTANEOUS at 08:31

## 2024-01-11 RX ADMIN — LEVETIRACETAM 750 MG: 100 INJECTION, SOLUTION INTRAVENOUS at 08:35

## 2024-01-11 RX ADMIN — POTASSIUM CHLORIDE 10 MEQ: 7.45 INJECTION INTRAVENOUS at 13:55

## 2024-01-11 RX ADMIN — Medication 1 CAPSULE: at 08:31

## 2024-01-11 RX ADMIN — BUDESONIDE INHALATION 500 MCG: 0.5 SUSPENSION RESPIRATORY (INHALATION) at 19:59

## 2024-01-11 RX ADMIN — IPRATROPIUM BROMIDE AND ALBUTEROL SULFATE 1 DOSE: 2.5; .5 SOLUTION RESPIRATORY (INHALATION) at 08:52

## 2024-01-11 RX ADMIN — LEVETIRACETAM 750 MG: 100 INJECTION, SOLUTION INTRAVENOUS at 21:30

## 2024-01-11 RX ADMIN — METOPROLOL TARTRATE 5 MG: 5 INJECTION INTRAVENOUS at 00:18

## 2024-01-12 ENCOUNTER — APPOINTMENT (OUTPATIENT)
Facility: HOSPITAL | Age: 54
DRG: 981 | End: 2024-01-12

## 2024-01-12 LAB
ANION GAP SERPL CALC-SCNC: 6 MMOL/L (ref 5–15)
BASOPHILS # BLD: 0 K/UL (ref 0–0.1)
BASOPHILS NFR BLD: 0 % (ref 0–1)
BUN SERPL-MCNC: 10 MG/DL (ref 6–20)
BUN/CREAT SERPL: 24 (ref 12–20)
CA-I BLD-MCNC: 8.6 MG/DL (ref 8.5–10.1)
CHLORIDE SERPL-SCNC: 106 MMOL/L (ref 97–108)
CO2 SERPL-SCNC: 26 MMOL/L (ref 21–32)
CREAT SERPL-MCNC: 0.41 MG/DL (ref 0.55–1.02)
DIFFERENTIAL METHOD BLD: ABNORMAL
EOSINOPHIL # BLD: 0.3 K/UL (ref 0–0.4)
EOSINOPHIL NFR BLD: 4 % (ref 0–7)
ERYTHROCYTE [DISTWIDTH] IN BLOOD BY AUTOMATED COUNT: 14 % (ref 11.5–14.5)
GLUCOSE BLD STRIP.AUTO-MCNC: 111 MG/DL (ref 65–100)
GLUCOSE BLD STRIP.AUTO-MCNC: 112 MG/DL (ref 65–100)
GLUCOSE BLD STRIP.AUTO-MCNC: 113 MG/DL (ref 65–100)
GLUCOSE BLD STRIP.AUTO-MCNC: 115 MG/DL (ref 65–100)
GLUCOSE BLD STRIP.AUTO-MCNC: 128 MG/DL (ref 65–100)
GLUCOSE BLD STRIP.AUTO-MCNC: 94 MG/DL (ref 65–100)
GLUCOSE SERPL-MCNC: 105 MG/DL (ref 65–100)
HCT VFR BLD AUTO: 25 % (ref 35–47)
HGB BLD-MCNC: 8.2 G/DL (ref 11.5–16)
IMM GRANULOCYTES # BLD AUTO: 0 K/UL (ref 0–0.04)
IMM GRANULOCYTES NFR BLD AUTO: 0 % (ref 0–0.5)
LYMPHOCYTES # BLD: 2 K/UL (ref 0.8–3.5)
LYMPHOCYTES NFR BLD: 29 % (ref 12–49)
MCH RBC QN AUTO: 30.6 PG (ref 26–34)
MCHC RBC AUTO-ENTMCNC: 32.8 G/DL (ref 30–36.5)
MCV RBC AUTO: 93.3 FL (ref 80–99)
MONOCYTES # BLD: 0.7 K/UL (ref 0–1)
MONOCYTES NFR BLD: 10 % (ref 5–13)
NEUTS SEG # BLD: 4.1 K/UL (ref 1.8–8)
NEUTS SEG NFR BLD: 57 % (ref 32–75)
NRBC # BLD: 0 K/UL (ref 0–0.01)
NRBC BLD-RTO: 0 PER 100 WBC
PERFORMED BY:: ABNORMAL
PERFORMED BY:: NORMAL
PLATELET # BLD AUTO: 224 K/UL (ref 150–400)
PMV BLD AUTO: 11.6 FL (ref 8.9–12.9)
POTASSIUM SERPL-SCNC: 3.7 MMOL/L (ref 3.5–5.1)
RBC # BLD AUTO: 2.68 M/UL (ref 3.8–5.2)
SODIUM SERPL-SCNC: 138 MMOL/L (ref 136–145)
WBC # BLD AUTO: 7.1 K/UL (ref 3.6–11)

## 2024-01-12 PROCEDURE — 2580000003 HC RX 258: Performed by: INTERNAL MEDICINE

## 2024-01-12 PROCEDURE — 6360000002 HC RX W HCPCS: Performed by: INTERNAL MEDICINE

## 2024-01-12 PROCEDURE — 51798 US URINE CAPACITY MEASURE: CPT

## 2024-01-12 PROCEDURE — 85025 COMPLETE CBC W/AUTO DIFF WBC: CPT

## 2024-01-12 PROCEDURE — 6370000000 HC RX 637 (ALT 250 FOR IP): Performed by: INTERNAL MEDICINE

## 2024-01-12 PROCEDURE — 97530 THERAPEUTIC ACTIVITIES: CPT

## 2024-01-12 PROCEDURE — 2500000003 HC RX 250 WO HCPCS: Performed by: INTERNAL MEDICINE

## 2024-01-12 PROCEDURE — 94640 AIRWAY INHALATION TREATMENT: CPT

## 2024-01-12 PROCEDURE — 92611 MOTION FLUOROSCOPY/SWALLOW: CPT

## 2024-01-12 PROCEDURE — 6360000002 HC RX W HCPCS: Performed by: UROLOGY

## 2024-01-12 PROCEDURE — 82962 GLUCOSE BLOOD TEST: CPT

## 2024-01-12 PROCEDURE — 6360000002 HC RX W HCPCS: Performed by: PSYCHIATRY & NEUROLOGY

## 2024-01-12 PROCEDURE — 6370000000 HC RX 637 (ALT 250 FOR IP): Performed by: HOSPITALIST

## 2024-01-12 PROCEDURE — 1100000000 HC RM PRIVATE

## 2024-01-12 PROCEDURE — 36415 COLL VENOUS BLD VENIPUNCTURE: CPT

## 2024-01-12 PROCEDURE — 94761 N-INVAS EAR/PLS OXIMETRY MLT: CPT

## 2024-01-12 PROCEDURE — 80048 BASIC METABOLIC PNL TOTAL CA: CPT

## 2024-01-12 PROCEDURE — 2700000000 HC OXYGEN THERAPY PER DAY

## 2024-01-12 PROCEDURE — 99232 SBSQ HOSP IP/OBS MODERATE 35: CPT | Performed by: UROLOGY

## 2024-01-12 PROCEDURE — 74230 X-RAY XM SWLNG FUNCJ C+: CPT

## 2024-01-12 RX ORDER — POTASSIUM CHLORIDE 20 MEQ/1
20 TABLET, EXTENDED RELEASE ORAL ONCE
Status: COMPLETED | OUTPATIENT
Start: 2024-01-12 | End: 2024-01-12

## 2024-01-12 RX ADMIN — BUDESONIDE INHALATION 500 MCG: 0.5 SUSPENSION RESPIRATORY (INHALATION) at 21:37

## 2024-01-12 RX ADMIN — Medication 1 CAPSULE: at 22:50

## 2024-01-12 RX ADMIN — SODIUM CHLORIDE, PRESERVATIVE FREE 20 MG: 5 INJECTION INTRAVENOUS at 10:23

## 2024-01-12 RX ADMIN — LEVETIRACETAM 750 MG: 100 INJECTION, SOLUTION INTRAVENOUS at 22:50

## 2024-01-12 RX ADMIN — SODIUM CHLORIDE, PRESERVATIVE FREE 10 ML: 5 INJECTION INTRAVENOUS at 22:51

## 2024-01-12 RX ADMIN — Medication 1 CAPSULE: at 10:22

## 2024-01-12 RX ADMIN — WHITE PETROLATUM,ZINC OXIDE: 57; 17 PASTE TOPICAL at 10:24

## 2024-01-12 RX ADMIN — POTASSIUM CHLORIDE 20 MEQ: 1500 TABLET, EXTENDED RELEASE ORAL at 16:27

## 2024-01-12 RX ADMIN — LEVETIRACETAM 750 MG: 100 INJECTION, SOLUTION INTRAVENOUS at 11:52

## 2024-01-12 RX ADMIN — ENOXAPARIN SODIUM 30 MG: 100 INJECTION SUBCUTANEOUS at 10:22

## 2024-01-12 RX ADMIN — BARIUM SULFATE 10 ML: 400 PASTE ORAL at 09:45

## 2024-01-12 RX ADMIN — BARIUM SULFATE 30 ML: 0.81 POWDER, FOR SUSPENSION ORAL at 09:45

## 2024-01-12 RX ADMIN — BARIUM SULFATE 20 ML: 400 SUSPENSION ORAL at 09:45

## 2024-01-12 RX ADMIN — CARBOXYMETHYLCELLULOSE SODIUM 1 DROP: 10 GEL OPHTHALMIC at 22:50

## 2024-01-12 RX ADMIN — IPRATROPIUM BROMIDE AND ALBUTEROL SULFATE 1 DOSE: 2.5; .5 SOLUTION RESPIRATORY (INHALATION) at 08:59

## 2024-01-12 RX ADMIN — CARBOXYMETHYLCELLULOSE SODIUM 1 DROP: 10 GEL OPHTHALMIC at 10:23

## 2024-01-12 RX ADMIN — BUDESONIDE INHALATION 500 MCG: 0.5 SUSPENSION RESPIRATORY (INHALATION) at 08:59

## 2024-01-12 RX ADMIN — IPRATROPIUM BROMIDE AND ALBUTEROL SULFATE 1 DOSE: 2.5; .5 SOLUTION RESPIRATORY (INHALATION) at 21:37

## 2024-01-12 RX ADMIN — ATORVASTATIN CALCIUM 40 MG: 40 TABLET, FILM COATED ORAL at 22:50

## 2024-01-12 RX ADMIN — BARIUM SULFATE 10 ML: 400 SUSPENSION ORAL at 09:45

## 2024-01-12 RX ADMIN — MORPHINE SULFATE 2 MG: 2 INJECTION, SOLUTION INTRAMUSCULAR; INTRAVENOUS at 12:26

## 2024-01-12 RX ADMIN — AMLODIPINE BESYLATE 10 MG: 5 TABLET ORAL at 10:22

## 2024-01-12 RX ADMIN — WHITE PETROLATUM,ZINC OXIDE: 57; 17 PASTE TOPICAL at 20:00

## 2024-01-12 RX ADMIN — SODIUM CHLORIDE, PRESERVATIVE FREE 10 ML: 5 INJECTION INTRAVENOUS at 10:24

## 2024-01-12 ASSESSMENT — PAIN DESCRIPTION - DESCRIPTORS: DESCRIPTORS: ACHING;SORE

## 2024-01-12 ASSESSMENT — PAIN SCALES - WONG BAKER
WONGBAKER_NUMERICALRESPONSE: 4
WONGBAKER_NUMERICALRESPONSE: 4

## 2024-01-12 ASSESSMENT — PAIN SCALES - GENERAL
PAINLEVEL_OUTOF10: 7
PAINLEVEL_OUTOF10: 4
PAINLEVEL_OUTOF10: 0

## 2024-01-12 ASSESSMENT — PAIN DESCRIPTION - LOCATION
LOCATION: ABDOMEN

## 2024-01-12 ASSESSMENT — PAIN DESCRIPTION - ORIENTATION: ORIENTATION: ANTERIOR

## 2024-01-12 NOTE — CARE COORDINATION
[facility] reviewing to see if patient can qualify for michael.      Needs patient will have at discharge request quotes;  Hospital Bed  Wheelchair  Navdeep lift  Shower chair  Mechanical lift  Bedside commode    Tube feeding

## 2024-01-13 LAB
ANION GAP SERPL CALC-SCNC: 5 MMOL/L (ref 5–15)
BASOPHILS # BLD: 0 K/UL (ref 0–0.1)
BASOPHILS NFR BLD: 0 % (ref 0–1)
BUN SERPL-MCNC: 10 MG/DL (ref 6–20)
BUN/CREAT SERPL: 21 (ref 12–20)
CA-I BLD-MCNC: 9.1 MG/DL (ref 8.5–10.1)
CHLORIDE SERPL-SCNC: 106 MMOL/L (ref 97–108)
CO2 SERPL-SCNC: 26 MMOL/L (ref 21–32)
CREAT SERPL-MCNC: 0.48 MG/DL (ref 0.55–1.02)
DIFFERENTIAL METHOD BLD: ABNORMAL
EOSINOPHIL # BLD: 0.2 K/UL (ref 0–0.4)
EOSINOPHIL NFR BLD: 3 % (ref 0–7)
ERYTHROCYTE [DISTWIDTH] IN BLOOD BY AUTOMATED COUNT: 14 % (ref 11.5–14.5)
GLUCOSE BLD STRIP.AUTO-MCNC: 120 MG/DL (ref 65–100)
GLUCOSE BLD STRIP.AUTO-MCNC: 140 MG/DL (ref 65–100)
GLUCOSE BLD STRIP.AUTO-MCNC: 150 MG/DL (ref 65–100)
GLUCOSE SERPL-MCNC: 122 MG/DL (ref 65–100)
HCT VFR BLD AUTO: 25.8 % (ref 35–47)
HGB BLD-MCNC: 8.4 G/DL (ref 11.5–16)
IMM GRANULOCYTES # BLD AUTO: 0 K/UL (ref 0–0.04)
IMM GRANULOCYTES NFR BLD AUTO: 0 % (ref 0–0.5)
LYMPHOCYTES # BLD: 2.2 K/UL (ref 0.8–3.5)
LYMPHOCYTES NFR BLD: 32 % (ref 12–49)
MCH RBC QN AUTO: 30.2 PG (ref 26–34)
MCHC RBC AUTO-ENTMCNC: 32.6 G/DL (ref 30–36.5)
MCV RBC AUTO: 92.8 FL (ref 80–99)
MONOCYTES # BLD: 0.7 K/UL (ref 0–1)
MONOCYTES NFR BLD: 10 % (ref 5–13)
NEUTS SEG # BLD: 3.7 K/UL (ref 1.8–8)
NEUTS SEG NFR BLD: 55 % (ref 32–75)
NRBC # BLD: 0 K/UL (ref 0–0.01)
NRBC BLD-RTO: 0 PER 100 WBC
PERFORMED BY:: ABNORMAL
PLATELET # BLD AUTO: 240 K/UL (ref 150–400)
PMV BLD AUTO: 11.6 FL (ref 8.9–12.9)
POTASSIUM SERPL-SCNC: 3.5 MMOL/L (ref 3.5–5.1)
RBC # BLD AUTO: 2.78 M/UL (ref 3.8–5.2)
SODIUM SERPL-SCNC: 137 MMOL/L (ref 136–145)
WBC # BLD AUTO: 6.9 K/UL (ref 3.6–11)

## 2024-01-13 PROCEDURE — 2580000003 HC RX 258: Performed by: INTERNAL MEDICINE

## 2024-01-13 PROCEDURE — 6360000002 HC RX W HCPCS: Performed by: INTERNAL MEDICINE

## 2024-01-13 PROCEDURE — 94761 N-INVAS EAR/PLS OXIMETRY MLT: CPT

## 2024-01-13 PROCEDURE — 6370000000 HC RX 637 (ALT 250 FOR IP): Performed by: HOSPITALIST

## 2024-01-13 PROCEDURE — 6370000000 HC RX 637 (ALT 250 FOR IP): Performed by: INTERNAL MEDICINE

## 2024-01-13 PROCEDURE — 80048 BASIC METABOLIC PNL TOTAL CA: CPT

## 2024-01-13 PROCEDURE — 1100000000 HC RM PRIVATE

## 2024-01-13 PROCEDURE — 6360000002 HC RX W HCPCS: Performed by: PSYCHIATRY & NEUROLOGY

## 2024-01-13 PROCEDURE — 36415 COLL VENOUS BLD VENIPUNCTURE: CPT

## 2024-01-13 PROCEDURE — 2500000003 HC RX 250 WO HCPCS: Performed by: INTERNAL MEDICINE

## 2024-01-13 PROCEDURE — 82962 GLUCOSE BLOOD TEST: CPT

## 2024-01-13 PROCEDURE — 85025 COMPLETE CBC W/AUTO DIFF WBC: CPT

## 2024-01-13 PROCEDURE — 94640 AIRWAY INHALATION TREATMENT: CPT

## 2024-01-13 PROCEDURE — 97530 THERAPEUTIC ACTIVITIES: CPT

## 2024-01-13 RX ORDER — POTASSIUM CHLORIDE 20 MEQ/1
40 TABLET, EXTENDED RELEASE ORAL ONCE
Status: COMPLETED | OUTPATIENT
Start: 2024-01-13 | End: 2024-01-13

## 2024-01-13 RX ADMIN — LEVETIRACETAM 750 MG: 100 INJECTION, SOLUTION INTRAVENOUS at 21:56

## 2024-01-13 RX ADMIN — ENOXAPARIN SODIUM 30 MG: 100 INJECTION SUBCUTANEOUS at 08:22

## 2024-01-13 RX ADMIN — SODIUM CHLORIDE, PRESERVATIVE FREE 20 MG: 5 INJECTION INTRAVENOUS at 08:22

## 2024-01-13 RX ADMIN — ATORVASTATIN CALCIUM 40 MG: 40 TABLET, FILM COATED ORAL at 21:57

## 2024-01-13 RX ADMIN — BUDESONIDE INHALATION 500 MCG: 0.5 SUSPENSION RESPIRATORY (INHALATION) at 19:59

## 2024-01-13 RX ADMIN — WHITE PETROLATUM,ZINC OXIDE: 57; 17 PASTE TOPICAL at 21:58

## 2024-01-13 RX ADMIN — SODIUM CHLORIDE, PRESERVATIVE FREE 10 ML: 5 INJECTION INTRAVENOUS at 21:57

## 2024-01-13 RX ADMIN — LEVETIRACETAM 750 MG: 100 INJECTION, SOLUTION INTRAVENOUS at 08:21

## 2024-01-13 RX ADMIN — IPRATROPIUM BROMIDE AND ALBUTEROL SULFATE 1 DOSE: 2.5; .5 SOLUTION RESPIRATORY (INHALATION) at 19:59

## 2024-01-13 RX ADMIN — WHITE PETROLATUM,ZINC OXIDE: 57; 17 PASTE TOPICAL at 08:23

## 2024-01-13 RX ADMIN — BUDESONIDE INHALATION 500 MCG: 0.5 SUSPENSION RESPIRATORY (INHALATION) at 10:02

## 2024-01-13 RX ADMIN — Medication 1 CAPSULE: at 08:22

## 2024-01-13 RX ADMIN — AMLODIPINE BESYLATE 10 MG: 5 TABLET ORAL at 08:22

## 2024-01-13 RX ADMIN — CARBOXYMETHYLCELLULOSE SODIUM 1 DROP: 10 GEL OPHTHALMIC at 22:17

## 2024-01-13 RX ADMIN — POTASSIUM CHLORIDE 40 MEQ: 1500 TABLET, EXTENDED RELEASE ORAL at 13:26

## 2024-01-13 RX ADMIN — CARBOXYMETHYLCELLULOSE SODIUM 1 DROP: 10 GEL OPHTHALMIC at 11:29

## 2024-01-13 RX ADMIN — IPRATROPIUM BROMIDE AND ALBUTEROL SULFATE 1 DOSE: 2.5; .5 SOLUTION RESPIRATORY (INHALATION) at 10:02

## 2024-01-13 RX ADMIN — Medication 1 CAPSULE: at 21:57

## 2024-01-13 RX ADMIN — SODIUM CHLORIDE, PRESERVATIVE FREE 10 ML: 5 INJECTION INTRAVENOUS at 11:29

## 2024-01-13 ASSESSMENT — PAIN SCALES - GENERAL
PAINLEVEL_OUTOF10: 0
PAINLEVEL_OUTOF10: 4

## 2024-01-14 LAB
ANION GAP SERPL CALC-SCNC: 5 MMOL/L (ref 5–15)
BASOPHILS # BLD: 0 K/UL (ref 0–0.1)
BASOPHILS NFR BLD: 1 % (ref 0–1)
BUN SERPL-MCNC: 10 MG/DL (ref 6–20)
BUN/CREAT SERPL: 18 (ref 12–20)
CA-I BLD-MCNC: 9 MG/DL (ref 8.5–10.1)
CHLORIDE SERPL-SCNC: 107 MMOL/L (ref 97–108)
CO2 SERPL-SCNC: 26 MMOL/L (ref 21–32)
CREAT SERPL-MCNC: 0.55 MG/DL (ref 0.55–1.02)
DIFFERENTIAL METHOD BLD: ABNORMAL
EOSINOPHIL # BLD: 0.2 K/UL (ref 0–0.4)
EOSINOPHIL NFR BLD: 3 % (ref 0–7)
ERYTHROCYTE [DISTWIDTH] IN BLOOD BY AUTOMATED COUNT: 13.9 % (ref 11.5–14.5)
GLUCOSE BLD STRIP.AUTO-MCNC: 116 MG/DL (ref 65–100)
GLUCOSE BLD STRIP.AUTO-MCNC: 125 MG/DL (ref 65–100)
GLUCOSE BLD STRIP.AUTO-MCNC: 134 MG/DL (ref 65–100)
GLUCOSE BLD STRIP.AUTO-MCNC: 134 MG/DL (ref 65–100)
GLUCOSE BLD STRIP.AUTO-MCNC: 136 MG/DL (ref 65–100)
GLUCOSE BLD STRIP.AUTO-MCNC: 145 MG/DL (ref 65–100)
GLUCOSE BLD STRIP.AUTO-MCNC: 149 MG/DL (ref 65–100)
GLUCOSE SERPL-MCNC: 128 MG/DL (ref 65–100)
HCT VFR BLD AUTO: 25.8 % (ref 35–47)
HGB BLD-MCNC: 8.3 G/DL (ref 11.5–16)
IMM GRANULOCYTES # BLD AUTO: 0 K/UL (ref 0–0.04)
IMM GRANULOCYTES NFR BLD AUTO: 1 % (ref 0–0.5)
LYMPHOCYTES # BLD: 2.4 K/UL (ref 0.8–3.5)
LYMPHOCYTES NFR BLD: 38 % (ref 12–49)
MCH RBC QN AUTO: 29.9 PG (ref 26–34)
MCHC RBC AUTO-ENTMCNC: 32.2 G/DL (ref 30–36.5)
MCV RBC AUTO: 92.8 FL (ref 80–99)
MONOCYTES # BLD: 0.6 K/UL (ref 0–1)
MONOCYTES NFR BLD: 9 % (ref 5–13)
NEUTS SEG # BLD: 3.1 K/UL (ref 1.8–8)
NEUTS SEG NFR BLD: 48 % (ref 32–75)
NRBC # BLD: 0 K/UL (ref 0–0.01)
NRBC BLD-RTO: 0 PER 100 WBC
PERFORMED BY:: ABNORMAL
PLATELET # BLD AUTO: 278 K/UL (ref 150–400)
PMV BLD AUTO: 10.8 FL (ref 8.9–12.9)
POTASSIUM SERPL-SCNC: 3.9 MMOL/L (ref 3.5–5.1)
RBC # BLD AUTO: 2.78 M/UL (ref 3.8–5.2)
SODIUM SERPL-SCNC: 138 MMOL/L (ref 136–145)
WBC # BLD AUTO: 6.4 K/UL (ref 3.6–11)

## 2024-01-14 PROCEDURE — 80048 BASIC METABOLIC PNL TOTAL CA: CPT

## 2024-01-14 PROCEDURE — 6360000002 HC RX W HCPCS: Performed by: UROLOGY

## 2024-01-14 PROCEDURE — 6370000000 HC RX 637 (ALT 250 FOR IP): Performed by: INTERNAL MEDICINE

## 2024-01-14 PROCEDURE — 2580000003 HC RX 258: Performed by: INTERNAL MEDICINE

## 2024-01-14 PROCEDURE — 36415 COLL VENOUS BLD VENIPUNCTURE: CPT

## 2024-01-14 PROCEDURE — 97530 THERAPEUTIC ACTIVITIES: CPT

## 2024-01-14 PROCEDURE — 97110 THERAPEUTIC EXERCISES: CPT

## 2024-01-14 PROCEDURE — 2500000003 HC RX 250 WO HCPCS: Performed by: INTERNAL MEDICINE

## 2024-01-14 PROCEDURE — 6360000002 HC RX W HCPCS: Performed by: INTERNAL MEDICINE

## 2024-01-14 PROCEDURE — 1100000000 HC RM PRIVATE

## 2024-01-14 PROCEDURE — 94640 AIRWAY INHALATION TREATMENT: CPT

## 2024-01-14 PROCEDURE — 82962 GLUCOSE BLOOD TEST: CPT

## 2024-01-14 PROCEDURE — 6360000002 HC RX W HCPCS: Performed by: PSYCHIATRY & NEUROLOGY

## 2024-01-14 PROCEDURE — 94761 N-INVAS EAR/PLS OXIMETRY MLT: CPT

## 2024-01-14 PROCEDURE — 85025 COMPLETE CBC W/AUTO DIFF WBC: CPT

## 2024-01-14 PROCEDURE — 6370000000 HC RX 637 (ALT 250 FOR IP): Performed by: HOSPITALIST

## 2024-01-14 RX ADMIN — AMLODIPINE BESYLATE 10 MG: 5 TABLET ORAL at 09:42

## 2024-01-14 RX ADMIN — Medication 1 CAPSULE: at 09:42

## 2024-01-14 RX ADMIN — CARBOXYMETHYLCELLULOSE SODIUM 1 DROP: 10 GEL OPHTHALMIC at 21:51

## 2024-01-14 RX ADMIN — SODIUM CHLORIDE, PRESERVATIVE FREE 10 ML: 5 INJECTION INTRAVENOUS at 21:50

## 2024-01-14 RX ADMIN — LEVETIRACETAM 750 MG: 100 INJECTION, SOLUTION INTRAVENOUS at 09:41

## 2024-01-14 RX ADMIN — Medication 1 CAPSULE: at 21:49

## 2024-01-14 RX ADMIN — SODIUM CHLORIDE, PRESERVATIVE FREE 20 MG: 5 INJECTION INTRAVENOUS at 09:41

## 2024-01-14 RX ADMIN — WHITE PETROLATUM,ZINC OXIDE: 57; 17 PASTE TOPICAL at 09:41

## 2024-01-14 RX ADMIN — IPRATROPIUM BROMIDE AND ALBUTEROL SULFATE 1 DOSE: 2.5; .5 SOLUTION RESPIRATORY (INHALATION) at 21:23

## 2024-01-14 RX ADMIN — BUDESONIDE INHALATION 500 MCG: 0.5 SUSPENSION RESPIRATORY (INHALATION) at 07:41

## 2024-01-14 RX ADMIN — SODIUM CHLORIDE, PRESERVATIVE FREE 10 ML: 5 INJECTION INTRAVENOUS at 09:42

## 2024-01-14 RX ADMIN — WHITE PETROLATUM,ZINC OXIDE: 57; 17 PASTE TOPICAL at 21:51

## 2024-01-14 RX ADMIN — ATORVASTATIN CALCIUM 40 MG: 40 TABLET, FILM COATED ORAL at 21:49

## 2024-01-14 RX ADMIN — IPRATROPIUM BROMIDE AND ALBUTEROL SULFATE 1 DOSE: 2.5; .5 SOLUTION RESPIRATORY (INHALATION) at 07:41

## 2024-01-14 RX ADMIN — LEVETIRACETAM 750 MG: 100 INJECTION, SOLUTION INTRAVENOUS at 21:48

## 2024-01-14 RX ADMIN — BUDESONIDE INHALATION 500 MCG: 0.5 SUSPENSION RESPIRATORY (INHALATION) at 21:23

## 2024-01-14 RX ADMIN — ENOXAPARIN SODIUM 30 MG: 100 INJECTION SUBCUTANEOUS at 09:41

## 2024-01-14 RX ADMIN — MORPHINE SULFATE 2 MG: 2 INJECTION, SOLUTION INTRAMUSCULAR; INTRAVENOUS at 16:48

## 2024-01-14 RX ADMIN — MORPHINE SULFATE 2 MG: 2 INJECTION, SOLUTION INTRAMUSCULAR; INTRAVENOUS at 22:41

## 2024-01-14 ASSESSMENT — PAIN SCALES - GENERAL
PAINLEVEL_OUTOF10: 10
PAINLEVEL_OUTOF10: 7
PAINLEVEL_OUTOF10: 0

## 2024-01-14 ASSESSMENT — PAIN DESCRIPTION - LOCATION
LOCATION: ABDOMEN
LOCATION: ABDOMEN

## 2024-01-14 ASSESSMENT — PAIN - FUNCTIONAL ASSESSMENT: PAIN_FUNCTIONAL_ASSESSMENT: PREVENTS OR INTERFERES SOME ACTIVE ACTIVITIES AND ADLS

## 2024-01-14 ASSESSMENT — PAIN DESCRIPTION - DESCRIPTORS: DESCRIPTORS: DISCOMFORT

## 2024-01-14 ASSESSMENT — PAIN DESCRIPTION - ORIENTATION: ORIENTATION: MID

## 2024-01-14 ASSESSMENT — PAIN SCALES - WONG BAKER
WONGBAKER_NUMERICALRESPONSE: 0
WONGBAKER_NUMERICALRESPONSE: 0

## 2024-01-15 LAB
ANION GAP SERPL CALC-SCNC: 7 MMOL/L (ref 5–15)
BASOPHILS # BLD: 0 K/UL (ref 0–0.1)
BASOPHILS NFR BLD: 0 % (ref 0–1)
BUN SERPL-MCNC: 13 MG/DL (ref 6–20)
BUN/CREAT SERPL: 22 (ref 12–20)
CA-I BLD-MCNC: 9.5 MG/DL (ref 8.5–10.1)
CHLORIDE SERPL-SCNC: 104 MMOL/L (ref 97–108)
CO2 SERPL-SCNC: 26 MMOL/L (ref 21–32)
CREAT SERPL-MCNC: 0.59 MG/DL (ref 0.55–1.02)
DIFFERENTIAL METHOD BLD: ABNORMAL
EOSINOPHIL # BLD: 0.2 K/UL (ref 0–0.4)
EOSINOPHIL NFR BLD: 2 % (ref 0–7)
ERYTHROCYTE [DISTWIDTH] IN BLOOD BY AUTOMATED COUNT: 14.3 % (ref 11.5–14.5)
GLUCOSE BLD STRIP.AUTO-MCNC: 109 MG/DL (ref 65–100)
GLUCOSE BLD STRIP.AUTO-MCNC: 138 MG/DL (ref 65–100)
GLUCOSE BLD STRIP.AUTO-MCNC: 141 MG/DL (ref 65–100)
GLUCOSE BLD STRIP.AUTO-MCNC: 189 MG/DL (ref 65–100)
GLUCOSE SERPL-MCNC: 124 MG/DL (ref 65–100)
HCT VFR BLD AUTO: 28.5 % (ref 35–47)
HGB BLD-MCNC: 9.3 G/DL (ref 11.5–16)
IMM GRANULOCYTES # BLD AUTO: 0 K/UL (ref 0–0.04)
IMM GRANULOCYTES NFR BLD AUTO: 1 % (ref 0–0.5)
LYMPHOCYTES # BLD: 2 K/UL (ref 0.8–3.5)
LYMPHOCYTES NFR BLD: 26 % (ref 12–49)
MCH RBC QN AUTO: 30.1 PG (ref 26–34)
MCHC RBC AUTO-ENTMCNC: 32.6 G/DL (ref 30–36.5)
MCV RBC AUTO: 92.2 FL (ref 80–99)
MONOCYTES # BLD: 0.7 K/UL (ref 0–1)
MONOCYTES NFR BLD: 9 % (ref 5–13)
NEUTS SEG # BLD: 4.9 K/UL (ref 1.8–8)
NEUTS SEG NFR BLD: 62 % (ref 32–75)
NRBC # BLD: 0 K/UL (ref 0–0.01)
NRBC BLD-RTO: 0 PER 100 WBC
PERFORMED BY:: ABNORMAL
PLATELET # BLD AUTO: 284 K/UL (ref 150–400)
PMV BLD AUTO: 11 FL (ref 8.9–12.9)
POTASSIUM SERPL-SCNC: 3.8 MMOL/L (ref 3.5–5.1)
RBC # BLD AUTO: 3.09 M/UL (ref 3.8–5.2)
SODIUM SERPL-SCNC: 137 MMOL/L (ref 136–145)
WBC # BLD AUTO: 7.9 K/UL (ref 3.6–11)

## 2024-01-15 PROCEDURE — 6370000000 HC RX 637 (ALT 250 FOR IP): Performed by: INTERNAL MEDICINE

## 2024-01-15 PROCEDURE — 2500000003 HC RX 250 WO HCPCS: Performed by: INTERNAL MEDICINE

## 2024-01-15 PROCEDURE — 94640 AIRWAY INHALATION TREATMENT: CPT

## 2024-01-15 PROCEDURE — 6360000002 HC RX W HCPCS: Performed by: PSYCHIATRY & NEUROLOGY

## 2024-01-15 PROCEDURE — 82962 GLUCOSE BLOOD TEST: CPT

## 2024-01-15 PROCEDURE — 1100000000 HC RM PRIVATE

## 2024-01-15 PROCEDURE — 85025 COMPLETE CBC W/AUTO DIFF WBC: CPT

## 2024-01-15 PROCEDURE — 92526 ORAL FUNCTION THERAPY: CPT

## 2024-01-15 PROCEDURE — 97530 THERAPEUTIC ACTIVITIES: CPT

## 2024-01-15 PROCEDURE — 2580000003 HC RX 258: Performed by: INTERNAL MEDICINE

## 2024-01-15 PROCEDURE — 36415 COLL VENOUS BLD VENIPUNCTURE: CPT

## 2024-01-15 PROCEDURE — 6370000000 HC RX 637 (ALT 250 FOR IP): Performed by: UROLOGY

## 2024-01-15 PROCEDURE — 99232 SBSQ HOSP IP/OBS MODERATE 35: CPT | Performed by: UROLOGY

## 2024-01-15 PROCEDURE — 80048 BASIC METABOLIC PNL TOTAL CA: CPT

## 2024-01-15 PROCEDURE — 6360000002 HC RX W HCPCS: Performed by: INTERNAL MEDICINE

## 2024-01-15 PROCEDURE — A4216 STERILE WATER/SALINE, 10 ML: HCPCS | Performed by: INTERNAL MEDICINE

## 2024-01-15 PROCEDURE — 6370000000 HC RX 637 (ALT 250 FOR IP): Performed by: HOSPITALIST

## 2024-01-15 PROCEDURE — 6360000002 HC RX W HCPCS: Performed by: UROLOGY

## 2024-01-15 PROCEDURE — 94761 N-INVAS EAR/PLS OXIMETRY MLT: CPT

## 2024-01-15 RX ADMIN — Medication 1 CAPSULE: at 20:25

## 2024-01-15 RX ADMIN — IPRATROPIUM BROMIDE AND ALBUTEROL SULFATE 1 DOSE: 2.5; .5 SOLUTION RESPIRATORY (INHALATION) at 08:38

## 2024-01-15 RX ADMIN — HYDROCODONE BITARTRATE AND ACETAMINOPHEN 1 TABLET: 5; 325 TABLET ORAL at 09:26

## 2024-01-15 RX ADMIN — AMLODIPINE BESYLATE 10 MG: 5 TABLET ORAL at 09:27

## 2024-01-15 RX ADMIN — CARBOXYMETHYLCELLULOSE SODIUM 1 DROP: 10 GEL OPHTHALMIC at 09:28

## 2024-01-15 RX ADMIN — SODIUM CHLORIDE, PRESERVATIVE FREE 10 ML: 5 INJECTION INTRAVENOUS at 09:28

## 2024-01-15 RX ADMIN — WHITE PETROLATUM,ZINC OXIDE: 57; 17 PASTE TOPICAL at 20:26

## 2024-01-15 RX ADMIN — ENOXAPARIN SODIUM 30 MG: 100 INJECTION SUBCUTANEOUS at 09:28

## 2024-01-15 RX ADMIN — LEVETIRACETAM 750 MG: 100 INJECTION, SOLUTION INTRAVENOUS at 09:26

## 2024-01-15 RX ADMIN — BUDESONIDE INHALATION 500 MCG: 0.5 SUSPENSION RESPIRATORY (INHALATION) at 08:38

## 2024-01-15 RX ADMIN — ATORVASTATIN CALCIUM 40 MG: 40 TABLET, FILM COATED ORAL at 20:25

## 2024-01-15 RX ADMIN — WHITE PETROLATUM,ZINC OXIDE: 57; 17 PASTE TOPICAL at 09:27

## 2024-01-15 RX ADMIN — MORPHINE SULFATE 2 MG: 2 INJECTION, SOLUTION INTRAMUSCULAR; INTRAVENOUS at 17:44

## 2024-01-15 RX ADMIN — SODIUM CHLORIDE, PRESERVATIVE FREE 20 MG: 5 INJECTION INTRAVENOUS at 09:28

## 2024-01-15 RX ADMIN — SODIUM CHLORIDE, PRESERVATIVE FREE 10 ML: 5 INJECTION INTRAVENOUS at 20:26

## 2024-01-15 RX ADMIN — MORPHINE SULFATE 2 MG: 2 INJECTION, SOLUTION INTRAMUSCULAR; INTRAVENOUS at 06:10

## 2024-01-15 RX ADMIN — IPRATROPIUM BROMIDE AND ALBUTEROL SULFATE 1 DOSE: 2.5; .5 SOLUTION RESPIRATORY (INHALATION) at 23:00

## 2024-01-15 RX ADMIN — LEVETIRACETAM 750 MG: 100 INJECTION, SOLUTION INTRAVENOUS at 20:25

## 2024-01-15 RX ADMIN — BUDESONIDE INHALATION 500 MCG: 0.5 SUSPENSION RESPIRATORY (INHALATION) at 23:00

## 2024-01-15 RX ADMIN — CARBOXYMETHYLCELLULOSE SODIUM 1 DROP: 10 GEL OPHTHALMIC at 20:36

## 2024-01-15 RX ADMIN — Medication 1 CAPSULE: at 09:27

## 2024-01-15 ASSESSMENT — PAIN SCALES - WONG BAKER
WONGBAKER_NUMERICALRESPONSE: 10
WONGBAKER_NUMERICALRESPONSE: 0

## 2024-01-15 ASSESSMENT — PAIN SCALES - GENERAL
PAINLEVEL_OUTOF10: 5
PAINLEVEL_OUTOF10: 10
PAINLEVEL_OUTOF10: 7

## 2024-01-15 ASSESSMENT — PAIN DESCRIPTION - DESCRIPTORS
DESCRIPTORS: THROBBING
DESCRIPTORS: ACHING

## 2024-01-15 ASSESSMENT — PAIN DESCRIPTION - LOCATION
LOCATION: ABDOMEN
LOCATION: GENERALIZED

## 2024-01-15 ASSESSMENT — PAIN DESCRIPTION - ORIENTATION: ORIENTATION: MID

## 2024-01-15 ASSESSMENT — PAIN - FUNCTIONAL ASSESSMENT: PAIN_FUNCTIONAL_ASSESSMENT: PREVENTS OR INTERFERES SOME ACTIVE ACTIVITIES AND ADLS

## 2024-01-15 NOTE — CARE COORDINATION
CM reviewed chart. Waiting to see if patient can qualify for michael with [employer] [facility]. [facility] is currently waiting for financial documents from .     Did receive quotes for equipment rental needed for safe discharge home. Quotes as follows;   Hospital bed: $120 monthly rental   Wheelchair:   $85 monthly rental   Navdeep Lift:      $145 monthly rental     Tube feed: Glucerna 1.5 (5 cans) $39.20 daily            Monthly $1176 monthly est.       Includes supplies     Grand total estimate: $1526.00 monthly

## 2024-01-16 LAB
EKG ATRIAL RATE: 125 BPM
EKG DIAGNOSIS: NORMAL
EKG P AXIS: 62 DEGREES
EKG P-R INTERVAL: 136 MS
EKG Q-T INTERVAL: 308 MS
EKG QRS DURATION: 70 MS
EKG QTC CALCULATION (BAZETT): 444 MS
EKG R AXIS: 1 DEGREES
EKG T AXIS: 105 DEGREES
EKG VENTRICULAR RATE: 125 BPM
GLUCOSE BLD STRIP.AUTO-MCNC: 110 MG/DL (ref 65–100)
GLUCOSE BLD STRIP.AUTO-MCNC: 150 MG/DL (ref 65–100)
GLUCOSE BLD STRIP.AUTO-MCNC: 170 MG/DL (ref 65–100)
PERFORMED BY:: ABNORMAL
TROPONIN I SERPL HS-MCNC: 28 NG/L (ref 0–51)

## 2024-01-16 PROCEDURE — 2500000003 HC RX 250 WO HCPCS: Performed by: INTERNAL MEDICINE

## 2024-01-16 PROCEDURE — 1100000000 HC RM PRIVATE

## 2024-01-16 PROCEDURE — 6360000002 HC RX W HCPCS: Performed by: INTERNAL MEDICINE

## 2024-01-16 PROCEDURE — 94640 AIRWAY INHALATION TREATMENT: CPT

## 2024-01-16 PROCEDURE — 84443 ASSAY THYROID STIM HORMONE: CPT

## 2024-01-16 PROCEDURE — 2580000003 HC RX 258: Performed by: INTERNAL MEDICINE

## 2024-01-16 PROCEDURE — 82962 GLUCOSE BLOOD TEST: CPT

## 2024-01-16 PROCEDURE — 6370000000 HC RX 637 (ALT 250 FOR IP): Performed by: INTERNAL MEDICINE

## 2024-01-16 PROCEDURE — 84484 ASSAY OF TROPONIN QUANT: CPT

## 2024-01-16 PROCEDURE — 97530 THERAPEUTIC ACTIVITIES: CPT

## 2024-01-16 PROCEDURE — 84439 ASSAY OF FREE THYROXINE: CPT

## 2024-01-16 PROCEDURE — 6370000000 HC RX 637 (ALT 250 FOR IP): Performed by: HOSPITALIST

## 2024-01-16 PROCEDURE — 36415 COLL VENOUS BLD VENIPUNCTURE: CPT

## 2024-01-16 PROCEDURE — 93005 ELECTROCARDIOGRAM TRACING: CPT | Performed by: INTERNAL MEDICINE

## 2024-01-16 PROCEDURE — 6360000002 HC RX W HCPCS: Performed by: UROLOGY

## 2024-01-16 PROCEDURE — 6360000002 HC RX W HCPCS: Performed by: PSYCHIATRY & NEUROLOGY

## 2024-01-16 PROCEDURE — 94761 N-INVAS EAR/PLS OXIMETRY MLT: CPT

## 2024-01-16 RX ADMIN — AMLODIPINE BESYLATE 10 MG: 5 TABLET ORAL at 12:06

## 2024-01-16 RX ADMIN — ATORVASTATIN CALCIUM 40 MG: 40 TABLET, FILM COATED ORAL at 22:15

## 2024-01-16 RX ADMIN — SODIUM CHLORIDE, PRESERVATIVE FREE 10 ML: 5 INJECTION INTRAVENOUS at 23:09

## 2024-01-16 RX ADMIN — ENOXAPARIN SODIUM 30 MG: 100 INJECTION SUBCUTANEOUS at 12:06

## 2024-01-16 RX ADMIN — CARBOXYMETHYLCELLULOSE SODIUM 1 DROP: 10 GEL OPHTHALMIC at 23:08

## 2024-01-16 RX ADMIN — Medication 1 CAPSULE: at 22:15

## 2024-01-16 RX ADMIN — MORPHINE SULFATE 2 MG: 2 INJECTION, SOLUTION INTRAMUSCULAR; INTRAVENOUS at 12:17

## 2024-01-16 RX ADMIN — WHITE PETROLATUM,ZINC OXIDE: 57; 17 PASTE TOPICAL at 23:09

## 2024-01-16 RX ADMIN — IPRATROPIUM BROMIDE AND ALBUTEROL SULFATE 1 DOSE: 2.5; .5 SOLUTION RESPIRATORY (INHALATION) at 08:39

## 2024-01-16 RX ADMIN — LEVETIRACETAM 750 MG: 100 INJECTION, SOLUTION INTRAVENOUS at 12:05

## 2024-01-16 RX ADMIN — SODIUM CHLORIDE, PRESERVATIVE FREE 20 MG: 5 INJECTION INTRAVENOUS at 12:06

## 2024-01-16 RX ADMIN — LEVETIRACETAM 750 MG: 100 INJECTION, SOLUTION INTRAVENOUS at 22:14

## 2024-01-16 RX ADMIN — IPRATROPIUM BROMIDE AND ALBUTEROL SULFATE 1 DOSE: 2.5; .5 SOLUTION RESPIRATORY (INHALATION) at 21:09

## 2024-01-16 RX ADMIN — BUDESONIDE INHALATION 500 MCG: 0.5 SUSPENSION RESPIRATORY (INHALATION) at 21:09

## 2024-01-16 RX ADMIN — BUDESONIDE INHALATION 500 MCG: 0.5 SUSPENSION RESPIRATORY (INHALATION) at 08:39

## 2024-01-16 RX ADMIN — Medication 1 CAPSULE: at 12:05

## 2024-01-16 ASSESSMENT — PAIN DESCRIPTION - ORIENTATION: ORIENTATION: ANTERIOR

## 2024-01-16 ASSESSMENT — PAIN DESCRIPTION - LOCATION: LOCATION: ABDOMEN

## 2024-01-16 ASSESSMENT — PAIN SCALES - GENERAL: PAINLEVEL_OUTOF10: 0

## 2024-01-16 ASSESSMENT — PAIN DESCRIPTION - DESCRIPTORS: DESCRIPTORS: ACHING

## 2024-01-16 NOTE — CARE COORDINATION
Reached out to [employer] via web site. Form filled out with request of equipment with my number. Requested equipment as follows;  1.Wheel chair  2.Navdeep lift  3.Walker  4.Hospital bed    All equipment is michael based and not guaranteed. Application will have to be approved and is base don equipment in stock.

## 2024-01-17 ENCOUNTER — APPOINTMENT (OUTPATIENT)
Facility: HOSPITAL | Age: 54
DRG: 981 | End: 2024-01-17

## 2024-01-17 LAB
ECHO BSA: 1.71 M2
EKG ATRIAL RATE: 123 BPM
EKG DIAGNOSIS: NORMAL
EKG P AXIS: 63 DEGREES
EKG P-R INTERVAL: 136 MS
EKG Q-T INTERVAL: 318 MS
EKG QRS DURATION: 70 MS
EKG QTC CALCULATION (BAZETT): 455 MS
EKG R AXIS: 0 DEGREES
EKG T AXIS: 98 DEGREES
EKG VENTRICULAR RATE: 123 BPM
GLUCOSE BLD STRIP.AUTO-MCNC: 111 MG/DL (ref 65–100)
GLUCOSE BLD STRIP.AUTO-MCNC: 124 MG/DL (ref 65–100)
GLUCOSE BLD STRIP.AUTO-MCNC: 129 MG/DL (ref 65–100)
GLUCOSE BLD STRIP.AUTO-MCNC: 147 MG/DL (ref 65–100)
GLUCOSE BLD STRIP.AUTO-MCNC: 157 MG/DL (ref 65–100)
GLUCOSE BLD STRIP.AUTO-MCNC: 162 MG/DL (ref 65–100)
PERFORMED BY:: ABNORMAL

## 2024-01-17 PROCEDURE — 6360000002 HC RX W HCPCS: Performed by: INTERNAL MEDICINE

## 2024-01-17 PROCEDURE — 93970 EXTREMITY STUDY: CPT

## 2024-01-17 PROCEDURE — 6370000000 HC RX 637 (ALT 250 FOR IP): Performed by: INTERNAL MEDICINE

## 2024-01-17 PROCEDURE — 2580000003 HC RX 258: Performed by: INTERNAL MEDICINE

## 2024-01-17 PROCEDURE — 94761 N-INVAS EAR/PLS OXIMETRY MLT: CPT

## 2024-01-17 PROCEDURE — 97530 THERAPEUTIC ACTIVITIES: CPT

## 2024-01-17 PROCEDURE — 6370000000 HC RX 637 (ALT 250 FOR IP)

## 2024-01-17 PROCEDURE — 2500000003 HC RX 250 WO HCPCS: Performed by: INTERNAL MEDICINE

## 2024-01-17 PROCEDURE — 6370000000 HC RX 637 (ALT 250 FOR IP): Performed by: HOSPITALIST

## 2024-01-17 PROCEDURE — 82962 GLUCOSE BLOOD TEST: CPT

## 2024-01-17 PROCEDURE — 6360000002 HC RX W HCPCS: Performed by: PSYCHIATRY & NEUROLOGY

## 2024-01-17 PROCEDURE — 94640 AIRWAY INHALATION TREATMENT: CPT

## 2024-01-17 PROCEDURE — 1100000000 HC RM PRIVATE

## 2024-01-17 PROCEDURE — 93005 ELECTROCARDIOGRAM TRACING: CPT | Performed by: INTERNAL MEDICINE

## 2024-01-17 RX ORDER — IPRATROPIUM BROMIDE AND ALBUTEROL SULFATE 2.5; .5 MG/3ML; MG/3ML
1 SOLUTION RESPIRATORY (INHALATION) EVERY 6 HOURS PRN
Status: DISCONTINUED | OUTPATIENT
Start: 2024-01-17 | End: 2024-01-24 | Stop reason: HOSPADM

## 2024-01-17 RX ORDER — LABETALOL 100 MG/1
50 TABLET, FILM COATED ORAL EVERY 8 HOURS SCHEDULED
Status: DISCONTINUED | OUTPATIENT
Start: 2024-01-17 | End: 2024-01-19

## 2024-01-17 RX ADMIN — ATORVASTATIN CALCIUM 40 MG: 40 TABLET, FILM COATED ORAL at 21:08

## 2024-01-17 RX ADMIN — LEVETIRACETAM 750 MG: 100 INJECTION, SOLUTION INTRAVENOUS at 10:11

## 2024-01-17 RX ADMIN — Medication 1 CAPSULE: at 10:11

## 2024-01-17 RX ADMIN — WHITE PETROLATUM,ZINC OXIDE: 57; 17 PASTE TOPICAL at 17:37

## 2024-01-17 RX ADMIN — BUDESONIDE INHALATION 500 MCG: 0.5 SUSPENSION RESPIRATORY (INHALATION) at 08:30

## 2024-01-17 RX ADMIN — BUDESONIDE INHALATION 500 MCG: 0.5 SUSPENSION RESPIRATORY (INHALATION) at 20:13

## 2024-01-17 RX ADMIN — SODIUM CHLORIDE, PRESERVATIVE FREE 10 ML: 5 INJECTION INTRAVENOUS at 21:15

## 2024-01-17 RX ADMIN — SODIUM CHLORIDE, PRESERVATIVE FREE 10 ML: 5 INJECTION INTRAVENOUS at 10:00

## 2024-01-17 RX ADMIN — Medication 1 CAPSULE: at 21:07

## 2024-01-17 RX ADMIN — IPRATROPIUM BROMIDE AND ALBUTEROL SULFATE 1 DOSE: 2.5; .5 SOLUTION RESPIRATORY (INHALATION) at 08:30

## 2024-01-17 RX ADMIN — LABETALOL HYDROCHLORIDE 50 MG: 100 TABLET, FILM COATED ORAL at 21:08

## 2024-01-17 RX ADMIN — LEVETIRACETAM 750 MG: 100 INJECTION, SOLUTION INTRAVENOUS at 21:08

## 2024-01-17 RX ADMIN — LABETALOL HYDROCHLORIDE 50 MG: 100 TABLET, FILM COATED ORAL at 17:36

## 2024-01-17 RX ADMIN — SODIUM CHLORIDE, PRESERVATIVE FREE 20 MG: 5 INJECTION INTRAVENOUS at 10:11

## 2024-01-17 RX ADMIN — AMLODIPINE BESYLATE 10 MG: 5 TABLET ORAL at 10:11

## 2024-01-17 RX ADMIN — ENOXAPARIN SODIUM 30 MG: 100 INJECTION SUBCUTANEOUS at 10:14

## 2024-01-17 RX ADMIN — WHITE PETROLATUM,ZINC OXIDE: 57; 17 PASTE TOPICAL at 21:19

## 2024-01-18 LAB
GLUCOSE BLD STRIP.AUTO-MCNC: 107 MG/DL (ref 65–100)
GLUCOSE BLD STRIP.AUTO-MCNC: 123 MG/DL (ref 65–100)
GLUCOSE BLD STRIP.AUTO-MCNC: 144 MG/DL (ref 65–100)
GLUCOSE BLD STRIP.AUTO-MCNC: 149 MG/DL (ref 65–100)
PERFORMED BY:: ABNORMAL
T4 FREE SERPL-MCNC: 1.4 NG/DL (ref 0.8–1.5)
TSH SERPL DL<=0.05 MIU/L-ACNC: 1.18 UIU/ML (ref 0.36–3.74)

## 2024-01-18 PROCEDURE — 6370000000 HC RX 637 (ALT 250 FOR IP)

## 2024-01-18 PROCEDURE — 1100000000 HC RM PRIVATE

## 2024-01-18 PROCEDURE — 97530 THERAPEUTIC ACTIVITIES: CPT

## 2024-01-18 PROCEDURE — 82962 GLUCOSE BLOOD TEST: CPT

## 2024-01-18 PROCEDURE — 6370000000 HC RX 637 (ALT 250 FOR IP): Performed by: INTERNAL MEDICINE

## 2024-01-18 PROCEDURE — 6370000000 HC RX 637 (ALT 250 FOR IP): Performed by: HOSPITALIST

## 2024-01-18 PROCEDURE — 99232 SBSQ HOSP IP/OBS MODERATE 35: CPT | Performed by: UROLOGY

## 2024-01-18 PROCEDURE — 6360000002 HC RX W HCPCS: Performed by: INTERNAL MEDICINE

## 2024-01-18 PROCEDURE — 2580000003 HC RX 258: Performed by: INTERNAL MEDICINE

## 2024-01-18 PROCEDURE — 6360000002 HC RX W HCPCS: Performed by: PSYCHIATRY & NEUROLOGY

## 2024-01-18 PROCEDURE — 2500000003 HC RX 250 WO HCPCS: Performed by: INTERNAL MEDICINE

## 2024-01-18 PROCEDURE — 94640 AIRWAY INHALATION TREATMENT: CPT

## 2024-01-18 RX ORDER — 0.9 % SODIUM CHLORIDE 0.9 %
500 INTRAVENOUS SOLUTION INTRAVENOUS ONCE
Status: DISCONTINUED | OUTPATIENT
Start: 2024-01-18 | End: 2024-01-18

## 2024-01-18 RX ADMIN — BUDESONIDE INHALATION 500 MCG: 0.5 SUSPENSION RESPIRATORY (INHALATION) at 08:14

## 2024-01-18 RX ADMIN — ATORVASTATIN CALCIUM 40 MG: 40 TABLET, FILM COATED ORAL at 20:59

## 2024-01-18 RX ADMIN — Medication 1 CAPSULE: at 10:16

## 2024-01-18 RX ADMIN — AMLODIPINE BESYLATE 10 MG: 5 TABLET ORAL at 10:16

## 2024-01-18 RX ADMIN — Medication 1 CAPSULE: at 20:59

## 2024-01-18 RX ADMIN — LABETALOL HYDROCHLORIDE 50 MG: 100 TABLET, FILM COATED ORAL at 21:00

## 2024-01-18 RX ADMIN — WHITE PETROLATUM,ZINC OXIDE: 57; 17 PASTE TOPICAL at 21:08

## 2024-01-18 RX ADMIN — LEVETIRACETAM 750 MG: 100 INJECTION, SOLUTION INTRAVENOUS at 10:22

## 2024-01-18 RX ADMIN — ENOXAPARIN SODIUM 30 MG: 100 INJECTION SUBCUTANEOUS at 10:16

## 2024-01-18 RX ADMIN — SODIUM CHLORIDE, PRESERVATIVE FREE 20 MG: 5 INJECTION INTRAVENOUS at 10:23

## 2024-01-18 RX ADMIN — WHITE PETROLATUM,ZINC OXIDE: 57; 17 PASTE TOPICAL at 10:23

## 2024-01-18 RX ADMIN — CARBOXYMETHYLCELLULOSE SODIUM 1 DROP: 10 GEL OPHTHALMIC at 10:23

## 2024-01-18 RX ADMIN — SODIUM CHLORIDE, PRESERVATIVE FREE 10 ML: 5 INJECTION INTRAVENOUS at 21:07

## 2024-01-18 RX ADMIN — LABETALOL HYDROCHLORIDE 50 MG: 100 TABLET, FILM COATED ORAL at 13:58

## 2024-01-18 RX ADMIN — SODIUM CHLORIDE, PRESERVATIVE FREE 10 ML: 5 INJECTION INTRAVENOUS at 10:15

## 2024-01-18 RX ADMIN — BUDESONIDE INHALATION 500 MCG: 0.5 SUSPENSION RESPIRATORY (INHALATION) at 22:13

## 2024-01-18 RX ADMIN — LEVETIRACETAM 750 MG: 100 INJECTION, SOLUTION INTRAVENOUS at 21:01

## 2024-01-19 LAB
GLUCOSE BLD STRIP.AUTO-MCNC: 105 MG/DL (ref 65–100)
GLUCOSE BLD STRIP.AUTO-MCNC: 113 MG/DL (ref 65–100)
GLUCOSE BLD STRIP.AUTO-MCNC: 116 MG/DL (ref 65–100)
GLUCOSE BLD STRIP.AUTO-MCNC: 127 MG/DL (ref 65–100)
GLUCOSE BLD STRIP.AUTO-MCNC: 138 MG/DL (ref 65–100)
GLUCOSE BLD STRIP.AUTO-MCNC: 98 MG/DL (ref 65–100)
PERFORMED BY:: ABNORMAL
PERFORMED BY:: NORMAL

## 2024-01-19 PROCEDURE — 6360000002 HC RX W HCPCS: Performed by: PSYCHIATRY & NEUROLOGY

## 2024-01-19 PROCEDURE — 92526 ORAL FUNCTION THERAPY: CPT

## 2024-01-19 PROCEDURE — 2500000003 HC RX 250 WO HCPCS: Performed by: INTERNAL MEDICINE

## 2024-01-19 PROCEDURE — 6370000000 HC RX 637 (ALT 250 FOR IP)

## 2024-01-19 PROCEDURE — 99232 SBSQ HOSP IP/OBS MODERATE 35: CPT | Performed by: STUDENT IN AN ORGANIZED HEALTH CARE EDUCATION/TRAINING PROGRAM

## 2024-01-19 PROCEDURE — 82962 GLUCOSE BLOOD TEST: CPT

## 2024-01-19 PROCEDURE — 97530 THERAPEUTIC ACTIVITIES: CPT

## 2024-01-19 PROCEDURE — 6370000000 HC RX 637 (ALT 250 FOR IP): Performed by: INTERNAL MEDICINE

## 2024-01-19 PROCEDURE — 2580000003 HC RX 258: Performed by: INTERNAL MEDICINE

## 2024-01-19 PROCEDURE — 6360000002 HC RX W HCPCS: Performed by: INTERNAL MEDICINE

## 2024-01-19 PROCEDURE — 94761 N-INVAS EAR/PLS OXIMETRY MLT: CPT

## 2024-01-19 PROCEDURE — 6370000000 HC RX 637 (ALT 250 FOR IP): Performed by: HOSPITALIST

## 2024-01-19 PROCEDURE — 1100000000 HC RM PRIVATE

## 2024-01-19 PROCEDURE — 94640 AIRWAY INHALATION TREATMENT: CPT

## 2024-01-19 PROCEDURE — 6370000000 HC RX 637 (ALT 250 FOR IP): Performed by: NURSE PRACTITIONER

## 2024-01-19 RX ORDER — 0.9 % SODIUM CHLORIDE 0.9 %
250 INTRAVENOUS SOLUTION INTRAVENOUS ONCE
Status: COMPLETED | OUTPATIENT
Start: 2024-01-19 | End: 2024-01-19

## 2024-01-19 RX ADMIN — WHITE PETROLATUM,ZINC OXIDE: 57; 17 PASTE TOPICAL at 21:24

## 2024-01-19 RX ADMIN — Medication 1 CAPSULE: at 08:31

## 2024-01-19 RX ADMIN — METOPROLOL TARTRATE 25 MG: 25 TABLET, FILM COATED ORAL at 21:09

## 2024-01-19 RX ADMIN — SODIUM CHLORIDE, PRESERVATIVE FREE 10 ML: 5 INJECTION INTRAVENOUS at 21:10

## 2024-01-19 RX ADMIN — LABETALOL HYDROCHLORIDE 50 MG: 100 TABLET, FILM COATED ORAL at 12:49

## 2024-01-19 RX ADMIN — CARBOXYMETHYLCELLULOSE SODIUM 1 DROP: 10 GEL OPHTHALMIC at 08:33

## 2024-01-19 RX ADMIN — ATORVASTATIN CALCIUM 40 MG: 40 TABLET, FILM COATED ORAL at 21:09

## 2024-01-19 RX ADMIN — LABETALOL HYDROCHLORIDE 50 MG: 100 TABLET, FILM COATED ORAL at 05:42

## 2024-01-19 RX ADMIN — BUDESONIDE INHALATION 500 MCG: 0.5 SUSPENSION RESPIRATORY (INHALATION) at 20:56

## 2024-01-19 RX ADMIN — CARBOXYMETHYLCELLULOSE SODIUM 1 DROP: 10 GEL OPHTHALMIC at 21:23

## 2024-01-19 RX ADMIN — WHITE PETROLATUM,ZINC OXIDE: 57; 17 PASTE TOPICAL at 08:32

## 2024-01-19 RX ADMIN — METOPROLOL TARTRATE 25 MG: 25 TABLET, FILM COATED ORAL at 16:32

## 2024-01-19 RX ADMIN — Medication 1 CAPSULE: at 21:09

## 2024-01-19 RX ADMIN — AMLODIPINE BESYLATE 10 MG: 5 TABLET ORAL at 08:31

## 2024-01-19 RX ADMIN — BUDESONIDE INHALATION 500 MCG: 0.5 SUSPENSION RESPIRATORY (INHALATION) at 07:42

## 2024-01-19 RX ADMIN — LEVETIRACETAM 750 MG: 100 INJECTION, SOLUTION INTRAVENOUS at 08:30

## 2024-01-19 RX ADMIN — SODIUM CHLORIDE 250 ML: 9 INJECTION, SOLUTION INTRAVENOUS at 12:48

## 2024-01-19 RX ADMIN — SODIUM CHLORIDE, PRESERVATIVE FREE 10 ML: 5 INJECTION INTRAVENOUS at 08:31

## 2024-01-19 RX ADMIN — SODIUM CHLORIDE, PRESERVATIVE FREE 20 MG: 5 INJECTION INTRAVENOUS at 08:31

## 2024-01-19 RX ADMIN — LEVETIRACETAM 750 MG: 100 INJECTION, SOLUTION INTRAVENOUS at 21:09

## 2024-01-19 RX ADMIN — ENOXAPARIN SODIUM 30 MG: 100 INJECTION SUBCUTANEOUS at 08:31

## 2024-01-19 NOTE — CARE COORDINATION
First call of the day made to , no answer. Message left, will call again shortly.    0955: second call made to spouse, no answer. Called daughter number on file, not in service. Called son number, no answer.    Outright cost for hospital bed $1200.00    1430: called  again, no answer.    Discussed in IDR patient may be able to get off tube feed. If no tube feed, will not need PEG feeding and would not need hospital bed.

## 2024-01-19 NOTE — CONSULTS
CARDIOLOGY CONSULTATION    REASON FOR CONSULT: Hypertensive urgency    REQUESTING PROVIDER: Dr. Kinsey    CHIEF COMPLAINT:  AMS    HISTORY OF PRESENT ILLNESS:  Lucretia Andres is a 53 y.o. year-old female with past medical history significant for CVA, seizures, DM, hypertension who was evaluated today due to hypertensive urgency and elevated troponin.  She was brought in by EMS after being found unresponsive post witnessed seizure.  She was intubated on arrival.  She was started on cardene gtt, now BP controlled.  Remains intubated and sedated.  Apparently she did not  her seizure medications after a hospitalization for status epilepticus in September.  Echo in 7/23 with preserved EF and LVH.    Records from hospital admission course thus far reviewed.      Telemetry reviewed.      INPATIENT MEDICATIONS:  Home medications reviewed.    Current Facility-Administered Medications:     fentanyl (SUBLIMAZE) infusion 1000 mcg/100mL,  mcg/hr, IntraVENous, Continuous, Zana Lucas MD, Last Rate: 7.5 mL/hr at 12/13/23 0657, 75 mcg/hr at 12/13/23 0657    niCARdipine (CARDENE) 25 mg in sodium chloride 0.9 % 250 mL infusion ([id number]), 2.5-15 mg/hr, IntraVENous, Continuous, Zana Lucas MD, Last Rate: 25 mL/hr at 12/13/23 1508, 2.5 mg/hr at 12/13/23 1508    sodium chloride flush 0.9 % injection 5-40 mL, 5-40 mL, IntraVENous, 2 times per day, Dony Kinsey MD, 10 mL at 12/13/23 0809    sodium chloride flush 0.9 % injection 5-40 mL, 5-40 mL, IntraVENous, PRN, Dony Kinsey MD    0.9 % sodium chloride infusion, , IntraVENous, PRN, Dony Kinsey MD    enoxaparin Sodium (LOVENOX) injection 30 mg, 30 mg, SubCUTAneous, Daily, Dony Kinsey MD, 30 mg at 12/13/23 0920    ondansetron (ZOFRAN-ODT) disintegrating tablet 4 mg, 4 mg, Oral, Q8H PRN **OR** ondansetron (ZOFRAN) injection 4 mg, 4 mg, IntraVENous, Q6H PRN, Dony Kinsey MD    polyethylene glycol (GLYCOLAX) packet 17 g, 17 g, Oral, Daily PRN, Dony Kinsey, 
     Nutrition Note      RD consulted for TF rec's. RD completed Comprehensive Nutrition assessment on 1/10/24. Please see note for rec's/regimen as medically appropriate.    Start TF via PEG as Glucerna 1.5 Joe(Diabetic) at 30 mL/hr, advancing by 10 mL every 4 hrs as tolerated to goal rate of 50 mL/hr.  Flush with 150 mL H2O q4hrs.       Provides: 1800 kcal(100%), 99 gm protein(100%), 1812 mL H2O(100%).    Electronically signed by Irma Onofre RD on 1/11/24 at 1:49 PM EST    Contact: ext. [phone].  
   Nephrology Consult    Patient: Lucretia Andres MRN: [id number]  SSN: [id number]    YOB: 1970  Age: 53 y.o.  Sex: female      Subjective:   Reason for the consultation: ADONIS  HPI: The pt is 52 yo woman with hx of CKD IIIB, DM2, HTN, CVA, seizure disorder was hospitalized seizure/required intubation. Her Cr was elevated at 2.0 which prompted nephrology consultation. Her BL Cr seems to be 1.4-1.6. She was with malignant htn ad on cardene drip. No LE swelling.     Past Medical History:   Diagnosis Date    CKD (chronic kidney disease), stage III (HCC)     CVA (cerebral vascular accident) (HCC)     Diabetes mellitus (HCC)     Dyslipidemia     Hypertension     Seizure disorder (HCC)      History reviewed. No pertinent surgical history.   History reviewed. No pertinent family history.  Social History     Tobacco Use    Smoking status: Never    Smokeless tobacco: Never   Substance Use Topics    Alcohol use: Not Currently      Current Facility-Administered Medications   Medication Dose Route Frequency Provider Last Rate Last Admin    fentanyl (SUBLIMAZE) infusion 1000 mcg/100mL   mcg/hr IntraVENous Continuous Zana Lucas MD 7.5 mL/hr at 12/13/23 0657 75 mcg/hr at 12/13/23 0657    niCARdipine (CARDENE) 25 mg in sodium chloride 0.9 % 250 mL infusion ([id number])  2.5-15 mg/hr IntraVENous Continuous Zana Lucas MD 25 mL/hr at 12/13/23 1508 2.5 mg/hr at 12/13/23 1508    sodium chloride flush 0.9 % injection 5-40 mL  5-40 mL IntraVENous 2 times per day Dony Kinsey MD   10 mL at 12/13/23 0809    sodium chloride flush 0.9 % injection 5-40 mL  5-40 mL IntraVENous PRN Dony Kinsey MD        0.9 % sodium chloride infusion   IntraVENous PRN Dony Kinsey MD        enoxaparin Sodium (LOVENOX) injection 30 mg  30 mg SubCUTAneous Daily Dony Kinsey MD   30 mg at 12/13/23 0920    ondansetron (ZOFRAN-ODT) disintegrating tablet 4 mg  4 mg Oral Q8H PRN Dony Kinsey MD        Or    ondansetron (ZOFRAN) 
  IMPRESSION:   Acute hypoxic respiratory failure  Status epilepticus  Aspiration pneumonia  Accelerated hypertension  Acute kidney injury  Hyperglycemia with diabetes mellitus by history  Pt is at high risk of sudden decline and decompensation with life threatening consequenses and continued end organ dysfunction and failure  Pt is critically ill. Time spent with pt and staff actively rendering care, managing pt and coordinating care as stated below;  55 minutes, exclusive of any procedures      RECOMMENDATIONS/PLAN:   ICU monitoring  Ventilator for mechanical life support and prevent respiratory arrest with protective lung strategies  Arterial blood gases shows pCO2 23 will change vent setting decrease FiO2 pO2 141 on 45% FiO2 pH 7.47 metabolic acidosis with bicarb of 19 will give 2 A of bicarb  Agree with Empiric IV antibiotics pending culture results   Blood pressure is elevated on nicardipine  Follow culture results on doxycycline and Zosyn  CVP monitoring  WBCs 23 panculture elevated BUN/creatinine on IV fluids  Chest x-ray no acute infiltrate  IV vasopressors for circulatory shock refractory to fluids to maintain SBP> 90  Transfuse prn to maintain Hgb > 7  Labs to follow electrolytes, renal function and and blood counts  Bronchial hygiene with respiratory therapy techniques, bronchodilators  Pt needs IV fluids with additives and Drug therapy requiring intensive monitoring for toxicity  Prescription drug management with home med reconciliation reviewed  DVT, SUP prophylaxis  Will be available to assist in medical management while in the CCU pending disposition     [x] High complexity decision making was performed  [x] See my orders for details  HPI  53-year-old lady came in because of seizure-like activity and she was found unresponsive postictal hypoxic came to the emergency room and subsequently got intubated she had a history of seizures in September 2023 she was at [facility] she is on Keppra at 
  NEURO CONSULT      REASON FOR ADMISSION:  Seizures   Mental status changes  Other medical problems      HISTORY:  Ms. Andres is 53 years old with a history of previous stroke, seizure disorder, poorly controlled diabetes, hypertension, who is consulted to neurology for seizures.  Patient reportedly had a diagnosis of status epilepticus in September this year and was admitted to [facility] where she was treated.  She was discharged home with Keppra and was brought in on the 13th to [facility] secondary to witnessed seizure activity.  Patient reportedly had \"back to back seizures\" she was found unresponsive.  Patient was subsequently brought to the ED.  In the ED, patient had a head CT without contrast that did not show any acute process.  She was subsequently admitted to the ICU.  She has not had another seizure since being admitted.  Currently, she is in the ICU and is on mechanical ventilation and is nonresponsive.      ROS: As per above plus more    General:                     No fever, no chills, no sweats, no generalized weakness, no weight loss/gain,                                       No loss of appetite   Eyes:                           No blurred vision, no eye pain, no loss of vision, no double vision  ENT:                            rhinorrhea, no pharyngitis   Respiratory:               No cough, no sputum production, no SOB, no QUINONES, no wheezing, no pleuritic pain   Cardiology:                No chest pain, no palpitations, no orthopnea, no PND, no edema, no syncope   Gastrointestinal:       No abdominal pain , no N/V, no diarrhea, no dysphagia, no constipation, no bleeding   Genitourinary:           frequency, no urgency, no dysuria, no hematuria, no incontinence   Muskuloskeletal :      No arthralgia, no myalgia, no back pain  Hematology:              No easy bruising, no nose or gum bleeding, no lymphadenopathy   Dermatological:         No rash, no ulceration, no pruritis, no color change 
Comprehensive Nutrition Assessment    Type and Reason for Visit:  Consult (TF)    Nutrition Recommendations/Plan:   NPO  Rec TF of: Vital HP at goal rate of 28mL/hr, 140mL H2O flush q4hrs, +2pkts prosource/day - provides 672kcal (+689kcal propofol, 120kcal prosource, 1481kcal total, 109%), 89g protein (100%), 1402mL H2O (103%)  If pt requires high dose or multiple pressors, rec hold TF  Monitor and record TF rate, flushes, and Bms in I/Os     Malnutrition Assessment:  Malnutrition Status:  No malnutrition (12/13/23 1233)    Context:  Acute Illness     Findings of the 6 clinical characteristics of malnutrition:  Energy Intake:  Unable to assess  Weight Loss:  No significant weight loss     Body Fat Loss:  Unable to assess     Muscle Mass Loss:  Unable to assess    Fluid Accumulation:  No significant fluid accumulation     Strength:  Not Performed    Nutrition Assessment:    Admitted for acute respiratory failure with hypoxia. Intubated, sedated, not currently requiring pressor support. Consulted for TF rec's- above. Labs: Na 133, K 4.3, BUN 27, Creat 2.08, Gluc 214. Meds: insulin glargine, insulin lispro, lactated ringers, propfol    Nutrition Related Findings:    NFPE deferred. No n/v, d/c, NPO. No edema. BM PTA. Wound Type: None       Current Nutrition Intake & Therapies:    Average Meal Intake: NPO  Average Supplements Intake: NPO  Diet NPO    Anthropometric Measures:  Height: 157.5 cm (5' 2.01\")  Ideal Body Weight (IBW): 110 lbs (50 kg)    Current Body Weight: 67 kg (147 lb 11.3 oz), 134.3 % IBW. Weight Source: Not Specified  Current BMI (kg/m2): 27  BMI Categories: Overweight (BMI 25.0-29.9)    Estimated Daily Nutrient Needs:  Energy Requirements Based On: Formula  Weight Used for Energy Requirements: Current  Energy (kcal/day): 1359kcal ([id number])  Weight Used for Protein Requirements: Current  Protein (g/day): 80-101g (1.2-1.5g/kg)  Method Used for Fluid Requirements: 1 ml/kcal  Fluid (ml/day): 
Gastroenterology Consult     Referring Physician: None, None     Consult Date: 1/4/2024     Subjective:     Patient is unable to give any meaningful history most of the historical data was gathered from patient's chart from the chart patient has history of cerebrovascular accident which she is being fed through a nasogastric tube a consult was placed to change that to a feeding gastrostomy tube.    Past Medical History:   Diagnosis Date    CKD (chronic kidney disease), stage III (HCC)     CVA (cerebral vascular accident) (HCC)     Diabetes mellitus (HCC)     Dyslipidemia     Hypertension     Seizure disorder (HCC)      Past Surgical History:   Procedure Laterality Date    IR NONTUNNELED VASCULAR CATHETER  12/21/2023    IR NONTUNNELED VASCULAR CATHETER 12/21/2023 Jonathan Edge Jr., APRN - NP SSR RAD ANGIO IR      History reviewed. No pertinent family history.  Social History     Tobacco Use    Smoking status: Never    Smokeless tobacco: Never   Substance Use Topics    Alcohol use: Not Currently      No Known Allergies  Current Facility-Administered Medications   Medication Dose Route Frequency    predniSONE (DELTASONE) tablet 20 mg  20 mg Oral Daily    insulin lispro (HUMALOG) injection vial 5 Units  5 Units SubCUTAneous TID WC    ipratropium 0.5 mg-albuterol 2.5 mg (DUONEB) nebulizer solution 1 Dose  1 Dose Inhalation BID RT    insulin glargine (LANTUS) injection vial 40 Units  40 Units SubCUTAneous BID    insulin lispro (HUMALOG) injection vial 8 Units  8 Units SubCUTAneous Once    hydrALAZINE (APRESOLINE) injection 20 mg  20 mg IntraVENous Q4H PRN    cloNIDine (CATAPRES) tablet 0.3 mg  0.3 mg Oral TID    metoprolol (LOPRESSOR) injection 5 mg  5 mg IntraVENous Q6H PRN    hydrALAZINE (APRESOLINE) tablet 50 mg  50 mg Oral 3 times per day    insulin lispro (HUMALOG) injection vial 0-16 Units  0-16 Units SubCUTAneous 4 times per day    lactobacillus (CULTURELLE) capsule 1 capsule  1 capsule Per NG tube BID    [Held 
Infectious Disease Consult Note    Reason for Consult:  Sepsis, leukocytosis   Date of Consultation: December 25, 2023  Date of Admission: 12/13/2023  Referring Physician: Hospitalist     HPI: 53 y.o BF admitted on 12/23 after a witness seizure, ID consulted today for sepsis. Her medical history is significant for CKD, CVA, recently diagnosed seizure, HTN, and hyperlipidemia.  She was intubated and sedated during assessment, history obtained from chart documentations.  She was noted to be hypoxic requiring intubation on admission, was extubated on 12/21, had to be reintubated. She tested negative for influenza or COVID-19 on 12/13.  Blood and sputum cultures have been negative on current admission.  MRSA nasal PCR was negative on 12/24.  WBC has been trending up on serial labs, 23.3 today but she is on high-dose steroids Solu-Medrol 40 mg every 8 hours.  UA on 12/13 was negative for pyuria.  CT chest abdomen/pelvis on 12/22 was negative for acute process, revealed a left retroperitoneal mass arising from kidney, bilateral effusions and volume loss.  She is on Vanc and Zosyn.    Review of Systems: Per HPI otherwise unobtainable, intubated and sedated     Past Medical History:  Past Medical History:   Diagnosis Date    CKD (chronic kidney disease), stage III (HCC)     CVA (cerebral vascular accident) (HCC)     Diabetes mellitus (HCC)     Dyslipidemia     Hypertension     Seizure disorder (HCC)          Past surgical history     Past Surgical History:   Procedure Laterality Date    IR NONTUNNELED VASCULAR CATHETER  12/21/2023    IR NONTUNNELED VASCULAR CATHETER 12/21/2023 Jonathan Edge Jr., APRN - NP SSR RAD ANGIO IR        Social History   Social History     Tobacco Use    Smoking status: Never    Smokeless tobacco: Never   Substance Use Topics    Alcohol use: Not Currently        Family history   History reviewed. No pertinent family history.       Allergies:  Allergies   Allergen Reactions    Zosyn [Piperacillin 
Nutrition Assessment     Type and Reason for Visit: Consult    Nutrition Recommendations/Plan:   MM5/Thin Liq diet, advance per SLP rec's.  Modify TF to Glucerna 1.5 Joe(Diabetic) via PEG to bolus feeds of 237 mL 3 times/day.  Decrease water flushes to 75 mL q4hrs.               Provides 1067 kcal(56%), 59 gm protein(78%), 990 mL H2O(52%).    Continue to monitor and document PO intakes, TF bolus feeds, flushes, tolerance, BM in I/Os.     Malnutrition Assessment:  Malnutrition Status: Mild malnutrition    Nutrition Assessment:  RD consulted by SLP for TF adjustment. SLP reported 100% of B tray today, RD unable to verify intake of L tray; average PO intake ~50-60% of meals. RD to modify TF to promote increased oral intakes; will monitor need for further adjustments to TF where medically appropriate. Labs unremarkable. Meds reviewed.    Estimated Daily Nutrient Needs:  Energy (kcal):  4273-9413 kcal/d (30-35 kcal/kg, wound) Weight Used for Energy Requirements: Current     Protein (g):  76-89 gm/d (1.2-1.4 gm/kg) Weight Used for Protein Requirements: Current        Fluid (ml/day):  8105-4545 mL/d Method Used for Fluid Requirements: 1 ml/kcal    Nutrition Related Findings:   Wound Type: Pressure Injury, Stage II, Surgical Incision (St II- R. coccyx; Incision- abdomen.)    Current Nutrition Therapies:    ADULT TUBE FEEDING; PEG; Diabetic; Continuous; 30; Yes; 10; Q 4 hours; 40; 200; Q 4 hours; Protein; Prosource 1 packet daily via PEG.  ADULT DIET; Dysphagia - Minced and Moist    Anthropometric Measures:  Height: 157.2 cm (5' 1.89\")  Current Body Wt: 63.4 kg (139 lb 12.4 oz) (1/17, RD obtained.)   BMI: 25.7    Discharge Planning:    Enteral Nutrition, Continue current diet     Irma Onofre RD  Contact: ext. [phone].  
Still waiting for consent from the family regarding a PEG placement.  
Subjective:   Lucretia Andres   53 y.o.   1970     Otolaryngology Consult Note:  Reason for consult: aphonia    History of Present Illness:  Lucretia Andres is a 53 y.o. female with past medical history of CKD, CVA, DM, HTN, with unwitnessed seizure, found down, was intubated and admitted to the ICU.    Patient was extubated, but required reintubation x 1.  On most recent extubation on 12/30/2023 patient remained extubated.  Afterwards patient however she was aphonic.  ENT consulted for evaluation trying aphonic.  Patient is verbally responsive to stimuli, but does not appear to be oriented.    Review of Systems  Consitutional: denies fever, excessive weight gain or loss.  Eyes: denies diplopia, eye pain.  Integumentary: denies new concerning skin lesions.  Ears, Nose, Mouth, Throat: denies except as per HPI.  Endocrine: denies hot or cold intolerance, increased thirst.  Respiratory: denies cough, hemoptysis, wheezing  Gastrointestinal: denies trouble swallowing, nausea, emesis, regurgitation  Musculoskeletal: denies muscle weakness or wasting  Cardiovascular: denies chest pain, shortness of breath  Neurologic: denies seizures, numbness or tingling, syncope  Hematologic: denies easy bleeding or bruising       Past Medical History:   Diagnosis Date    CKD (chronic kidney disease), stage III (HCC)     CVA (cerebral vascular accident) (HCC)     Diabetes mellitus (HCC)     Dyslipidemia     Hypertension     Seizure disorder (HCC)      Past Surgical History:   Procedure Laterality Date    IR NONTUNNELED VASCULAR CATHETER  12/21/2023    IR NONTUNNELED VASCULAR CATHETER 12/21/2023 Jonathan Edge Jr., APRN - NP SSR RAD ANGIO IR      History reviewed. No pertinent family history.  Social History     Tobacco Use    Smoking status: Never    Smokeless tobacco: Never   Substance Use Topics    Alcohol use: Not Currently      Prior to Admission medications    Medication Sig Start Date End Date Taking? Authorizing Provider 
UROLOGY CONSULT    Yovany Eduardo MD  [phone] Office    Patient: Lucretia Andres MRN: [id number]  SSN: [id number]    YOB: 1970  Age: 53 y.o.  Sex: female          Date of Encounter:  January 2, 2024  ADMITTED: 12/13/2023  for Seizure (HCC) [R56.9]  Hyperglycemia [R73.9]  NSTEMI (non-ST elevated myocardial infarction) (HCC) [I21.4]  Acute respiratory failure with hypoxia (HCC) [J96.01]  Hypertensive emergency [I16.1]  Tooth avulsion, initial encounter [S03.2XXA]  Acute respiratory failure with hypoxia and hypercarbia (HCC) [J96.01, J96.02]  Chief Complaint:  acute hypoxic respiratory failure  Reason for consult: left retroperitoneal vs renal mass         History of Present Illness:  Patient is a 53 y.o. female admitted 12/13/2023 to the hospital for Seizure (HCC) [R56.9]  Hyperglycemia [R73.9]  NSTEMI (non-ST elevated myocardial infarction) (HCC) [I21.4]  Acute respiratory failure with hypoxia (HCC) [J96.01]  Hypertensive emergency [I16.1]  Tooth avulsion, initial encounter [S03.2XXA]  Acute respiratory failure with hypoxia and hypercarbia (HCC) [J96.01, J96.02].       She is not conversive. History from chart.    Admitted on 12/13/23 for acute hypoxic respiratory failure requiring intubation and critical care.    Now extubated, on nasal cannula.  Multiple medical issues.    Urology consulted for left renal vs retroperitoneal mass seen on CT chest 12/22/23 measuring 5.1 x 4.4 cm.      Uncontrolled glucose, ranging 250-417.  Creatinine 1.24, GFR 52.    Past Medical History:  Allergies   Allergen Reactions    Zosyn [Piperacillin Sod-Tazobactam So] Angioedema      Current Facility-Administered Medications   Medication Dose Route Frequency    sodium chloride 0.225 % 1,000 mL infusion   IntraVENous Continuous    insulin glargine (LANTUS) injection vial 40 Units  40 Units SubCUTAneous BID    methylPREDNISolone sodium succ (SOLU-MEDROL) injection 20 mg  20 mg IntraVENous Q12H    insulin lispro (HUMALOG) 
\"LEVETIRACETAM\"  No results found for: \"PHENYTOIN\"  No results found for: \"PHENYTOIN\"  No results found for: \"VALPROATE\"  No results found for: \"CBMZ\"    CBC:   Recent Labs     12/19/23  1137 12/20/23  0230 12/21/23  0200   WBC 14.1* 13.8* 14.4*   RBC 2.43* 2.39* 2.40*   HGB 7.1* 7.0* 7.2*   HCT 22.3* 22.0* 22.2*   MCV 91.8 92.1 92.5   RDW 13.3 13.6 14.4    396 472*     BMP:   Recent Labs     12/19/23  0245 12/20/23  0230 12/21/23  0200    142 142   K 4.0 3.9 3.6   * 111* 109*   CO2 21 23 25   PHOS 4.8* 4.1  --    BUN 32* 32* 45*   CREATININE 1.15* 1.21* 1.38*     BNP: No results for input(s): \"BNP\" in the last 72 hours.  PT/INR: No results for input(s): \"PROTIME\", \"INR\" in the last 72 hours.  APTT: No results for input(s): \"APTT\" in the last 72 hours.  CARDIAC ENZYMES: No results for input(s): \"CKMB\", \"CKMBINDEX\", \"TROPONINI\" in the last 72 hours.    Invalid input(s): \"CKTOTAL;3\"  FASTING LIPID PANEL:  Lab Results   Component Value Date    CHOL 220 (H) 07/28/2023     07/28/2023    TRIG 60 07/28/2023     LIVER PROFILE: No results for input(s): \"AST\", \"ALT\", \"ALB\", \"BILIDIR\", \"BILITOT\", \"ALKPHOS\" in the last 72 hours.     Recent Results (from the past 24 hour(s))   POCT Glucose    Collection Time: 12/20/23 11:47 AM   Result Value Ref Range    POC Glucose 249 (H) 65 - 100 mg/dL    Performed by: Branden Perkins    POCT Glucose    Collection Time: 12/20/23  5:32 PM   Result Value Ref Range    POC Glucose 115 (H) 65 - 100 mg/dL    Performed by: Carlos Seth    POCT Glucose    Collection Time: 12/20/23 11:42 PM   Result Value Ref Range    POC Glucose 202 (H) 65 - 100 mg/dL    Performed by: Kevon Chowdhury    Basic Metabolic Panel    Collection Time: 12/21/23  2:00 AM   Result Value Ref Range    Sodium 142 136 - 145 mmol/L    Potassium 3.6 3.5 - 5.1 mmol/L    Chloride 109 (H) 97 - 108 mmol/L    CO2 25 21 - 32 mmol/L    Anion Gap 8 5 - 15 mmol/L    Glucose 249 (H) 65 - 100 mg/dL    BUN 45 (H) 6

## 2024-01-20 ENCOUNTER — APPOINTMENT (OUTPATIENT)
Facility: HOSPITAL | Age: 54
DRG: 981 | End: 2024-01-20

## 2024-01-20 LAB
GLUCOSE BLD STRIP.AUTO-MCNC: 100 MG/DL (ref 65–100)
GLUCOSE BLD STRIP.AUTO-MCNC: 106 MG/DL (ref 65–100)
GLUCOSE BLD STRIP.AUTO-MCNC: 124 MG/DL (ref 65–100)
GLUCOSE BLD STRIP.AUTO-MCNC: 128 MG/DL (ref 65–100)
GLUCOSE BLD STRIP.AUTO-MCNC: 131 MG/DL (ref 65–100)
GLUCOSE BLD STRIP.AUTO-MCNC: 78 MG/DL (ref 65–100)
PERFORMED BY:: ABNORMAL
PERFORMED BY:: NORMAL
PERFORMED BY:: NORMAL

## 2024-01-20 PROCEDURE — 6360000002 HC RX W HCPCS: Performed by: PSYCHIATRY & NEUROLOGY

## 2024-01-20 PROCEDURE — 2500000003 HC RX 250 WO HCPCS: Performed by: INTERNAL MEDICINE

## 2024-01-20 PROCEDURE — 6370000000 HC RX 637 (ALT 250 FOR IP): Performed by: INTERNAL MEDICINE

## 2024-01-20 PROCEDURE — 81001 URINALYSIS AUTO W/SCOPE: CPT

## 2024-01-20 PROCEDURE — 2580000003 HC RX 258: Performed by: INTERNAL MEDICINE

## 2024-01-20 PROCEDURE — 6370000000 HC RX 637 (ALT 250 FOR IP): Performed by: HOSPITALIST

## 2024-01-20 PROCEDURE — 87040 BLOOD CULTURE FOR BACTERIA: CPT

## 2024-01-20 PROCEDURE — 71045 X-RAY EXAM CHEST 1 VIEW: CPT

## 2024-01-20 PROCEDURE — 2500000003 HC RX 250 WO HCPCS: Performed by: HOSPITALIST

## 2024-01-20 PROCEDURE — 6360000002 HC RX W HCPCS: Performed by: INTERNAL MEDICINE

## 2024-01-20 PROCEDURE — 94640 AIRWAY INHALATION TREATMENT: CPT

## 2024-01-20 PROCEDURE — 6370000000 HC RX 637 (ALT 250 FOR IP): Performed by: NURSE PRACTITIONER

## 2024-01-20 PROCEDURE — 1100000000 HC RM PRIVATE

## 2024-01-20 PROCEDURE — 82962 GLUCOSE BLOOD TEST: CPT

## 2024-01-20 PROCEDURE — 36415 COLL VENOUS BLD VENIPUNCTURE: CPT

## 2024-01-20 RX ADMIN — SODIUM CHLORIDE, PRESERVATIVE FREE 10 ML: 5 INJECTION INTRAVENOUS at 08:53

## 2024-01-20 RX ADMIN — METOPROLOL TARTRATE 25 MG: 25 TABLET, FILM COATED ORAL at 08:56

## 2024-01-20 RX ADMIN — Medication 1 CAPSULE: at 21:44

## 2024-01-20 RX ADMIN — Medication 1 CAPSULE: at 08:52

## 2024-01-20 RX ADMIN — LEVETIRACETAM 750 MG: 100 INJECTION, SOLUTION INTRAVENOUS at 21:45

## 2024-01-20 RX ADMIN — METOPROLOL TARTRATE 5 MG: 5 INJECTION INTRAVENOUS at 08:51

## 2024-01-20 RX ADMIN — METOPROLOL TARTRATE 25 MG: 25 TABLET, FILM COATED ORAL at 21:44

## 2024-01-20 RX ADMIN — METOPROLOL TARTRATE 25 MG: 25 TABLET, FILM COATED ORAL at 16:21

## 2024-01-20 RX ADMIN — ACETAMINOPHEN 650 MG: 325 TABLET ORAL at 08:52

## 2024-01-20 RX ADMIN — SODIUM CHLORIDE, PRESERVATIVE FREE 20 MG: 5 INJECTION INTRAVENOUS at 08:52

## 2024-01-20 RX ADMIN — AMLODIPINE BESYLATE 10 MG: 5 TABLET ORAL at 08:52

## 2024-01-20 RX ADMIN — SODIUM CHLORIDE, PRESERVATIVE FREE 10 ML: 5 INJECTION INTRAVENOUS at 21:45

## 2024-01-20 RX ADMIN — CARBOXYMETHYLCELLULOSE SODIUM 1 DROP: 10 GEL OPHTHALMIC at 21:46

## 2024-01-20 RX ADMIN — WHITE PETROLATUM,ZINC OXIDE: 57; 17 PASTE TOPICAL at 08:57

## 2024-01-20 RX ADMIN — BUDESONIDE INHALATION 500 MCG: 0.5 SUSPENSION RESPIRATORY (INHALATION) at 21:55

## 2024-01-20 RX ADMIN — CARBOXYMETHYLCELLULOSE SODIUM 1 DROP: 10 GEL OPHTHALMIC at 08:51

## 2024-01-20 RX ADMIN — LEVETIRACETAM 750 MG: 100 INJECTION, SOLUTION INTRAVENOUS at 08:51

## 2024-01-20 RX ADMIN — BUDESONIDE INHALATION 500 MCG: 0.5 SUSPENSION RESPIRATORY (INHALATION) at 09:22

## 2024-01-20 RX ADMIN — WHITE PETROLATUM,ZINC OXIDE: 57; 17 PASTE TOPICAL at 21:57

## 2024-01-20 RX ADMIN — ATORVASTATIN CALCIUM 40 MG: 40 TABLET, FILM COATED ORAL at 21:44

## 2024-01-20 RX ADMIN — ENOXAPARIN SODIUM 30 MG: 100 INJECTION SUBCUTANEOUS at 08:51

## 2024-01-21 LAB
ANION GAP SERPL CALC-SCNC: 6 MMOL/L (ref 5–15)
APPEARANCE UR: CLEAR
BACTERIA URNS QL MICRO: NEGATIVE /HPF
BASOPHILS # BLD: 0 K/UL (ref 0–0.1)
BASOPHILS NFR BLD: 1 % (ref 0–1)
BILIRUB UR QL: NEGATIVE
BUN SERPL-MCNC: 20 MG/DL (ref 6–20)
BUN/CREAT SERPL: 29 (ref 12–20)
CA-I BLD-MCNC: 9.7 MG/DL (ref 8.5–10.1)
CHLORIDE SERPL-SCNC: 106 MMOL/L (ref 97–108)
CO2 SERPL-SCNC: 25 MMOL/L (ref 21–32)
COLOR UR: ABNORMAL
CREAT SERPL-MCNC: 0.68 MG/DL (ref 0.55–1.02)
CRP SERPL-MCNC: 0.76 MG/DL (ref 0–0.6)
DIFFERENTIAL METHOD BLD: ABNORMAL
EOSINOPHIL # BLD: 0.2 K/UL (ref 0–0.4)
EOSINOPHIL NFR BLD: 3 % (ref 0–7)
EPITH CASTS URNS QL MICRO: ABNORMAL /LPF
ERYTHROCYTE [DISTWIDTH] IN BLOOD BY AUTOMATED COUNT: 14.1 % (ref 11.5–14.5)
GLUCOSE BLD STRIP.AUTO-MCNC: 107 MG/DL (ref 65–100)
GLUCOSE BLD STRIP.AUTO-MCNC: 110 MG/DL (ref 65–100)
GLUCOSE BLD STRIP.AUTO-MCNC: 117 MG/DL (ref 65–100)
GLUCOSE BLD STRIP.AUTO-MCNC: 120 MG/DL (ref 65–100)
GLUCOSE BLD STRIP.AUTO-MCNC: 125 MG/DL (ref 65–100)
GLUCOSE BLD STRIP.AUTO-MCNC: 129 MG/DL (ref 65–100)
GLUCOSE SERPL-MCNC: 118 MG/DL (ref 65–100)
GLUCOSE UR STRIP.AUTO-MCNC: NEGATIVE MG/DL
HCT VFR BLD AUTO: 30 % (ref 35–47)
HGB BLD-MCNC: 9.7 G/DL (ref 11.5–16)
HGB UR QL STRIP: NEGATIVE
IMM GRANULOCYTES # BLD AUTO: 0 K/UL (ref 0–0.04)
IMM GRANULOCYTES NFR BLD AUTO: 1 % (ref 0–0.5)
KETONES UR QL STRIP.AUTO: NEGATIVE MG/DL
LEUKOCYTE ESTERASE UR QL STRIP.AUTO: NEGATIVE
LYMPHOCYTES # BLD: 2.3 K/UL (ref 0.8–3.5)
LYMPHOCYTES NFR BLD: 37 % (ref 12–49)
MCH RBC QN AUTO: 29.8 PG (ref 26–34)
MCHC RBC AUTO-ENTMCNC: 32.3 G/DL (ref 30–36.5)
MCV RBC AUTO: 92 FL (ref 80–99)
MONOCYTES # BLD: 0.6 K/UL (ref 0–1)
MONOCYTES NFR BLD: 10 % (ref 5–13)
MUCOUS THREADS URNS QL MICRO: NEGATIVE /LPF
NEUTS SEG # BLD: 3.1 K/UL (ref 1.8–8)
NEUTS SEG NFR BLD: 48 % (ref 32–75)
NITRITE UR QL STRIP.AUTO: NEGATIVE
NRBC # BLD: 0 K/UL (ref 0–0.01)
NRBC BLD-RTO: 0 PER 100 WBC
PERFORMED BY:: ABNORMAL
PH UR STRIP: 7 (ref 5–8)
PLATELET # BLD AUTO: 494 K/UL (ref 150–400)
PMV BLD AUTO: 10.2 FL (ref 8.9–12.9)
POTASSIUM SERPL-SCNC: 3.8 MMOL/L (ref 3.5–5.1)
PROCALCITONIN SERPL-MCNC: <0.05 NG/ML
PROT UR STRIP-MCNC: 30 MG/DL
RBC # BLD AUTO: 3.26 M/UL (ref 3.8–5.2)
RBC #/AREA URNS HPF: ABNORMAL /HPF (ref 0–5)
SODIUM SERPL-SCNC: 137 MMOL/L (ref 136–145)
SP GR UR REFRACTOMETRY: 1.01 (ref 1–1.03)
URINE CULTURE IF INDICATED: ABNORMAL
UROBILINOGEN UR QL STRIP.AUTO: 0.1 EU/DL (ref 0.1–1)
WBC # BLD AUTO: 6.4 K/UL (ref 3.6–11)
WBC URNS QL MICRO: ABNORMAL /HPF (ref 0–4)

## 2024-01-21 PROCEDURE — 92526 ORAL FUNCTION THERAPY: CPT

## 2024-01-21 PROCEDURE — 82962 GLUCOSE BLOOD TEST: CPT

## 2024-01-21 PROCEDURE — 85025 COMPLETE CBC W/AUTO DIFF WBC: CPT

## 2024-01-21 PROCEDURE — 6370000000 HC RX 637 (ALT 250 FOR IP): Performed by: INTERNAL MEDICINE

## 2024-01-21 PROCEDURE — 84145 PROCALCITONIN (PCT): CPT

## 2024-01-21 PROCEDURE — 80048 BASIC METABOLIC PNL TOTAL CA: CPT

## 2024-01-21 PROCEDURE — 6370000000 HC RX 637 (ALT 250 FOR IP): Performed by: HOSPITALIST

## 2024-01-21 PROCEDURE — 2580000003 HC RX 258: Performed by: INTERNAL MEDICINE

## 2024-01-21 PROCEDURE — 6370000000 HC RX 637 (ALT 250 FOR IP): Performed by: NURSE PRACTITIONER

## 2024-01-21 PROCEDURE — 86140 C-REACTIVE PROTEIN: CPT

## 2024-01-21 PROCEDURE — 6360000002 HC RX W HCPCS: Performed by: PSYCHIATRY & NEUROLOGY

## 2024-01-21 PROCEDURE — 2500000003 HC RX 250 WO HCPCS: Performed by: INTERNAL MEDICINE

## 2024-01-21 PROCEDURE — 94640 AIRWAY INHALATION TREATMENT: CPT

## 2024-01-21 PROCEDURE — 1100000000 HC RM PRIVATE

## 2024-01-21 PROCEDURE — 6360000002 HC RX W HCPCS: Performed by: INTERNAL MEDICINE

## 2024-01-21 RX ADMIN — CARBOXYMETHYLCELLULOSE SODIUM 1 DROP: 10 GEL OPHTHALMIC at 08:46

## 2024-01-21 RX ADMIN — METOPROLOL TARTRATE 25 MG: 25 TABLET, FILM COATED ORAL at 08:47

## 2024-01-21 RX ADMIN — BUDESONIDE INHALATION 500 MCG: 0.5 SUSPENSION RESPIRATORY (INHALATION) at 09:40

## 2024-01-21 RX ADMIN — Medication 1 CAPSULE: at 22:56

## 2024-01-21 RX ADMIN — ATORVASTATIN CALCIUM 40 MG: 40 TABLET, FILM COATED ORAL at 22:56

## 2024-01-21 RX ADMIN — AMLODIPINE BESYLATE 10 MG: 5 TABLET ORAL at 08:47

## 2024-01-21 RX ADMIN — METOPROLOL TARTRATE 25 MG: 25 TABLET, FILM COATED ORAL at 22:56

## 2024-01-21 RX ADMIN — SODIUM CHLORIDE, PRESERVATIVE FREE 10 ML: 5 INJECTION INTRAVENOUS at 22:56

## 2024-01-21 RX ADMIN — CARBOXYMETHYLCELLULOSE SODIUM 1 DROP: 10 GEL OPHTHALMIC at 22:56

## 2024-01-21 RX ADMIN — Medication 1 CAPSULE: at 08:47

## 2024-01-21 RX ADMIN — LEVETIRACETAM 750 MG: 100 INJECTION, SOLUTION INTRAVENOUS at 22:56

## 2024-01-21 RX ADMIN — ENOXAPARIN SODIUM 30 MG: 100 INJECTION SUBCUTANEOUS at 08:47

## 2024-01-21 RX ADMIN — LEVETIRACETAM 750 MG: 100 INJECTION, SOLUTION INTRAVENOUS at 08:47

## 2024-01-21 RX ADMIN — BUDESONIDE INHALATION 500 MCG: 0.5 SUSPENSION RESPIRATORY (INHALATION) at 22:30

## 2024-01-21 RX ADMIN — METOPROLOL TARTRATE 25 MG: 25 TABLET, FILM COATED ORAL at 15:34

## 2024-01-21 RX ADMIN — SODIUM CHLORIDE, PRESERVATIVE FREE 20 MG: 5 INJECTION INTRAVENOUS at 08:47

## 2024-01-21 NOTE — CARE COORDINATION
01/21/24 0848   Avoidable Days   Payor Acute Rehab Auth   Patient/Family       Lack Payor Source     DC order noted.  Chart reviewed.   The pt has been reopened with [facility] [employer],  They are working with the pts  for a payor source.  Do not expect progress towards DC before Monday 01/22/2024

## 2024-01-22 PROBLEM — R50.9 FUO (FEVER OF UNKNOWN ORIGIN): Status: ACTIVE | Noted: 2024-01-22

## 2024-01-22 LAB
ANION GAP SERPL CALC-SCNC: 4 MMOL/L (ref 5–15)
BASOPHILS # BLD: 0 K/UL (ref 0–0.1)
BASOPHILS NFR BLD: 1 % (ref 0–1)
BUN SERPL-MCNC: 23 MG/DL (ref 6–20)
BUN/CREAT SERPL: 33 (ref 12–20)
CA-I BLD-MCNC: 9.3 MG/DL (ref 8.5–10.1)
CHLORIDE SERPL-SCNC: 105 MMOL/L (ref 97–108)
CO2 SERPL-SCNC: 27 MMOL/L (ref 21–32)
CREAT SERPL-MCNC: 0.7 MG/DL (ref 0.55–1.02)
CRP SERPL-MCNC: 0.55 MG/DL (ref 0–0.6)
DIFFERENTIAL METHOD BLD: ABNORMAL
EOSINOPHIL # BLD: 0.2 K/UL (ref 0–0.4)
EOSINOPHIL NFR BLD: 2 % (ref 0–7)
ERYTHROCYTE [DISTWIDTH] IN BLOOD BY AUTOMATED COUNT: 14.4 % (ref 11.5–14.5)
GLUCOSE BLD STRIP.AUTO-MCNC: 104 MG/DL (ref 65–100)
GLUCOSE BLD STRIP.AUTO-MCNC: 109 MG/DL (ref 65–100)
GLUCOSE BLD STRIP.AUTO-MCNC: 124 MG/DL (ref 65–100)
GLUCOSE BLD STRIP.AUTO-MCNC: 129 MG/DL (ref 65–100)
GLUCOSE BLD STRIP.AUTO-MCNC: 178 MG/DL (ref 65–100)
GLUCOSE BLD STRIP.AUTO-MCNC: 95 MG/DL (ref 65–100)
GLUCOSE SERPL-MCNC: 102 MG/DL (ref 65–100)
HCT VFR BLD AUTO: 28.8 % (ref 35–47)
HGB BLD-MCNC: 9.2 G/DL (ref 11.5–16)
IMM GRANULOCYTES # BLD AUTO: 0.1 K/UL (ref 0–0.04)
IMM GRANULOCYTES NFR BLD AUTO: 1 % (ref 0–0.5)
LYMPHOCYTES # BLD: 2.7 K/UL (ref 0.8–3.5)
LYMPHOCYTES NFR BLD: 37 % (ref 12–49)
MCH RBC QN AUTO: 29.3 PG (ref 26–34)
MCHC RBC AUTO-ENTMCNC: 31.9 G/DL (ref 30–36.5)
MCV RBC AUTO: 91.7 FL (ref 80–99)
MONOCYTES # BLD: 0.7 K/UL (ref 0–1)
MONOCYTES NFR BLD: 9 % (ref 5–13)
NEUTS SEG # BLD: 3.7 K/UL (ref 1.8–8)
NEUTS SEG NFR BLD: 50 % (ref 32–75)
NRBC # BLD: 0 K/UL (ref 0–0.01)
NRBC BLD-RTO: 0 PER 100 WBC
PERFORMED BY:: ABNORMAL
PERFORMED BY:: NORMAL
PLATELET # BLD AUTO: 527 K/UL (ref 150–400)
PMV BLD AUTO: 9.9 FL (ref 8.9–12.9)
POTASSIUM SERPL-SCNC: 4.1 MMOL/L (ref 3.5–5.1)
PROCALCITONIN SERPL-MCNC: <0.05 NG/ML
RBC # BLD AUTO: 3.14 M/UL (ref 3.8–5.2)
SODIUM SERPL-SCNC: 136 MMOL/L (ref 136–145)
WBC # BLD AUTO: 7.3 K/UL (ref 3.6–11)

## 2024-01-22 PROCEDURE — 86140 C-REACTIVE PROTEIN: CPT

## 2024-01-22 PROCEDURE — 6370000000 HC RX 637 (ALT 250 FOR IP): Performed by: INTERNAL MEDICINE

## 2024-01-22 PROCEDURE — 94640 AIRWAY INHALATION TREATMENT: CPT

## 2024-01-22 PROCEDURE — 6360000002 HC RX W HCPCS: Performed by: INTERNAL MEDICINE

## 2024-01-22 PROCEDURE — 80048 BASIC METABOLIC PNL TOTAL CA: CPT

## 2024-01-22 PROCEDURE — 2500000003 HC RX 250 WO HCPCS: Performed by: INTERNAL MEDICINE

## 2024-01-22 PROCEDURE — 36415 COLL VENOUS BLD VENIPUNCTURE: CPT

## 2024-01-22 PROCEDURE — 6370000000 HC RX 637 (ALT 250 FOR IP): Performed by: NURSE PRACTITIONER

## 2024-01-22 PROCEDURE — 2580000003 HC RX 258: Performed by: INTERNAL MEDICINE

## 2024-01-22 PROCEDURE — 1100000000 HC RM PRIVATE

## 2024-01-22 PROCEDURE — 6370000000 HC RX 637 (ALT 250 FOR IP): Performed by: HOSPITALIST

## 2024-01-22 PROCEDURE — 97530 THERAPEUTIC ACTIVITIES: CPT

## 2024-01-22 PROCEDURE — 94761 N-INVAS EAR/PLS OXIMETRY MLT: CPT

## 2024-01-22 PROCEDURE — 85025 COMPLETE CBC W/AUTO DIFF WBC: CPT

## 2024-01-22 PROCEDURE — 92526 ORAL FUNCTION THERAPY: CPT

## 2024-01-22 PROCEDURE — 99231 SBSQ HOSP IP/OBS SF/LOW 25: CPT | Performed by: INTERNAL MEDICINE

## 2024-01-22 PROCEDURE — 6360000002 HC RX W HCPCS: Performed by: PSYCHIATRY & NEUROLOGY

## 2024-01-22 PROCEDURE — 82962 GLUCOSE BLOOD TEST: CPT

## 2024-01-22 PROCEDURE — 84145 PROCALCITONIN (PCT): CPT

## 2024-01-22 RX ADMIN — METOPROLOL TARTRATE 25 MG: 25 TABLET, FILM COATED ORAL at 13:57

## 2024-01-22 RX ADMIN — Medication 1 CAPSULE: at 21:58

## 2024-01-22 RX ADMIN — CARBOXYMETHYLCELLULOSE SODIUM 1 DROP: 10 GEL OPHTHALMIC at 11:00

## 2024-01-22 RX ADMIN — WHITE PETROLATUM,ZINC OXIDE: 57; 17 PASTE TOPICAL at 14:00

## 2024-01-22 RX ADMIN — SODIUM CHLORIDE, PRESERVATIVE FREE 20 MG: 5 INJECTION INTRAVENOUS at 11:13

## 2024-01-22 RX ADMIN — CARBOXYMETHYLCELLULOSE SODIUM 1 DROP: 10 GEL OPHTHALMIC at 22:04

## 2024-01-22 RX ADMIN — LEVETIRACETAM 750 MG: 100 INJECTION, SOLUTION INTRAVENOUS at 11:25

## 2024-01-22 RX ADMIN — Medication 1 CAPSULE: at 11:00

## 2024-01-22 RX ADMIN — METOPROLOL TARTRATE 25 MG: 25 TABLET, FILM COATED ORAL at 11:00

## 2024-01-22 RX ADMIN — ATORVASTATIN CALCIUM 40 MG: 40 TABLET, FILM COATED ORAL at 21:58

## 2024-01-22 RX ADMIN — AMLODIPINE BESYLATE 10 MG: 5 TABLET ORAL at 10:59

## 2024-01-22 RX ADMIN — SODIUM CHLORIDE, PRESERVATIVE FREE 10 ML: 5 INJECTION INTRAVENOUS at 10:59

## 2024-01-22 RX ADMIN — WHITE PETROLATUM,ZINC OXIDE: 57; 17 PASTE TOPICAL at 22:00

## 2024-01-22 RX ADMIN — ENOXAPARIN SODIUM 30 MG: 100 INJECTION SUBCUTANEOUS at 10:59

## 2024-01-22 RX ADMIN — BUDESONIDE INHALATION 500 MCG: 0.5 SUSPENSION RESPIRATORY (INHALATION) at 22:49

## 2024-01-22 RX ADMIN — SODIUM CHLORIDE, PRESERVATIVE FREE 10 ML: 5 INJECTION INTRAVENOUS at 21:59

## 2024-01-22 RX ADMIN — LEVETIRACETAM 750 MG: 100 INJECTION, SOLUTION INTRAVENOUS at 22:01

## 2024-01-22 RX ADMIN — METOPROLOL TARTRATE 25 MG: 25 TABLET, FILM COATED ORAL at 21:58

## 2024-01-22 RX ADMIN — BUDESONIDE INHALATION 500 MCG: 0.5 SUSPENSION RESPIRATORY (INHALATION) at 08:04

## 2024-01-22 ASSESSMENT — PAIN SCALES - GENERAL
PAINLEVEL_OUTOF10: 0
PAINLEVEL_OUTOF10: 0

## 2024-01-22 ASSESSMENT — PAIN SCALES - WONG BAKER: WONGBAKER_NUMERICALRESPONSE: 0

## 2024-01-22 NOTE — CARE COORDINATION
CM reviewed chart. Attempted to call  again, no answer. Message left.     More quotes for items needed and possible plans of being off tube feed.    Wheelchair $350 out of pocket cost  Hospital bed $1200 out of pocket cost. , will not need hospital bed if off tube feed.    1347:called  again, no answer. Message left to call. If no call back before 4pm/1600, will reach out to police department for wellness check to make sure  is ok and not in distress.     1535: have not heard back from patient . Called [facility] non-emergent line requesting wellness check on him. Per dispatch they will have sone out there looking for him.

## 2024-01-23 LAB
GLUCOSE BLD STRIP.AUTO-MCNC: 106 MG/DL (ref 65–100)
GLUCOSE BLD STRIP.AUTO-MCNC: 109 MG/DL (ref 65–100)
GLUCOSE BLD STRIP.AUTO-MCNC: 118 MG/DL (ref 65–100)
GLUCOSE BLD STRIP.AUTO-MCNC: 123 MG/DL (ref 65–100)
GLUCOSE BLD STRIP.AUTO-MCNC: 99 MG/DL (ref 65–100)
PERFORMED BY:: ABNORMAL
PERFORMED BY:: NORMAL

## 2024-01-23 PROCEDURE — 94640 AIRWAY INHALATION TREATMENT: CPT

## 2024-01-23 PROCEDURE — 2580000003 HC RX 258: Performed by: INTERNAL MEDICINE

## 2024-01-23 PROCEDURE — 6370000000 HC RX 637 (ALT 250 FOR IP): Performed by: NURSE PRACTITIONER

## 2024-01-23 PROCEDURE — 6370000000 HC RX 637 (ALT 250 FOR IP): Performed by: INTERNAL MEDICINE

## 2024-01-23 PROCEDURE — 6360000002 HC RX W HCPCS: Performed by: INTERNAL MEDICINE

## 2024-01-23 PROCEDURE — 82962 GLUCOSE BLOOD TEST: CPT

## 2024-01-23 PROCEDURE — 2500000003 HC RX 250 WO HCPCS: Performed by: INTERNAL MEDICINE

## 2024-01-23 PROCEDURE — 1100000000 HC RM PRIVATE

## 2024-01-23 PROCEDURE — 92526 ORAL FUNCTION THERAPY: CPT

## 2024-01-23 PROCEDURE — 97530 THERAPEUTIC ACTIVITIES: CPT

## 2024-01-23 PROCEDURE — 6370000000 HC RX 637 (ALT 250 FOR IP): Performed by: HOSPITALIST

## 2024-01-23 PROCEDURE — 6360000002 HC RX W HCPCS: Performed by: PSYCHIATRY & NEUROLOGY

## 2024-01-23 RX ORDER — CARBOXYMETHYLCELLULOSE SODIUM 10 MG/ML
1 GEL OPHTHALMIC 2 TIMES DAILY
Qty: 8 EACH | Refills: 1 | Status: SHIPPED | OUTPATIENT
Start: 2024-01-23 | End: 2024-02-22

## 2024-01-23 RX ORDER — ATORVASTATIN CALCIUM 40 MG/1
40 TABLET, FILM COATED ORAL NIGHTLY
Qty: 30 TABLET | Refills: 2 | Status: SHIPPED | OUTPATIENT
Start: 2024-01-23

## 2024-01-23 RX ORDER — LEVETIRACETAM 750 MG/1
750 TABLET ORAL 2 TIMES DAILY
Qty: 60 TABLET | Refills: 1 | Status: SHIPPED | OUTPATIENT
Start: 2024-01-23

## 2024-01-23 RX ORDER — POLYETHYLENE GLYCOL 3350 17 G/17G
17 POWDER, FOR SOLUTION ORAL DAILY PRN
Qty: 527 G | Refills: 0 | Status: SHIPPED | OUTPATIENT
Start: 2024-01-23 | End: 2024-02-22

## 2024-01-23 RX ORDER — BUDESONIDE 0.5 MG/2ML
0.5 INHALANT ORAL
Qty: 60 EACH | Refills: 3 | Status: SHIPPED | OUTPATIENT
Start: 2024-01-23

## 2024-01-23 RX ORDER — AMLODIPINE BESYLATE 10 MG/1
10 TABLET ORAL DAILY
Qty: 30 TABLET | Refills: 3 | Status: SHIPPED | OUTPATIENT
Start: 2024-01-24

## 2024-01-23 RX ADMIN — AMLODIPINE BESYLATE 10 MG: 5 TABLET ORAL at 09:27

## 2024-01-23 RX ADMIN — CARBOXYMETHYLCELLULOSE SODIUM 1 DROP: 10 GEL OPHTHALMIC at 09:27

## 2024-01-23 RX ADMIN — BUDESONIDE INHALATION 500 MCG: 0.5 SUSPENSION RESPIRATORY (INHALATION) at 22:15

## 2024-01-23 RX ADMIN — BUDESONIDE INHALATION 500 MCG: 0.5 SUSPENSION RESPIRATORY (INHALATION) at 08:14

## 2024-01-23 RX ADMIN — ATORVASTATIN CALCIUM 40 MG: 40 TABLET, FILM COATED ORAL at 21:07

## 2024-01-23 RX ADMIN — SODIUM CHLORIDE, PRESERVATIVE FREE 10 ML: 5 INJECTION INTRAVENOUS at 21:08

## 2024-01-23 RX ADMIN — METOPROLOL TARTRATE 25 MG: 25 TABLET, FILM COATED ORAL at 18:33

## 2024-01-23 RX ADMIN — CARBOXYMETHYLCELLULOSE SODIUM 1 DROP: 10 GEL OPHTHALMIC at 21:07

## 2024-01-23 RX ADMIN — ENOXAPARIN SODIUM 30 MG: 100 INJECTION SUBCUTANEOUS at 09:27

## 2024-01-23 RX ADMIN — METOPROLOL TARTRATE 25 MG: 25 TABLET, FILM COATED ORAL at 09:28

## 2024-01-23 RX ADMIN — LEVETIRACETAM 750 MG: 100 INJECTION, SOLUTION INTRAVENOUS at 21:07

## 2024-01-23 RX ADMIN — METOPROLOL TARTRATE 25 MG: 25 TABLET, FILM COATED ORAL at 23:40

## 2024-01-23 RX ADMIN — WHITE PETROLATUM,ZINC OXIDE: 57; 17 PASTE TOPICAL at 21:08

## 2024-01-23 RX ADMIN — Medication 1 CAPSULE: at 21:07

## 2024-01-23 RX ADMIN — LEVETIRACETAM 750 MG: 100 INJECTION, SOLUTION INTRAVENOUS at 09:27

## 2024-01-23 RX ADMIN — SODIUM CHLORIDE, PRESERVATIVE FREE 10 ML: 5 INJECTION INTRAVENOUS at 09:28

## 2024-01-23 RX ADMIN — Medication 1 CAPSULE: at 09:27

## 2024-01-23 RX ADMIN — SODIUM CHLORIDE, PRESERVATIVE FREE 20 MG: 5 INJECTION INTRAVENOUS at 09:28

## 2024-01-23 RX ADMIN — WHITE PETROLATUM,ZINC OXIDE: 57; 17 PASTE TOPICAL at 09:32

## 2024-01-23 ASSESSMENT — PAIN SCALES - WONG BAKER: WONGBAKER_NUMERICALRESPONSE: 0

## 2024-01-23 ASSESSMENT — PAIN SCALES - GENERAL: PAINLEVEL_OUTOF10: 0

## 2024-01-23 NOTE — CARE COORDINATION
Message left for  informing him his wife is cleared for discharge and we need him to come and pick her up. Requested return call. Still waiting for call.

## 2024-01-23 NOTE — CARE COORDINATION
Spoke with  via telephone. Plans are for discharge tomorrow afternoon/evening. Currently working on getting wheelchair and rolling walker. Nurse reaching out to GI to see about PEG tube. Suggested to  to see about getting his wife on his insurance or suing the marketplace for insurance.     Plans discharge tomorrow evening.    1400: checking with [facility] to see if they can quote price of medicine to see if we can help with cost.    1422: rolling walker and therapy documents provided to patient for home use.    1433: quote for 1 month supply of medication for patient is $154.06. approval from admin to pay for 1 month supply only.

## 2024-01-24 VITALS
OXYGEN SATURATION: 97 % | BODY MASS INDEX: 25.58 KG/M2 | TEMPERATURE: 98.8 F | SYSTOLIC BLOOD PRESSURE: 133 MMHG | HEART RATE: 99 BPM | HEIGHT: 62 IN | WEIGHT: 139 LBS | RESPIRATION RATE: 14 BRPM | DIASTOLIC BLOOD PRESSURE: 82 MMHG

## 2024-01-24 LAB
GLUCOSE BLD STRIP.AUTO-MCNC: 103 MG/DL (ref 65–100)
GLUCOSE BLD STRIP.AUTO-MCNC: 104 MG/DL (ref 65–100)
GLUCOSE BLD STRIP.AUTO-MCNC: 140 MG/DL (ref 65–100)
PERFORMED BY:: ABNORMAL

## 2024-01-24 PROCEDURE — 6370000000 HC RX 637 (ALT 250 FOR IP): Performed by: INTERNAL MEDICINE

## 2024-01-24 PROCEDURE — 2500000003 HC RX 250 WO HCPCS: Performed by: INTERNAL MEDICINE

## 2024-01-24 PROCEDURE — 82962 GLUCOSE BLOOD TEST: CPT

## 2024-01-24 PROCEDURE — 6360000002 HC RX W HCPCS: Performed by: INTERNAL MEDICINE

## 2024-01-24 PROCEDURE — 2580000003 HC RX 258: Performed by: INTERNAL MEDICINE

## 2024-01-24 PROCEDURE — 6370000000 HC RX 637 (ALT 250 FOR IP): Performed by: HOSPITALIST

## 2024-01-24 PROCEDURE — 94640 AIRWAY INHALATION TREATMENT: CPT

## 2024-01-24 PROCEDURE — 97530 THERAPEUTIC ACTIVITIES: CPT

## 2024-01-24 PROCEDURE — 6370000000 HC RX 637 (ALT 250 FOR IP): Performed by: NURSE PRACTITIONER

## 2024-01-24 PROCEDURE — 6360000002 HC RX W HCPCS: Performed by: PSYCHIATRY & NEUROLOGY

## 2024-01-24 PROCEDURE — 94761 N-INVAS EAR/PLS OXIMETRY MLT: CPT

## 2024-01-24 RX ADMIN — BUDESONIDE INHALATION 500 MCG: 0.5 SUSPENSION RESPIRATORY (INHALATION) at 09:16

## 2024-01-24 RX ADMIN — CARBOXYMETHYLCELLULOSE SODIUM 1 DROP: 10 GEL OPHTHALMIC at 09:06

## 2024-01-24 RX ADMIN — LEVETIRACETAM 750 MG: 100 INJECTION, SOLUTION INTRAVENOUS at 09:16

## 2024-01-24 RX ADMIN — METOPROLOL TARTRATE 25 MG: 25 TABLET, FILM COATED ORAL at 15:20

## 2024-01-24 RX ADMIN — ENOXAPARIN SODIUM 30 MG: 100 INJECTION SUBCUTANEOUS at 09:06

## 2024-01-24 RX ADMIN — SODIUM CHLORIDE, PRESERVATIVE FREE 20 MG: 5 INJECTION INTRAVENOUS at 09:05

## 2024-01-24 RX ADMIN — WHITE PETROLATUM,ZINC OXIDE: 57; 17 PASTE TOPICAL at 09:41

## 2024-01-24 RX ADMIN — AMLODIPINE BESYLATE 10 MG: 5 TABLET ORAL at 09:05

## 2024-01-24 RX ADMIN — Medication 1 CAPSULE: at 09:05

## 2024-01-24 RX ADMIN — METOPROLOL TARTRATE 25 MG: 25 TABLET, FILM COATED ORAL at 09:06

## 2024-01-24 ASSESSMENT — PAIN SCALES - WONG BAKER: WONGBAKER_NUMERICALRESPONSE: 0

## 2024-01-24 ASSESSMENT — PAIN SCALES - GENERAL: PAINLEVEL_OUTOF10: 0

## 2024-01-24 NOTE — DISCHARGE SUMMARY
12:16 AM   Result Value Ref Range    POC Glucose 110 (H) 65 - 100 mg/dL    Performed by: Alvarez Soliz    POCT Glucose    Collection Time: 01/21/24  6:29 AM   Result Value Ref Range    POC Glucose 125 (H) 65 - 100 mg/dL    Performed by: PRITI COLON    CBC with Auto Differential    Collection Time: 01/21/24  6:56 AM   Result Value Ref Range    WBC 6.4 3.6 - 11.0 K/uL    RBC 3.26 (L) 3.80 - 5.20 M/uL    Hemoglobin 9.7 (L) 11.5 - 16.0 g/dL    Hematocrit 30.0 (L) 35.0 - 47.0 %    MCV 92.0 80.0 - 99.0 FL    MCH 29.8 26.0 - 34.0 PG    MCHC 32.3 30.0 - 36.5 g/dL    RDW 14.1 11.5 - 14.5 %    Platelets 494 (H) 150 - 400 K/uL    MPV 10.2 8.9 - 12.9 FL    Nucleated RBCs 0.0 0.0  WBC    nRBC 0.00 0.00 - 0.01 K/uL    Neutrophils % 48 32 - 75 %    Lymphocytes % 37 12 - 49 %    Monocytes % 10 5 - 13 %    Eosinophils % 3 0 - 7 %    Basophils % 1 0 - 1 %    Immature Granulocytes 1 (H) 0 - 0.5 %    Neutrophils Absolute 3.1 1.8 - 8.0 K/UL    Lymphocytes Absolute 2.3 0.8 - 3.5 K/UL    Monocytes Absolute 0.6 0.0 - 1.0 K/UL    Eosinophils Absolute 0.2 0.0 - 0.4 K/UL    Basophils Absolute 0.0 0.0 - 0.1 K/UL    Absolute Immature Granulocyte 0.0 0.00 - 0.04 K/UL    Differential Type AUTOMATED     Basic Metabolic Panel    Collection Time: 01/21/24  6:56 AM   Result Value Ref Range    Sodium 137 136 - 145 mmol/L    Potassium 3.8 3.5 - 5.1 mmol/L    Chloride 106 97 - 108 mmol/L    CO2 25 21 - 32 mmol/L    Anion Gap 6 5 - 15 mmol/L    Glucose 118 (H) 65 - 100 mg/dL    BUN 20 6 - 20 mg/dL    Creatinine 0.68 0.55 - 1.02 mg/dL    Bun/Cre Ratio 29 (H) 12 - 20      Est, Glom Filt Rate >60 >60 ml/min/1.73m2    Calcium 9.7 8.5 - 10.1 mg/dL   C-Reactive Protein    Collection Time: 01/21/24  6:56 AM   Result Value Ref Range    CRP 0.76 (H) 0.00 - 0.60 mg/dL   Procalcitonin    Collection Time: 01/21/24  6:56 AM   Result Value Ref Range    Procalcitonin <0.05 (H) 0 ng/mL   POCT Glucose    Collection Time: 01/21/24  8:36 AM   Result Value Ref 
FOLLOWING:   Fever over 101 degrees for 24 hours.   Chest pain, shortness of breath, fever, chills, nausea, vomiting, diarrhea, change in mentation, falling, weakness, bleeding. Severe pain or pain not relieved by medications.  Or, any other signs or symptoms that you may have questions about.    DISPOSITION:  x  Home With:   OT  PT  HH  RN       Long term SNF/Inpatient Rehab    Independent/assisted living    Hospice    Other:       PATIENT CONDITION AT DISCHARGE:     Functional status   x Poor     Deconditioned     Independent      Cognition   x Lucid     Forgetful     Dementia      Catheters/lines (plus indication)    Menezes     PICC     PEG    x None      Code status    x Full code     DNR      PHYSICAL EXAMINATION AT DISCHARGE:  General:          Alert, cooperative, no distress, appears stated age.     HEENT:           Atraumatic, anicteric sclerae, pink conjunctivae                          No oral ulcers, mucosa moist, throat clear, dentition fair  Neck:               Supple, symmetrical  Lungs:             Clear to auscultation bilaterally.  No Wheezing or Rhonchi. No rales.  Chest wall:      No tenderness  No Accessory muscle use.  Heart:              Regular  rhythm,  No  murmur   No edema  Abdomen:        Soft, non-tender. Not distended.  Bowel sounds normal  Extremities:     No cyanosis.  No clubbing,                            Skin turgor normal, Capillary refill normal  Skin:                Not pale.  Not Jaundiced  No rashes   Psych:             Not anxious or agitated.  Neurologic:      Alert, moves all extremities, answers questions appropriately and responds to commands       CHRONIC MEDICAL DIAGNOSES:      Greater than 35 minutes were spent with the patient on counseling and coordination of care    Signed:   SIRI HOLMAN MD  1/23/2024  1:27 PM

## 2024-01-24 NOTE — PLAN OF CARE
PHYSICAL THERAPY TREATMENT     Patient: Lucertia Andres (53 y.o. female)  Date: 1/18/2024  Diagnosis: Seizure (HCC) [R56.9]  Hyperglycemia [R73.9]  NSTEMI (non-ST elevated myocardial infarction) (HCC) [I21.4]  Acute respiratory failure with hypoxia (HCC) [J96.01]  Hypertensive emergency [I16.1]  Tooth avulsion, initial encounter [S03.2XXA]  Acute respiratory failure with hypoxia and hypercarbia (HCC) [J96.01, J96.02] Acute respiratory failure with hypoxia and hypercarbia (HCC)  Procedure(s) (LRB):  EGD ESOPHAGOGASTRODUODENOSCOPY PEG TUBE INSERTION (N/A) 8 Days Post-Op  Precautions:  Fall Risk                Recommendations for nursing mobility: Encourage HEP in prep for ADLs/mobility; see handout for details, Frequent repositioning to prevent skin breakdown, Use of bed/chair alarm for safety, LE elevation for management of edema, and Jess Stedy for bed to chair transfers     In place during session: External Catheter, EKG/telemetry , and PEG Tube  Chart, physical therapy assessment, plan of care and goals were reviewed.  ASSESSMENT  Patient continues with skilled PT services and is progressing towards goals. Pt in bed upon PT arrival, agreeable to session. Pt A&O x 4. (See below for objective details and assist levels).     Overall pt tolerated session well today.  Improved mobility noted today and was able to complete two sit<>stand transfers at EOB and able to stand approx 10-15 seconds. BLE knee buckling noted with standing mobility and is still limited elevated HR. Patient HR ai to 144 with standing, otherwise mostly 120s-130s with mobility.  Patient completed 1 exercise at EOB and the remainder of therex in semi supine due to fatigue. Will continue to benefit from skilled PT services, and will continue to progress as tolerated. Potential barriers for safe discharge: pt has impaired cognition, pt is a high fall risk, pt is not safe to be alone, and concern for pt safely navigating or managing the home 
         PHYSICAL THERAPY TREATMENT     Patient: Lucretia Andres (53 y.o. female)  Date: 1/13/2024  Diagnosis: Seizure (HCC) [R56.9]  Hyperglycemia [R73.9]  NSTEMI (non-ST elevated myocardial infarction) (HCC) [I21.4]  Acute respiratory failure with hypoxia (HCC) [J96.01]  Hypertensive emergency [I16.1]  Tooth avulsion, initial encounter [S03.2XXA]  Acute respiratory failure with hypoxia and hypercarbia (HCC) [J96.01, J96.02] Acute respiratory failure with hypoxia and hypercarbia (HCC)  Procedure(s) (LRB):  EGD ESOPHAGOGASTRODUODENOSCOPY PEG TUBE INSERTION (N/A) 3 Days Post-Op  Precautions: Fall Risk, Bed Alarm                Recommendations for nursing mobility: Encourage HEP in prep for ADLs/mobility; see handout for details, Frequent repositioning to prevent skin breakdown, Use of bed/chair alarm for safety, and Assist x2    In place during session: External Catheter, EKG/telemetry , and PEG Tube  Chart, physical therapy assessment, plan of care and goals were reviewed.  ASSESSMENT  Patient continues with skilled PT services and is progressing towards goals. Pt L side lying upon PT arrival, agreeable to session. Pt A&O x 4. (See below for objective details and assist levels).     Overall pt tolerated session fair today with bed mobility, sitting tolerance and supine therex, limited by activity tolerance and endurance. Pt demo's improved functional mobility to date progressing to A x1 for bed mobility. Pt continues to demo poor sitting balance limiting further progression, tolerated EOB ~5 mins before requesting to return supine. Presenting with intermittent levels of A to maintain static balance, pt occasionally able to sit unsupported however mostly requiring mod A for support. Pt participated in supine therex with AARENOC for heel slides. Discussed discharge recommendations and educated on IRF, demo's verbal understanding.  Will continue to benefit from skilled PT services, and will continue to progress as 
         PHYSICAL THERAPY TREATMENT     Patient: Lucretia Andres (53 y.o. female)  Date: 1/17/2024  Diagnosis: Seizure (HCC) [R56.9]  Hyperglycemia [R73.9]  NSTEMI (non-ST elevated myocardial infarction) (HCC) [I21.4]  Acute respiratory failure with hypoxia (HCC) [J96.01]  Hypertensive emergency [I16.1]  Tooth avulsion, initial encounter [S03.2XXA]  Acute respiratory failure with hypoxia and hypercarbia (HCC) [J96.01, J96.02] Acute respiratory failure with hypoxia and hypercarbia (HCC)  Procedure(s) (LRB):  EGD ESOPHAGOGASTRODUODENOSCOPY PEG TUBE INSERTION (N/A) 7 Days Post-Op  Precautions:  Fall Risk                Recommendations for nursing mobility: Frequent repositioning to prevent skin breakdown and Use of bed/chair alarm for safety    In place during session: Peripheral IV, External Catheter, and EKG/telemetry   Chart, physical therapy assessment, plan of care and goals were reviewed.  ASSESSMENT  Patient continues with skilled PT services and is progressing towards goals. Pt semi supine  upon PT arrival, agreeable to session. Pt A&O x 4. (See below for objective details and assist levels).   Patient was seen for weekly reassessment.Patient is improving with mobility strength and requires encouragement to do more consistently.  Patient able to assist more with strengthening exs in bed .Able to perform LAQ indep .Ms strength 2+ -3- /5 grossly in hips ms villa,knee extensor 3-/5.   Overall pt tolerated session fair/good today with PT.  Will continue to benefit from skilled PT services, and will continue to progress as tolerated. Potential barriers for safe discharge: . Current PT DC recommendation Inpatient Rehabilitation Facility  once medically appropriate.      GOALS:      Problem: Physical Therapy - Adult  Goal: By Discharge: Performs mobility at highest level of function for planned discharge setting.  See evaluation for individualized goals.  Description: FUNCTIONAL STATUS PRIOR TO ADMISSION: Per , who 
         PHYSICAL THERAPY TREATMENT     Patient: Lucretia Andres (53 y.o. female)  Date: 1/19/2024  Diagnosis: Seizure (HCC) [R56.9]  Hyperglycemia [R73.9]  NSTEMI (non-ST elevated myocardial infarction) (HCC) [I21.4]  Acute respiratory failure with hypoxia (HCC) [J96.01]  Hypertensive emergency [I16.1]  Tooth avulsion, initial encounter [S03.2XXA]  Acute respiratory failure with hypoxia and hypercarbia (HCC) [J96.01, J96.02] Acute respiratory failure with hypoxia and hypercarbia (HCC)  Procedure(s) (LRB):  EGD ESOPHAGOGASTRODUODENOSCOPY PEG TUBE INSERTION (N/A) 9 Days Post-Op  Precautions:  Fall Risk                Recommendations for nursing mobility: Encourage HEP in prep for ADLs/mobility; see handout for details, Use of bed/chair alarm for safety, Jess Stedy for bed to chair transfers , and Jess Plus for bed to chair transfers    In place during session: External Catheter and PEG Tube  Chart, physical therapy assessment, plan of care and goals were reviewed.  ASSESSMENT  Patient continues with skilled PT services and is progressing towards goals. Pt semi supine in bed upon PT arrival, agreeable to session. Pt A&O x 4. (See below for objective details and assist levels).     Overall pt tolerated session well today with pt demonstrating improvement in functional activity tolerance. Pt improved to SBA for bed mobility and was able to transfer bed to chair ModAx2. Reviewed HEP with pt demonstrating understanding; handout provided for carry-over.  Will continue to benefit from skilled PT services, and will continue to progress as tolerated. Potential barriers for safe discharge: pt is a high fall risk, pt is not safe to be alone, and concern for pt safely navigating or managing the home environment. Current PT DC recommendation Inpatient Rehabilitation Facility  once medically appropriate.     Start of Session End of Session   SPO2 (%) 100 97   Heart Rate (BPM) 108 116     GOALS:    Problem: Physical Therapy - 
         PHYSICAL THERAPY TREATMENT     Patient: Lucretia Andres (53 y.o. female)  Date: 1/22/2024  Diagnosis: Seizure (HCC) [R56.9]  Hyperglycemia [R73.9]  NSTEMI (non-ST elevated myocardial infarction) (HCC) [I21.4]  Acute respiratory failure with hypoxia (HCC) [J96.01]  Hypertensive emergency [I16.1]  Tooth avulsion, initial encounter [S03.2XXA]  Acute respiratory failure with hypoxia and hypercarbia (HCC) [J96.01, J96.02] Acute respiratory failure with hypoxia and hypercarbia (HCC)  Procedure(s) (LRB):  EGD ESOPHAGOGASTRODUODENOSCOPY PEG TUBE INSERTION (N/A) 12 Days Post-Op  Precautions:  Fall Risk                Recommendations for nursing mobility: Out of bed to chair for meals, Encourage HEP in prep for ADLs/mobility; see handout for details, Frequent repositioning to prevent skin breakdown, Use of bed/chair alarm for safety, LE elevation for management of edema, Use of BSC for toileting , AD and gt belt for bed to chair , Jess Stedy for bed to chair transfers , and Assist x2    In place during session: Peripheral IV, External Catheter, EKG/telemetry , and PEG Tube  Chart, physical therapy assessment, plan of care and goals were reviewed.  ASSESSMENT  Patient continues with skilled PT services and is slowly progressing towards goals. Pt received semi supine on bed upon PT arrival, agreeable to session. Pt A&O x self, place, disoriented to time. (See below for objective details and assist levels).     Overall pt tolerated session fair today, requires vc's, additional time and SBA for bed mobility, modAx 2 for STS and bed<>chair transfers with cues for hand placement and technique, able to take few steps towards the chair - 2' with RW, modAx2 , demos slow shuffling gait, b/l knees buckling in stance, requires cues for safety, walker management and gait mechanics.  Will continue to benefit from skilled PT services, and will continue to progress as tolerated. Potential barriers for safe discharge: pt has decreased 
  Problem: Discharge Planning  Goal: Discharge to home or other facility with appropriate resources  1/23/2024 0250 by Alayna Wagner RN  Outcome: Progressing     Problem: Safety - Adult  Goal: Free from fall injury  1/23/2024 1557 by Domi Adames RN  Outcome: Progressing  1/23/2024 0250 by Alayna Wagner RN  Outcome: Progressing     Problem: Pain  Goal: Verbalizes/displays adequate comfort level or baseline comfort level  Outcome: Progressing     Problem: Musculoskeletal - Adult  Goal: Return mobility to safest level of function  1/23/2024 0250 by Alayna Wagner RN  Outcome: Progressing     Problem: Skin/Tissue Integrity  Goal: Absence of new skin breakdown  Description: 1.  Monitor for areas of redness and/or skin breakdown  2.  Assess vascular access sites hourly  3.  Every 4-6 hours minimum:  Change oxygen saturation probe site  4.  Every 4-6 hours:  If on nasal continuous positive airway pressure, respiratory therapy assess nares and determine need for appliance change or resting period.  Outcome: Progressing     
  Problem: Discharge Planning  Goal: Discharge to home or other facility with appropriate resources  Outcome: Progressing     Problem: Musculoskeletal - Adult  Goal: Return mobility to safest level of function  Outcome: Progressing     Problem: Safety - Adult  Goal: Free from fall injury  1/23/2024 0250 by Alayna Wagner, RN  Outcome: Progressing       
  Problem: Discharge Planning  Goal: Discharge to home or other facility with appropriate resources  Outcome: Progressing     Problem: Neurosensory - Adult  Goal: Absence of seizures  Outcome: Progressing     Problem: Neurosensory - Adult  Goal: Remains free of injury related to seizures activity  Outcome: Progressing     Problem: Metabolic/Fluid and Electrolytes - Adult  Goal: Electrolytes maintained within normal limits  Outcome: Progressing     
  Problem: Discharge Planning  Goal: Discharge to home or other facility with appropriate resources  Outcome: Progressing     Problem: Neurosensory - Adult  Goal: Achieves stable or improved neurological status  Outcome: Progressing  Flowsheets (Taken 12/29/2023 2000)  Achieves stable or improved neurological status: Assess for and report changes in neurological status  Goal: Absence of seizures  Outcome: Progressing  Flowsheets (Taken 12/29/2023 2000)  Absence of seizures: Monitor for seizure activity.  If seizure occurs, document type and location of movements and any associated apnea  Goal: Remains free of injury related to seizures activity  Outcome: Progressing  Flowsheets (Taken 12/29/2023 2000)  Remains free of injury related to seizure activity: Maintain airway, patient safety  and administer oxygen as ordered  Goal: Achieves maximal functionality and self care  Outcome: Progressing  Flowsheets (Taken 12/29/2023 2000)  Achieves maximal functionality and self care: Monitor swallowing and airway patency with patient fatigue and changes in neurological status     Problem: Respiratory - Adult  Goal: Achieves optimal ventilation and oxygenation  Outcome: Progressing     Problem: Cardiovascular - Adult  Goal: Maintains optimal cardiac output and hemodynamic stability  Outcome: Progressing  Flowsheets (Taken 12/29/2023 2000)  Maintains optimal cardiac output and hemodynamic stability: Monitor blood pressure and heart rate  Goal: Absence of cardiac dysrhythmias or at baseline  Outcome: Progressing  Flowsheets (Taken 12/29/2023 2000)  Absence of cardiac dysrhythmias or at baseline: Monitor cardiac rate and rhythm     Problem: Infection - Adult  Goal: Absence of infection at discharge  Outcome: Progressing  Goal: Absence of infection during hospitalization  Outcome: Progressing  Goal: Absence of fever/infection during anticipated neutropenic period  Outcome: Progressing     Problem: Metabolic/Fluid and Electrolytes - 
  Problem: Discharge Planning  Goal: Discharge to home or other facility with appropriate resources  Outcome: Progressing     Problem: Neurosensory - Adult  Goal: Achieves stable or improved neurological status  Outcome: Progressing  Goal: Absence of seizures  Outcome: Progressing  Goal: Achieves maximal functionality and self care  Outcome: Progressing     Problem: Respiratory - Adult  Goal: Achieves optimal ventilation and oxygenation  Outcome: Progressing     Problem: Cardiovascular - Adult  Goal: Maintains optimal cardiac output and hemodynamic stability  Outcome: Progressing     
  Problem: Discharge Planning  Goal: Discharge to home or other facility with appropriate resources  Outcome: Progressing     Problem: Neurosensory - Adult  Goal: Achieves stable or improved neurological status  Outcome: Progressing  Goal: Absence of seizures  Outcome: Progressing  Goal: Remains free of injury related to seizures activity  Outcome: Progressing  Goal: Achieves maximal functionality and self care  Outcome: Progressing     Problem: Respiratory - Adult  Goal: Achieves optimal ventilation and oxygenation  Outcome: Progressing     Problem: Cardiovascular - Adult  Goal: Maintains optimal cardiac output and hemodynamic stability  Outcome: Progressing  Goal: Absence of cardiac dysrhythmias or at baseline  Outcome: Progressing     Problem: Infection - Adult  Goal: Absence of infection at discharge  Outcome: Progressing  Goal: Absence of infection during hospitalization  Outcome: Progressing  Goal: Absence of fever/infection during anticipated neutropenic period  Outcome: Progressing     Problem: Metabolic/Fluid and Electrolytes - Adult  Goal: Electrolytes maintained within normal limits  Outcome: Progressing  Goal: Hemodynamic stability and optimal renal function maintained  Outcome: Progressing  Goal: Glucose maintained within prescribed range  Outcome: Progressing     Problem: Physical Therapy - Adult  Goal: By Discharge: Performs mobility at highest level of function for planned discharge setting.  See evaluation for individualized goals.  Description: FUNCTIONAL STATUS PRIOR TO ADMISSION: Per , who was present in room, pt was I for ADLs and IADLs prior to admission, did not use AD for amb. Pt resides with  and 4 children in a 1st floor apartment with level entry.    Physical Therapy Goals  Initiated 1/2/2024  Pt stated goal: to get better  Pt will be I with LE HEP in 7 days.  Pt will perform bed mobility with Moderate Assist in 7 days.  1/10/2024 1444 by Melanie White, PT  Outcome: 
  Problem: Discharge Planning  Goal: Discharge to home or other facility with appropriate resources  Outcome: Progressing  Flowsheets (Taken 1/15/2024 0940 by Ismael Dawson LPN)  Discharge to home or other facility with appropriate resources: Identify barriers to discharge with patient and caregiver     
  Problem: Discharge Planning  Goal: Discharge to home or other facility with appropriate resources  Outcome: Progressing  Flowsheets (Taken 1/3/2024 0830)  Discharge to home or other facility with appropriate resources: Identify barriers to discharge with patient and caregiver     Problem: Neurosensory - Adult  Goal: Achieves stable or improved neurological status  Outcome: Progressing     Problem: Respiratory - Adult  Goal: Achieves optimal ventilation and oxygenation  Outcome: Progressing     Problem: Infection - Adult  Goal: Absence of infection at discharge  Outcome: Progressing     Problem: Chronic Conditions and Co-morbidities  Goal: Patient's chronic conditions and co-morbidity symptoms are monitored and maintained or improved  Outcome: Progressing  Flowsheets (Taken 1/3/2024 0830)  Care Plan - Patient's Chronic Conditions and Co-Morbidity Symptoms are Monitored and Maintained or Improved: Monitor and assess patient's chronic conditions and comorbid symptoms for stability, deterioration, or improvement     Problem: Safety - Adult  Goal: Free from fall injury  Outcome: Progressing     Problem: Pain  Goal: Verbalizes/displays adequate comfort level or baseline comfort level  Outcome: Progressing  Flowsheets (Taken 1/3/2024 0830)  Verbalizes/displays adequate comfort level or baseline comfort level:   Encourage patient to monitor pain and request assistance   Assess pain using appropriate pain scale     
  Problem: Discharge Planning  Goal: Discharge to home or other facility with appropriate resources  Outcome: Progressing  Flowsheets (Taken 1/5/2024 0947)  Discharge to home or other facility with appropriate resources: Identify barriers to discharge with patient and caregiver     Problem: Neurosensory - Adult  Goal: Achieves stable or improved neurological status  Outcome: Progressing  Flowsheets (Taken 1/5/2024 0947)  Achieves stable or improved neurological status: Assess for and report changes in neurological status  Goal: Absence of seizures  Outcome: Progressing  Flowsheets (Taken 1/5/2024 0947)  Absence of seizures: Monitor for seizure activity.  If seizure occurs, document type and location of movements and any associated apnea  Goal: Remains free of injury related to seizures activity  Outcome: Progressing  Flowsheets (Taken 1/5/2024 0947)  Remains free of injury related to seizure activity: Maintain airway, patient safety  and administer oxygen as ordered  Goal: Achieves maximal functionality and self care  Outcome: Progressing  Flowsheets (Taken 1/5/2024 0947)  Achieves maximal functionality and self care: Monitor swallowing and airway patency with patient fatigue and changes in neurological status     Problem: Respiratory - Adult  Goal: Achieves optimal ventilation and oxygenation  Outcome: Progressing  Flowsheets (Taken 1/5/2024 0947)  Achieves optimal ventilation and oxygenation: Assess for changes in respiratory status     Problem: Cardiovascular - Adult  Goal: Maintains optimal cardiac output and hemodynamic stability  Outcome: Progressing  Flowsheets (Taken 1/5/2024 0947)  Maintains optimal cardiac output and hemodynamic stability: Monitor blood pressure and heart rate  Goal: Absence of cardiac dysrhythmias or at baseline  Outcome: Progressing  Flowsheets (Taken 1/5/2024 0947)  Absence of cardiac dysrhythmias or at baseline: Monitor cardiac rate and rhythm     Problem: Infection - Adult  Goal: 
  Problem: Discharge Planning  Goal: Discharge to home or other facility with appropriate resources  Outcome: Progressing  Flowsheets (Taken 12/16/2023 0700)  Discharge to home or other facility with appropriate resources: Identify barriers to discharge with patient and caregiver     Problem: Neurosensory - Adult  Goal: Achieves stable or improved neurological status  Outcome: Progressing  Flowsheets (Taken 12/16/2023 0700)  Achieves stable or improved neurological status: Assess for and report changes in neurological status  Goal: Absence of seizures  Outcome: Progressing  Flowsheets (Taken 12/16/2023 0700)  Absence of seizures: Monitor for seizure activity.  If seizure occurs, document type and location of movements and any associated apnea  Goal: Remains free of injury related to seizures activity  Outcome: Progressing  Flowsheets (Taken 12/16/2023 0700)  Remains free of injury related to seizure activity: Maintain airway, patient safety  and administer oxygen as ordered  Goal: Achieves maximal functionality and self care  Outcome: Progressing  Flowsheets (Taken 12/16/2023 0700)  Achieves maximal functionality and self care: Monitor swallowing and airway patency with patient fatigue and changes in neurological status     Problem: Respiratory - Adult  Goal: Achieves optimal ventilation and oxygenation  Outcome: Progressing  Flowsheets (Taken 12/16/2023 0700)  Achieves optimal ventilation and oxygenation: Assess for changes in respiratory status     Problem: Cardiovascular - Adult  Goal: Maintains optimal cardiac output and hemodynamic stability  Outcome: Progressing  Flowsheets (Taken 12/16/2023 0700)  Maintains optimal cardiac output and hemodynamic stability: Monitor blood pressure and heart rate  Goal: Absence of cardiac dysrhythmias or at baseline  Outcome: Progressing  Flowsheets (Taken 12/16/2023 0700)  Absence of cardiac dysrhythmias or at baseline: Monitor cardiac rate and rhythm     Problem: Infection - 
  Problem: Discharge Planning  Goal: Discharge to home or other facility with appropriate resources  Outcome: Progressing  Flowsheets (Taken 12/28/2023 2000)  Discharge to home or other facility with appropriate resources: Identify barriers to discharge with patient and caregiver     Problem: Neurosensory - Adult  Goal: Achieves stable or improved neurological status  Outcome: Progressing  Flowsheets (Taken 12/28/2023 2000)  Achieves stable or improved neurological status: Assess for and report changes in neurological status  Goal: Absence of seizures  Outcome: Progressing  Flowsheets (Taken 12/28/2023 2000)  Absence of seizures: Monitor for seizure activity.  If seizure occurs, document type and location of movements and any associated apnea  Goal: Remains free of injury related to seizures activity  Outcome: Progressing  Flowsheets (Taken 12/28/2023 2000)  Remains free of injury related to seizure activity: Maintain airway, patient safety  and administer oxygen as ordered  Goal: Achieves maximal functionality and self care  Outcome: Progressing  Flowsheets (Taken 12/28/2023 2000)  Achieves maximal functionality and self care: Monitor swallowing and airway patency with patient fatigue and changes in neurological status     Problem: Respiratory - Adult  Goal: Achieves optimal ventilation and oxygenation  Outcome: Progressing  Flowsheets (Taken 12/28/2023 2000)  Achieves optimal ventilation and oxygenation: Assess for changes in respiratory status     Problem: Cardiovascular - Adult  Goal: Maintains optimal cardiac output and hemodynamic stability  Outcome: Progressing  Goal: Absence of cardiac dysrhythmias or at baseline  Outcome: Progressing     Problem: Infection - Adult  Goal: Absence of infection at discharge  Outcome: Progressing  Flowsheets (Taken 12/28/2023 2000)  Absence of infection at discharge: Assess and monitor for signs and symptoms of infection  Goal: Absence of infection during 
  Problem: Discharge Planning  Goal: Discharge to home or other facility with appropriate resources  Outcome: Progressing  Flowsheets (Taken 12/30/2023 2000)  Discharge to home or other facility with appropriate resources: Identify barriers to discharge with patient and caregiver     Problem: Neurosensory - Adult  Goal: Achieves stable or improved neurological status  Outcome: Progressing  Flowsheets (Taken 12/30/2023 2000)  Achieves stable or improved neurological status: Assess for and report changes in neurological status  Goal: Absence of seizures  Outcome: Progressing  Flowsheets (Taken 12/30/2023 2000)  Absence of seizures: Monitor for seizure activity.  If seizure occurs, document type and location of movements and any associated apnea  Goal: Remains free of injury related to seizures activity  Outcome: Progressing  Flowsheets (Taken 12/30/2023 2000)  Remains free of injury related to seizure activity: Maintain airway, patient safety  and administer oxygen as ordered  Goal: Achieves maximal functionality and self care  Outcome: Progressing  Flowsheets (Taken 12/30/2023 2000)  Achieves maximal functionality and self care: Monitor swallowing and airway patency with patient fatigue and changes in neurological status     Problem: Respiratory - Adult  Goal: Achieves optimal ventilation and oxygenation  Outcome: Progressing     Problem: Cardiovascular - Adult  Goal: Maintains optimal cardiac output and hemodynamic stability  Outcome: Progressing  Flowsheets (Taken 12/30/2023 2000)  Maintains optimal cardiac output and hemodynamic stability: Monitor blood pressure and heart rate  Goal: Absence of cardiac dysrhythmias or at baseline  Outcome: Progressing  Flowsheets (Taken 12/30/2023 2000)  Absence of cardiac dysrhythmias or at baseline: Monitor cardiac rate and rhythm     Problem: Infection - Adult  Goal: Absence of infection at discharge  Outcome: Progressing  Flowsheets (Taken 12/30/2023 2000)  Absence of 
  Problem: Discharge Planning  Goal: Discharge to home or other facility with appropriate resources  Recent Flowsheet Documentation  Taken 12/20/2023 2000 by Steven Santos RN  Discharge to home or other facility with appropriate resources:   Identify barriers to discharge with patient and caregiver   Arrange for needed discharge resources and transportation as appropriate   Identify discharge learning needs (meds, wound care, etc)     Problem: Neurosensory - Adult  Goal: Achieves stable or improved neurological status  Recent Flowsheet Documentation  Taken 12/20/2023 2000 by Steven Santos RN  Achieves stable or improved neurological status:   Assess for and report changes in neurological status   Maintain blood pressure and fluid volume within ordered parameters to optimize cerebral perfusion and minimize risk of hemorrhage   Initiate measures to prevent increased intracranial pressure  Goal: Absence of seizures  Recent Flowsheet Documentation  Taken 12/20/2023 2000 by Steven Santos RN  Absence of seizures:   Monitor for seizure activity.  If seizure occurs, document type and location of movements and any associated apnea   If seizure occurs, turn head to side and suction secretions as needed   Administer anticonvulsants as ordered  Goal: Remains free of injury related to seizures activity  Recent Flowsheet Documentation  Taken 12/20/2023 2000 by Steven Santos RN  Remains free of injury related to seizure activity:   Maintain airway, patient safety  and administer oxygen as ordered   Monitor patient for seizure activity, document and report duration and description of seizure to Licensed Independent Practitioner   If seizure occurs, turn patient to side and suction secretions as needed  Goal: Achieves maximal functionality and self care  Recent Flowsheet Documentation  Taken 12/20/2023 2000 by Steven Santos RN  Achieves maximal functionality and self care:   Encourage and assist patient to increase 
  Problem: Metabolic/Fluid and Electrolytes - Adult  Goal: Glucose maintained within prescribed range  Outcome: Progressing     Problem: Neurosensory - Adult  Goal: Remains free of injury related to seizures activity  Outcome: Progressing     
  Problem: Neurosensory - Adult  Goal: Absence of seizures  1/24/2024 0149 by Heydi Pino LPN  Outcome: Progressing     Problem: Respiratory - Adult  Goal: Achieves optimal ventilation and oxygenation  1/24/2024 0149 by Heydi Pino LPN  Outcome: Progressing     Problem: Metabolic/Fluid and Electrolytes - Adult  Goal: Glucose maintained within prescribed range  1/24/2024 0149 by Heydi Pino LPN  Outcome: Progressing     Problem: Safety - Adult  Goal: Free from fall injury  Outcome: Progressing     Problem: Pain  Goal: Verbalizes/displays adequate comfort level or baseline comfort level  Outcome: Progressing     Problem: Skin/Tissue Integrity  Goal: Absence of new skin breakdown  Description: 1.  Monitor for areas of redness and/or skin breakdown  2.  Assess vascular access sites hourly  3.  Every 4-6 hours minimum:  Change oxygen saturation probe site  4.  Every 4-6 hours:  If on nasal continuous positive airway pressure, respiratory therapy assess nares and determine need for appliance change or resting period.  Outcome: Progressing     Problem: ABCDS Injury Assessment  Goal: Absence of physical injury  Outcome: Progressing     
  Problem: Neurosensory - Adult  Goal: Absence of seizures  Outcome: Progressing     Problem: Safety - Adult  Goal: Free from fall injury  1/23/2024 1847 by Domi Adames RN  Outcome: Progressing  1/23/2024 1557 by Domi Adames RN  Outcome: Progressing     Problem: Pain  Goal: Verbalizes/displays adequate comfort level or baseline comfort level  1/23/2024 1847 by Domi Adames RN  Outcome: Progressing  1/23/2024 1557 by Domi Adames RN  Outcome: Progressing     
  Problem: Neurosensory - Adult  Goal: Achieves stable or improved neurological status  1/11/2024 1052 by Selene Harrison RN  Outcome: Progressing  1/11/2024 0140 by Kevin Singh RN  Outcome: Progressing     Problem: Neurosensory - Adult  Goal: Absence of seizures  1/11/2024 1052 by Selene Harrison RN  Outcome: Progressing  1/11/2024 0140 by Kevin Singh RN  Outcome: Progressing     
  Problem: Neurosensory - Adult  Goal: Achieves stable or improved neurological status  Outcome: Progressing     Problem: Respiratory - Adult  Goal: Achieves optimal ventilation and oxygenation  Outcome: Progressing     
  Problem: Pain  Goal: Verbalizes/displays adequate comfort level or baseline comfort level  Outcome: Progressing     Problem: Skin/Tissue Integrity  Goal: Absence of new skin breakdown  Description: 1.  Monitor for areas of redness and/or skin breakdown  2.  Assess vascular access sites hourly  3.  Every 4-6 hours minimum:  Change oxygen saturation probe site  4.  Every 4-6 hours:  If on nasal continuous positive airway pressure, respiratory therapy assess nares and determine need for appliance change or resting period.  Outcome: Progressing     
  Problem: Physical Therapy - Adult  Goal: By Discharge: Performs mobility at highest level of function for planned discharge setting.  See evaluation for individualized goals.  Description: FUNCTIONAL STATUS PRIOR TO ADMISSION: The patient  required minimal assistance for basic and instrumental ADLs.    HOME SUPPORT PRIOR TO ADMISSION: The patient lived with family and required minimal assistance for some activities.As per notes she was able to cook some for kids..    Physical Therapy Goals  Initiated 1/2/2024  Pt stated goal: none verbalized  Pt will be I with LE HEP in 7 days.  Pt will perform bed mobility with Moderate Assist in 7 days.  1/2/2024 1423 by Alda Clark, PT  Outcome: Not Progressing     Problem: Physical Therapy - Adult  Goal: By Discharge: Performs mobility at highest level of function for planned discharge setting.  See evaluation for individualized goals.  Description: FUNCTIONAL STATUS PRIOR TO ADMISSION: The patient  required minimal assistance for basic and instrumental ADLs.    HOME SUPPORT PRIOR TO ADMISSION: The patient lived with family and required minimal assistance for some activities.As per notes she was able to cook some for kids..    Physical Therapy Goals  Initiated 1/2/2024  Pt stated goal: none verbalized  Pt will be I with LE HEP in 7 days.  Pt will perform bed mobility with Moderate Assist in 7 days.  1/2/2024 1423 by Alda Clark, PT  Outcome: Not Progressing     
  Problem: Respiratory - Adult  Goal: Achieves optimal ventilation and oxygenation  Outcome: Progressing     
  Problem: Safety - Adult  Goal: Free from fall injury  1/23/2024 1847 by Domi Adames RN  Outcome: Progressing  1/23/2024 1557 by Domi Adames RN  Outcome: Progressing     Problem: Pain  Goal: Verbalizes/displays adequate comfort level or baseline comfort level  1/23/2024 1847 by Domi Adames RN  Outcome: Progressing  1/23/2024 1557 by Domi Adames RN  Outcome: Progressing     Problem: Skin/Tissue Integrity  Goal: Absence of new skin breakdown  Description: 1.  Monitor for areas of redness and/or skin breakdown  2.  Assess vascular access sites hourly  3.  Every 4-6 hours minimum:  Change oxygen saturation probe site  4.  Every 4-6 hours:  If on nasal continuous positive airway pressure, respiratory therapy assess nares and determine need for appliance change or resting period.  1/23/2024 1847 by Domi Adames RN  Outcome: Progressing  1/23/2024 1557 by Domi Adames RN  Outcome: Progressing     Problem: SLP Adult - Impaired Swallowing  Goal: By Discharge: Advance to least restrictive diet without signs or symptoms of aspiration for planned discharge setting.  See evaluation for individualized goals.  Description: Speech Therapy Swallow Goals    Initiated 12/31/2023    -Patient stated goal: pt is eager for PO intake  -Patient will tolerate PO trials without clinical indicators of aspiration given min cues within 7 day(s).    -Patient will tolerate minced and moist/thin diet without clinical indicators of aspiration given min cues within 7 day(s).    -Patient will participate in modified barium swallow study within 7 day(s).  MET  -Patient will demonstrate understanding of swallow safety precautions and aspiration precautions, diet recs with min cues within 7 day(s).  1/23/2024 1645 by Annita Portillo, SLP  Outcome: Progressing     Problem: Physical Therapy - Adult  Goal: By Discharge: Performs mobility at highest level of function for planned discharge setting.  See evaluation for 
  Problem: Safety - Adult  Goal: Free from fall injury  Outcome: Progressing     Problem: Pain  Goal: Verbalizes/displays adequate comfort level or baseline comfort level  Outcome: Progressing     Problem: Skin/Tissue Integrity  Goal: Absence of new skin breakdown  Description: 1.  Monitor for areas of redness and/or skin breakdown  2.  Assess vascular access sites hourly  3.  Every 4-6 hours minimum:  Change oxygen saturation probe site  4.  Every 4-6 hours:  If on nasal continuous positive airway pressure, respiratory therapy assess nares and determine need for appliance change or resting period.  Outcome: Progressing     Problem: ABCDS Injury Assessment  Goal: Absence of physical injury  Outcome: Progressing     Problem: Physical Therapy - Adult  Goal: By Discharge: Performs mobility at highest level of function for planned discharge setting.  See evaluation for individualized goals.  Description: FUNCTIONAL STATUS PRIOR TO ADMISSION: Per , who was present in room, pt was I for ADLs and IADLs prior to admission, did not use AD for amb. Pt resides with  and 4 children in a 1st floor apartment with level entry.    Physical Therapy Goals  Initiated 1/2/2024  Revised 1/17/2024  Pt stated goal: to get better  Pt will be I with LE HEP in 7 days.  Pt will perform bed mobility with Moderate Assist in 7 days.  Patient will be able to sustain standing ~1 min x 7 days  Patient will be able to take 5 steps with Ax2 and rW and chair following x 7 days  1/22/2024 1502 by Isai Olmstead PT  Outcome: Progressing     Problem: Occupational Therapy - Adult  Goal: By Discharge: Performs self-care activities at highest level of function for planned discharge setting.  See evaluation for individualized goals.  Description: FUNCTIONAL STATUS PRIOR TO ADMISSION:  Pt was independent and active without use of DME for functional mobility. Pt was independent for ADLs.    HOME SUPPORT: The patient lived with  but did not 
  Problem: Safety - Adult  Goal: Free from fall injury  Outcome: Progressing     Problem: Pain  Goal: Verbalizes/displays adequate comfort level or baseline comfort level  Outcome: Progressing  Flowsheets (Taken 1/16/2024 2016 by Sangita Paulino RN)  Verbalizes/displays adequate comfort level or baseline comfort level:   Encourage patient to monitor pain and request assistance   Assess pain using appropriate pain scale   Administer analgesics based on type and severity of pain and evaluate response   Implement non-pharmacological measures as appropriate and evaluate response   Consider cultural and social influences on pain and pain management     
OCCUPATIONAL THERAPY EVALUATION  Patient: Lucretia Andres (53 y.o. female)  Date: 1/10/2024  Primary Diagnosis: Seizure ([facility]) [R56.9]  Hyperglycemia [R73.9]  NSTEMI (non-ST elevated myocardial infarction) ([facility]) [I21.4]  Acute respiratory failure with hypoxia (HCC) [J96.01]  Hypertensive emergency [I16.1]  Tooth avulsion, initial encounter [S03.2XXA]  Acute respiratory failure with hypoxia and hypercarbia (HCC) [J96.01, J96.02]  Procedure(s) (LRB):  EGD ESOPHAGOGASTRODUODENOSCOPY PEG TUBE INSERTION (N/A) Day of Surgery   Precautions: Ax2, Fall Risk, Bed Alarm, Aspiration Risk, Contact Precautions                Recommendations for nursing mobility: Encourage HEP in prep for ADLs/mobility; see handout for details, Frequent repositioning to prevent skin breakdown, Use of bed/chair alarm for safety, and Assist x2    In place during session:Peripheral IV, Nasal Cannula 2L, Menezes Catheter, Flexi-seal, EKG/telemetry , and Nasogastric Tube  ASSESSMENT  Pt is a 53 y.o. female presenting to [facility] with c/o seizures, admitted 12/13/23 and currently being treated for pheochromocytoma, anemia, hypokalemia, status epilepticus, vocal cord paresis, DM II, hyperglycemia, sacral wound, hypoxemic respiratory failure, and ADONIS. Pt received semi-supine in bed upon arrival, AXO x person only, and agreeable to OT evaluation.     Based on current observations, pt presents with decreased  functional mobility, independence in ADLs, high-level IADLs, ROM, strength, body mechanics, activity tolerance, endurance, cognition, command following, attention/concentration, coordination, balance, posture, fine-motor control (see below for objective details and assist levels).     Overall, pt tolerates session fair with c/o pain with movement of LLE. Bed mobility completed with max Ax2 and additional time. Pt able to sit EOB with poor sitting balance req'ing constant support to remain in upright positioning. Pt demo'ing decreased strength/AROM of BUE req'ing 
OCCUPATIONAL THERAPY TREATMENT  Patient: Lucretia Andres (53 y.o. female)  Date: 1/12/2024  Primary Diagnosis: Seizure (HCC) [R56.9]  Hyperglycemia [R73.9]  NSTEMI (non-ST elevated myocardial infarction) (HCC) [I21.4]  Acute respiratory failure with hypoxia (HCC) [J96.01]  Hypertensive emergency [I16.1]  Tooth avulsion, initial encounter [S03.2XXA]  Acute respiratory failure with hypoxia and hypercarbia (HCC) [J96.01, J96.02]  Procedure(s) (LRB):  EGD ESOPHAGOGASTRODUODENOSCOPY PEG TUBE INSERTION (N/A) 2 Days Post-Op   Precautions: Fall Risk, Bed Alarm                Recommendations for nursing mobility: Encourage HEP in prep for ADLs/mobility; see handout for details, Frequent repositioning to prevent skin breakdown, Use of bed/chair alarm for safety, and Assist x2    In place during session: Peripheral IV, Central Venous Line, Flexi-seal, EKG/telemetry , and PEG Tube  Chart, occupational therapy assessment, plan of care, and goals were reviewed.  ASSESSMENT  Patient continues with skilled OT services and is slowly progressing towards goals. Pt semi supine upon OT arrival, agreeable to session. Pt A&O x person (answers to name). Pt continues to req Ax2 for bed mobility and physical assist to maintain balance while seated EOB. Pt demo'ing progress with grooming while seated EOB, req'ing mod A to support RUE during task and pt able to navigate all areas of face with wash cloth. UE therex completed while supine and seated EOB (see below for details) to increase strength, AROM, and endurance req'd for ADLs and functional mobility/transfers. Pt req'd cuing while seated EOB to maintain cervical alignment/posture in prep for seated grooming. Pt's HR monitored throughout session (See below for objective details and assist levels).     Overall pt tolerated session fair today with no c/o pain. Potential barriers for safe discharge: pts current support system is unable to meet their requirements for physical assistance, pt is a 
PHYSICAL THERAPY EVALUATION  Patient: Lucretia Andres (53 y.o. female)  Date: 1/2/2024  Primary Diagnosis: Seizure ([facility]) [R56.9]  Hyperglycemia [R73.9]  NSTEMI (non-ST elevated myocardial infarction) ([facility]) [I21.4]  Acute respiratory failure with hypoxia ([facility]) [J96.01]  Hypertensive emergency [I16.1]  Tooth avulsion, initial encounter [S03.2XXA]  Acute respiratory failure with hypoxia and hypercarbia ([facility]) [J96.01, J96.02]       Precautions: Fall Risk, Bed Alarm, Aspiration Risk, Contact Precautions                Recommendations for nursing mobility: Frequent repositioning to prevent skin breakdown    In place during session: Peripheral IV, PICC line, External Catheter, and EKG/telemetry     ASSESSMENT  Pt is a 53 y.o. female admitted on 12/13/2023 for seizure; Patient 53-year-old lady came in because of seizure-like activity and she was found unresponsive postictal hypoxic came to the emergency room and subsequently got intubated she had a history of seizures in September 2023 she was at [facility] she is on Keppra at home apparently her pregnancy test came back positive but no intrauterine pregnancy or ectopic pregnancy from ultrasound send now she is intubated on ventilator unable to get any history out of the patient. pt currently being treated for hypoxia and hypercarbia . Pt semi supine  upon PT arrival, agreeable to evaluation. Pt A&O x 4.      Based on the objective data described below, the patient currently presents with impaired functional mobility, decreased independence in ADLs, impaired ability to perform high-level IADLs, decreased ROM, impaired strength, poor body mechanics, poor safety awareness, impaired balance, and impaired posture. (See below for objective details and assist levels).   Ptient able to move RT ue for attempt to .  Overall pt tolerated session poor today with PT. Pt required total for bed mobility and transfers.rolling side to side with CNA for bed and chux pad 
SPEECH PATHOLOGY MODIFIED BARIUM SWALLOW STUDY  Patient: Lucretia Andres (53 y.o. female)  Date: 1/12/2024  Primary Diagnosis: Seizure ([facility]) [R56.9]  Hyperglycemia [R73.9]  NSTEMI (non-ST elevated myocardial infarction) ([facility]) [I21.4]  Acute respiratory failure with hypoxia ([facility]) [J96.01]  Hypertensive emergency [I16.1]  Tooth avulsion, initial encounter [S03.2XXA]  Acute respiratory failure with hypoxia and hypercarbia ([facility]) [J96.01, J96.02]  Procedure(s) (LRB):  EGD ESOPHAGOGASTRODUODENOSCOPY PEG TUBE INSERTION (N/A) 2 Days Post-Op   Precautions: aspiration,   Fall Risk, Bed Alarm   DIET RECOMMENDATIONS:Full liquid and mildly thick liquids    SWALLOW SAFETY PRECAUTIONS: 1:1 assistance with ALL PO intake, STRICT aspiration and GERD precautions, monitor pt closely for s/s aspiration, meds via PEG, FEED ONLY IF AWAKE AND ALERT, PEG TF as per MD/RD Recs.      ASSESSMENT :  Based on the objective data described below, the patient presents with mild oropharyngeal dysphagia without observed pen/asp.     See details of MBS below.   Oral phase functional with mild delay, reduced efficacy of mastication. Oral bolus fragmentation leading to vallecular pooling (mild) after initial swallow.   Pharyngeal phase with persistent delay with varying vallecular/pyriform pooling prior to pharyngeal phase initiation. Once initiated, pharyngeal phase WNL. Pt clears vallecular pooling from oral residue with verbal prompts for second swallow.   No pen/asp observed with any trials.      Mildly reduced UES relaxation, boluses clear without difficulty.   See radiologist report re: cervical osteophyte.     Patient will benefit from skilled intervention to address the above impairments.       PLAN :  Recommendations and Planned Interventions:  DIET RECOMMENDATIONS:Full liquid and mildly thick liquids    SWALLOW SAFETY PRECAUTIONS: 1:1 assistance with ALL PO intake, STRICT aspiration and GERD precautions, monitor pt closely for s/s aspiration, meds 
Speech LAnguage Pathology Dysphagia EVALUATION    Patient: Lucretia Andres (53 y.o. female)  Date: 12/31/2023  Primary Diagnosis: Seizure ([facility]) [R56.9]  Hyperglycemia [R73.9]  NSTEMI (non-ST elevated myocardial infarction) ([facility]) [I21.4]  Acute respiratory failure with hypoxia ([facility]) [J96.01]  Hypertensive emergency [I16.1]  Tooth avulsion, initial encounter [S03.2XXA]  Acute respiratory failure with hypoxia and hypercarbia ([facility]) [J96.01, J96.02]       Precautions: aspiration, seizure                    Recommendations: NPO; will defer to MD for alternate means of nutrition. PO trials with SLP ONLY.  Recommend frequent oral care. MBS if/as indicated and patient appropriate to participate. Cog-linguistic eval if/as indicated.     ASSESSMENT :  Based on the objective data described below, the patient presents with moderate-severe.    Patient extubated previous date. On 2L NC. Per chart review and nsg report, NGT insertions failed, and patient's spouse not consenting to attempting insertion at time of eval. Minimal thin via ice chip/tsp and mildly thick liquid via tsp trials provided s/t severity and overall medical status. Results of recent CXR noted. Patient remains nonverbal throughout but appears with oral groping; unsure if attempting to verbalize/phonate or related to possible apraxia. Per chart review, there are concerns present for vocal fold damage following reintubation/extubation. Otolaryngologist consult pending. Responds to min yes/no questions by shaking/nodding head. Patient does acknowledge throat pain by nodding when asked. Reduced command following.   Xerostomia noted; oral care provided with moistened oral swab.   Overall weakness appreciated. Difficulty forming labial seal around tsp, requiring multiple verbal cues, and anterior spillage noted. Reduced bolus awareness, prolonged manipulation of bolus, reduced bolus control, bolus holding, delay in a-p transit.   Moderately delayed swallow initiation. 
Speech LAnguage Pathology Dysphagia TREATMENT    Patient: Lucretia Andres (53 y.o. female)  Date: 1/15/2024  Primary Diagnosis: Seizure ([facility]) [R56.9]  Hyperglycemia [R73.9]  NSTEMI (non-ST elevated myocardial infarction) ([facility]) [I21.4]  Acute respiratory failure with hypoxia ([facility]) [J96.01]  Hypertensive emergency [I16.1]  Tooth avulsion, initial encounter [S03.2XXA]  Acute respiratory failure with hypoxia and hypercarbia ([facility]) [J96.01, J96.02]  Procedure(s) (LRB):  EGD ESOPHAGOGASTRODUODENOSCOPY PEG TUBE INSERTION (N/A) 5 Days Post-Op   Precautions: aspiration Fall Risk, Bed Alarm                  DIET RECOMMENDATIONS: Puree and mildly thick liquids    SWALLOW SAFETY PRECAUTIONS: 1:1 assistance with ALL PO intake, STRICT aspiration and GERD precautions, monitor pt closely for s/s aspiration, meds crushed if able in applesauce or pudding, FEED ONLY IF AWAKE AND ALERT.      ASSESSMENT :    MBS results reviewed.   Patient alert and seen at bedside. Vocal volume improved. There is episodic dysphonia.   ?'able underlying cognitive impairment. Patient w/ delayed processing and response time. Appears to have some difficulty w/ transitioning task or w/ divided attention.   Administered thin liquids to trial via cup and straw. Reduced labial closure around cup resulting in anterior spillage. Bolus control improved w/ straw. Swallow initiated w/ minimal delay. There was x1 cough response after the swallow of thin. No other clinical indicators of penetration or aspiration. Administered soft solid ( chopped peaches ) to trial. Prolonged mastication w/ intermittent cough response. May be incidental, however does appear slightly disorganized orally.     Recommend diet advancement to puree, continue mildly thick. SLP to trial thins.     Patient will benefit from skilled intervention to address the above impairments.    GOALS:         PLAN :  Recommendations and Planned Interventions:  Diet: Puree and mildly thick liquids  Aspiration 
Speech LAnguage Pathology Dysphagia TREATMENT    Patient: Lucretia Andres (53 y.o. female)  Date: 1/21/2024  Primary Diagnosis: Seizure ([facility]) [R56.9]  Hyperglycemia [R73.9]  NSTEMI (non-ST elevated myocardial infarction) ([facility]) [I21.4]  Acute respiratory failure with hypoxia ([facility]) [J96.01]  Hypertensive emergency [I16.1]  Tooth avulsion, initial encounter [S03.2XXA]  Acute respiratory failure with hypoxia and hypercarbia ([facility]) [J96.01, J96.02]  Procedure(s) (LRB):  EGD ESOPHAGOGASTRODUODENOSCOPY PEG TUBE INSERTION (N/A) 11 Days Post-Op   Precautions: Aspiration/GERD Fall Risk                DIET RECOMMENDATIONS: Soft and bite sized and thin liquids    SWALLOW SAFETY PRECAUTIONS: Upright positioning during po intake and after po intake for at least 30-60 minutes, slow rate, small-single sips and bites, STRICT aspiration and GERD precautions advised. Monitor for overt s/sx of aspiration. ST to follow.    ASSESSMENT :  Patient initially not receptive to ST tx session then agreeable with encouragement though limited participation in voice intervention on this date. Limited verbal productions today and RN enters and reports patient did not sleep much last night. Fully alert throughout tx session.   Upon inspection: poor oral hygiene, flat affect  PO trials: soft solids and thin liquids via cup and patient feeding self today  Based on the objective data described below, the patient presents with moderate oropharyngeal phase dysphagia characterized by the following  Oral phase: prolonged mastication, sluggish clearing of oral cavity and stasis after the swallow  Pharyngeal phase: swallow initiation delayed and HLE weak/reduced for soft solids, WFL for thin liquids  Overt s/sx of aspiration/penetration: coughing with soft solids x3    Patient participates in MPT: 5.19 and 5.45 sec prior to voice breaks.     Patient declines further intervention and session ceased.     Problem: SLP Adult - Impaired Swallowing  Goal: By 
Speech LAnguage Pathology Dysphagia TREATMENT    Patient: Lucretia Andres (53 y.o. female)  Date: 1/22/2024  Primary Diagnosis: Seizure ([facility]) [R56.9]  Hyperglycemia [R73.9]  NSTEMI (non-ST elevated myocardial infarction) ([facility]) [I21.4]  Acute respiratory failure with hypoxia ([facility]) [J96.01]  Hypertensive emergency [I16.1]  Tooth avulsion, initial encounter [S03.2XXA]  Acute respiratory failure with hypoxia and hypercarbia ([facility]) [J96.01, J96.02]  Procedure(s) (LRB):  EGD ESOPHAGOGASTRODUODENOSCOPY PEG TUBE INSERTION (N/A) 12 Days Post-Op   Precautions: aspiration,  Fall Risk   DIET RECOMMENDATIONS: Soft and bite sized and thin liquids    SWALLOW SAFETY PRECAUTIONS:  aspiration and GERD precautions, monitor pt closely for s/s aspiration, meds crushed if able in applesauce or pudding    ASSESSMENT :  Based on the objective data described below, the patient presents with mild oropharyngeal dysphagia, improving dysphonia.    Pt seen for follow-up, positioned upright in bed.   Pt receiving bolus TF at this time. Per RD note, recs to d/c TF after this bottle completed noted.   Pt alert and oriented to person, place, time. Pt interactive and jokes with clinician.     Vocal adduction x 10, sustained phonation x 10, rising pitch tasks x 10- pt with voice breaks with this task but was able to improve pitch range with reduced range target.   Trials of thin via cup/straw and hard solids. Oral phase mildly disorganized and appears related to poor dentition.  Pt is able to clear oral cavity. Pt tolerates all trials without overt s/s aspiration.     Patient will benefit from skilled intervention to address the above impairments.    GOALS:    Problem: SLP Adult - Impaired Swallowing  Goal: By Discharge: Advance to least restrictive diet without signs or symptoms of aspiration for planned discharge setting.  See evaluation for individualized goals.  Description: Speech Therapy Swallow Goals    Initiated 12/31/2023    -Patient stated goal: 
Speech LAnguage Pathology Dysphagia TREATMENT    Patient: Lucretia Andres (53 y.o. female)  Date: 1/23/2024  Primary Diagnosis: Seizure ([facility]) [R56.9]  Hyperglycemia [R73.9]  NSTEMI (non-ST elevated myocardial infarction) ([facility]) [I21.4]  Acute respiratory failure with hypoxia ([facility]) [J96.01]  Hypertensive emergency [I16.1]  Tooth avulsion, initial encounter [S03.2XXA]  Acute respiratory failure with hypoxia and hypercarbia ([facility]) [J96.01, J96.02]  Procedure(s) (LRB):  EGD ESOPHAGOGASTRODUODENOSCOPY PEG TUBE INSERTION (N/A) 13 Days Post-Op   Precautions: aspiration,  Fall Risk     DIET RECOMMENDATIONS: Soft and bite sized and thin liquids    SWALLOW SAFETY PRECAUTIONS: aspiration and GERD precautions, monitor pt closely for s/s aspiration, meds as tolerated      ASSESSMENT :  Based on the objective data described below, the patient presents with stable oropharyngeal swallow function and improving dysphonia.    Pt is pending d/c home.   Patient educated re: swallow safety precautions, diet recs, home prep, appropriate foods and foods to avoid. Pt is able to demonstrate awareness and understanding.   Reviewed with pt vocal hygiene and strategies to promote vocal healing. Basic HEP provided of vocal adduction, max sustained phonation and rising pitch tasks.   Pt performs vocal adduction x 10, max sustained phonation x 10 and rising pitch tasks x 10. Pt requires demonstration for accurate completion.  Max phonation of 5 seconds this date.     Handouts provided and placed at bedside.     Patient will benefit from skilled intervention to address the above impairments.    GOALS:  Problem: SLP Adult - Impaired Swallowing  Goal: By Discharge: Advance to least restrictive diet without signs or symptoms of aspiration for planned discharge setting.  See evaluation for individualized goals.  Description: Speech Therapy Swallow Goals    Initiated 12/31/2023    -Patient stated goal: pt is eager for PO intake  -Patient will tolerate PO 
(%) 94 94   Heart Rate (BPM) 119 118   128-141bpm while sitting EOB.   GOALS:    Problem: Physical Therapy - Adult  Goal: By Discharge: Performs mobility at highest level of function for planned discharge setting.  See evaluation for individualized goals.  Description: FUNCTIONAL STATUS PRIOR TO ADMISSION: Per , who was present in room, pt was I for ADLs and IADLs prior to admission, did not use AD for amb. Pt resides with  and 4 children in a 1st floor apartment with level entry.    Physical Therapy Goals  Initiated 1/2/2024  Pt stated goal: to get better  Pt will be I with LE HEP in 7 days.  Pt will perform bed mobility with Moderate Assist in 7 days.  Outcome: Progressing          PLAN :  Patient continues to benefit from skilled intervention to address functional impairments. Continue treatment per established plan of care to address goals.    Recommendation for discharge: (in order for the patient to meet his/her long term goals)  Inpatient Rehabilitation Facility     IF patient discharges home will need the following DME:     SUBJECTIVE:   Patient stated “I feel okay.”    OBJECTIVE DATA SUMMARY:   Critical Behavior:  Orientation  Orientation Level: Oriented X4  Cognition  Overall Cognitive Status: Exceptions  Arousal/Alertness: Appropriate responses to stimuli  Following Commands: Follows one step commands consistently  Attention Span: Appears intact  Insights: Fully aware of deficits  Initiation: Requires cues for some  Sequencing: Requires cues for some    Functional Mobility Training:  Bed Mobility:  Bed Mobility Training  Interventions: Verbal cues  Rolling: Minimum assistance  Supine to Sit: Moderate assistance;Assist X2  Sit to Supine: Assist X2;Maximum assistance  Scooting: Maximum assistance  Transfers:     Balance:  Balance  Sitting: Impaired  Sitting - Static: Fair (occasional)  Sitting - Dynamic: Poor (constant support);Fair (occasional)  Wheelchair Mobility:     Ambulation/Gait 
Adult  Goal: By Discharge: Performs mobility at highest level of function for planned discharge setting.  See evaluation for individualized goals.  Description: FUNCTIONAL STATUS PRIOR TO ADMISSION: Per , who was present in room, pt was I for ADLs and IADLs prior to admission, did not use AD for amb. Pt resides with  and 4 children in a 1st floor apartment with level entry.    Physical Therapy Goals  Initiated 1/2/2024  Pt stated goal: to get better  Pt will be I with LE HEP in 7 days.  Pt will perform bed mobility with Moderate Assist in 7 days.  Outcome: Progressing          PLAN :  Patient continues to benefit from skilled intervention to address functional impairments. Continue treatment per established plan of care to address goals.    Recommendation for discharge: (in order for the patient to meet his/her long term goals)  Inpatient Rehabilitation Facility     IF patient discharges home will need the following DME: TBD     SUBJECTIVE:   Patient stated “My stomach hurts a lot.”    OBJECTIVE DATA SUMMARY:   Critical Behavior:  Orientation  Overall Orientation Status: Within Functional Limits  Orientation Level: Oriented X4  Cognition  Arousal/Alertness: Appropriate responses to stimuli  Following Commands: Follows one step commands with increased time;Follows one step commands with repetition;Follows one step commands consistently  Attention Span: Appears intact  Safety Judgement: Decreased awareness of need for safety;Decreased awareness of need for assistance  Problem Solving: Assistance required to correct errors made;Assistance required to generate solutions;Decreased awareness of errors;Assistance required to identify errors made;Assistance required to implement solutions  Insights: Fully aware of deficits  Initiation: Requires cues for some  Sequencing: Requires cues for some    Functional Mobility Training:  Bed Mobility:  Bed Mobility Training  Rolling: Minimum assistance  Supine to Sit: 
goals.  Description: FUNCTIONAL STATUS PRIOR TO ADMISSION: Per , who was present in room, pt was I for ADLs and IADLs prior to admission, did not use AD for amb. Pt resides with  and 4 children in a 1st floor apartment with level entry.    Physical Therapy Goals  Initiated 1/2/2024  Revised 1/17/2024  Pt stated goal: to get better  Pt will be I with LE HEP in 7 days.  Pt will perform bed mobility with Moderate Assist in 7 days.  Patient will be able to sustain standing ~1 min x 7 days  Patient will be able to take 5 steps with Ax2 and rW and chair following x 7 days  Outcome: Progressing          PLAN :  Patient continues to benefit from skilled intervention to address functional impairments. Continue treatment per established plan of care to address goals.    Recommendation for discharge: (in order for the patient to meet his/her long term goals)  Inpatient Rehabilitation Facility     IF patient discharges home will need the following DME: TBD     SUBJECTIVE:   Patient stated “It takes a lot out of me.”    OBJECTIVE DATA SUMMARY:   Critical Behavior:  Orientation  Overall Orientation Status: Within Normal Limits  Cognition  Overall Cognitive Status: WNL  Arousal/Alertness: Appropriate responses to stimuli  Following Commands: Follows one step commands consistently  Attention Span: Appears intact  Safety Judgement: Decreased awareness of need for assistance;Decreased awareness of need for safety  Problem Solving: Assistance required to generate solutions;Assistance required to identify errors made  Insights: Decreased awareness of deficits  Initiation: Requires cues for some  Sequencing: Requires cues for some  Functional Mobility Training:  Bed Mobility:  Bed Mobility Training  Rolling: Stand-by assistance;Additional time  Supine to Sit: Stand-by assistance;Additional time  Sit to Supine: Stand-by assistance;Additional time  Scooting: Stand-by assistance;Additional time  Transfers:  Transfer 
Self ROM   Comments   Ankle Pumps  10 [] [] [x] []    Quad Sets/Glut Sets   [] [] [] []    Hamstring Sets   [] [] [] []    Short Arc Quads   [] [] [] []    Heel Slides  10 [] [] [x] []    Straight Leg Raises   [] [] [] []    Hip abd/add   [] [] [] []    Long Arc Quads  10 [] [x] [] []    Marching   [] [] [] []       [] [] [] []       Pain Ratin/10 reported    Activity Tolerance:   Fair     After treatment patient left in no apparent distress:   Bed locked and returned to lowest position, Patient left in no apparent distress in bed, Call bell within reach, and Side rails x3, and nsg updated     COMMUNICATION/COLLABORATION:   The patient’s plan of care was discussed with: Occupational therapist and Registered nurse    Patient Education  Education Given To: Patient  Education Provided: Role of Therapy;Plan of Care;Home Exercise Program;Fall Prevention Strategies;Transfer Training;Orientation;Energy Conservation  Education Method: Demonstration;Verbal  Education Outcome: Verbalized understanding;Continued education needed    PT/OT sessions occurred together for increased safety of pt and clinician.     Sherrie Robert PTA  Minutes: 31  
any fall with injury in the past year?: No  Receives Help From: Family  ADL Assistance: Independent  Toileting: Independent  Homemaking Assistance: Independent  Homemaking Responsibilities: Yes  Ambulation Assistance: Independent  Transfer Assistance: Independent  Active : No  Occupation: Unemployed    Cognitive and Communication Status:  Neurologic State: Alert  Orientation Level: Oriented to person and Oriented to place  Cognition: Follows commands    Baseline Assessment:  Current Diet : NPO  Current Liquid Diet : NPO  Prior Dysphagia History: none prior to current admission  Patient Complaint: pt wants to eat/drink  Baseline Vocal Quality: Normal  Hearing: appears intact     Start of Session End of Session   Heart Rate 110s 110s   SPO2 96 96        General:   O2 Device: Nasal cannula  Liters of Oxygen: 1 L  Current Diet : NPO  Current Liquid Diet : NPO  Dentition: Poor dental/oral hygeine  Patient Positioning: Upright in bed  Volitional Cough: Weak  Volitional Swallow: Delayed    Dysphagia:  Oral Assessment:  Oral Motor   Labial: Decreased rate;Other (comment)  Dentition: Poor;Limited  Oral Hygiene: Dried secretions  Oral Hygiene Comments: significantly dried mucosa on lips  Lingual: Decreased rate;Decreased strength (ROM on right greater than left, white tongue)  Velum: No Impairment  Mandible: No impairment  P.O. Trials:  Consistencies Administered: Pureed;Thin - teaspoon;Ice Chips;Mildly Thick- teaspoon  PO Trials  Assessment Method(s): Observation;Palpation  Patient Position: upright in bed  Vocal Quality: Low volume;Breathy;Hoarse;Fatigue;Phonation breaks  Consistency Presented: Thin;Pureed;Mildly Thick  How Presented: SLP-fed/Presented  Bolus Acceptance: No impairment  Bolus Formation/Control: No impairment  Propulsion: No impairment  Oral Residue: None  Initiation of Swallow: Delayed (# of seconds)  Laryngeal Elevation: Functional  Aspiration Signs/Symptoms: Throat clear;Delayed cough/throat 
assistance;Additional time  Sit to Supine: Stand-by assistance;Additional time  Scooting: Stand-by assistance;Additional time  Transfers:  Transfer Training  Transfer Training: Yes  Sit to Stand: Moderate assistance;Assist X1  Stand to Sit: Moderate assistance;Minimum assistance;Assist X1  Balance:  Balance  Sitting: Impaired  Sitting - Static: Good (unsupported);Fair (occasional)  Sitting - Dynamic: Fair (occasional);Good (unsupported)  Standing: Impaired  Standing - Static: Fair;Constant support  Standing - Dynamic: Poor;Constant support  Wheelchair Mobility:  Ambulation/Gait Training:  Gait Training: Yes  Gait  Gait Training: Yes  Overall Level of Assistance: Moderate assistance;Minimum assistance;Assist X1  Distance (ft): 12 Feet  Assistive Device: Gait belt;Walker, rolling  Base of Support: Narrowed  Speed/Lanette: Shuffled;Slow  Step Length: Left shortened;Right shortened  Gait Abnormalities: Decreased step clearance;Shuffling gait;Step to gait    Therapeutic Exercises:     EXERCISE   Sets   Reps   Active Active Assist   Passive Self ROM   Comments   Ankle Pumps  12 [x] [] [] []    Glut Sets  12 [x] [] [] []    Hamstring Sets   [] [] [] []    Short Arc Quads   [] [] [] []    Heel Slides   [] [] [] []    Straight Leg Raises   [] [] [] []    Hip abd/add  12 [x] [] [] []    Long Arc Quads  12 [x] [] [] []    Marching  12 [x] [] [] []       [] [] [] []       Pain Ratin/10 reported    Activity Tolerance:   Fair     After treatment patient left in no apparent distress:   Bed locked and returned to lowest position, Patient left in no apparent distress in bed, Call bell within reach, and Side rails x3, and nsg updated     COMMUNICATION/COLLABORATION:   The patient’s plan of care was discussed with: Registered nurse    Patient Education  Education Given To: Patient  Education Provided: Role of Therapy;Plan of Care;Transfer Training;Home Exercise Program;Energy Conservation;Fall Prevention Strategies  Education 
follow-up re-assessment, alert, agreeable, pleasant.     OBJECTIVE:     Past Medical History:   Diagnosis Date    CKD (chronic kidney disease), stage III (HCC)     CVA (cerebral vascular accident) (HCC)     Diabetes mellitus (HCC)     Dyslipidemia     Hypertension     Seizure disorder (HCC)      Past Surgical History:   Procedure Laterality Date    ADRENALECTOMY N/A 1/8/2024    ROBOTIC ASSISTED LAPAROSCOPIC LEFT ADRENALECTOMY performed by Yovany Eduardo MD at [facility] MAIN OR    IR NONTUNNELED VASCULAR CATHETER  12/21/2023    IR NONTUNNELED VASCULAR CATHETER 12/21/2023 Jonathan Edge Jr., APRN - NP [facility] ANGIO IR    UPPER GASTROINTESTINAL ENDOSCOPY N/A 1/10/2024    EGD ESOPHAGOGASTRODUODENOSCOPY PEG TUBE INSERTION performed by Carissa Shaikh MD at [facility]     Prior Level of Function/Home Situation:   Social/Functional History  Lives With: Spouse  Type of Home: Apartment  Home Layout: One level  Home Access: Level entry  Entrance Stairs - Number of Steps: 2 outside steps.  Bathroom Shower/Tub: Tub/Shower unit  Bathroom Toilet: Standard  Bathroom Accessibility: Accessible  Home Equipment: None  Has the patient had two or more falls in the past year or any fall with injury in the past year?: No  Receives Help From: Family  ADL Assistance: Independent  Toileting: Independent  Homemaking Assistance: Independent  Homemaking Responsibilities: Yes  Ambulation Assistance: Independent  Transfer Assistance: Independent  Active : No  Occupation: Unemployed    Cognitive and Communication Status:  Neurologic State: Alert  Orientation Level: Oriented to person and Oriented to place  Cognition: Follows commands and Memory loss    After Treatment:  Patient left in no apparent distress sitting up in chair, Call bell left within reach, and Nursing notified     Pain:  VAS (numerical) 0    COMMUNICATION/EDUCATION:   Swallow safety precautions, Aspiration precautions, GERD precautions, Diet recommendations, Compensatory 
Identify and instruct in appropriate isolation precautions for identified infection/condition  1/3/2024 2112 by Jess Quintanilla RN  Outcome: Progressing  Goal: Absence of fever/infection during anticipated neutropenic period  1/4/2024 0420 by Emerald Quintanilla RN  Outcome: Progressing  Flowsheets (Taken 1/3/2024 2130)  Absence of fever/infection during anticipated neutropenic period: Monitor white blood cell count  1/3/2024 2112 by Jess Quintanilla RN  Outcome: Progressing     Problem: Metabolic/Fluid and Electrolytes - Adult  Goal: Electrolytes maintained within normal limits  1/4/2024 0420 by Emerald Quintanilla RN  Outcome: Progressing  Flowsheets (Taken 1/3/2024 2130)  Electrolytes maintained within normal limits:   Monitor labs and assess patient for signs and symptoms of electrolyte imbalances   Administer electrolyte replacement as ordered   Monitor response to electrolyte replacements, including repeat lab results as appropriate  1/3/2024 2112 by Jess Quintanilla RN  Outcome: Progressing  Goal: Hemodynamic stability and optimal renal function maintained  1/4/2024 0420 by Emerald Quintanilla RN  Outcome: Progressing  Flowsheets (Taken 1/3/2024 2130)  Hemodynamic stability and optimal renal function maintained:   Monitor intake, output and patient weight   Monitor labs and assess for signs and symptoms of volume excess or deficit   Monitor urine specific gravity, serum osmolarity and serum sodium as indicated or ordered   Monitor response to interventions for patient's volume status, including labs, urine output, blood pressure (other measures as available)   Encourage oral intake as appropriate   Instruct patient on fluid and nutrition restrictions as appropriate  1/3/2024 2112 by Jess Quintanilla RN  Outcome: Progressing  Goal: Glucose maintained within prescribed range  Recent Flowsheet Documentation  Taken 1/3/2024 2130 by Emerald Quintanilla RN  Glucose maintained within prescribed range:   Monitor blood glucose as ordered   
sessions occurred together for increased safety of pt and clinician.     Bridget Ventura PTA  Minutes: 48           
[]  3 []  4   4.  Putting on and taking off regular upper body clothing? []  1 []  2 [x]  3 []  4   5.  Taking care of personal grooming such as brushing teeth? []  1 []  2 [x]  3 []  4   6.  Eating meals? []  1 []  2 [x]  3 []  4   © , [employer] of [facility], under license to [employer]. All rights reserved     Score: 15/24     Interpretation of Tool:  Represents clinically-significant functional categories (i.e. Activities of daily living).  Percentage of Impairment CH    0%   CI    1-19% CJ    20-39% CK    40-59% CL    60-79% CM    80-99% CN     100%   AMPA  Score 6-24 24 23 20-22 15-19 10-14 7-9 6       Pain Ratin/10   Pain Intervention(s):       Activity Tolerance:   Fair  and requires rest breaks  Please refer to the flowsheet for vital signs taken during this treatment.    After treatment:   Bed locked and in lowest position Patient left in no apparent distress sitting up in chair and Call bell within reach and nsg updated.    COMMUNICATION/EDUCATION:   The patient’s plan of care was discussed with: Physical therapist and Registered nurse    Patient Education  Education Given To: Patient  Education Provided: Role of Therapy;Fall Prevention Strategies;Plan of Care;ADL Adaptive Strategies;Equipment;Home Exercise Program;Transfer Training  Education Method: Verbal;Demonstration  Barriers to Learning: None  Education Outcome: Verbalized understanding;Demonstrated understanding;Continued education needed    The supervising occupational therapist and treating occupational therapist assistant have met to review this patient’s progress and plan of care.    This patient’s plan of care is appropriate for delegation to [facility].     PT/OT sessions occurred together for increased safety of pt and clinician.     Thank you for this referral.  Lidia Rosenberg OT  Minutes: 26   
Assess bowel function   Encourage oral fluids to ensure adequate hydration   Administer ordered medications as needed   Administer IV fluids as ordered to ensure adequate hydration  Goal: Maintains adequate nutritional intake  Recent Flowsheet Documentation  Taken 12/21/2023 2000 by Steven Santos RN  Maintains adequate nutritional intake:   Monitor percentage of each meal consumed   Identify factors contributing to decreased intake, treat as appropriate   Assist with meals as needed     Problem: Genitourinary - Adult  Goal: Absence of urinary retention  Recent Flowsheet Documentation  Taken 12/21/2023 2000 by Steven Santos RN  Absence of urinary retention:   Assess patient’s ability to void and empty bladder   Monitor intake/output and perform bladder scan as needed   Place urinary catheter per Licensed Independent Practitioner order if needed     Problem: Coping  Goal: Pt/Family able to verbalize concerns and demonstrate effective coping strategies  Description: INTERVENTIONS:  1. Assist patient/family to identify coping skills, available support systems and cultural and spiritual values  2. Provide emotional support, including active listening and acknowledgement of concerns of patient and caregivers  3. Reduce environmental stimuli, as able  4. Instruct patient/family in relaxation techniques, as appropriate  5. Assess for spiritual pain/suffering and initiate Spiritual Care, Psychosocial Clinical Specialist consults as needed  Recent Flowsheet Documentation  Taken 12/21/2023 2000 by Steven Santos RN  Patient/family able to verbalize anxieties, fears, and concerns, and demonstrate effective coping:   Assist patient/family to identify coping skills, available support systems and cultural and spiritual values   Provide emotional support, including active listening and acknowledgement of concerns of patient and caregivers   Reduce environmental stimuli, as able     Problem: ABCDS Injury Assessment  Goal: 
caregivers  3. Reduce environmental stimuli, as able  4. Instruct patient/family in relaxation techniques, as appropriate  5. Assess for spiritual pain/suffering and initiate Spiritual Care, Psychosocial Clinical Specialist consults as needed  1/4/2024 1153 by Vivi Antonio RN  Outcome: Progressing  1/4/2024 0420 by Emerald Quintanilla RN  Outcome: Progressing  Flowsheets (Taken 1/3/2024 2130)  Patient/family able to verbalize anxieties, fears, and concerns, and demonstrate effective coping:   Assist patient/family to identify coping skills, available support systems and cultural and spiritual values   Provide emotional support, including active listening and acknowledgement of concerns of patient and caregivers   Reduce environmental stimuli, as able   Instruct patient/family in relaxation techniques, as appropriate   Assess for spiritual pain/suffering and initiate Spiritual Care, Psychosocial Clinical Specialist consults as needed     Problem: Nutrition Deficit:  Goal: Optimize nutritional status  1/4/2024 1153 by Vivi Antonio RN  Outcome: Progressing  1/4/2024 0420 by Emerald Quintanilla RN  Outcome: Progressing     Problem: ABCDS Injury Assessment  Goal: Absence of physical injury  1/4/2024 1153 by Vivi Antonio RN  Outcome: Progressing  1/4/2024 0420 by Emerald Quintanilla RN  Outcome: Progressing  Flowsheets (Taken 1/3/2024 2130)  Absence of Physical Injury: Implement safety measures based on patient assessment

## 2024-01-25 NOTE — PROGRESS NOTES
Infectious Disease Progress Note           Subjective:   Continues to do well clinically, no slow to respond. Afebrile, leukocytosis trending down w steroid taper. No acute events since last seen   Objective:   Physical Exam:     /67   Pulse 82   Temp 97.7 °F (36.5 °C) (Axillary)   Resp 15   Ht 1.572 m (5' 1.89\")   Wt 66.5 kg (146 lb 9.7 oz)   SpO2 99%   BMI 26.91 kg/m²  O2 Flow Rate (L/min): 2 L/min O2 Device: None (Room air)    Temp (24hrs), Av.7 °F (36.5 °C), Min:97.7 °F (36.5 °C), Max:97.8 °F (36.6 °C)    701 -  190  In: -   Out: 450 [Urine:450]   1901 -  0700  In: 2611.7 [I.V.:761.7]  Out: 2650 [Urine:2650]    General: NAD, alert, slow to respond    HEENT: URIAH, Moist mucosa, NGT in place   Lungs: Mild expiratory wheeze on right, no rhonchi   Heart: S1S2+, RRR, no murmur  Abdo: Soft, NT, ND, +BS   : external hopper   Exts: no edema, + pulses b/l   Skin: no chronic wounds or ulcers     Data Review:       Recent Days:    Recent Labs     23  0353 24  0553 24  1046   WBC 22.9* 19.7* 15.5*   HGB 9.4* 9.3* 9.3*   HCT 30.1* 30.2* 29.7*   * 509* 493*       Recent Labs     23  0353 24  0553 24  1046   BUN 77* 86* 73*   CREATININE 1.23* 1.24* 1.00         Lab Results   Component Value Date/Time    CRP 6.03 2023 03:00 AM      Microbiology     Results       Procedure Component Value Units Date/Time    Culture, Respiratory [[id number]] Collected: 23    Order Status: Completed Specimen: Sputum Updated: 24 0848     Special Requests No Special Requests        Gram stain No wbc's seen               Occasional Epithelial cells seen            No definite organism seen        Culture       Light Normal respiratory mercedes          Culture, Wound [[id number]] Collected: 23    Order Status: Completed Specimen: Wound Updated: 24 1003     Special Requests No Special Requests        Culture NO 
                    Infectious Disease Progress Note           Subjective:   Febrile over the wkend, isolated temp of 101.8F, hasn't had any fevers since then. WBC WNLs, UA unremarkable, Blood Cx neg. No focal infiltrates on CXR. Remains off antibiotics   Objective:   Physical Exam:     /83   Pulse (!) 105   Temp 98.2 °F (36.8 °C) (Oral)   Resp 18   Ht 1.572 m (5' 1.89\")   Wt 63.4 kg (139 lb 12.4 oz)   SpO2 98%   BMI 25.66 kg/m²  O2 Flow Rate (L/min): 0 L/min O2 Device: None (Room air)    Temp (24hrs), Av °F (36.7 °C), Min:97.7 °F (36.5 °C), Max:98.2 °F (36.8 °C)    701 - 1900  In: -   Out: 1200 [Urine:1200]   1901 -  0700  In: 310 [P.O.:240]  Out: 1800 [Urine:1800]    General: NAD, AAO x 2 (Person and place)   HEENT: URIAH, Moist mucosa,   Lungs: CTA b/l, no wheeze/rhonchi   Heart: S1S2+, RRR, no murmur  Abdo: Soft, NT, ND, +BS, peg tube   : external hopper   Exts: no edema, + pulses b/l   Skin: no chronic wounds or ulcers     Data Review:       Recent Days:    Recent Labs     24  0656 24  0712   WBC 6.4 7.3   HGB 9.7* 9.2*   HCT 30.0* 28.8*   * 527*       Recent Labs     24  0656 24  0712   BUN 20 23*   CREATININE 0.68 0.70         Lab Results   Component Value Date/Time    CRP 0.55 2024 07:12 AM      Microbiology     Results       Procedure Component Value Units Date/Time    Culture, Blood 1 [[id number]] Collected: 24 114    Order Status: Completed Specimen: Blood Updated: 2442     Special Requests --        No Special Requests  Left  Forearm       Culture No growth 2 days       Culture, Blood 2 [[id number]] Collected: 24 1140    Order Status: Completed Specimen: Blood Updated: 24 0542     Special Requests No Special Requests        Culture No growth 2 days               Assessment/Plan     FUO, isolated temp of 101.8F on , no documented fevers since then         WBC Wnls, CRP 0.55 down from 0.76, and 
                    Infectious Disease Progress Note           Subjective:   No change in clinical status, remains on vent support, off pressors, intermittent low-grade fevers with a Tmax of 100.8F.  Leukocytosis trending down with steroid taper.  Stool C. difficile is pending  Objective:   Physical Exam:     /82   Pulse 90   Temp 100.4 °F (38 °C) (Esophageal)   Resp 16   Ht 1.572 m (5' 1.89\")   Wt 71.9 kg (158 lb 8.2 oz)   SpO2 98%   BMI 29.10 kg/m²    O2 Device: Ventilator    Temp (24hrs), Av.2 °F (37.9 °C), Min:99.5 °F (37.5 °C), Max:100.8 °F (38.2 °C)    701 - 1900  In: 179.3 [I.V.:29.3]  Out: 850 [Urine:850]   1901 -  0700  In: 3251.2 [I.V.:851.2]  Out: 6750 [Urine:6450]    General: NAD, intubated and sedated   HEENT: URIAH, Moist mucosa, ETT in place   Lungs: CTA b/l, decreased at the bases   Heart: S1S2+, RRR, no murmur  Abdo: Soft, NT, ND, +BS   : external hopper   Exts: no edema, + pulses b/l   Skin: no chronic wounds or ulcers     Data Review:       Recent Days:    Recent Labs     23  0436 23  0445 23  0405   WBC 28.9* 23.5* 21.2*   HGB 8.9* 9.4* 9.4*   HCT 27.7* 29.4* 30.0*   * 623* 586*       Recent Labs     23  0436 23  0445 23  0405   BUN 61* 67* 69*   CREATININE 1.11* 1.22* 1.33*         Lab Results   Component Value Date/Time    CRP 3.54 2023 04:05 AM      Microbiology     Results       Procedure Component Value Units Date/Time    Clostridium Difficile Toxin/Antigen [[id number]] Collected: 23 1630    Order Status: Sent Specimen: Stool Updated: 23 1651    MRSA by PCR [[id number]] Collected: 23 1541    Order Status: Completed Specimen: Swab Updated: 23 1714     MRSA by PCR Not Detected       Culture, Respiratory [[id number]] Collected: 23 171    Order Status: Completed Specimen: Sputum Updated: 23 0958     Special Requests No Special Requests        Gram stain       Rare 
                    Infectious Disease Progress Note           Subjective:   No change in clinical status, remains stable, remains on vent support, off pressors, WBC down to 23.5, fever spike of 100.8F earlier today, currently afebrile.  at bedside.   Objective:   Physical Exam:     /85   Pulse 92   Temp 98.9 °F (37.2 °C) (Esophageal)   Resp 20   Ht 1.572 m (5' 1.89\")   Wt 71.9 kg (158 lb 8.2 oz)   SpO2 97%   BMI 29.10 kg/m²    O2 Device: Ventilator    Temp (24hrs), Av.6 °F (37.6 °C), Min:98.4 °F (36.9 °C), Max:100.8 °F (38.2 °C)    701 - 1900  In: 448.6 [I.V.:208.6]  Out: 1900 [Urine:1900]   1901 -  0700  In: 1376 [I.V.:376]  Out: 9150 [Urine:9050]    General: NAD, intubated and sedated   HEENT: URIAH, Moist mucosa, ETT in place   Lungs: CTA b/l, decreased at the bases   Heart: S1S2+, RRR, no murmur  Abdo: Soft, NT, ND, +BS   : external hopper   Exts: no edema, + pulses b/l   Skin: no chronic wounds or ulcers     Data Review:       Recent Days:    Recent Labs     23  0505 23  0436 23  0445   WBC 23.3* 28.9* 23.5*   HGB 7.6* 8.9* 9.4*   HCT 24.1* 27.7* 29.4*   * 631* 623*       Recent Labs     23  0505 23  0436 23  0445   BUN 62* 61* 67*   CREATININE 1.19* 1.11* 1.22*         Lab Results   Component Value Date/Time    CRP 4.94 2023 04:45 AM      Microbiology     Results       Procedure Component Value Units Date/Time    Clostridium Difficile Toxin/Antigen [[id number]]     Order Status: Sent Specimen: Stool     MRSA by PCR [[id number]] Collected: 23 1541    Order Status: Completed Specimen: Swab Updated: 23     MRSA by PCR Not Detected       Culture, Respiratory [[id number]] Collected: 23    Order Status: Completed Specimen: Sputum Updated: 23 0958     Special Requests No Special Requests        Gram stain       Rare Epithelial cells seen            2+ WBCs seen         No definite 
                    Infectious Disease Progress Note           Subjective:   Pt seen and examined at bedside.  Extubated earlier this morning, maintaining O2 sats on high flow decrease febrile episodes off ventilator, WBC of 23 K on today's labs, CRP 6.03, Procal 1.77.  Sputum and wound cultures from  are pending  Objective:   Physical Exam:     BP (!) 170/83   Pulse 90   Temp 98.3 °F (36.8 °C) (Axillary)   Resp 14   Ht 1.572 m (5' 1.89\")   Wt 69.1 kg (152 lb 5.4 oz)   SpO2 97%   BMI 27.96 kg/m²    O2 Device: Ventilator    Temp (24hrs), Av.2 °F (37.9 °C), Min:98.3 °F (36.8 °C), Max:101.5 °F (38.6 °C)    No intake/output data recorded.    1901 -  0700  In: 2877.5 [I.V.:595.3]  Out: 5850 [Urine:5800]    General: NAD, extubated, not following commands  HEENT: URIAH, Moist mucosa,  Lungs: Mild expiratory wheeze on right, no rhonchi   Heart: S1S2+, RRR, no murmur  Abdo: Soft, NT, ND, +BS   : external hopper   Exts: no edema, + pulses b/l   Skin: no chronic wounds or ulcers     Data Review:       Recent Days:    Recent Labs     23  0405 23  0347 23  0300   WBC 21.2* 21.1* 23.0*   HGB 9.4* 9.2* 8.8*   HCT 30.0* 28.9* 27.8*   * 553* 499*       Recent Labs     23  0405 23  0347 23  0300   BUN 69* 79* 76*   CREATININE 1.33* 1.23* 1.32*         Lab Results   Component Value Date/Time    CRP 6.03 2023 03:00 AM      Microbiology     Results       Procedure Component Value Units Date/Time    Culture, Respiratory [[id number]] Collected: 23    Order Status: Sent Specimen: Endotracheal Updated: 23    Culture, Wound [[id number]] Collected: 23    Order Status: Sent Specimen: Skin Updated: 23    Clostridium Difficile Toxin/Antigen [[id number]] Collected: 23 1630    Order Status: Completed Specimen: Stool Updated: 23 1351     GDH Antigen Negative        C difficile Toxin, EIA Negative        C Diff Toxin 
                    Infectious Disease Progress Note           Subjective:   Pt seen and examined at bedside. Stable, denies new complaints, fmly at bedside. Afebrile and hemodynamically stable   Objective:   Physical Exam:     BP (!) 141/73   Pulse 92   Temp 97.9 °F (36.6 °C)   Resp 20   Ht 1.572 m (5' 1.89\")   Wt 72.4 kg (159 lb 9.8 oz)   SpO2 99%   BMI 29.30 kg/m²  O2 Flow Rate (L/min): 2 L/min O2 Device: None (Room air)    Temp (24hrs), Av.2 °F (36.8 °C), Min:97.8 °F (36.6 °C), Max:98.6 °F (37 °C)    701 -  190  In: 300   Out: -    1901 -  0700  In: 2100   Out: 2400 [Urine:2400]    General: NAD, alert, follows some commands   HEENT: URIAH, Moist mucosa, NGT in place   Lungs: Mild expiratory wheeze on right, no rhonchi   Heart: S1S2+, RRR, no murmur  Abdo: Soft, NT, ND, +BS   : external hopper   Exts: no edema, + pulses b/l   Skin: no chronic wounds or ulcers     Data Review:       Recent Days:    Recent Labs     24  0506 24  1103 24  0509   WBC 19.3* 19.0* 19.1*   HGB 10.2* 9.3* 9.5*   HCT 31.7* 29.3* 29.6*   * 444* 357       Recent Labs     24  0506 24  1103 24  0509   BUN 62* 56* 55*   CREATININE 0.88 0.83 0.81         Lab Results   Component Value Date/Time    CRP <0.29 2024 05:09 AM      Microbiology     Results       Procedure Component Value Units Date/Time    Culture, Wound [[id number]] Collected: 24 0345    Order Status: Completed Specimen: Wound Updated: 24 0938     Special Requests No Special Requests        Gram stain No wbc's seen         No organisms seen        Culture No growth thus far       Culture, Respiratory [[id number]] Collected: 23    Order Status: Completed Specimen: Sputum Updated: 24 0848     Special Requests No Special Requests        Gram stain No wbc's seen               Occasional Epithelial cells seen            No definite organism seen        Culture       Light 
                    Infectious Disease Progress Note           Subjective:   Remains extubated, very weak and slow to respond, afebrile, leukocytosis of 22.9 on today's labs, back on high-dose IV steroid therapy.  Decrease fever episodes, less tachycardic since extubation, remains off pressor support.  Objective:   Physical Exam:     BP (!) 188/85   Pulse (!) 104   Temp 99.2 °F (37.3 °C) (Axillary)   Resp 14   Ht 1.572 m (5' 1.89\")   Wt 66.5 kg (146 lb 9.7 oz)   SpO2 100%   BMI 26.91 kg/m²  O2 Flow Rate (L/min): 2 L/min O2 Device: Nasal cannula    Temp (24hrs), Av.7 °F (37.1 °C), Min:98.2 °F (36.8 °C), Max:99.2 °F (37.3 °C)    701 - 1900  In: -   Out: 400 [Urine:400]   1901 -  07  In: 1787.7 [I.V.:302.7]  Out: 2700 [Urine:2550]    General: NAD, alert, slow to respond, follows some commands  HEENT: URIAH, Moist mucosa,  Lungs: Mild expiratory wheeze on right, no rhonchi   Heart: S1S2+, RRR, no murmur  Abdo: Soft, NT, ND, +BS   : external hopper   Exts: no edema, + pulses b/l   Skin: no chronic wounds or ulcers     Data Review:       Recent Days:    Recent Labs     23  03423  0300 23  0353   WBC 21.1* 23.0* 22.9*   HGB 9.2* 8.8* 9.4*   HCT 28.9* 27.8* 30.1*   * 499* 543*       Recent Labs     23  03423  0300 23  0353   BUN 79* 76* 77*   CREATININE 1.23* 1.32* 1.23*         Lab Results   Component Value Date/Time    CRP 6.03 2023 03:00 AM      Microbiology     Results       Procedure Component Value Units Date/Time    Culture, Respiratory [[id number]] Collected: 23    Order Status: Completed Specimen: Sputum Updated: 23 0148     Special Requests No Special Requests        Gram stain No wbc's seen               Occasional Epithelial cells seen            No definite organism seen        Culture PENDING    Culture, Wound [[id number]] Collected: 23    Order Status: Sent Specimen: Skin Updated: 23 
                    Infectious Disease Progress Note           Subjective:   Remains stable, underwent peg tube placement today, afebrile, WBC normalized.   Objective:   Physical Exam:     /65   Pulse (!) 114   Temp 98.3 °F (36.8 °C) (Axillary)   Resp 16   Ht 1.572 m (5' 1.89\")   Wt 72.4 kg (159 lb 9.8 oz)   SpO2 96%   BMI 29.30 kg/m²  O2 Flow Rate (L/min): 2 L/min O2 Device: Nasal cannula    Temp (24hrs), Av.9 °F (37.2 °C), Min:97.9 °F (36.6 °C), Max:100.2 °F (37.9 °C)    01/10 0701 - 01/10 1900  In: -   Out: 500    1901 - 01/10 0700  In: 537.2 [I.V.:37.2]  Out: 2900 [Urine:2850]    General: NAD, alert and following commands   HEENT: URIAH, Moist mucosa,   Lungs: decreased at the bases, no wheeze/rhonchi   Heart: S1S2+, RRR, no murmur  Abdo: Soft, NT, ND, +BS, peg tube   : external hopper   Exts: no edema, + pulses b/l   Skin: no chronic wounds or ulcers     Data Review:       Recent Days:    Recent Labs     24  02224  1640 01/10/24  0502 01/10/24  1007   WBC 13.4*  --  8.7  --    HGB 7.2* 7.6* 7.5* 7.9*   HCT 21.7* 23.5* 23.0* 24.6*     --  272  --        Recent Labs     24  0221 01/10/24  0502   BUN 28* 15   CREATININE 0.58 0.45*         Lab Results   Component Value Date/Time    CRP 1.42 2024 02:21 AM      Microbiology     Results       Procedure Component Value Units Date/Time    Culture, Urine [[id number]] Collected: 24 1101    Order Status: Completed Specimen: Urine Updated: 24 1255     Special Requests No Special Requests        Culture No Growth (<1000 cfu/mL)       Culture, Wound [[id number]] Collected: 24 0345    Order Status: Completed Specimen: Wound Updated: 24 0903     Special Requests No Special Requests        Gram stain No wbc's seen         No organisms seen        Culture No growth 2 days       Culture, Respiratory [[id number]] Collected: 23    Order Status: Completed Specimen: Sputum Updated: 24 
                    Infectious Disease Progress Note           Subjective:   Stable, decreased responsiveness, follows some commands, WBC up to 19.3 from 15.5. Afebrile and hemodynamically stable, no new source of infection   Objective:   Physical Exam:     BP (!) 162/74   Pulse (!) 102   Temp 98.4 °F (36.9 °C) (Axillary)   Resp 16   Ht 1.572 m (5' 1.89\")   Wt 72.2 kg (159 lb 2.8 oz)   SpO2 98%   BMI 29.22 kg/m²  O2 Flow Rate (L/min): 2 L/min O2 Device: None (Room air)    Temp (24hrs), Av.2 °F (36.8 °C), Min:97.7 °F (36.5 °C), Max:98.6 °F (37 °C)    No intake/output data recorded.    1901 -  0700  In: 900   Out: 1750 [Urine:1750]    General: NAD, alert, follows some commands, still very slow   HEENT: URIAH, Moist mucosa, NGT in place   Lungs: Mild expiratory wheeze on right, no rhonchi   Heart: S1S2+, RRR, no murmur  Abdo: Soft, NT, ND, +BS   : external hopper   Exts: no edema, + pulses b/l   Skin: no chronic wounds or ulcers     Data Review:       Recent Days:    Recent Labs     24  0553 24  1046 24  0506   WBC 19.7* 15.5* 19.3*   HGB 9.3* 9.3* 10.2*   HCT 30.2* 29.7* 31.7*   * 493* 524*       Recent Labs     24  0553 24  1046 24  0506   BUN 86* 73* 62*   CREATININE 1.24* 1.00 0.88         Lab Results   Component Value Date/Time    CRP 0.36 2024 05:06 AM      Microbiology     Results       Procedure Component Value Units Date/Time    Culture, Respiratory [[id number]] Collected: 23    Order Status: Completed Specimen: Sputum Updated: 24 0848     Special Requests No Special Requests        Gram stain No wbc's seen               Occasional Epithelial cells seen            No definite organism seen        Culture       Light Normal respiratory mercedes          Culture, Wound [[id number]] Collected: 23    Order Status: Completed Specimen: Wound Updated: 24 1003     Special Requests No Special Requests        Culture NO 
                    Infectious Disease Progress Note           Subjective:   Stable, denies new complaints, underwent left adrenalectomy today by urology, doing well post-operatively, in the ICU for close monitoring. Right IJ was discontinued and replaced. Right wrist, Danette in place. Pt reported left eye pain during my assessment  Objective:   Physical Exam:     BP (!) 138/52   Pulse 94   Temp 97 °F (36.1 °C) (Axillary)   Resp 14   Ht 1.572 m (5' 1.89\")   Wt 72.4 kg (159 lb 9.8 oz)   SpO2 98%   BMI 29.30 kg/m²  O2 Flow Rate (L/min): 2 L/min O2 Device: Nasal cannula    Temp (24hrs), Av.2 °F (36.2 °C), Min:94.4 °F (34.7 °C), Max:98.3 °F (36.8 °C)    701 - 1900  In: 2340 [I.V.:1800]  Out: 850 [Urine:850]   1901 -  0700  In: 2450   Out: 1650 [Urine:1650]    General: NAD, alert and following commands   HEENT: URIAH, Moist mucosa, NGT in place, right IJ triple lumen   Lungs: Mild expiratory wheeze on right, no rhonchi   Heart: S1S2+, RRR, no murmur  Abdo: Soft, NT, ND, +BS   : external hopper   Exts: no edema, + pulses b/l   Skin: no chronic wounds or ulcers     Data Review:       Recent Days:    Recent Labs     24  0708 24  0601   WBC 15.7* 13.7*   HGB 9.0* 9.3*   HCT 27.0* 28.4*   * 406*       Recent Labs     24  0708 24  0601   BUN 48* 45*   CREATININE 0.79 0.75         Lab Results   Component Value Date/Time    CRP <0.29 2024 05:09 AM      Microbiology     Results       Procedure Component Value Units Date/Time    Culture, Urine [[id number]] Collected: 24 1101    Order Status: No result Specimen: Urine Updated: 24 1326    Culture, Wound [[id number]] Collected: 24 0345    Order Status: Completed Specimen: Wound Updated: 24 0903     Special Requests No Special Requests        Gram stain No wbc's seen         No organisms seen        Culture No growth 2 days       Culture, Respiratory [[id number]] Collected: 23    
                    Infectious Disease Progress Note           Subjective:   Stable, intermittently febrile w a tmax of 100.8F, mildly tachycardic,  WBC stable at 21K. Fmly at bedside   Objective:   Physical Exam:     /76   Pulse (!) 121   Temp 99.5 °F (37.5 °C) (Esophageal)   Resp 15   Ht 1.572 m (5' 1.89\")   Wt 69.1 kg (152 lb 5.4 oz)   SpO2 97%   BMI 27.96 kg/m²    O2 Device: Ventilator    Temp (24hrs), Av.3 °F (37.9 °C), Min:99.5 °F (37.5 °C), Max:100.8 °F (38.2 °C)    701 - 1900  In: -   Out: 1500 [Urine:1500]   1901 - 700  In: 4091.4 [I.V.:971.1]  Out: 4700 [Urine:4500]    General: NAD, intubated and sedated   HEENT: URIAH, Moist mucosa, ETT in place   Lungs: CTA b/l, decreased at the bases   Heart: S1S2+, RRR, no murmur  Abdo: Soft, NT, ND, +BS   : external hopper   Exts: no edema, + pulses b/l   Skin: no chronic wounds or ulcers     Data Review:       Recent Days:    Recent Labs     23  0445 23  0405 23  0347   WBC 23.5* 21.2* 21.1*   HGB 9.4* 9.4* 9.2*   HCT 29.4* 30.0* 28.9*   * 586* 553*       Recent Labs     23  0445 23  0405 23  0347   BUN 67* 69* 79*   CREATININE 1.22* 1.33* 1.23*         Lab Results   Component Value Date/Time    CRP 3.54 2023 04:05 AM      Microbiology     Results       Procedure Component Value Units Date/Time    Clostridium Difficile Toxin/Antigen [[id number]] Collected: 23 1630    Order Status: Completed Specimen: Stool Updated: 23 1351     GDH Antigen Negative        C difficile Toxin, EIA Negative        C Diff Toxin Interpretation       Negative for toxigenic C. difficile          MRSA by PCR [[id number]] Collected: 23 1541    Order Status: Completed Specimen: Swab Updated: 23     MRSA by PCR Not Detected       Culture, Respiratory [[id number]] Collected: 23    Order Status: Completed Specimen: Sputum Updated: 23 0958     Special Requests 
                    Infectious Disease Progress Note           Subjective:   Staying extubated, slow to respond, weak, follows some commands, afebrile, mild decrease in WBC on todays labs   Objective:   Physical Exam:     BP (!) 143/66   Pulse (!) 104   Temp 97.9 °F (36.6 °C) (Axillary)   Resp 18   Ht 1.572 m (5' 1.89\")   Wt 66.5 kg (146 lb 9.7 oz)   SpO2 98%   BMI 26.91 kg/m²  O2 Flow Rate (L/min): 2 L/min O2 Device: Nasal cannula    Temp (24hrs), Av.1 °F (36.7 °C), Min:97.9 °F (36.6 °C), Max:98.6 °F (37 °C)    No intake/output data recorded.   1901 -  0700  In: 2634.7 [I.V.:761.7]  Out: 2750 [Urine:2650]    General: NAD, alert, follows some commands   HEENT: URIAH, Moist mucosa,  Lungs: Mild expiratory wheeze on right, no rhonchi   Heart: S1S2+, RRR, no murmur  Abdo: Soft, NT, ND, +BS   : external hopper   Exts: no edema, + pulses b/l   Skin: no chronic wounds or ulcers     Data Review:       Recent Days:    Recent Labs     23  0553   WBC 23.0* 22.9* 19.7*   HGB 8.8* 9.4* 9.3*   HCT 27.8* 30.1* 30.2*   * 543* 509*       Recent Labs     23  0300 233 24  0553   BUN 76* 77* 86*   CREATININE 1.32* 1.23* 1.24*         Lab Results   Component Value Date/Time    CRP 6.03 2023 03:00 AM      Microbiology     Results       Procedure Component Value Units Date/Time    Culture, Respiratory [[id number]] Collected: 23    Order Status: Completed Specimen: Sputum Updated: 24 0848     Special Requests No Special Requests        Gram stain No wbc's seen               Occasional Epithelial cells seen            No definite organism seen        Culture       Light Normal respiratory mercedes          Culture, Wound [[id number]] Collected: 23    Order Status: Completed Specimen: Wound Updated: 24 0734     Special Requests No Special Requests        Culture NACI    Clostridium Difficile Toxin/Antigen 
                 HOSPITALIST PROGRESS NOTE  Sky Huynh MD, [facility]   [place], [place]         Daily Progress Note: 12/20/2023  53 y.o. female with a history of CVA, seizure, poorly controlled diabetes, hypertension.  September 2023 admitted to [facility] for new onset seizure activity with status epilepticus, CVA, uncontrolled diabetes. Discharged home with a regimen of keppra, lisinopril, carvedilol, amlodipine, statin, insulin. Unfortunately she was unable to get any of her prescriptions aside from her insulin.     12/13/23 presents to hospital by EMS because of witnessed seizure activity at home. Per EMS, the patient had back-to-back seizures and was found unresponsive, postictal, and hypoxic. Intubated ventilated during the ED course. During the ED course a pregnancy test was positive but US did not reveal IUP nor ectopic pregnancy. Also with hyperglycemia and acute renal failure. Started on nicardipine for hypertension.     Subjective:     12/18/23  Counseled  regarding patient's critical condition with likelihood of severe aspiration pneumonia following seizure and currently requiring significant IV antibiotics as well as ventilatory support.    12/20/23  Patient continues on mechanical ventilation at 24% FiO2.    Continues to require fentanyl, propofol gtt.    No acute fevers or events noted.  12/19/23 - Reviewed chest x-ray which shows increasing right sided pneumonia and ABG which continues to improve.    Continue treatment with IV antibiotics for likely aspiration pneumonia following seizure.      Medications reviewed  Current Facility-Administered Medications   Medication Dose Route Frequency    insulin glargine (LANTUS) injection vial 25 Units  25 Units SubCUTAneous Daily    insulin lispro (HUMALOG) injection vial 0-8 Units  0-8 Units SubCUTAneous Q6H    labetalol (NORMODYNE) tablet 200 mg  200 mg Oral 3 times per day    
                 HOSPITALIST PROGRESS NOTE  Sky Huynh MD, [facility] Medicine   [place], [place]         Daily Progress Note: 1/22/2024      Subjective:     1/17/24  Significant sinus tachycardia with minimal exertion with heart rate in the 120s to 130s noted.  Discussed with cardiology and likely will restart labetalol which had been held during hospitalization.    1/19/24  Is alert oriented x 4.  Awaiting PT this a.m.  Tachycardia somewhat improved after restarting labetalol, per cardiology.    1/20/24  Fever of 101.8 noted with recommendations by ID to workup for possible infection source.    Patient continues to be significantly weak awaiting michael bed at [facility].    1/21/24  Patient continues to be alert and oriented x 4 and is in better spirits.  Fevers have resolved.  Only 1 solitary fever on 1/19/2024.    1/22/24  Patient is alert and oriented x 4 and comfortably in bed.  Patient continues to be significantly weak awaiting michael bed at [facility].  No overnight cough, chest pain, shortness of breath, nausea/vomiting, abdominal pain, dysuria or diarrhea noted.    UA negative, blood cultures on 1/20/2024 negative and chest x-ray unremarkable without any hypoxia or cough.    Patient awaiting michael SNF/LTC bed per case management.    Medications reviewed  Current Facility-Administered Medications   Medication Dose Route Frequency    metoprolol tartrate (LOPRESSOR) tablet 25 mg  25 mg Oral TID    ipratropium 0.5 mg-albuterol 2.5 mg (DUONEB) nebulizer solution 1 Dose  1 Dose Inhalation Q6H PRN    morphine (PF) injection 2 mg  2 mg IntraVENous Q4H PRN    HYDROcodone-acetaminophen (NORCO) 5-325 MG per tablet 1 tablet  1 tablet PEG Tube Q6H PRN    carboxymethylcellulose PF (THERATEARS/REFRESH) 1 % ophthalmic gel 1 drop  1 drop Both Eyes BID    Petrolatum-Zinc Oxide ointment   Topical BID    [Held by provider] insulin glargine (LANTUS) injection vial 10 Units  10 Units 
                 HOSPITALIST PROGRESS NOTE  Sky Huynh MD, [facility] Medicine   [place], [place]         Daily Progress Note: 12/24/2023  53 y.o. female with a history of CVA, seizure, poorly controlled diabetes, hypertension.  September 2023 admitted to [facility] for new onset seizure activity with status epilepticus, CVA, uncontrolled diabetes. Discharged home with a regimen of keppra, lisinopril, carvedilol, amlodipine, statin, insulin. Unfortunately she was unable to get any of her prescriptions aside from her insulin.     12/13/23 presents to hospital by EMS because of witnessed seizure activity at home. Per EMS, the patient had back-to-back seizures and was found unresponsive, postictal, and hypoxic. Intubated ventilated during the ED course. During the ED course a pregnancy test was positive but US did not reveal IUP nor ectopic pregnancy. Also with hyperglycemia and acute renal failure. Started on nicardipine for hypertension.     Subjective:     12/18/23  Counseled  regarding patient's critical condition with likelihood of severe aspiration pneumonia following seizure and currently requiring significant IV antibiotics as well as ventilatory support.    12/20/23  Patient continues on mechanical ventilation at 24% FiO2.    12/21/23   at bedside.  Early a.m. extubation per respiratory therapy and pulmonology however immediately after patient started having significant bronchospasm and after 20 minutes required reintubation.  Continues on mechanical ventilation at 100% FiO2.  Continues to require fentanyl, propofol gtt.  Possible stridor per respiratory therapy and further evaluation by anesthesia pending.    Continue treatment with IV antibiotics for likely aspiration pneumonia following acute seizure.    12/22/23   at bedside.  Continue on mechanical ventilation with 60% FiO2.  Significant anemia with hemoglobin down to 6.5 
                 HOSPITALIST PROGRESS NOTE  Sky Huynh MD, [facility] Medicine   [place], [place]         Daily Progress Note: 12/18/2023  53 y.o. female with a history of CVA, seizure, poorly controlled diabetes, hypertension.  September 2023 admitted to [facility] for new onset seizure activity with status epilepticus, CVA, uncontrolled diabetes. Discharged home with a regimen of keppra, lisinopril, carvedilol, amlodipine, statin, insulin. Unfortunately she was unable to get any of her prescriptions aside from her insulin.     12/13/23 presents to hospital by EMS because of witnessed seizure activity at home. Per EMS, the patient had back-to-back seizures and was found unresponsive, postictal, and hypoxic. Intubated ventilated during the ED course. During the ED course a pregnancy test was positive but US did not reveal IUP nor ectopic pregnancy. Also with hyperglycemia and acute renal failure. Started on nicardipine for hypertension.     Subjective:     12/18/23  Patient continues on mechanical ventilation at 24% FiO2.   at bedside.  Continues to require fentanyl, propofol gtt.    Mildly elevated troponins noted.    Counseled  regarding patient's critical condition with likelihood of severe aspiration pneumonia following seizure and currently requiring significant IV antibiotics as well as ventilatory support.      Medications reviewed  Current Facility-Administered Medications   Medication Dose Route Frequency    metoprolol (LOPRESSOR) injection 5 mg  5 mg IntraVENous Q4H    vancomycin (VANCOCIN) 750 mg in sodium chloride 0.9 % 250 mL IVPB ([id number])  750 mg IntraVENous Q12H    [START ON 12/19/2023] Vancomycin - Please draw a trough prior to dose 12/19 @ 1100, thanks!   Other Once    potassium chloride (KLOR-CON M) extended release tablet 40 mEq  40 mEq Oral Once    potassium bicarb-citric acid (EFFER-K) effervescent tablet 20 mEq  20 mEq 
                 HOSPITALIST PROGRESS NOTE  Sky Huynh MD, [facility] Medicine   [place], [place]         Daily Progress Note: 1/19/2024      Subjective:     1/17/24  Significant sinus tachycardia with minimal exertion with heart rate in the 120s to 130s noted.  Discussed with cardiology and likely will restart labetalol which had been held during hospitalization.    1/19/24  Is alert oriented x 4.  Awaiting PT this a.m.  Tachycardia somewhat improved after restarting labetalol, per cardiology.    Patient continues to be significantly weak awaiting michael bed at [facility].  Patient denies chest pain, palpitations however has mild shortness of breath.  No overnight fever/chills, vomiting noted.      Medications reviewed  Current Facility-Administered Medications   Medication Dose Route Frequency    ipratropium 0.5 mg-albuterol 2.5 mg (DUONEB) nebulizer solution 1 Dose  1 Dose Inhalation Q6H PRN    labetalol (NORMODYNE) tablet 50 mg  50 mg Oral 3 times per day    morphine (PF) injection 2 mg  2 mg IntraVENous Q4H PRN    HYDROcodone-acetaminophen (NORCO) 5-325 MG per tablet 1 tablet  1 tablet PEG Tube Q6H PRN    carboxymethylcellulose PF (THERATEARS/REFRESH) 1 % ophthalmic gel 1 drop  1 drop Both Eyes BID    Petrolatum-Zinc Oxide ointment   Topical BID    [Held by provider] insulin glargine (LANTUS) injection vial 10 Units  10 Units SubCUTAneous Nightly    [Held by provider] insulin lispro (HUMALOG) injection vial 5 Units  5 Units SubCUTAneous TID WC    insulin lispro (HUMALOG) injection vial 8 Units  8 Units SubCUTAneous Once    hydrALAZINE (APRESOLINE) injection 20 mg  20 mg IntraVENous Q4H PRN    metoprolol (LOPRESSOR) injection 5 mg  5 mg IntraVENous Q6H PRN    insulin lispro (HUMALOG) injection vial 0-16 Units  0-16 Units SubCUTAneous 4 times per day    lactobacillus (CULTURELLE) capsule 1 capsule  1 capsule Per NG tube BID    [Held by provider] furosemide (LASIX) 
                 HOSPITALIST PROGRESS NOTE  Sky Huynh MD, [facility] Medicine   [place], [place]         Daily Progress Note: 1/20/2024      Subjective:     1/17/24  Significant sinus tachycardia with minimal exertion with heart rate in the 120s to 130s noted.  Discussed with cardiology and likely will restart labetalol which had been held during hospitalization.    1/19/24  Is alert oriented x 4.  Awaiting PT this a.m.  Tachycardia somewhat improved after restarting labetalol, per cardiology.    1/20/24  Patient continues to be alert and oriented x 4 and is in better spirits.    Fever of 101.8 noted with recommendations by ID to workup for possible infection source.    Patient continues to be significantly weak awaiting michael bed at [facility].  No overnight cough, chest pain, shortness of breath, nausea/vomiting, abdominal pain or diarrhea noted.    Medications reviewed  Current Facility-Administered Medications   Medication Dose Route Frequency    metoprolol tartrate (LOPRESSOR) tablet 25 mg  25 mg Oral TID    ipratropium 0.5 mg-albuterol 2.5 mg (DUONEB) nebulizer solution 1 Dose  1 Dose Inhalation Q6H PRN    morphine (PF) injection 2 mg  2 mg IntraVENous Q4H PRN    HYDROcodone-acetaminophen (NORCO) 5-325 MG per tablet 1 tablet  1 tablet PEG Tube Q6H PRN    carboxymethylcellulose PF (THERATEARS/REFRESH) 1 % ophthalmic gel 1 drop  1 drop Both Eyes BID    Petrolatum-Zinc Oxide ointment   Topical BID    [Held by provider] insulin glargine (LANTUS) injection vial 10 Units  10 Units SubCUTAneous Nightly    [Held by provider] insulin lispro (HUMALOG) injection vial 5 Units  5 Units SubCUTAneous TID WC    insulin lispro (HUMALOG) injection vial 8 Units  8 Units SubCUTAneous Once    hydrALAZINE (APRESOLINE) injection 20 mg  20 mg IntraVENous Q4H PRN    metoprolol (LOPRESSOR) injection 5 mg  5 mg IntraVENous Q6H PRN    insulin lispro (HUMALOG) injection vial 0-16 Units  
                 HOSPITALIST PROGRESS NOTE  Sky Huynh MD, [facility] Medicine   [place], [place]         Daily Progress Note: 12/22/2023  53 y.o. female with a history of CVA, seizure, poorly controlled diabetes, hypertension.  September 2023 admitted to [facility] for new onset seizure activity with status epilepticus, CVA, uncontrolled diabetes. Discharged home with a regimen of keppra, lisinopril, carvedilol, amlodipine, statin, insulin. Unfortunately she was unable to get any of her prescriptions aside from her insulin.     12/13/23 presents to hospital by EMS because of witnessed seizure activity at home. Per EMS, the patient had back-to-back seizures and was found unresponsive, postictal, and hypoxic. Intubated ventilated during the ED course. During the ED course a pregnancy test was positive but US did not reveal IUP nor ectopic pregnancy. Also with hyperglycemia and acute renal failure. Started on nicardipine for hypertension.     Subjective:     12/18/23  Counseled  regarding patient's critical condition with likelihood of severe aspiration pneumonia following seizure and currently requiring significant IV antibiotics as well as ventilatory support.    12/20/23  Patient continues on mechanical ventilation at 24% FiO2.    12/21/23   at bedside.  Early a.m. extubation per respiratory therapy and pulmonology however immediately after patient started having significant bronchospasm and after 20 minutes required reintubation.  Continues on mechanical ventilation at 100% FiO2.  Continues to require fentanyl, propofol gtt.  Possible stridor per respiratory therapy and further evaluation by anesthesia pending.    Continue treatment with IV antibiotics for likely aspiration pneumonia following acute seizure.    12/22/23   at bedside.  Continue on mechanical ventilation with 60% FiO2.    Significant anemia with hemoglobin down to 6.5 
                 HOSPITALIST PROGRESS NOTE  Sky Huynh MD, [facility] Medicine   [place], [place]         Daily Progress Note: 12/25/2023  53 y.o. female with a history of CVA, seizure, poorly controlled diabetes, hypertension.  September 2023 admitted to [facility] for new onset seizure activity with status epilepticus, CVA, uncontrolled diabetes. Discharged home with a regimen of keppra, lisinopril, carvedilol, amlodipine, statin, insulin. Unfortunately she was unable to get any of her prescriptions aside from her insulin.     12/13/23 presents to hospital by EMS because of witnessed seizure activity at home. Per EMS, the patient had back-to-back seizures and was found unresponsive, postictal, and hypoxic. Intubated ventilated during the ED course. During the ED course a pregnancy test was positive but US did not reveal IUP nor ectopic pregnancy. Also with hyperglycemia and acute renal failure. Started on nicardipine for hypertension.     Subjective:     12/18/23  Counseled  regarding patient's critical condition with likelihood of severe aspiration pneumonia following seizure and currently requiring significant IV antibiotics as well as ventilatory support.    12/20/23  Patient continues on mechanical ventilation at 24% FiO2.    12/21/23   at bedside.  Early a.m. extubation per respiratory therapy and pulmonology however immediately after patient started having significant bronchospasm and after 20 minutes required reintubation.  Continues on mechanical ventilation at 100% FiO2.  Continues to require fentanyl, propofol gtt.  Possible stridor per respiratory therapy and further evaluation by anesthesia pending.    Continue treatment with IV antibiotics for likely aspiration pneumonia following acute seizure.    12/22/23   at bedside.  Continue on mechanical ventilation with 60% FiO2.  Significant anemia with hemoglobin down to 6.5 
                 HOSPITALIST PROGRESS NOTE  Sky Huynh MD, [facility] Medicine   [place], [place]         Daily Progress Note: 12/19/2023  53 y.o. female with a history of CVA, seizure, poorly controlled diabetes, hypertension.  September 2023 admitted to [facility] for new onset seizure activity with status epilepticus, CVA, uncontrolled diabetes. Discharged home with a regimen of keppra, lisinopril, carvedilol, amlodipine, statin, insulin. Unfortunately she was unable to get any of her prescriptions aside from her insulin.     12/13/23 presents to hospital by EMS because of witnessed seizure activity at home. Per EMS, the patient had back-to-back seizures and was found unresponsive, postictal, and hypoxic. Intubated ventilated during the ED course. During the ED course a pregnancy test was positive but US did not reveal IUP nor ectopic pregnancy. Also with hyperglycemia and acute renal failure. Started on nicardipine for hypertension.     Subjective:     12/18/23  Counseled  regarding patient's critical condition with likelihood of severe aspiration pneumonia following seizure and currently requiring significant IV antibiotics as well as ventilatory support.    12/19/23  Patient continues on mechanical ventilation at 24% FiO2.    Continues to require fentanyl, propofol gtt.    No acute fevers or events noted.  Reviewed chest x-ray which shows increasing right sided pneumonia and ABG which has improved    Continue treatment with IV antibiotics for likely aspiration pneumonia following seizure.      Medications reviewed  Current Facility-Administered Medications   Medication Dose Route Frequency    labetalol (NORMODYNE) tablet 200 mg  200 mg Oral 3 times per day    vancomycin (VANCOCIN) 750 mg in sodium chloride 0.9 % 250 mL IVPB ([id number])  750 mg IntraVENous Q12H    Vancomycin - Please draw a trough prior to dose 12/19 @ 1100, thanks!   Other Once 
                 HOSPITALIST PROGRESS NOTE  Sky Huynh MD, [facility] Medicine   [place], [place]         Daily Progress Note: 1/17/2024      Subjective:     Is alert oriented x 4.  Evaluated with PT in the room.  Patient able to stand up twice with PT today.  Continues to be significantly tachycardic.    Patient continues to be significantly weak awaiting IRF placement.  Patient denies chest pain, palpitations however has mild shortness of breath.  No overnight fever/chills, vomiting noted.    Discussed with cardiology and likely will restart labetalol which had been held during hospitalization.      Medications reviewed  Current Facility-Administered Medications   Medication Dose Route Frequency    ipratropium 0.5 mg-albuterol 2.5 mg (DUONEB) nebulizer solution 1 Dose  1 Dose Inhalation Q6H PRN    morphine (PF) injection 2 mg  2 mg IntraVENous Q4H PRN    HYDROcodone-acetaminophen (NORCO) 5-325 MG per tablet 1 tablet  1 tablet PEG Tube Q6H PRN    carboxymethylcellulose PF (THERATEARS/REFRESH) 1 % ophthalmic gel 1 drop  1 drop Both Eyes BID    Petrolatum-Zinc Oxide ointment   Topical BID    [Held by provider] insulin glargine (LANTUS) injection vial 10 Units  10 Units SubCUTAneous Nightly    [Held by provider] insulin lispro (HUMALOG) injection vial 5 Units  5 Units SubCUTAneous TID WC    insulin lispro (HUMALOG) injection vial 8 Units  8 Units SubCUTAneous Once    hydrALAZINE (APRESOLINE) injection 20 mg  20 mg IntraVENous Q4H PRN    metoprolol (LOPRESSOR) injection 5 mg  5 mg IntraVENous Q6H PRN    insulin lispro (HUMALOG) injection vial 0-16 Units  0-16 Units SubCUTAneous 4 times per day    lactobacillus (CULTURELLE) capsule 1 capsule  1 capsule Per NG tube BID    [Held by provider] furosemide (LASIX) injection 40 mg  40 mg IntraVENous Daily    budesonide (PULMICORT) nebulizer suspension 500 mcg  0.5 mg Nebulization BID RT    amLODIPine (NORVASC) tablet 10 mg  
                 HOSPITALIST PROGRESS NOTE  Sky Huynh MD, [facility] Medicine   [place], [place]         Daily Progress Note: 1/16/2024      Subjective:     Is alert oriented x 4.    Patient continues to be significantly weak awaiting IRF placement.    Overnight pulse noted to be in the 120s and seems regular however will obtain EKG.  Patient denies chest pain, palpitations however has mild shortness of breath.  No overnight fever/chills, vomiting noted.      Medications reviewed  Current Facility-Administered Medications   Medication Dose Route Frequency    morphine (PF) injection 2 mg  2 mg IntraVENous Q4H PRN    HYDROcodone-acetaminophen (NORCO) 5-325 MG per tablet 1 tablet  1 tablet PEG Tube Q6H PRN    carboxymethylcellulose PF (THERATEARS/REFRESH) 1 % ophthalmic gel 1 drop  1 drop Both Eyes BID    Petrolatum-Zinc Oxide ointment   Topical BID    [Held by provider] insulin glargine (LANTUS) injection vial 10 Units  10 Units SubCUTAneous Nightly    [Held by provider] insulin lispro (HUMALOG) injection vial 5 Units  5 Units SubCUTAneous TID WC    ipratropium 0.5 mg-albuterol 2.5 mg (DUONEB) nebulizer solution 1 Dose  1 Dose Inhalation BID RT    insulin lispro (HUMALOG) injection vial 8 Units  8 Units SubCUTAneous Once    hydrALAZINE (APRESOLINE) injection 20 mg  20 mg IntraVENous Q4H PRN    metoprolol (LOPRESSOR) injection 5 mg  5 mg IntraVENous Q6H PRN    insulin lispro (HUMALOG) injection vial 0-16 Units  0-16 Units SubCUTAneous 4 times per day    lactobacillus (CULTURELLE) capsule 1 capsule  1 capsule Per NG tube BID    [Held by provider] furosemide (LASIX) injection 40 mg  40 mg IntraVENous Daily    budesonide (PULMICORT) nebulizer suspension 500 mcg  0.5 mg Nebulization BID RT    amLODIPine (NORVASC) tablet 10 mg  10 mg Per NG tube Daily    famotidine (PEPCID) 20 mg in sodium chloride (PF) 0.9 % 10 mL injection  20 mg IntraVENous Daily    levETIRAcetam 
                 HOSPITALIST PROGRESS NOTE  Sky Huynh MD, [facility] Medicine   [place], [place]         Daily Progress Note: 12/22/2023  53 y.o. female with a history of CVA, seizure, poorly controlled diabetes, hypertension.  September 2023 admitted to [facility] for new onset seizure activity with status epilepticus, CVA, uncontrolled diabetes. Discharged home with a regimen of keppra, lisinopril, carvedilol, amlodipine, statin, insulin. Unfortunately she was unable to get any of her prescriptions aside from her insulin.     12/13/23 presents to hospital by EMS because of witnessed seizure activity at home. Per EMS, the patient had back-to-back seizures and was found unresponsive, postictal, and hypoxic. Intubated ventilated during the ED course. During the ED course a pregnancy test was positive but US did not reveal IUP nor ectopic pregnancy. Also with hyperglycemia and acute renal failure. Started on nicardipine for hypertension.     Subjective:     12/18/23  Counseled  regarding patient's critical condition with likelihood of severe aspiration pneumonia following seizure and currently requiring significant IV antibiotics as well as ventilatory support.    12/20/23  Patient continues on mechanical ventilation at 24% FiO2.    12/21/23   at bedside.  Early a.m. extubation per respiratory therapy and pulmonology however immediately after patient started having significant bronchospasm and after 20 minutes required reintubation.  Continues on mechanical ventilation at 100% FiO2.  Continues to require fentanyl, propofol gtt.  Possible stridor per respiratory therapy and further evaluation by anesthesia pending.    Continue treatment with IV antibiotics for likely aspiration pneumonia following acute seizure.    12/22/23   at bedside.  Continue on mechanical ventilation with 60% FiO2.  Significant anemia with hemoglobin down to 6.5 
        UROLOGY Progress Note          [phone]      Daily Progress Note: 1/3/2024      Subjective:   The patient is seen for UROLOGIC follow  up.   5.1cm left retroperitoneal mass concerning for adrenal neoplasm.  Serum cortisol this AM normal.     Problem List:  Patient Active Problem List   Diagnosis    Hyperosmolar hyperglycemic state (HHS) (HCC)    Seizure (HCC)    Old cerebrovascular accident (CVA) without late effect    Type 2 diabetes mellitus with hyperglycemia, without long-term current use of insulin (HCC)    Acute respiratory failure with hypoxia and hypercarbia (HCC)    Hypertensive emergency    NSTEMI (non-ST elevated myocardial infarction) ([facility])    Diarrhea    Acute respiratory failure with hypoxia ([facility])    Adrenal mass greater than 4 cm in diameter ([facility])    Hyperglycemia         Medications reviewed  Current Facility-Administered Medications   Medication Dose Route Frequency    0.45 % sodium chloride infusion   IntraVENous Continuous    insulin lispro (HUMALOG) injection vial 5 Units  5 Units SubCUTAneous TID WC    ipratropium 0.5 mg-albuterol 2.5 mg (DUONEB) nebulizer solution 1 Dose  1 Dose Inhalation BID RT    insulin glargine (LANTUS) injection vial 40 Units  40 Units SubCUTAneous BID    methylPREDNISolone sodium succ (SOLU-MEDROL) injection 20 mg  20 mg IntraVENous Q12H    insulin lispro (HUMALOG) injection vial 8 Units  8 Units SubCUTAneous Once    hydrALAZINE (APRESOLINE) injection 20 mg  20 mg IntraVENous Q4H PRN    cloNIDine (CATAPRES) tablet 0.3 mg  0.3 mg Oral TID    metoprolol (LOPRESSOR) injection 5 mg  5 mg IntraVENous Q6H PRN    hydrALAZINE (APRESOLINE) tablet 50 mg  50 mg Oral 3 times per day    insulin lispro (HUMALOG) injection vial 0-16 Units  0-16 Units SubCUTAneous 4 times per day    lactobacillus (CULTURELLE) capsule 1 capsule  1 capsule Per NG tube BID    [Held by provider] furosemide (LASIX) injection 40 mg  40 mg IntraVENous Daily    budesonide (PULMICORT) nebulizer 
        UROLOGY Progress Note          [phone]      Daily Progress Note: 1/18/2024      Subjective:   The patient is seen for UROLOGIC follow  up.    s/p robotic LEFT adrenalectomy on 1/8/24.          She is alert this morning.  She denies pain, resting comfortably.       She is eating and drinking.   TF at 40cc/hr  Elevated HR  Problem List:      Patient Active Problem List   Diagnosis    Hyperosmolar hyperglycemic state (HHS) (HCC)    Seizure (HCC)    Old cerebrovascular accident (CVA) without late effect    Type 2 diabetes mellitus with hyperglycemia, without long-term current use of insulin (HCC)    Acute respiratory failure with hypoxia and hypercarbia (HCC)    Hypertensive emergency    NSTEMI (non-ST elevated myocardial infarction) ([facility])    Diarrhea    Acute respiratory failure with hypoxia (HCC)    Adrenal mass greater than 4 cm in diameter ([facility] [facility])    Hyperglycemia    Pheochromocytoma of left adrenal gland         Medications reviewed  Current Facility-Administered Medications   Medication Dose Route Frequency    ipratropium 0.5 mg-albuterol 2.5 mg (DUONEB) nebulizer solution 1 Dose  1 Dose Inhalation Q6H PRN    labetalol (NORMODYNE) tablet 50 mg  50 mg Oral 3 times per day    morphine (PF) injection 2 mg  2 mg IntraVENous Q4H PRN    HYDROcodone-acetaminophen (NORCO) 5-325 MG per tablet 1 tablet  1 tablet PEG Tube Q6H PRN    carboxymethylcellulose PF (THERATEARS/REFRESH) 1 % ophthalmic gel 1 drop  1 drop Both Eyes BID    Petrolatum-Zinc Oxide ointment   Topical BID    [Held by provider] insulin glargine (LANTUS) injection vial 10 Units  10 Units SubCUTAneous Nightly    [Held by provider] insulin lispro (HUMALOG) injection vial 5 Units  5 Units SubCUTAneous TID WC    insulin lispro (HUMALOG) injection vial 8 Units  8 Units SubCUTAneous Once    hydrALAZINE (APRESOLINE) injection 20 mg  20 mg IntraVENous Q4H PRN    metoprolol (LOPRESSOR) injection 5 mg  5 mg IntraVENous Q6H PRN    insulin lispro (HUMALOG) 
        UROLOGY Progress Note          [phone]      Daily Progress Note: 1/5/2024      Subjective:   The patient is seen for UROLOGIC follow  up.   5.1cm left retroperitoneal mass concerning for adrenal neoplasm.    MRI pending - awaiting screening/ auth from       Problem List:      Component  Ref Range & Units 12/14/23 0440   Normetanephrine, Free  0.0 - 244.0 pg/mL 1878.5 High         Metanephrine, Free  0.0 - 88.0 pg/mL 193.9 High      Patient Active Problem List   Diagnosis    Hyperosmolar hyperglycemic state (HHS) (HCC)    Seizure (HCC)    Old cerebrovascular accident (CVA) without late effect    Type 2 diabetes mellitus with hyperglycemia, without long-term current use of insulin (HCC)    Acute respiratory failure with hypoxia and hypercarbia (HCC)    Hypertensive emergency    NSTEMI (non-ST elevated myocardial infarction) (HCC)    Diarrhea    Acute respiratory failure with hypoxia (HCC)    Adrenal mass greater than 4 cm in diameter (HCC)    Hyperglycemia    Pheochromocytoma of left adrenal gland         Medications reviewed  Current Facility-Administered Medications   Medication Dose Route Frequency    predniSONE (DELTASONE) tablet 20 mg  20 mg Oral Daily    insulin lispro (HUMALOG) injection vial 5 Units  5 Units SubCUTAneous TID WC    ipratropium 0.5 mg-albuterol 2.5 mg (DUONEB) nebulizer solution 1 Dose  1 Dose Inhalation BID RT    insulin glargine (LANTUS) injection vial 40 Units  40 Units SubCUTAneous BID    insulin lispro (HUMALOG) injection vial 8 Units  8 Units SubCUTAneous Once    hydrALAZINE (APRESOLINE) injection 20 mg  20 mg IntraVENous Q4H PRN    cloNIDine (CATAPRES) tablet 0.3 mg  0.3 mg Oral TID    metoprolol (LOPRESSOR) injection 5 mg  5 mg IntraVENous Q6H PRN    hydrALAZINE (APRESOLINE) tablet 50 mg  50 mg Oral 3 times per day    insulin lispro (HUMALOG) injection vial 0-16 Units  0-16 Units SubCUTAneous 4 times per day    lactobacillus (CULTURELLE) capsule 1 capsule  1 capsule 
        UROLOGY Progress Note          [phone]      Daily Progress Note: 1/15/2024      Subjective:   The patient is seen for UROLOGIC follow  up.    s/p robotic LEFT adrenalectomy on 1/8/24.          She is alert this morning.  She complains of 10/10 pain, resting comfortably.  She says her pain goes to 6/10 with medication.          Problem List:      Patient Active Problem List   Diagnosis    Hyperosmolar hyperglycemic state (HHS) ([facility])    Seizure (HCC)    Old cerebrovascular accident (CVA) without late effect    Type 2 diabetes mellitus with hyperglycemia, without long-term current use of insulin (HCC)    Acute respiratory failure with hypoxia and hypercarbia (HCC)    Hypertensive emergency    NSTEMI (non-ST elevated myocardial infarction) ([facility])    Diarrhea    Acute respiratory failure with hypoxia ([facility])    Adrenal mass greater than 4 cm in diameter ([facility])    Hyperglycemia    Pheochromocytoma of left adrenal gland         Medications reviewed  Current Facility-Administered Medications   Medication Dose Route Frequency    morphine (PF) injection 2 mg  2 mg IntraVENous Q4H PRN    HYDROcodone-acetaminophen (NORCO) 5-325 MG per tablet 1 tablet  1 tablet PEG Tube Q6H PRN    carboxymethylcellulose PF (THERATEARS/REFRESH) 1 % ophthalmic gel 1 drop  1 drop Both Eyes BID    Petrolatum-Zinc Oxide ointment   Topical BID    [Held by provider] insulin glargine (LANTUS) injection vial 10 Units  10 Units SubCUTAneous Nightly    [Held by provider] insulin lispro (HUMALOG) injection vial 5 Units  5 Units SubCUTAneous TID WC    ipratropium 0.5 mg-albuterol 2.5 mg (DUONEB) nebulizer solution 1 Dose  1 Dose Inhalation BID RT    insulin lispro (HUMALOG) injection vial 8 Units  8 Units SubCUTAneous Once    hydrALAZINE (APRESOLINE) injection 20 mg  20 mg IntraVENous Q4H PRN    metoprolol (LOPRESSOR) injection 5 mg  5 mg IntraVENous Q6H PRN    insulin lispro (HUMALOG) injection vial 0-16 Units  0-16 Units SubCUTAneous 4 times per 
        UROLOGY Progress Note          [phone]      Daily Progress Note: 1/10/2024      Subjective:   The patient is seen for UROLOGIC follow  up.   5.1cm left retroperitoneal mass concerning for pheochromocytoma.  Now s/p   LEFT adrenalectomy on 1/8/24.  Robot assisted.  Pathology pending.    Off Levophed.  Lethargic, but appropriate responses to questions.  Abdominal soreness.  Consideration for PEG placement.        Tachycardia.  BP stable.  Glucose .    Problem List:      Component  Ref Range & Units 12/14/23 0440   Normetanephrine, Free  0.0 - 244.0 pg/mL 1878.5 High         Metanephrine, Free  0.0 - 88.0 pg/mL 193.9 High      Patient Active Problem List   Diagnosis    Hyperosmolar hyperglycemic state (HHS) ([facility])    Seizure ([facility])    Old cerebrovascular accident (CVA) without late effect    Type 2 diabetes mellitus with hyperglycemia, without long-term current use of insulin ([facility])    Acute respiratory failure with hypoxia and hypercarbia ([facility])    Hypertensive emergency    NSTEMI (non-ST elevated myocardial infarction) ([facility])    Diarrhea    Acute respiratory failure with hypoxia ([facility])    Adrenal mass greater than 4 cm in diameter ([facility])    Hyperglycemia    Pheochromocytoma of left adrenal gland         Medications reviewed  Current Facility-Administered Medications   Medication Dose Route Frequency    Petrolatum-Zinc Oxide ointment   Topical BID    insulin glargine (LANTUS) injection vial 10 Units  10 Units SubCUTAneous Nightly    dextrose 5 % in lactated ringers infusion   IntraVENous Continuous    glycerin-hypromellose- 0.2-0.2-1 % opthalmic solution 1 drop  1 drop Both Eyes BID    [Held by provider] insulin lispro (HUMALOG) injection vial 5 Units  5 Units SubCUTAneous TID WC    ipratropium 0.5 mg-albuterol 2.5 mg (DUONEB) nebulizer solution 1 Dose  1 Dose Inhalation BID RT    insulin lispro (HUMALOG) injection vial 8 Units  8 Units SubCUTAneous Once    hydrALAZINE (APRESOLINE) injection 20 mg  
       Progress Note    Patient: Lucretia Andres MRN: [id number]  SSN: [id number]    YOB: 1970  Age: 53 y.o.  Sex: female      Admit Date: 12/13/2023    LOS: 41 days     Subjective:     Patient had a PEG tube placed on January 10 but now she is eating and drinking and there was request to remove the PEG tube.      Objective:     Vitals:    01/22/24 2249 01/23/24 0331 01/23/24 0839 01/23/24 1508   BP:  119/67 138/86 131/80   Pulse: 96 (!) 103 (!) 105 (!) 103   Resp: 16 18 16 18   Temp:  98.5 °F (36.9 °C) 97.5 °F (36.4 °C) 98.4 °F (36.9 °C)   TempSrc:  Axillary Oral    SpO2: 100% 100% 99% 100%   Weight:       Height:            Intake and Output:  Current Shift: No intake/output data recorded.  Last three shifts: 01/21 1901 - 01/23 0700  In: 1572 [P.O.:970; I.V.:40]  Out: 2300 [Urine:2300]    Physical Exam:   Skin:  Extremities and face reveal no rashes. No walters erythema.   HEENT: Sclerae anicteric. Extra-occular muscles are intact. No abnormal pigmentation of the lips. The neck is supple.  Cardiovascular: Regular rate and rhythm.  Respiratory:  Comfortable breathing with no accessory muscle use.   GI:  Abdomen nondistended, soft, and nontender. No enlargement of the liver or spleen. No masses palpable.  Rectal:  Deferred  Musculoskeletal: Extremities have good range of motion.  Neurological:  Gross memory appears intact.  Patient is alert and oriented.  Psychiatric:  Mood appears appropriate with judgement intact.  Lymphatic:  No visible adenopathy      Lab/Data Review:  Recent Results (from the past 24 hour(s))   POCT Glucose    Collection Time: 01/22/24  4:58 PM   Result Value Ref Range    POC Glucose 104 (H) 65 - 100 mg/dL    Performed by: Delano Downs    POCT Glucose    Collection Time: 01/22/24 10:05 PM   Result Value Ref Range    POC Glucose 129 (H) 65 - 100 mg/dL    Performed by: Kristy Catalan    POCT Glucose    Collection Time: 01/23/24  5:48 AM   Result Value Ref Range    POC Glucose 99 
       Progress Note    Patient: Lucretia Andres MRN: [id number]  SSN: [id number]    YOB: 1970  Age: 53 y.o.  Sex: female      Admit Date: 12/13/2023    LOS: 26 days     Subjective:     Patient does not communicate most of the historical data was gathered from the chart events of past 24 hours were reviewed patient had a prolonged illness with CVA and respiratory failure and very long ventilatory support.  A consult was placed for PEG tube placement today I had a discussion with family regarding PEG placement the  wants to discuss it with his family before proceeding with PEG tube.    Objective:     Vitals:    01/08/24 1330 01/08/24 1335 01/08/24 1345 01/08/24 1443   BP: (!) 117/55  (!) 108/55 (!) 112/58   Pulse: 94 96 89 91   Resp: 14 16 14 16   Temp:   97.5 °F (36.4 °C) (!) 96.6 °F (35.9 °C)   TempSrc:   Oral Axillary   SpO2: 95% 94% 94% 94%   Weight:       Height:            Intake and Output:  Current Shift: 01/08 0701 - 01/08 1900  In: 2340 [I.V.:1800]  Out: 850 [Urine:850]  Last three shifts: 01/06 1901 - 01/08 0700  In: 2450   Out: 1650 [Urine:1650]    Physical Exam:   Skin:  Extremities and face reveal no rashes. No walters erythema.   HEENT: Sclerae anicteric. Extra-occular muscles are intact. No abnormal pigmentation of the lips. The neck is supple.  Cardiovascular: Regular rate and rhythm.  Respiratory:  Comfortable breathing with no accessory muscle use.   GI:  Abdomen nondistended, soft, and nontender. No enlargement of the liver or spleen. No masses palpable.  Rectal:  Deferred  Musculoskeletal: Extremities have good range of motion.  Neurological:  Gross memory appears intact.  Patient is alert and oriented.  Psychiatric:  Mood appears appropriate with judgement intact.  Lymphatic:  No visible adenopathy      Lab/Data Review:  Recent Results (from the past 24 hour(s))   POCT Glucose    Collection Time: 01/07/24  5:50 PM   Result Value Ref Range    POC Glucose 321 (H) 65 - 100 mg/dL 
    CARDIOLOGY PROGRESS NOTE      Patient Name: Lucretia Andres  Age: 53 y.o.  Gender:female  :1970  MRN: [id number]    Patient seen and examined. This is a patient with a history of  CVA, seizures, DM, hypertension who presented with hypertensive urgency now being followed for elevated troponin. She was brought in by EMS after being found unresponsive post witnessed seizure.  She was intubated on arrival.  She was started on cardene gtt, now BP controlled.  Remains intubated and sedated.  Apparently she did not  her seizure medications after a hospitalization for status epilepticus in September. Echo in  with preserved EF and LVH. No other complaints reported.    2023, seen today in ICU. Remains intubated and sedated. Family at bedside.     2023.  Remains in ICU, intubated and sedated, off versed gtt.  Mild sinus tachy, BP better but still mildly up.     2023:  Resting in bed, intubated and sedated.  Remains sinus tach-some improvement with IV lopressor.  BP remains above goal.    :  In ICU, intubated and sedated.  HR and BP improved.    :  Remains intubated and sedated in ICU.  Attempts to wean sedation complicated by tachycardia, hypertension    :  Remains in ICU, extubated this AM and initially did well but then developed possible seizure like symptoms along with stridor and bradycardia.  Reintubated.  Family and RN at the bedside.  Now tachycardic and hypertensive.    : Reintubated yesterday. On sedation in ICU. Hypertensive, on Cardene gtt.      on vent, 80% FIO2, sats okay, sinus tachy, sedated. Bps 160s systolic on iv nicardipine     - no major changes but remains hypertensive requiring iv cardene coupled with high dose oral/NG meds. FI02 still 80%, CXR more wet. BP elevation most striking post re-intubation     - BP better overnight with increased Rx, diuresed well.     Telemetry reviewed - sinus    Unable to obtain ROS. Current medications 
    CARDIOLOGY PROGRESS NOTE      Patient Name: Lucretia Andres  Age: 53 y.o.  Gender:female  :1970  MRN: [id number]    Patient seen and examined. This is a patient with a history of  CVA, seizures, DM, hypertension who presented with hypertensive urgency now being followed for elevated troponin. She was brought in by EMS after being found unresponsive post witnessed seizure.  She was intubated on arrival.  She was started on cardene gtt, now BP controlled.  Remains intubated and sedated.  Apparently she did not  her seizure medications after a hospitalization for status epilepticus in September. Echo in  with preserved EF and LVH. No other complaints reported.     Seen and examined.  Resting in bed.  Does not speak but nods yes and no.  No chest pain.  Breathing okay.  No longer on tele. Now diagnosed with phaeochromocytoma.    Current medications reviewed.    Physical Examination    No Known Allergies    Vitals:    24 1646   BP: 125/70   Pulse:    Resp:    Temp:    SpO2:      Vital signs are stable  Heart is regular rate and rhythm.  No murmur  Lungs are coarse  Abdomen is soft, nontender, normal bowel sounds.  Extremities have no edema  Skin is dry and warm.    Labs reviewed:  Recent Results (from the past 12 hour(s))   Basic Metabolic Panel    Collection Time: 24  5:09 AM   Result Value Ref Range    Sodium 139 136 - 145 mmol/L    Potassium 4.1 3.5 - 5.1 mmol/L    Chloride 109 (H) 97 - 108 mmol/L    CO2 21 21 - 32 mmol/L    Anion Gap 9 5 - 15 mmol/L    Glucose 149 (H) 65 - 100 mg/dL    BUN 55 (H) 6 - 20 mg/dL    Creatinine 0.81 0.55 - 1.02 mg/dL    Bun/Cre Ratio 68 (H) 12 - 20      Est, Glom Filt Rate >60 >60 ml/min/1.73m2    Calcium 9.1 8.5 - 10.1 mg/dL   CBC    Collection Time: 24  5:09 AM   Result Value Ref Range    WBC 19.1 (H) 3.6 - 11.0 K/uL    RBC 3.21 (L) 3.80 - 5.20 M/uL    Hemoglobin 9.5 (L) 11.5 - 16.0 g/dL    Hematocrit 29.6 (L) 35.0 - 47.0 %    MCV 92.2 80.0 - 99.0 FL 
    CARDIOLOGY PROGRESS NOTE      Patient Name: Lucretia Andres  Age: 53 y.o.  Gender:female  :1970  MRN: [id number]    Patient seen and examined. This is a patient with a history of  CVA, seizures, DM, hypertension who presented with hypertensive urgency now being followed for elevated troponin. She was brought in by EMS after being found unresponsive post witnessed seizure.  She was intubated on arrival.  She was started on cardene gtt, now BP controlled.  Remains intubated and sedated.  Apparently she did not  her seizure medications after a hospitalization for status epilepticus in September. Echo in  with preserved EF and LVH. No other complaints reported.    2023, seen today in ICU. Remains intubated and sedated. Family at bedside.     2023.  Remains in ICU, intubated and sedated, off versed gtt.  Mild sinus tachy, BP better but still mildly up.     2023:  Resting in bed, intubated and sedated.  Remains sinus tach-some improvement with IV lopressor.  BP remains above goal.    Telemetry reviewed, there were no events noted in the past 24 hours.    Unable to obtain ROS. Current medications reviewed.    Physical Examination    No Known Allergies  Vitals:    23 1117   BP:    Pulse: (!) 132   Resp: 18   Temp:    SpO2: 98%     Vital signs are stable  Heart has a RRR.  No murmur  Lungs are diminished  Abdomen is soft, nontender, normal bowel sounds.  Extremities have no edema  Skin is dry and warm.    Labs reviewed:  Recent Results (from the past 12 hour(s))   POCT Glucose    Collection Time: 23 12:35 AM   Result Value Ref Range    POC Glucose 251 (H) 65 - 100 mg/dL    Performed by: LIZETH FRITZ    POCT Glucose    Collection Time: 23  3:19 AM   Result Value Ref Range    POC Glucose 191 (H) 65 - 100 mg/dL    Performed by: LIZETH FRITZ    Troponin    Collection Time: 23  3:30 AM   Result Value Ref Range    Troponin, High Sensitivity 453 (HH) 0 - 
    CARDIOLOGY PROGRESS NOTE      Patient Name: Lucretia Andres  Age: 53 y.o.  Gender:female  :1970  MRN: [id number]    This is a patient with a history of  CVA, seizures, DM, hypertension who presented with hypertensive urgency now being followed for elevated troponin. She was brought in by EMS after being found unresponsive post witnessed seizure.  She was intubated on arrival.  She was started on cardene gtt, now BP controlled. Apparently she did not  her seizure medications after a hospitalization for status epilepticus in September. Echo in  with preserved EF and LVH. Now extubated. No other complaints reported.    Cardiology re-consulted for sinus tachycardia. Seen and examined. She is now post op robotic LEFT adrenalectomy 24. Resting comfortably bed. No chest pain. Breathing okay. Denies further complaints.    Current medications reviewed.    Physical Examination    No Known Allergies    Vitals:    24 0808   BP: 124/79   Pulse: (!) 112   Resp: 18   Temp: 99 °F (37.2 °C)   SpO2: 100%     Vital signs are stable  Heart is tachycardic rate and regular rhythm.  No murmur  Lungs are coarse  Abdomen is soft, nontender, normal bowel sounds.  Extremities have no edema  Skin is dry and warm.    Labs reviewed:  Recent Results (from the past 12 hour(s))   POCT Glucose    Collection Time: 24  5:28 AM   Result Value Ref Range    POC Glucose 105 (H) 65 - 100 mg/dL    Performed by: David Rosales    POCT Glucose    Collection Time: 24 11:12 AM   Result Value Ref Range    POC Glucose 138 (H) 65 - 100 mg/dL    Performed by: Pedro Stallworth         Impression and recommendations as discussed with Dr. Kingston:  Pheochromocytoma, post laparoscopic adrenalectomy post op day 10, BP stable   Elevated troponin, trop peaked at 692, following Type 2 MI.   Echo with EF 60-65%  Likely secondary to hypertension, seizure, respiratory failure  Consider ischemic work up as OP, caution with lexiscan as 
    CARDIOLOGY PROGRESS NOTE      Patient Name: Lucretia Andres  Age: 53 y.o.  Gender:female  :1970  MRN: [id number]    This is a patient with a history of  CVA, seizures, DM, hypertension who presented with hypertensive urgency now being followed for elevated troponin. She was brought in by EMS after being found unresponsive post witnessed seizure.  She was intubated on arrival.  She was started on cardene gtt, now BP controlled. Apparently she did not  her seizure medications after a hospitalization for status epilepticus in September. Echo in  with preserved EF and LVH. Now extubated. No other complaints reported.    Cardiology re-consulted for sinus tachycardia. Seen and examined. She is now post op robotic LEFT adrenalectomy 24. Resting comfortably bed. No chest pain. Breathing okay. Denies further complaints. Febrile this morning.     Current medications reviewed.    Physical Examination    No Known Allergies    Vitals:    24 0802   BP: 131/77   Pulse: (!) 106   Resp: 15   Temp: (!) 101.8 °F (38.8 °C)   SpO2: 98%     Vital signs are stable  Heart is tachycardic rate and regular rhythm.  No murmur  Lungs are coarse  Abdomen is soft, nontender, normal bowel sounds.  Extremities have no edema  Skin is dry and warm.    Labs reviewed:  Recent Results (from the past 12 hour(s))   POCT Glucose    Collection Time: 24 11:47 PM   Result Value Ref Range    POC Glucose 116 (H) 65 - 100 mg/dL    Performed by: Alvarez Soliz    POCT Glucose    Collection Time: 24 12:03 AM   Result Value Ref Range    POC Glucose 106 (H) 65 - 100 mg/dL    Performed by: Mansoor Galindo    POCT Glucose    Collection Time: 24  6:13 AM   Result Value Ref Range    POC Glucose 128 (H) 65 - 100 mg/dL    Performed by: Mansoor Galindo    POCT Glucose    Collection Time: 24  8:04 AM   Result Value Ref Range    POC Glucose 78 65 - 100 mg/dL    Performed by: Delano Veras and 
    CARDIOLOGY PROGRESS NOTE      Patient Name: Lucretia Andres  Age: 53 y.o.  Gender:female  :1970  MRN: [id number]    Patient seen and examined. This is a patient with a history of  CVA, seizures, DM, hypertension who presented with hypertensive urgency now being followed for elevated troponin. She was brought in by EMS after being found unresponsive post witnessed seizure.  She was intubated on arrival.  She was started on cardene gtt, now BP controlled.  Remains intubated and sedated.  Apparently she did not  her seizure medications after a hospitalization for status epilepticus in September. Echo in  with preserved EF and LVH. No other complaints reported.    2023, seen today in ICU. Remains intubated and sedated. Family at bedside.     2023.  Remains in ICU, intubated and sedated, off versed gtt.  Mild sinus tachy, BP better but still mildly up.     2023:  Resting in bed, intubated and sedated.  Remains sinus tach-some improvement with IV lopressor.  BP remains above goal.    :  In ICU, intubated and sedated.  HR and BP improved.    :  Remains intubated and sedated in ICU.  Attempts to wean sedation complicated by tachycardia, hypertension    :  Remains in ICU, extubated this AM and initially did well but then developed possible seizure like symptoms along with stridor and bradycardia.  Reintubated.  Family and RN at the bedside.  Now tachycardic and hypertensive.    Telemetry reviewed    Unable to obtain ROS. Current medications reviewed.    Physical Examination    No Known Allergies  Vitals:    23 1200   BP: (!) 164/78   Pulse: (!) 111   Resp: 18   Temp:    SpO2: 100%     Vital signs are stable  Heart is tachycardic but regular.  No murmur  Lungs are coarse with wheezes  Abdomen is soft, nontender, normal bowel sounds.  Extremities have no edema  Skin is dry and warm.    Labs reviewed:  Recent Results (from the past 12 hour(s))   Basic Metabolic Panel    
    CARDIOLOGY PROGRESS NOTE      Patient Name: Lucretia Andres  Age: 53 y.o.  Gender:female  :1970  MRN: [id number]    Patient seen and examined. This is a patient with a history of  CVA, seizures, DM, hypertension who presented with hypertensive urgency now being followed for elevated troponin. She was brought in by EMS after being found unresponsive post witnessed seizure.  She was intubated on arrival.  She was started on cardene gtt, now BP controlled.  Remains intubated and sedated.  Apparently she did not  her seizure medications after a hospitalization for status epilepticus in September. Echo in  with preserved EF and LVH. No other complaints reported.    2023, seen today in ICU. Remains intubated and sedated. Family at bedside.     2023.  Remains in ICU, intubated and sedated, off versed gtt.  Mild sinus tachy, BP better but still mildly up.     2023:  Resting in bed, intubated and sedated.  Remains sinus tach-some improvement with IV lopressor.  BP remains above goal.    :  In ICU, intubated and sedated.  HR and BP improved.    :  Remains intubated and sedated in ICU.  Attempts to wean sedation complicated by tachycardia, hypertension    :  Remains in ICU, extubated this AM and initially did well but then developed possible seizure like symptoms along with stridor and bradycardia.  Reintubated.  Family and RN at the bedside.  Now tachycardic and hypertensive.    : Reintubated yesterday. On sedation in ICU. Hypertensive, on Cardene gtt.      on vent, 80% FIO2, sats okay, sinus tachy, sedated. Bps 160s systolic on iv nicardipine     - no major changes but remains hypertensive requiring iv cardene coupled with high dose oral/NG meds. FI02 still 80%, CXR more wet. BP elevation most striking post re-intubation    Telemetry reviewed    Unable to obtain ROS. Current medications reviewed.    Physical Examination    Allergies   Allergen Reactions    
    CARDIOLOGY PROGRESS NOTE      Patient Name: Lucretia Andres  Age: 53 y.o.  Gender:female  :1970  MRN: [id number]    Patient seen and examined. This is a patient with a history of  CVA, seizures, DM, hypertension who presented with hypertensive urgency now being followed for elevated troponin. She was brought in by EMS after being found unresponsive post witnessed seizure.  She was intubated on arrival.  She was started on cardene gtt, now BP controlled.  Remains intubated and sedated.  Apparently she did not  her seizure medications after a hospitalization for status epilepticus in September. Echo in  with preserved EF and LVH. No other complaints reported.    2023, seen today in ICU. Remains intubated and sedated. Family at bedside.     2023.  Remains in ICU, intubated and sedated, off versed gtt.  Mild sinus tachy, BP climbing.     Telemetry reviewed, there were no events noted in the past 24 hours.    Pertinent review of systems items noted above, all other systems are negative. Current medications reviewed.    Physical Examination    No Known Allergies  Vitals:    23 0850   BP:    Pulse: (!) 110   Resp: 14   Temp:    SpO2: 97%     Vital signs are stable  No apparent distress.  Heart has a RRR.  No murmur  Lungs are diminished  Abdomen is soft, nontender, normal bowel sounds.  Extremities have no edema  Skin is dry and warm.  Normal affect    Labs reviewed:  Recent Results (from the past 12 hour(s))   POCT Glucose    Collection Time: 23 12:33 AM   Result Value Ref Range    POC Glucose 152 (H) 65 - 100 mg/dL    Performed by: Bill DAWKINS)    Blood Gas, Arterial    Collection Time: 23  2:02 AM   Result Value Ref Range    pH, Arterial 7.36 7.35 - 7.45      pCO2, Arterial 35 35 - 45 mmHg    pO2, Arterial 89 80 - 100 mmHg    O2 Sat, Arterial 95 95 - 99 %    HCO3, Arterial 19 (L) 22 - 26 mmol/L    Base deficit, arterial blood 5.5 mmol/L    O2 Method VENT      FIO2 
    CARDIOLOGY PROGRESS NOTE      Patient Name: Lucretia Andres  Age: 53 y.o.  Gender:female  :1970  MRN: [id number]    Patient seen and examined. This is a patient with a history of  CVA, seizures, DM, hypertension who presented with hypertensive urgency now being followed for elevated troponin. She was brought in by EMS after being found unresponsive post witnessed seizure.  She was intubated on arrival.  She was started on cardene gtt, now BP controlled.  Remains intubated and sedated.  Apparently she did not  her seizure medications after a hospitalization for status epilepticus in September. Echo in  with preserved EF and LVH. No other complaints reported.    2023, seen today in ICU. Remains intubated and sedated. Family at bedside.     2023.  Remains in ICU, intubated and sedated, off versed gtt.  Mild sinus tachy, BP better but still mildly up.     2023:  Resting in bed, intubated and sedated.  Remains sinus tach-some improvement with IV lopressor.  BP remains above goal.    :  In ICU, intubated and sedated.  HR and BP improved.    :  Remains intubated and sedated in ICU.  Attempts to wean sedation complicated by tachycardia, hypertension    :  Remains in ICU, extubated this AM and initially did well but then developed possible seizure like symptoms along with stridor and bradycardia.  Reintubated.  Family and RN at the bedside.  Now tachycardic and hypertensive.    : Reintubated yesterday. On sedation in ICU. Hypertensive, on Cardene gtt.      on vent, 80% FIO2, sats okay, sinus tachy, sedated. Bps 160s systolic on iv nicardipine    Telemetry reviewed    Unable to obtain ROS. Current medications reviewed.    Physical Examination    Allergies   Allergen Reactions    Zosyn [Piperacillin Sod-Tazobactam So] Angioedema     Vitals:    23 1200   BP: (!) 161/71   Pulse: 97   Resp: 18   Temp:    SpO2: 95%     Vital signs are stable  Heart is tachycardic 
    CARDIOLOGY PROGRESS NOTE      Patient Name: Lucretia Andres  Age: 53 y.o.  Gender:female  :1970  MRN: [id number]    This is a patient with a history of  CVA, seizures, DM, hypertension who presented with hypertensive urgency now being followed for elevated troponin. She was brought in by EMS after being found unresponsive post witnessed seizure.  She was intubated on arrival.  She was started on cardene gtt, now BP controlled. Apparently she did not  her seizure medications after a hospitalization for status epilepticus in September. Echo in  with preserved EF and LVH. Now extubated. No other complaints reported.    Cardiology re-consulted for sinus tachycardia. Seen and examined. She is now post op robotic LEFT adrenalectomy 24. Resting comfortably bed. No chest pain. Breathing okay. Denies further complaints. Afebrile today. HR improved overall    Current medications reviewed.    Physical Examination    No Known Allergies    Vitals:    24 0940   BP:    Pulse: (!) 107   Resp: 18   Temp:    SpO2: 98%     Vital signs are stable  Heart is tachycardic rate and regular rhythm.  No murmur  Lungs are coarse  Abdomen is soft, nontender, normal bowel sounds.  Extremities have no edema  Skin is dry and warm.    Labs reviewed:  Recent Results (from the past 12 hour(s))   POCT Glucose    Collection Time: 24 12:16 AM   Result Value Ref Range    POC Glucose 110 (H) 65 - 100 mg/dL    Performed by: Alvarez Soliz    POCT Glucose    Collection Time: 24  6:29 AM   Result Value Ref Range    POC Glucose 125 (H) 65 - 100 mg/dL    Performed by: PRITI COLON    CBC with Auto Differential    Collection Time: 24  6:56 AM   Result Value Ref Range    WBC 6.4 3.6 - 11.0 K/uL    RBC 3.26 (L) 3.80 - 5.20 M/uL    Hemoglobin 9.7 (L) 11.5 - 16.0 g/dL    Hematocrit 30.0 (L) 35.0 - 47.0 %    MCV 92.0 80.0 - 99.0 FL    MCH 29.8 26.0 - 34.0 PG    MCHC 32.3 30.0 - 36.5 g/dL    RDW 14.1 11.5 - 14.5 %    
    CARDIOLOGY PROGRESS NOTE      Patient Name: Lucretia Andres  Age: 53 y.o.  Gender:female  :1970  MRN: [id number]    Patient seen and examined. This is a patient with a history of  CVA, seizures, DM, hypertension who presented with hypertensive urgency now being followed for elevated troponin. She was brought in by EMS after being found unresponsive post witnessed seizure.  She was intubated on arrival.  She was started on cardene gtt, now BP controlled.  Remains intubated and sedated.  Apparently she did not  her seizure medications after a hospitalization for status epilepticus in September. Echo in  with preserved EF and LVH. No other complaints reported.    2023, seen today in ICU. Remains intubated and sedated. Family at bedside.     2023.  Remains in ICU, intubated and sedated, off versed gtt.  Mild sinus tachy, BP better but still mildly up.     2023:  Resting in bed, intubated and sedated.  Remains sinus tach-some improvement with IV lopressor.  BP remains above goal.    :  In ICU, intubated and sedated.  HR and BP improved.    :  Remains intubated and sedated in ICU.  Attempts to wean sedation complicated by tachycardia, hypertension    :  Remains in ICU, extubated this AM and initially did well but then developed possible seizure like symptoms along with stridor and bradycardia.  Reintubated.  Family and RN at the bedside.  Now tachycardic and hypertensive.    : Reintubated yesterday. On sedation in ICU. Hypertensive, on Cardene gtt.      on vent, 80% FIO2, sats okay, sinus tachy, sedated. Bps 160s systolic on iv nicardipine     - no major changes but remains hypertensive requiring iv cardene coupled with high dose oral/NG meds. FI02 still 80%, CXR more wet. BP elevation most striking post re-intubation     - BP better overnight with increased Rx, diuresed well.     - Remains intubated, sedated. BP stable, good UOP yesterday 
    CARDIOLOGY PROGRESS NOTE      Patient Name: Lucretia Andres  Age: 53 y.o.  Gender:female  :1970  MRN: [id number]    Patient seen and examined. This is a patient with a history of  CVA, seizures, DM, hypertension who presented with hypertensive urgency now being followed for elevated troponin. She was brought in by EMS after being found unresponsive post witnessed seizure.  She was intubated on arrival.  She was started on cardene gtt, now BP controlled.  Remains intubated and sedated.  Apparently she did not  her seizure medications after a hospitalization for status epilepticus in September. Echo in  with preserved EF and LVH. No other complaints reported.    2023, seen today in ICU. Remains intubated and sedated. Family at bedside.     2023.  Remains in ICU, intubated and sedated, off versed gtt.  Mild sinus tachy, BP better but still mildly up.     2023:  Resting in bed, intubated and sedated.  Remains sinus tach-some improvement with IV lopressor.  BP remains above goal.    :  In ICU, intubated and sedated.  HR and BP improved.    :  Remains intubated and sedated in ICU.  Attempts to wean sedation complicated by tachycardia, hypertension    :  Remains in ICU, extubated this AM and initially did well but then developed possible seizure like symptoms along with stridor and bradycardia.  Reintubated.  Family and RN at the bedside.  Now tachycardic and hypertensive.    : Reintubated yesterday. On sedation in ICU. Hypertensive, on Cardene gtt.      on vent, 80% FIO2, sats okay, sinus tachy, sedated. Bps 160s systolic on iv nicardipine     - no major changes but remains hypertensive requiring iv cardene coupled with high dose oral/NG meds. FI02 still 80%, CXR more wet. BP elevation most striking post re-intubation     - BP better overnight with increased Rx, diuresed well.     - Remains intubated, sedated. BP stable, good UOP yesterday 
    CARDIOLOGY PROGRESS NOTE      Patient Name: Lucretia Andres  Age: 53 y.o.  Gender:female  :1970  MRN: [id number]    Patient seen and examined. This is a patient with a history of  CVA, seizures, DM, hypertension who presented with hypertensive urgency now being followed for elevated troponin. She was brought in by EMS after being found unresponsive post witnessed seizure.  She was intubated on arrival.  She was started on cardene gtt, now BP controlled.  Remains intubated and sedated.  Apparently she did not  her seizure medications after a hospitalization for status epilepticus in September. Echo in  with preserved EF and LVH. No other complaints reported.    Seen and examined.  Resting in bed.  Does not speak but nods yes and no.  No chest pain.  Breathing okay.  No longer on tele    Current medications reviewed.    Physical Examination    No Known Allergies    Vitals:    24 1311   BP: (!) 162/74   Pulse: (!) 102   Resp:    Temp:    SpO2:      Vital signs are stable  Heart is regular rate and rhythm.  No murmur  Lungs are coarse  Abdomen is soft, nontender, normal bowel sounds.  Extremities have no edema  Skin is dry and warm.    Labs reviewed:  Recent Results (from the past 12 hour(s))   CBC    Collection Time: 24  5:06 AM   Result Value Ref Range    WBC 19.3 (H) 3.6 - 11.0 K/uL    RBC 3.43 (L) 3.80 - 5.20 M/uL    Hemoglobin 10.2 (L) 11.5 - 16.0 g/dL    Hematocrit 31.7 (L) 35.0 - 47.0 %    MCV 92.4 80.0 - 99.0 FL    MCH 29.7 26.0 - 34.0 PG    MCHC 32.2 30.0 - 36.5 g/dL    RDW 13.5 11.5 - 14.5 %    Platelets 524 (H) 150 - 400 K/uL    MPV 12.5 8.9 - 12.9 FL    Nucleated RBCs 0.0 0.0  WBC    nRBC 0.00 0.00 - 0.01 K/uL   Cortisol AM, Total    Collection Time: 24  5:06 AM   Result Value Ref Range    Cortisol - AM 4.8 4.30 - 22.45 ug/dL   Renal Function Panel    Collection Time: 24  5:06 AM   Result Value Ref Range    Sodium 144 136 - 145 mmol/L    Potassium 4.9 3.5 - 
    CARDIOLOGY PROGRESS NOTE      Patient Name: Lucretia Andres  Age: 53 y.o.  Gender:female  :1970  MRN: [id number]    Patient seen and examined. This is a patient with a history of  CVA, seizures, DM, hypertension who presented with hypertensive urgency now being followed for elevated troponin. She was brought in by EMS after being found unresponsive post witnessed seizure.  She was intubated on arrival.  She was started on cardene gtt, now BP controlled.  Remains intubated and sedated.  Apparently she did not  her seizure medications after a hospitalization for status epilepticus in September. Echo in  with preserved EF and LVH. No other complaints reported.    2023, seen today in ICU. Remains intubated and sedated. Family at bedside.     2023.  Remains in ICU, intubated and sedated, off versed gtt.  Mild sinus tachy, BP better but still mildly up.     2023:  Resting in bed, intubated and sedated.  Remains sinus tach-some improvement with IV lopressor.  BP remains above goal.    :  In ICU, intubated and sedated.  HR and BP improved.    :  Remains intubated and sedated in ICU.  Attempts to wean sedation complicated by tachycardia, hypertension    :  Remains in ICU, extubated this AM and initially did well but then developed possible seizure like symptoms along with stridor and bradycardia.  Reintubated.  Family and RN at the bedside.  Now tachycardic and hypertensive.    : Reintubated yesterday. On sedation in ICU. Hypertensive, on Cardene gtt.      on vent, 80% FIO2, sats okay, sinus tachy, sedated. Bps 160s systolic on iv nicardipine     - no major changes but remains hypertensive requiring iv cardene coupled with high dose oral/NG meds. FI02 still 80%, CXR more wet. BP elevation most striking post re-intubation     - BP better overnight with increased Rx, diuresed well.     - Remains intubated, sedated. BP stable, good UOP yesterday 
    CARDIOLOGY PROGRESS NOTE      Patient Name: Lucretia Andres  Age: 53 y.o.  Gender:female  :1970  MRN: [id number]    Patient seen and examined. This is a patient with a history of  CVA, seizures, DM, hypertension who presented with hypertensive urgency now being followed for elevated troponin. She was brought in by EMS after being found unresponsive post witnessed seizure.  She was intubated on arrival.  She was started on cardene gtt, now BP controlled.  Remains intubated and sedated.  Apparently she did not  her seizure medications after a hospitalization for status epilepticus in September. Echo in  with preserved EF and LVH. No other complaints reported.    Continues to have improved.  Out of ICU.     Breathing is improved.  No chest pain.  No events on telemetry.    Current medications reviewed.    Physical Examination    Allergies   Allergen Reactions    Zosyn [Piperacillin Sod-Tazobactam So] Angioedema     Vitals:    24 0848   BP: (!) 144/70   Pulse: 96   Resp: 19   Temp: 98.6 °F (37 °C)   SpO2: 98%     Vital signs are stable  Heart is tachycardic but regular.  No murmur  Lungs are coarse  Abdomen is soft, nontender, normal bowel sounds.  Extremities have no edema  Skin is dry and warm.    Labs reviewed:  Recent Results (from the past 12 hour(s))   POCT Glucose    Collection Time: 24  5:39 AM   Result Value Ref Range    POC Glucose 417 (H) 65 - 100 mg/dL    Performed by: Galen Chacon    POCT Glucose    Collection Time: 24  5:41 AM   Result Value Ref Range    POC Glucose 384 (H) 65 - 100 mg/dL    Performed by: Galen Chacon    Renal Function Panel    Collection Time: 24  5:53 AM   Result Value Ref Range    Sodium 149 (H) 136 - 145 mmol/L    Potassium 4.0 3.5 - 5.1 mmol/L    Chloride 120 (H) 97 - 108 mmol/L    CO2 22 21 - 32 mmol/L    Anion Gap 7 5 - 15 mmol/L    Glucose 431 (H) 65 - 100 mg/dL    BUN 86 (H) 6 - 20 mg/dL    Creatinine 1.24 (H) 0.55 - 1.02 mg/dL 
    CARDIOLOGY PROGRESS NOTE      Patient Name: Lucretia Andres  Age: 53 y.o.  Gender:female  :1970  MRN: [id number]    Patient seen and examined. This is a patient with a history of  CVA, seizures, DM, hypertension who presented with hypertensive urgency now being followed for elevated troponin. She was brought in by EMS after being found unresponsive post witnessed seizure.  She was intubated on arrival.  She was started on cardene gtt, now BP controlled.  Remains intubated and sedated.  Apparently she did not  her seizure medications after a hospitalization for status epilepticus in September. Echo in  with preserved EF and LVH. No other complaints reported.    2023, seen today in ICU. Remains intubated and sedated. Family at bedside.     2023.  Remains in ICU, intubated and sedated, off versed gtt.  Mild sinus tachy, BP better but still mildly up.     2023:  Resting in bed, intubated and sedated.  Remains sinus tach-some improvement with IV lopressor.  BP remains above goal.    :  In ICU, intubated and sedated.  HR and BP improved.    Telemetry reviewed, there were no events noted in the past 24 hours.    Unable to obtain ROS. Current medications reviewed.    Physical Examination    No Known Allergies  Vitals:    23 1300   BP: (!) 168/76   Pulse: 98   Resp: 14   Temp:    SpO2: 96%     Vital signs are stable  Heart has a RRR.  No murmur  Lungs are diminished  Abdomen is soft, nontender, normal bowel sounds.  Extremities have no edema  Skin is dry and warm.    Labs reviewed:  Recent Results (from the past 12 hour(s))   POCT Glucose    Collection Time: 23  4:08 AM   Result Value Ref Range    POC Glucose 197 (H) 65 - 100 mg/dL    Performed by: An Harris    POCT Glucose    Collection Time: 23  7:47 AM   Result Value Ref Range    POC Glucose 256 (H) 65 - 100 mg/dL    Performed by: Keegan Cornell    Vancomycin Level, Random    Collection Time: 23 11:37 
    CARDIOLOGY PROGRESS NOTE      Patient Name: Lucretia Andres  Age: 53 y.o.  Gender:female  :1970  MRN: [id number]    Patient seen and examined. This is a patient with a history of  CVA, seizures, DM, hypertension who presented with hypertensive urgency now being followed for elevated troponin. She was brought in by EMS after being found unresponsive post witnessed seizure.  She was intubated on arrival.  She was started on cardene gtt, now BP controlled.  Remains intubated and sedated.  Apparently she did not  her seizure medications after a hospitalization for status epilepticus in September. Echo in  with preserved EF and LVH. No other complaints reported.    2023, seen today in ICU. Remains intubated and sedated. Family at bedside.     2023.  Remains in ICU, intubated and sedated, off versed gtt.  Mild sinus tachy, BP better but still mildly up.     2023:  Resting in bed, intubated and sedated.  Remains sinus tach-some improvement with IV lopressor.  BP remains above goal.    :  In ICU, intubated and sedated.  HR and BP improved.    :  Remains intubated and sedated in ICU.  Attempts to wean sedation complicated by tachycardia, hypertension    :  Remains in ICU, extubated this AM and initially did well but then developed possible seizure like symptoms along with stridor and bradycardia.  Reintubated.  Family and RN at the bedside.  Now tachycardic and hypertensive.    : Reintubated yesterday. On sedation in ICU. Hypertensive, on Cardene gtt.     Telemetry reviewed    Unable to obtain ROS. Current medications reviewed.    Physical Examination    No Known Allergies  Vitals:    23 1200   BP: (!) 164/78   Pulse: (!) 111   Resp: 18   Temp:    SpO2: 100%     Vital signs are stable  Heart is tachycardic but regular.  No murmur  Lungs are coarse with wheezes  Abdomen is soft, nontender, normal bowel sounds.  Extremities have no edema  Skin is dry and 
    CARDIOLOGY PROGRESS NOTE      Patient Name: Lucretia Andres  Age: 53 y.o.  Gender:female  :1970  MRN: [id number]    Patient seen and examined. This is a patient with a history of  CVA, seizures, DM, hypertension who presented with hypertensive urgency now being followed for elevated troponin. She was brought in by EMS after being found unresponsive post witnessed seizure.  She was intubated on arrival.  She was started on cardene gtt, now BP controlled.  Remains intubated and sedated.  Apparently she did not  her seizure medications after a hospitalization for status epilepticus in September. Echo in  with preserved EF and LVH. No other complaints reported.    2023, seen today in ICU. Remains intubated and sedated. Family at bedside.     2023.  Remains in ICU, intubated and sedated, off versed gtt.  Mild sinus tachy, BP better but still mildly up.     2023:  Resting in bed, intubated and sedated.  Remains sinus tach-some improvement with IV lopressor.  BP remains above goal.    :  In ICU, intubated and sedated.  HR and BP improved.    :  Remains intubated and sedated in ICU.  Attempts to wean sedation complicated by tachycardia, hypertension    :  Remains in ICU, extubated this AM and initially did well but then developed possible seizure like symptoms along with stridor and bradycardia.  Reintubated.  Family and RN at the bedside.  Now tachycardic and hypertensive.    : Reintubated yesterday. On sedation in ICU. Hypertensive, on Cardene gtt.      on vent, 80% FIO2, sats okay, sinus tachy, sedated. Bps 160s systolic on iv nicardipine     - no major changes but remains hypertensive requiring iv cardene coupled with high dose oral/NG meds. FI02 still 80%, CXR more wet. BP elevation most striking post re-intubation     - BP better overnight with increased Rx, diuresed well.     - Remains intubated, sedated. BP stable, good UOP yesterday 
    CARDIOLOGY PROGRESS NOTE      Patient Name: Lucretia Andres  Age: 53 y.o.  Gender:female  :1970  MRN: [id number]    This is a patient with a history of  CVA, seizures, DM, hypertension who presented with hypertensive urgency now being followed for elevated troponin. She was brought in by EMS after being found unresponsive post witnessed seizure.  She was intubated on arrival.  She was started on cardene gtt, now BP controlled.  Remains intubated and sedated.  Apparently she did not  her seizure medications after a hospitalization for status epilepticus in September. Echo in  with preserved EF and LVH. No other complaints reported.    Cardiology re-consulted for sinus tachycardia. Seen and examined. She is now post op robotic LEFT adrenalectomy 24. Sitting upright in bed. No chest pain. Breathing okay. Denies further complaints.    Current medications reviewed.    Physical Examination    No Known Allergies    Vitals:    24 1433   BP: 120/77   Pulse: (!) 115   Resp: 19   Temp: 97.3 °F (36.3 °C)   SpO2: 98%     Vital signs are stable  Heart is regular rate and rhythm.  No murmur  Lungs are coarse  Abdomen is soft, nontender, normal bowel sounds.  Extremities have no edema  Skin is dry and warm.    Labs reviewed:  Recent Results (from the past 12 hour(s))   POCT Glucose    Collection Time: 24  8:33 AM   Result Value Ref Range    POC Glucose 107 (H) 65 - 100 mg/dL    Performed by: Rachelle Cornell    POCT Glucose    Collection Time: 24  9:07 AM   Result Value Ref Range    POC Glucose 123 (H) 65 - 100 mg/dL    Performed by: Rico White    POCT Glucose    Collection Time: 24 11:48 AM   Result Value Ref Range    POC Glucose 144 (H) 65 - 100 mg/dL    Performed by: Rachelle Cornell         Impression and recommendations as discussed with Dr. Kingston:  Pheochromocytoma, post laparoscopic adrenalectomy post op day 9, BP stable   Elevated troponin, trop peaked at 692, following Type 2 
    CARDIOLOGY PROGRESS NOTE      Patient Name: Lucretia Andres  Age: 53 y.o.  Gender:female  :1970  MRN: [id number]    Patient seen and examined. This is a patient with a history of  CVA, seizures, DM, hypertension who presented with hypertensive urgency now being followed for elevated troponin. She was brought in by EMS after being found unresponsive post witnessed seizure.  She was intubated on arrival.  She was started on cardene gtt, now BP controlled.  Remains intubated and sedated.  Apparently she did not  her seizure medications after a hospitalization for status epilepticus in September. Echo in  with preserved EF and LVH. No other complaints reported.    Seen and examined.  Asleep but awakens to voice.  Does not speak but nods yes and no.  No chest pain.  Breathing okay.  No events on telemetry.    Current medications reviewed.    Physical Examination    Allergies   Allergen Reactions    Zosyn [Piperacillin Sod-Tazobactam So] Angioedema     Vitals:    24 0848   BP: (!) 144/70   Pulse: 96   Resp: 19   Temp: 98.6 °F (37 °C)   SpO2: 98%     Vital signs are stable  Heart is regular rate and rhythm.  No murmur  Lungs are coarse  Abdomen is soft, nontender, normal bowel sounds.  Extremities have no edema  Skin is dry and warm.    Labs reviewed:  Recent Results (from the past 12 hour(s))   POCT Glucose    Collection Time: 24  5:39 AM   Result Value Ref Range    POC Glucose 417 (H) 65 - 100 mg/dL    Performed by: Galen Chacon    POCT Glucose    Collection Time: 24  5:41 AM   Result Value Ref Range    POC Glucose 384 (H) 65 - 100 mg/dL    Performed by: Galen Chacon    Renal Function Panel    Collection Time: 24  5:53 AM   Result Value Ref Range    Sodium 149 (H) 136 - 145 mmol/L    Potassium 4.0 3.5 - 5.1 mmol/L    Chloride 120 (H) 97 - 108 mmol/L    CO2 22 21 - 32 mmol/L    Anion Gap 7 5 - 15 mmol/L    Glucose 431 (H) 65 - 100 mg/dL    BUN 86 (H) 6 - 20 mg/dL    
    CARDIOLOGY PROGRESS NOTE      Patient Name: Lucretia Andres  Age: 53 y.o.  Gender:female  :1970  MRN: [id number]    Patient seen and examined. This is a patient with a history of  CVA, seizures, DM, hypertension who presented with hypertensive urgency now being followed for elevated troponin. She was brought in by EMS after being found unresponsive post witnessed seizure.  She was intubated on arrival.  She was started on cardene gtt, now BP controlled.  Remains intubated and sedated.  Apparently she did not  her seizure medications after a hospitalization for status epilepticus in September. Echo in  with preserved EF and LVH. No other complaints reported.    2023, seen today in ICU. Remains intubated and sedated. Family at bedside.     12/15/2023.  Remains in ICU, intubated and sedated, off versed gtt.      Telemetry reviewed, there were no events noted in the past 24 hours.    Pertinent review of systems items noted above, all other systems are negative. Current medications reviewed.    Physical Examination    No Known Allergies  Vitals:    23 1500   BP:    Pulse:    Resp:    Temp: 97.1 °F (36.2 °C)   SpO2:      Vital signs are stable  No apparent distress.  Heart has a RRR.  No murmur  Lungs are diminished  Abdomen is soft, nontender, normal bowel sounds.  Extremities have no edema  Skin is dry and warm.  Normal affect    Labs reviewed:  Recent Results (from the past 12 hour(s))   Comprehensive Metabolic Panel w/ Reflex to MG    Collection Time: 23  4:40 AM   Result Value Ref Range    Sodium 136 136 - 145 mmol/L    Potassium 2.9 (L) 3.5 - 5.1 mmol/L    Chloride 107 97 - 108 mmol/L    CO2 20 (L) 21 - 32 mmol/L    Anion Gap 9 5 - 15 mmol/L    Glucose 243 (H) 65 - 100 mg/dL    BUN 18 6 - 20 mg/dL    Creatinine 1.83 (H) 0.55 - 1.02 mg/dL    Bun/Cre Ratio 10 (L) 12 - 20      Est, Glom Filt Rate 33 (L) >60 ml/min/1.73m2    Calcium 8.5 8.5 - 10.1 mg/dL    Total Bilirubin 0.7 0.2 - 
    CARDIOLOGY PROGRESS NOTE      Patient Name: Lucretia Andres  Age: 53 y.o.  Gender:female  :1970  MRN: [id number]    Patient seen and examined. This is a patient with a history of  CVA, seizures, DM, hypertension who presented with hypertensive urgency now being followed for elevated troponin. She was brought in by EMS after being found unresponsive post witnessed seizure.  She was intubated on arrival.  She was started on cardene gtt, now BP controlled.  Remains intubated and sedated.  Apparently she did not  her seizure medications after a hospitalization for status epilepticus in September. Echo in  with preserved EF and LVH. No other complaints reported.    2023, seen today in ICU. Remains intubated and sedated. Family at bedside.     2023.  Remains in ICU, intubated and sedated, off versed gtt.  Mild sinus tachy, BP better but still mildly up.     Telemetry reviewed, there were no events noted in the past 24 hours.    Pertinent review of systems items noted above, all other systems are negative. Current medications reviewed.    Physical Examination    No Known Allergies  Vitals:    23 0818   BP:    Pulse: (!) 105   Resp: 14   Temp:    SpO2: 99%     Vital signs are stable  No apparent distress.  Heart has a RRR.  No murmur  Lungs are diminished  Abdomen is soft, nontender, normal bowel sounds.  Extremities have no edema  Skin is dry and warm.  Normal affect    Labs reviewed:  Recent Results (from the past 12 hour(s))   POCT Glucose    Collection Time: 23 12:22 AM   Result Value Ref Range    POC Glucose 201 (H) 65 - 100 mg/dL    Performed by: An Harris    Comprehensive Metabolic Panel w/ Reflex to MG    Collection Time: 23  3:20 AM   Result Value Ref Range    Sodium 140 136 - 145 mmol/L    Potassium 3.7 3.5 - 5.1 mmol/L    Chloride 109 (H) 97 - 108 mmol/L    CO2 23 21 - 32 mmol/L    Anion Gap 8 5 - 15 mmol/L    Glucose 194 (H) 65 - 100 mg/dL    BUN 22 (H) 6 - 
    Hospitalist Progress Note               Daily Progress Note: 1/10/2024      Chief complaint: No chief complaint on file.       Subjective:     Hospital course to date:    Lucretia Andres is a 53 y.o. female with a history of CVA, seizure, poorly controlled diabetes, hypertension.     September 2023 admitted to [facility] for new onset seizure activity with status epilepticus, CVA, uncontrolled diabetes. Discharged home with a regimen of keppra, lisinopril, carvedilol, amlodipine, statin, insulin. Unfortunately she was unable to get any of her prescriptions aside from her insulin.     12/13/23 presents to hospital by EMS because of witnessed seizure activity at home. Per EMS, the patient had back-to-back seizures and was found unresponsive, postictal, and hypoxic. Intubated. An ED pregnancy test was positive but US did not reveal IUP nor ectopic pregnancy. Also with hyperglycemia and acute renal failure. Started on nicardipine for hypertension.   12/14  vancomycin added  12/15 remain intubated   12/16 Recurrent high grade fever  12/17 Cooling blanket used for hyperthermia   12/21 Extubated   12/22 re-intubated, transfusion ofr Hb 6.5   12/23 Angioedema suspected due to allergic reaction to pip tazo. Antibiotics changed to meropenem. CAT scan of abdomen shows left-sided retroperitoneal mass. Urology consulted .   12/31 extubated 12/30 doing well on nasal oxygen.    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    
    Hospitalist Progress Note               Daily Progress Note: 1/13/2024      Chief complaint: No chief complaint on file.       Subjective:     Hospital course to date:    Lucretia Andres is a 53 y.o. female with a history of CVA, seizure, poorly controlled diabetes, hypertension.     September 2023 admitted to [facility] for new onset seizure activity with status epilepticus, CVA, uncontrolled diabetes. Discharged home with a regimen of keppra, lisinopril, carvedilol, amlodipine, statin, insulin. Unfortunately she was unable to get any of her prescriptions aside from her insulin.     12/13/23 presents to hospital by EMS because of witnessed seizure activity at home. Per EMS, the patient had back-to-back seizures and was found unresponsive, postictal, and hypoxic. Intubated. An ED pregnancy test was positive but US did not reveal IUP nor ectopic pregnancy. Also with hyperglycemia and acute renal failure. Started on nicardipine for hypertension.   12/14  vancomycin added  12/15 remain intubated   12/16 Recurrent high grade fever  12/17 Cooling blanket used for hyperthermia   12/21 Extubated   12/22 re-intubated, transfusion ofr Hb 6.5   12/23 Angioedema suspected due to allergic reaction to pip tazo. Antibiotics changed to meropenem. CAT scan of abdomen shows left-sided retroperitoneal mass. Urology consulted .   12/31 extubated 12/30 doing well on nasal oxygen.    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 
    Hospitalist Progress Note               Daily Progress Note: 1/14/2024      Chief complaint: No chief complaint on file.       Subjective:     Hospital course to date:    Lucretia Andres is a 53 y.o. female with a history of CVA, seizure, poorly controlled diabetes, hypertension.     September 2023 admitted to [facility] for new onset seizure activity with status epilepticus, CVA, uncontrolled diabetes. Discharged home with a regimen of keppra, lisinopril, carvedilol, amlodipine, statin, insulin. Unfortunately she was unable to get any of her prescriptions aside from her insulin.     12/13/23 presents to hospital by EMS because of witnessed seizure activity at home. Per EMS, the patient had back-to-back seizures and was found unresponsive, postictal, and hypoxic. Intubated. An ED pregnancy test was positive but US did not reveal IUP nor ectopic pregnancy. Also with hyperglycemia and acute renal failure. Started on nicardipine for hypertension.   12/14  vancomycin added  12/15 remain intubated   12/16 Recurrent high grade fever  12/17 Cooling blanket used for hyperthermia   12/21 Extubated   12/22 re-intubated, transfusion ofr Hb 6.5   12/23 Angioedema suspected due to allergic reaction to pip tazo. Antibiotics changed to meropenem. CAT scan of abdomen shows left-sided retroperitoneal mass. Urology consulted .   12/31 extubated 12/30 doing well on nasal oxygen.    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 
    Hospitalist Progress Note               Daily Progress Note: 1/6/2024      Chief complaint: No chief complaint on file.       Subjective:     Hospital course to date:    Lucretia Andres is a 53 y.o. female with a history of CVA, seizure, poorly controlled diabetes, hypertension.     September 2023 admitted to [facility] for new onset seizure activity with status epilepticus, CVA, uncontrolled diabetes. Discharged home with a regimen of keppra, lisinopril, carvedilol, amlodipine, statin, insulin. Unfortunately she was unable to get any of her prescriptions aside from her insulin.     12/13/23 presents to hospital by EMS because of witnessed seizure activity at home. Per EMS, the patient had back-to-back seizures and was found unresponsive, postictal, and hypoxic. Intubated. An ED pregnancy test was positive but US did not reveal IUP nor ectopic pregnancy. Also with hyperglycemia and acute renal failure. Started on nicardipine for hypertension.   12/14  vancomycin added  12/15 remain intubated   12/16 Recurrent high grade fever  12/17 Cooling blanket used for hyperthermia   12/21 Extubated   12/22 re-intubated, transfusion ofr Hb 6.5   12/23 Angioedema suspected due to allergic reaction to pip tazo. Antibiotics changed to meropenem. CAT scan of abdomen shows left-sided retroperitoneal mass. Urology consulted .   12/31 extubated 12/30 doing well on nasal oxygen.    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 She is week and frail. She ad trouble talking.She reported no new physical complaints.       Medications reviewed  Current Facility-Administered Medications   Medication Dose Route Frequency    predniSONE (DELTASONE) tablet 20 mg  20 mg Oral Daily    insulin lispro (HUMALOG) injection vial 5 Units  5 Units SubCUTAneous TID WC    ipratropium 0.5 mg-albuterol 2.5 mg (DUONEB) nebulizer solution 1 Dose  1 Dose 
    Hospitalist Progress Note               Daily Progress Note: 1/7/2024      Chief complaint: No chief complaint on file.       Subjective:     Hospital course to date:    Lucretia Andres is a 53 y.o. female with a history of CVA, seizure, poorly controlled diabetes, hypertension.     September 2023 admitted to [facility] for new onset seizure activity with status epilepticus, CVA, uncontrolled diabetes. Discharged home with a regimen of keppra, lisinopril, carvedilol, amlodipine, statin, insulin. Unfortunately she was unable to get any of her prescriptions aside from her insulin.     12/13/23 presents to hospital by EMS because of witnessed seizure activity at home. Per EMS, the patient had back-to-back seizures and was found unresponsive, postictal, and hypoxic. Intubated. An ED pregnancy test was positive but US did not reveal IUP nor ectopic pregnancy. Also with hyperglycemia and acute renal failure. Started on nicardipine for hypertension.   12/14  vancomycin added  12/15 remain intubated   12/16 Recurrent high grade fever  12/17 Cooling blanket used for hyperthermia   12/21 Extubated   12/22 re-intubated, transfusion ofr Hb 6.5   12/23 Angioedema suspected due to allergic reaction to pip tazo. Antibiotics changed to meropenem. CAT scan of abdomen shows left-sided retroperitoneal mass. Urology consulted .   12/31 extubated 12/30 doing well on nasal oxygen.    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 She is week and frail. She has trouble talking. She reported no new physical complaints. Yesterday I discussed with her  the plans for surgery in light of the MRI abdomen result.       Medications reviewed  Current Facility-Administered Medications   Medication Dose Route Frequency    insulin glargine (LANTUS) injection vial 40 Units  40 Units SubCUTAneous Daily    predniSONE (DELTASONE) tablet 20 mg  
    Hospitalist Progress Note               Daily Progress Note: 12/14/2023      Chief complaint: No chief complaint on file.       Subjective:   Hospital course to date:    Lucretia Andres is a 53 y.o. female with a history of CVA, seizure, poorly controlled diabetes, hypertension.     September 2023 admitted to [facility] for new onset seizure activity with status epilepticus, CVA, uncontrolled diabetes. Discharged home with a regimen of keppra, lisinopril, carvedilol, amlodipine, statin, insulin. Unfortunately she was unable to get any of her prescriptions aside from her insulin.     12/13/23 presents to hospital by EMS because of witnessed seizure activity at home. Per EMS, the patient had back-to-back seizures and was found unresponsive, postictal, and hypoxic. Intubated ventilated during the ED course. During the ED course a pregnancy test was positive but US did not reveal IUP nor ectopic pregnancy. Also with hyperglycemia and acute renal failure. Started on nicardipine for hypertension. I have been asked to admit to hospital and ICU for further stabilization and management of her condition.    12/14/23 remains intubated ventilated  Sedated with versed and propofol  Fevers overnight, added vancomycin  EEG anticipated      Medications reviewed  Current Facility-Administered Medications   Medication Dose Route Frequency    atorvastatin (LIPITOR) tablet 40 mg  40 mg Oral Nightly    levETIRAcetam (KEPPRA) tablet 750 mg  750 mg Oral BID    Midazolam in normal saline (VERSED) 50 MG/50ML infusion  5 mg/hr IntraVENous Continuous    potassium chloride 10 mEq/100 mL IVPB (Peripheral Line)  10 mEq IntraVENous Q1H    potassium bicarb-citric acid (EFFER-K) effervescent tablet 40 mEq  40 mEq Per NG tube Once    fentanyl (SUBLIMAZE) infusion 1000 mcg/100mL   mcg/hr IntraVENous Continuous    niCARdipine (CARDENE) 25 mg in sodium chloride 0.9 % 250 mL infusion ([id number])  2.5-15 mg/hr IntraVENous Continuous    sodium 
    Hospitalist Progress Note               Daily Progress Note: 12/15/2023      Chief complaint: No chief complaint on file.       Subjective:   Hospital course to date:    Lucretia Andres is a 53 y.o. female with a history of CVA, seizure, poorly controlled diabetes, hypertension.     September 2023 admitted to [facility] for new onset seizure activity with status epilepticus, CVA, uncontrolled diabetes. Discharged home with a regimen of keppra, lisinopril, carvedilol, amlodipine, statin, insulin. Unfortunately she was unable to get any of her prescriptions aside from her insulin.     12/13/23 presents to hospital by EMS because of witnessed seizure activity at home. Per EMS, the patient had back-to-back seizures and was found unresponsive, postictal, and hypoxic. Intubated ventilated during the ED course. During the ED course a pregnancy test was positive but US did not reveal IUP nor ectopic pregnancy. Also with hyperglycemia and acute renal failure. Started on nicardipine for hypertension. I have been asked to admit to hospital and ICU for further stabilization and management of her condition.    12/14/23 remains intubated ventilated  Sedated with versed and propofol  Fevers overnight, added vancomycin  EEG    12/15/23 remains intubated ventilated  Sedated with fentanyl and propofol  Fevers resolved  Remains tachycardic, resumed her previous carvedilol  Echocardiogram      Medications reviewed  Current Facility-Administered Medications   Medication Dose Route Frequency    hydrALAZINE (APRESOLINE) injection 10 mg  10 mg IntraVENous Q4H PRN    carvedilol (COREG) tablet 25 mg  25 mg Oral BID WC    ipratropium 0.5 mg-albuterol 2.5 mg (DUONEB) nebulizer solution 1 Dose  1 Dose Inhalation Q6H WA RT    atorvastatin (LIPITOR) tablet 40 mg  40 mg Oral Nightly    levETIRAcetam (KEPPRA) tablet 750 mg  750 mg Oral BID    Midazolam in normal saline (VERSED) 50 MG/50ML infusion  0-10 mg/hr IntraVENous Continuous    
    Hospitalist Progress Note               Daily Progress Note: 12/16/2023      Chief complaint: No chief complaint on file.       Subjective:   Hospital course to date:    Lucretia Andres is a 53 y.o. female with a history of CVA, seizure, poorly controlled diabetes, hypertension.     September 2023 admitted to [facility] for new onset seizure activity with status epilepticus, CVA, uncontrolled diabetes. Discharged home with a regimen of keppra, lisinopril, carvedilol, amlodipine, statin, insulin. Unfortunately she was unable to get any of her prescriptions aside from her insulin.     12/13/23 presents to hospital by EMS because of witnessed seizure activity at home. Per EMS, the patient had back-to-back seizures and was found unresponsive, postictal, and hypoxic. Intubated ventilated during the ED course. During the ED course a pregnancy test was positive but US did not reveal IUP nor ectopic pregnancy. Also with hyperglycemia and acute renal failure. Started on nicardipine for hypertension. I have been asked to admit to hospital and ICU for further stabilization and management of her condition.    12/15/23 EEG IMPRESSION:  This is an abnormal electroencephalogram showing the patient very drowsy in addition to a fast beta activity secondary to exposure to central nervous system-acting drugs.  There was no seizure activity.     12/15/23 echocardiogram    Left Ventricle: The EF by visual approximation is 60 - 65%. Findings consistent with concentric hypertrophy.    Right Ventricle: Not well visualized.    Aortic Valve: Trileaflet valve.    Image quality is adequate. Contrast used: Definity. Procedure performed with the patient in a supine position.    12/16/23 remains intubated ventilated sedated  Fevers intermittent      Medications reviewed  Current Facility-Administered Medications   Medication Dose Route Frequency    potassium chloride (KLOR-CON M) extended release tablet 40 mEq  40 mEq Oral Once    
   Nephrology Consult    Patient: Lucretia Andres MRN: [id number]  SSN: [id number]    YOB: 1970  Age: 53 y.o.  Sex: female      Subjective:   Pt is seen in ICU  On ventilator support  K 2.9-->4.2  Noticed low bps   On no pressor  Will bolus with  cc     Past Medical History:   Diagnosis Date    CKD (chronic kidney disease), stage III (HCC)     CVA (cerebral vascular accident) (HCC)     Diabetes mellitus (HCC)     Dyslipidemia     Hypertension     Seizure disorder (HCC)      History reviewed. No pertinent surgical history.   History reviewed. No pertinent family history.  Social History     Tobacco Use    Smoking status: Never    Smokeless tobacco: Never   Substance Use Topics    Alcohol use: Not Currently      Current Facility-Administered Medications   Medication Dose Route Frequency Provider Last Rate Last Admin    hydrALAZINE (APRESOLINE) injection 10 mg  10 mg IntraVENous Q4H PRN Zion Green MD   10 mg at 12/15/23 0502    carvedilol (COREG) tablet 25 mg  25 mg Oral BID WC Dony Kinsey MD   25 mg at 12/15/23 0752    ipratropium 0.5 mg-albuterol 2.5 mg (DUONEB) nebulizer solution 1 Dose  1 Dose Inhalation Q6H WA RT Jeffrey Robin MD        vancomycin (VANCOCIN) 1,000 mg in sodium chloride 0.9 % 250 mL IVPB ([id number])  1,000 mg IntraVENous Q24H Dony Kinsey MD        [START ON 12/17/2023] Vancomycin Trough: Please draw level on 12/17 at 0900  1 each Other Once Dony Kinsey MD        atorvastatin (LIPITOR) tablet 40 mg  40 mg Oral Nightly Dony Kinsey MD   40 mg at 12/14/23 2112    levETIRAcetam (KEPPRA) tablet 750 mg  750 mg Oral BID Dony Kinsey MD   750 mg at 12/15/23 0808    Midazolam in normal saline (VERSED) 50 MG/50ML infusion  0-10 mg/hr IntraVENous Continuous Jeffrey Robin MD   Stopped at 12/14/23 1817    fentanyl (SUBLIMAZE) infusion 1000 mcg/100mL   mcg/hr IntraVENous Continuous Zana Lucas MD 10 mL/hr at 12/15/23 0940 100 mcg/hr at 12/15/23 0940    
   Nephrology Consult    Patient: Lucretia Andres MRN: [id number]  SSN: [id number]    YOB: 1970  Age: 53 y.o.  Sex: female      Subjective:   Pt is seen in ICU  On ventilator support  K 2.9    Past Medical History:   Diagnosis Date    CKD (chronic kidney disease), stage III (HCC)     CVA (cerebral vascular accident) (HCC)     Diabetes mellitus (HCC)     Dyslipidemia     Hypertension     Seizure disorder (HCC)      History reviewed. No pertinent surgical history.   History reviewed. No pertinent family history.  Social History     Tobacco Use    Smoking status: Never    Smokeless tobacco: Never   Substance Use Topics    Alcohol use: Not Currently      Current Facility-Administered Medications   Medication Dose Route Frequency Provider Last Rate Last Admin    atorvastatin (LIPITOR) tablet 40 mg  40 mg Oral Nightly Dony Kinsey MD   40 mg at 12/14/23 0147    levETIRAcetam (KEPPRA) tablet 750 mg  750 mg Oral BID Dony Kinsey MD   750 mg at 12/14/23 0808    Midazolam in normal saline (VERSED) 50 MG/50ML infusion  5 mg/hr IntraVENous Continuous Zion Green MD 5 mL/hr at 12/14/23 0720 5 mg/hr at 12/14/23 0720    potassium chloride 10 mEq/100 mL IVPB (Peripheral Line)  10 mEq IntraVENous Q1H Dony Kinsey  mL/hr at 12/14/23 1029 10 mEq at 12/14/23 1029    fentanyl (SUBLIMAZE) infusion 1000 mcg/100mL   mcg/hr IntraVENous Continuous Zana Lucas MD 15 mL/hr at 12/14/23 1044 150 mcg/hr at 12/14/23 1044    niCARdipine (CARDENE) 25 mg in sodium chloride 0.9 % 250 mL infusion ([id number])  2.5-15 mg/hr IntraVENous Continuous Zana Lucas MD   Stopped at 12/14/23 0131    sodium chloride flush 0.9 % injection 5-40 mL  5-40 mL IntraVENous 2 times per day Dony Kinsey MD   10 mL at 12/14/23 0810    sodium chloride flush 0.9 % injection 5-40 mL  5-40 mL IntraVENous PRN Dony Kinsey MD        0.9 % sodium chloride infusion   IntraVENous PRN Dony Kisney MD        enoxaparin Sodium 
  IMPRESSION:   Acute hypoxic respiratory failure  Status epilepticus  Anaphylaxis allergic reaction  Aspiration pneumonia  Accelerated hypertension  Acute kidney injury  Left-sided retroperitoneal mass  Tachycardia  Symptomatic anemia  Fever resolved  Hypokalemia resolved  Hyperglycemia with diabetes mellitus by history  Pt is at high risk of sudden decline and decompensation with life threatening consequenses and continued end organ dysfunction and failure  Pt is critically ill. Time spent with pt and staff actively rendering care, managing pt and coordinating care as stated below; 30  minutes, exclusive of any procedures      RECOMMENDATIONS/PLAN:   ICU monitoring  Ventilator for mechanical life support and prevent respiratory arrest with protective lung strategies  Assist-control mode sedated she is on 100% FiO2  Now patient is sedated with fentanyl Precedex and Versed as she was having some upward gaze seizure-like activity telemetry neuroconsulted  Patient was extubated yesterday apparently nasal cannula was not properly attached and patient became hypoxic subsequently got reintubated patient now on 60% FiO2  Patient is afebrile  Anaphylaxis allergic reaction possible patient has some allergic reaction to antibiotic Zosyn will discontinue she had swelling of the left face and eyes received Solu-Medrol and Benadryl much improved she is on ventilator  Chest x-ray shows pulmonary edema  Tachycardia resolved  Seizures improved with sedation  Blood pressure is elevated off nicardipine   Discontinue Zosyn doxycycline started on meropenem  CVP monitoring tachycardic received metoprolol  CT abdomen shows left-sided retroperitoneal mass urology consult  IV vasopressors for circulatory shock refractory to fluids to maintain SBP> 90  Transfuse prn to maintain Hgb > 7  Labs to follow electrolytes, renal function and and blood counts  Bronchial hygiene with respiratory therapy techniques, bronchodilators  Pt needs IV 
  IMPRESSION:   Acute hypoxic respiratory failure  Status epilepticus  Aspiration pneumonia  Accelerated hypertension  Acute kidney injury  Fever  Hypokalemia  Hyperglycemia with diabetes mellitus by history  Pt is at high risk of sudden decline and decompensation with life threatening consequenses and continued end organ dysfunction and failure  Pt is critically ill. Time spent with pt and staff actively rendering care, managing pt and coordinating care as stated below; 30  minutes, exclusive of any procedures      RECOMMENDATIONS/PLAN:   ICU monitoring  Ventilator for mechanical life support and prevent respiratory arrest with protective lung strategies  Assist-control mode arterial blood gases this morning shows severe respiratory and metabolic alkalosis pH 7.6 will decrease the rate PO2 145 on 40% FiO2  Agree with [facility]ir[facility] IV antibiotics pending culture results   Blood pressure is elevated on nicardipine we will try to wean  Follow culture results on doxycycline and Zosyn added vancomycin  CVP monitoring  WBCs improved panculture elevated BUN/creatinine on IV fluids  Chest x-ray no acute infiltrate  IV vasopressors for circulatory shock refractory to fluids to maintain SBP> 90  Transfuse prn to maintain Hgb > 7  Labs to follow electrolytes, renal function and and blood counts  Bronchial hygiene with respiratory therapy techniques, bronchodilators  Pt needs IV fluids with additives and Drug therapy requiring intensive monitoring for toxicity  Prescription drug management with home med reconciliation reviewed  DVT, SUP prophylaxis  Will be available to assist in medical management while in the CCU pending disposition     [x] High complexity decision making was performed  [x] See my orders for details  HPI  53-year-old lady came in because of seizure-like activity and she was found unresponsive postictal hypoxic came to the emergency room and subsequently got intubated she had a history of seizures in September 2023 
  IMPRESSION:   Acute hypoxic respiratory failure  Status epilepticus  Aspiration pneumonia  Accelerated hypertension  Acute kidney injury  Fever now afebrile  Hypokalemia resolved  Tachycardic  Hyperglycemia with diabetes mellitus by history  Pt is at high risk of sudden decline and decompensation with life threatening consequenses and continued end organ dysfunction and failure  Pt is critically ill. Time spent with pt and staff actively rendering care, managing pt and coordinating care as stated below; 30  minutes, exclusive of any procedures      RECOMMENDATIONS/PLAN:   ICU monitoring  Ventilator for mechanical life support and prevent respiratory arrest with protective lung strategies  Assist-control mode ABG much improved on 24% FiO2  Patient was febrile 101.8  Seizures improved sedated  Blood pressure is elevated on nicardipine we will try to wean  Follow culture results on doxycycline and Zosyn added vancomycin  CVP monitoring tachycardic received metoprolol  WBCs improved panculture elevated BUN/creatinine on IV fluids  Chest x-ray no acute infiltrate  IV vasopressors for circulatory shock refractory to fluids to maintain SBP> 90  Transfuse prn to maintain Hgb > 7  Labs to follow electrolytes, renal function and and blood counts  Bronchial hygiene with respiratory therapy techniques, bronchodilators  Pt needs IV fluids with additives and Drug therapy requiring intensive monitoring for toxicity  Prescription drug management with home med reconciliation reviewed  DVT, SUP prophylaxis  Will be available to assist in medical management while in the CCU pending disposition     [x] High complexity decision making was performed  [x] See my orders for details  HPI  53-year-old lady came in because of seizure-like activity and she was found unresponsive postictal hypoxic came to the emergency room and subsequently got intubated she had a history of seizures in September 2023 she was at [facility] she is 
  IMPRESSION:   Acute hypoxic respiratory failure  Status epilepticus  Aspiration pneumonia  Accelerated hypertension  Acute kidney injury  Fever now afebrile  Hypokalemia resolved  Tachycardic  Hyperglycemia with diabetes mellitus by history  Pt is at high risk of sudden decline and decompensation with life threatening consequenses and continued end organ dysfunction and failure  Pt is critically ill. Time spent with pt and staff actively rendering care, managing pt and coordinating care as stated below; 30  minutes, exclusive of any procedures      RECOMMENDATIONS/PLAN:   ICU monitoring  Ventilator for mechanical life support and prevent respiratory arrest with protective lung strategies  Assist-control mode ABG much improved on 24% FiO2  Patient was febrile 101.8 on cooling blanket  Seizures improved with sedated  Blood pressure is elevated off nicardipine   Follow culture results on doxycycline and Zosyn added vancomycin  CVP monitoring tachycardic received metoprolol  WBCs improved panculture elevated BUN/creatinine on IV fluids  Chest x-ray no acute infiltrate  IV vasopressors for circulatory shock refractory to fluids to maintain SBP> 90  Transfuse prn to maintain Hgb > 7  Labs to follow electrolytes, renal function and and blood counts  Bronchial hygiene with respiratory therapy techniques, bronchodilators  Pt needs IV fluids with additives and Drug therapy requiring intensive monitoring for toxicity  Prescription drug management with home med reconciliation reviewed  DVT, SUP prophylaxis  Will be available to assist in medical management while in the CCU pending disposition     [x] High complexity decision making was performed  [x] See my orders for details  HPI  53-year-old lady came in because of seizure-like activity and she was found unresponsive postictal hypoxic came to the emergency room and subsequently got intubated she had a history of seizures in September 2023 she was at [facility] 
  IMPRESSION:   Acute hypoxic respiratory failure  Status epilepticus  Aspiration pneumonia  Accelerated hypertension  Acute kidney injury  Tachycardia  Fever   Hypokalemia resolved  Hyperglycemia with diabetes mellitus by history  Pt is at high risk of sudden decline and decompensation with life threatening consequenses and continued end organ dysfunction and failure  Pt is critically ill. Time spent with pt and staff actively rendering care, managing pt and coordinating care as stated below; 30  minutes, exclusive of any procedures      RECOMMENDATIONS/PLAN:   ICU monitoring  Ventilator for mechanical life support and prevent respiratory arrest with protective lung strategies  Assist-control mode ABG much improved on 24% FiO2   Patient was febrile 101.8 on cooling blanket  Patient is tachycardic  Seizures improved with sedation  Blood pressure is elevated off nicardipine   Follow culture results on doxycycline and Zosyn added vancomycin  CVP monitoring tachycardic received metoprolol  WBCs improved panculture elevated BUN/creatinine on IV fluids  Chest x-ray no acute infiltrate  IV vasopressors for circulatory shock refractory to fluids to maintain SBP> 90  Transfuse prn to maintain Hgb > 7  Labs to follow electrolytes, renal function and and blood counts  Bronchial hygiene with respiratory therapy techniques, bronchodilators  Pt needs IV fluids with additives and Drug therapy requiring intensive monitoring for toxicity  Prescription drug management with home med reconciliation reviewed  DVT, SUP prophylaxis  Will be available to assist in medical management while in the CCU pending disposition     [x] High complexity decision making was performed  [x] See my orders for details  HPI  53-year-old lady came in because of seizure-like activity and she was found unresponsive postictal hypoxic came to the emergency room and subsequently got intubated she had a history of seizures in September 2023 [facility] 
  IMPRESSION:   Acute hypoxic respiratory failure  Status epilepticus  Aspiration pneumonia  Accelerated hypertension  Acute kidney injury  Tachycardia  Fever   Hypokalemia resolved  Hyperglycemia with diabetes mellitus by history  Pt is at high risk of sudden decline and decompensation with life threatening consequenses and continued end organ dysfunction and failure  Pt is critically ill. Time spent with pt and staff actively rendering care, managing pt and coordinating care as stated below; 30  minutes, exclusive of any procedures      RECOMMENDATIONS/PLAN:   ICU monitoring  Ventilator for mechanical life support and prevent respiratory arrest with protective lung strategies  Assist-control mode ABG much improved on 24% FiO2   Tried sedation vacation this morning patient became tachypneic tachycardic  Patient was febrile 101.8 on cooling blanket  Patient is tachycardic  Seizures improved with sedation  Blood pressure is elevated off nicardipine   Follow culture results on doxycycline and Zosyn added vancomycin  CVP monitoring tachycardic received metoprolol  WBCs improved panculture elevated BUN/creatinine on IV fluids  Chest x-ray no acute infiltrate  IV vasopressors for circulatory shock refractory to fluids to maintain SBP> 90  Transfuse prn to maintain Hgb > 7  Labs to follow electrolytes, renal function and and blood counts  Bronchial hygiene with respiratory therapy techniques, bronchodilators  Pt needs IV fluids with additives and Drug therapy requiring intensive monitoring for toxicity  Prescription drug management with home med reconciliation reviewed  DVT, SUP prophylaxis  Will be available to assist in medical management while in the CCU pending disposition     [x] High complexity decision making was performed  [x] See my orders for details  HPI  53-year-old lady came in because of seizure-like activity and she was found unresponsive postictal hypoxic came to the emergency room and subsequently got 
  IMPRESSION:   Acute hypoxic respiratory failure  Status epilepticus  Aspiration pneumonia  Accelerated hypertension  Acute kidney injury  Tachycardia  Fever   Hypokalemia resolved  Hyperglycemia with diabetes mellitus by history  Pt is at high risk of sudden decline and decompensation with life threatening consequenses and continued end organ dysfunction and failure  Pt is critically ill. Time spent with pt and staff actively rendering care, managing pt and coordinating care as stated below; 30  minutes, exclusive of any procedures      RECOMMENDATIONS/PLAN:   ICU monitoring  Ventilator for mechanical life support and prevent respiratory arrest with protective lung strategies  Assist-control mode ABG much improved on 24% FiO2  Patient was febrile 101.8 on cooling blanket  Patient is tachycardic  Seizures improved with sedation  Blood pressure is elevated off nicardipine   Follow culture results on doxycycline and Zosyn added vancomycin  CVP monitoring tachycardic received metoprolol  WBCs improved panculture elevated BUN/creatinine on IV fluids  Chest x-ray no acute infiltrate  IV vasopressors for circulatory shock refractory to fluids to maintain SBP> 90  Transfuse prn to maintain Hgb > 7  Labs to follow electrolytes, renal function and and blood counts  Bronchial hygiene with respiratory therapy techniques, bronchodilators  Pt needs IV fluids with additives and Drug therapy requiring intensive monitoring for toxicity  Prescription drug management with home med reconciliation reviewed  DVT, SUP prophylaxis  Will be available to assist in medical management while in the CCU pending disposition     [x] High complexity decision making was performed  [x] See my orders for details  HPI  53-year-old lady came in because of seizure-like activity and she was found unresponsive postictal hypoxic came to the emergency room and subsequently got intubated she had a history of seizures in September 2023 she was at [facility] 
  IMPRESSION:   Acute hypoxic respiratory failure  Status epilepticus  Aspiration pneumonia  Accelerated hypertension  Acute kidney injury  Tachycardia  Symptomatic anemia  Fever resolved  Hypokalemia resolved  Hyperglycemia with diabetes mellitus by history  Pt is at high risk of sudden decline and decompensation with life threatening consequenses and continued end organ dysfunction and failure  Pt is critically ill. Time spent with pt and staff actively rendering care, managing pt and coordinating care as stated below; 30  minutes, exclusive of any procedures      RECOMMENDATIONS/PLAN:   ICU monitoring  Ventilator for mechanical life support and prevent respiratory arrest with protective lung strategies  Assist-control mode sedated  Patient was extubated yesterday apparently nasal cannula was not properly attached and patient became hypoxic subsequently got reintubated patient now on 60% FiO2  Patient is afebrile  Will transfuse packed RBC  Tachycardia resolved  Seizures improved with sedation  Blood pressure is elevated off nicardipine   On Zosyn   CVP monitoring tachycardic received metoprolol  WBCs improved panculture elevated BUN/creatinine on IV fluids  Chest x-ray no acute infiltrate  IV vasopressors for circulatory shock refractory to fluids to maintain SBP> 90  Transfuse prn to maintain Hgb > 7  Labs to follow electrolytes, renal function and and blood counts  Bronchial hygiene with respiratory therapy techniques, bronchodilators  Pt needs IV fluids with additives and Drug therapy requiring intensive monitoring for toxicity  Prescription drug management with home med reconciliation reviewed  DVT, SUP prophylaxis  Will be available to assist in medical management while in the CCU pending disposition     [x] High complexity decision making was performed  [x] See my orders for details  HPI  53-year-old lady came in because of seizure-like activity and she was found unresponsive postictal hypoxic came to the 
  IMPRESSION:   Acute hypoxic respiratory failure FiO2 30%  Pulmonary edema cleared radiographically  Bilateral pleural effusion cleared  Diastolic left ventricular dysfunction  status epilepticus no further seizures seen on Keppra  Anaphylaxis allergic reaction may have just been vocal cord trauma from extubation however had facial swelling after intubation after receiving Zosyn which has been discontinued no swelling now Solu-Medrol discontinued  Aspiration pneumonia 12/23 sputum 2+ polys normal mercedes only  Hypernatremia resolved   Hypokalemia repleted  Hypothermic resolved  Accelerated hypertension controlled  Acute kidney injury creatinine up 12/28 back to baseline 12/29 now has risen again 12/30 will hold Lasix  Left-sided retroperitoneal mass  Tachycardia  Symptomatic anemia  Fever low-grade has recurred chest x-ray clear  Hyperglycemia with diabetes mellitus by history        RECOMMENDATIONS/PLAN:   ICU monitoring  Tolerating ventilator on spontaneous settings will hold propofol and attempt weaning of ventilator today  Hypernatremia resolved  Was extubated developed stridor when reintubated anesthesia noted laryngeal edema  Anaphylaxis allergic reaction possible patient has some allergic reaction to antibiotic Zosyn will discontinue she had swelling of the left face with stridor when extubated Reaction may have just been vocal cord edema from extubation although reportedly she had facial swelling the following day on the ventilator after Zosyn.  Last dose Solu-Medrol  12/28  pulmonary edema and bilateral pleural effusions have cleared radiographically  No further seizures  Blood pressure is controlled  Remains on meropenem and now fluconazole  CT abdomen with left retroperitoneal mass     [x] High complexity decision making was performed  [x] See my orders for details  HPI  53-year-old lady came in because of seizure-like activity and she was found unresponsive postictal hypoxic came to the emergency room and 
  IMPRESSION:   Acute hypoxic respiratory failure extubated 12/30 now on room air  Stridor after extubation 12/30 received racemic epi and Solu-Medrol now clear  Pulmonary edema cleared radiographically  Bilateral pleural effusion cleared  Diastolic left ventricular dysfunction  Right vocal cord paretic  Adrenal tumor rule out pheochromocytoma  Chronic aspiration from vocal cord paralysis  status epilepticus no further seizures seen on Keppra  Aspiration pneumonia 12/23 sputum 2+ polys normal mercedes only  Hyperglycemia with diabetes mellitus by history      RECOMMENDATIONS/PLAN:     Doing well on room air  Paretic right vocal cord with daniel aspiration during laryngoscopy she will need a PEG tube  Hypernatremia recurred OG tube and water flushes lost with extubation NG tube back in place and water flushes being given but also getting quarter saline at 75 an hour  Initial anaphylaxis allergic reaction possible patient has some allergic reaction to antibiotic Zosyn will discontinue she had swelling of the left face with stridor when extubated Reaction may have just been vocal cord edema from extubation although reportedly she had facial swelling the following day on the ventilator after Zosyn.  Last dose Solu-Medrol  12/28 had stridor after extubation 12/30 and Solu-Medrol resumed will decrease Solu-Medrol to 20 every 12 and if clear tomorrow discontinue  No further seizures  Blood pressure is controlled rule out pheochromocytoma elevated catecholamine     [x] High complexity decision making was performed  [x] See my orders for details  HPI  53-year-old lady came in because of seizure-like activity and she was found unresponsive postictal hypoxic came to the emergency room and subsequently got intubated she had a history of seizures in September 2023 she was at [facility] she is on Keppra at home apparently her pregnancy test came back positive but no intrauterine pregnancy or ectopic pregnancy from 
  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  Stridor after extubation 12/30 received racemic epi and Solu-Medrol now clear  Pulmonary edema cleared radiographically  Bilateral pleural effusion cleared  Diastolic left ventricular dysfunction  Right vocal cord paretic  Adrenal pheochromocytoma  Chronic aspiration from vocal cord paralysis  status epilepticus no further seizures seen on Keppra  Aspiration pneumonia 12/23 resolved  Hyperglycemia with diabetes mellitus by history      RECOMMENDATIONS/PLAN:     Overnight oximetry shows no significant desaturation will discontinue oxygen  Patient with adrenalectomy pheochromocytoma 1/8 blood pressure well controlled  Paretic right vocal cord with daniel aspiration during laryngoscopy   No further seizures       [x] High complexity decision making was performed  [x] See my orders for details  HPI  53-year-old lady came in because of seizure-like activity and she was found unresponsive postictal hypoxic came to the emergency room and subsequently got intubated she had a history of seizures in September 2023 she was at [facility] she is on Keppra at home apparently her pregnancy test came back positive but no intrauterine pregnancy or ectopic pregnancy from ultrasound send now she is intubated on ventilator unable to get any history out of the patient.    PMH:  has a past medical history of CKD (chronic kidney disease), stage III (HCC), CVA (cerebral vascular accident) (HCC), Diabetes mellitus (HCC), Dyslipidemia, Hypertension, and Seizure disorder (HCC).    PSH:   has a past surgical history that includes IR NONTUNNELED VASCULAR CATHETER > 5 YEARS (12/21/2023); Adrenalectomy (N/A, 1/8/2024); and Upper gastrointestinal endoscopy (N/A, 1/10/2024).     FHX: family history is not on file.     SHX:  reports that she has never smoked. She has never used smokeless tobacco. She reports that she 
  IMPRESSION:   Acute hypoxic respiratory failure resolved extubated 12/30 now on NC  Pheochromocytoma status post adrenalectomy  Stridor after extubation 12/30 received racemic epi and Solu-Medrol now clear  Pulmonary edema cleared radiographically  Bilateral pleural effusion cleared  Diastolic left ventricular dysfunction  Right vocal cord paretic  Adrenal tumor rule out pheochromocytoma  Chronic aspiration from vocal cord paralysis  status epilepticus no further seizures seen on Keppra  Aspiration pneumonia 12/23 sputum 2+ polys normal mercedes only  Hyperglycemia with diabetes mellitus by history      RECOMMENDATIONS/PLAN:     Doing well on room air  Patient has surgery done yesterday adrenalectomy pheochromocytoma blood pressure was low started on Levophed  Also she was started on esmolol yesterday  Paretic right vocal cord with daniel aspiration during laryngoscopy she will need a PEG tube  Hypernatremia recurred OG tube and water flushes lost with extubation NG tube back in place and water flushes being given but also getting quarter saline at 75 an hour  Initial anaphylaxis allergic reaction possible patient has some allergic reaction to antibiotic Zosyn will discontinue she had swelling of the left face with stridor when extubated Reaction may have just been vocal cord edema from extubation although reportedly she had facial swelling the following day on the ventilator after Zosyn.  Last dose Solu-Medrol  12/28 had stridor after extubation 12/30 and Solu-Medrol resumed will decrease Solu-Medrol to 20 every 12 and if clear tomorrow discontinue  No further seizures  Blood pressure is controlled rule out pheochromocytoma elevated catecholamine  ABG done this morning shows pO2 of 88 on room air     [x] High complexity decision making was performed  [x] See my orders for details  HPI  53-year-old lady came in because of seizure-like activity and she was found unresponsive postictal hypoxic came to the emergency room 
 Attempted to call patients  in order to get consent for central line placement. After no answer, I left a voicemail and requested that he call me back ASAP. The patients daughter was in the room at the bedside at this time and she stated that she would reach out to him as well. Shortly after, he did call us back and I notified him for the reason of the call and the importance of getting the central line. The  was very hesitant and seemed to not fully understand how I was explaining things. I offered to let him speak to another RN and/or the doctor however, he stated that he would be here in a few hours and would discuss it with us then. I educated him on the dangers of leaving the patient without central access at this time.   
.Progress Note (Hospitalist, Internal Medicine)  IDENTIFYING INFORMATION   PATIENT:  Lucretia Andres  MRN:  [id number]  ADMIT DATE: 12/13/2023  TIME OF EVALUATION: 12/28/2023 7:53 AM      HISTORY OF PRESENT ILLNESS   Lucretia Andres is a 53 y.o. female who presents with       SUBJECTIVE     Sedated and intubated.  She has remained febrile overnight.  Blood sugar control suboptimal.  Persistent leukocytosis    MEDICATIONS   Medications Prior to Admission  Medications Prior to Admission: insulin 70-30 (HUMULIN;NOVOLIN) (70-30) 100 UNIT per ML injection vial, Inject 11 Units into the skin 2 times daily (with meals) Okay to substitute Humulin 70-30 or ReliOn 70-30.  blood glucose monitor strips, Test 3-4 times a day BEFORE MEALS and BEDTIME & as needed for symptoms of irregular blood glucose. Dispense sufficient amount for indicated testing frequency plus additional to accommodate PRN testing needs. (Patient not taking: Reported on 12/14/2023)  Insulin Syringe-Needle U-100 30G X 5/16\" 0.3 ML MISC, Use as directed for insulin injections.  Use a NEW needle with each insulin injection (Patient not taking: Reported on 12/14/2023)  Blood Glucose Monitoring Suppl (BLOOD GLUCOSE MONITOR SYSTEM) w/Device KIT, Pharmacist to identify preferred meter and strips. (Patient not taking: Reported on 12/14/2023)  Lancets 30G MISC, Test 3 times a day & as needed for symptoms of irregular blood glucose. Dispense sufficient amount for indicated testing frequency plus additional to accommodate PRN testing needs. Pharmacist to identify preferred brand. (Patient not taking: Reported on 12/14/2023)  aspirin 81 MG chewable tablet, Take 1 tablet by mouth daily (Patient not taking: Reported on 12/14/2023)  atorvastatin (LIPITOR) 40 MG tablet, Take 1 tablet by mouth nightly (Patient not taking: Reported on 12/14/2023)  lisinopril (PRINIVIL;ZESTRIL) 20 MG tablet, Take 1 tablet by mouth in the morning and at bedtime (Patient not taking: Reported on 
.Progress Note (Hospitalist, Internal Medicine)  IDENTIFYING INFORMATION   PATIENT:  Lucretia Andres  MRN:  [id number]  ADMIT DATE: 12/13/2023  TIME OF EVALUATION: 12/29/2023 8:52 AM      HISTORY OF PRESENT ILLNESS   Lucretia Andres is a 53 y.o. female who presents with       SUBJECTIVE     Sedated and intubated.  Fever persistent.  Blood sugar control as well as blood pressure control improved  MEDICATIONS   Medications Prior to Admission  Medications Prior to Admission: insulin 70-30 (HUMULIN;NOVOLIN) (70-30) 100 UNIT per ML injection vial, Inject 11 Units into the skin 2 times daily (with meals) Okay to substitute Humulin 70-30 or ReliOn 70-30.  blood glucose monitor strips, Test 3-4 times a day BEFORE MEALS and BEDTIME & as needed for symptoms of irregular blood glucose. Dispense sufficient amount for indicated testing frequency plus additional to accommodate PRN testing needs. (Patient not taking: Reported on 12/14/2023)  Insulin Syringe-Needle U-100 30G X 5/16\" 0.3 ML MISC, Use as directed for insulin injections.  Use a NEW needle with each insulin injection (Patient not taking: Reported on 12/14/2023)  Blood Glucose Monitoring Suppl (BLOOD GLUCOSE MONITOR SYSTEM) w/Device KIT, Pharmacist to identify preferred meter and strips. (Patient not taking: Reported on 12/14/2023)  Lancets 30G MISC, Test 3 times a day & as needed for symptoms of irregular blood glucose. Dispense sufficient amount for indicated testing frequency plus additional to accommodate PRN testing needs. Pharmacist to identify preferred brand. (Patient not taking: Reported on 12/14/2023)  aspirin 81 MG chewable tablet, Take 1 tablet by mouth daily (Patient not taking: Reported on 12/14/2023)  atorvastatin (LIPITOR) 40 MG tablet, Take 1 tablet by mouth nightly (Patient not taking: Reported on 12/14/2023)  lisinopril (PRINIVIL;ZESTRIL) 20 MG tablet, Take 1 tablet by mouth in the morning and at bedtime (Patient not taking: Reported on 
.Progress Note (Hospitalist, Internal Medicine)  IDENTIFYING INFORMATION   PATIENT:  Lucretia Andres  MRN:  [id number]  ADMIT DATE: 12/13/2023  TIME OF EVALUATION: 1/2/2024 5:51 PM      HISTORY OF PRESENT ILLNESS   Lucretia Andres is a 53 y.o. female who presents with       SUBJECTIVE     Successfully extubated, fever persistent.      Leukocytosis and fever persistent.  Blood pressure still elevated.  MEDICATIONS   Medications Prior to Admission  Medications Prior to Admission: insulin 70-30 (HUMULIN;NOVOLIN) (70-30) 100 UNIT per ML injection vial, Inject 11 Units into the skin 2 times daily (with meals) Okay to substitute Humulin 70-30 or ReliOn 70-30.  blood glucose monitor strips, Test 3-4 times a day BEFORE MEALS and BEDTIME & as needed for symptoms of irregular blood glucose. Dispense sufficient amount for indicated testing frequency plus additional to accommodate PRN testing needs. (Patient not taking: Reported on 12/14/2023)  Insulin Syringe-Needle U-100 30G X 5/16\" 0.3 ML MISC, Use as directed for insulin injections.  Use a NEW needle with each insulin injection (Patient not taking: Reported on 12/14/2023)  Blood Glucose Monitoring Suppl (BLOOD GLUCOSE MONITOR SYSTEM) w/Device KIT, Pharmacist to identify preferred meter and strips. (Patient not taking: Reported on 12/14/2023)  Lancets 30G MISC, Test 3 times a day & as needed for symptoms of irregular blood glucose. Dispense sufficient amount for indicated testing frequency plus additional to accommodate PRN testing needs. Pharmacist to identify preferred brand. (Patient not taking: Reported on 12/14/2023)  aspirin 81 MG chewable tablet, Take 1 tablet by mouth daily (Patient not taking: Reported on 12/14/2023)  atorvastatin (LIPITOR) 40 MG tablet, Take 1 tablet by mouth nightly (Patient not taking: Reported on 12/14/2023)  lisinopril (PRINIVIL;ZESTRIL) 20 MG tablet, Take 1 tablet by mouth in the morning and at bedtime (Patient not taking: Reported on 
.Progress Note (Hospitalist, Internal Medicine)  IDENTIFYING INFORMATION   PATIENT:  Lucretia Andres  MRN:  [id number]  ADMIT DATE: 12/13/2023  TIME OF EVALUATION: 12/27/2023 6:27 PM      HISTORY OF PRESENT ILLNESS   Lucretia Andres is a 53 y.o. female who presents with       SUBJECTIVE     Sedated and intubated    MEDICATIONS   Medications Prior to Admission  Medications Prior to Admission: insulin 70-30 (HUMULIN;NOVOLIN) (70-30) 100 UNIT per ML injection vial, Inject 11 Units into the skin 2 times daily (with meals) Okay to substitute Humulin 70-30 or ReliOn 70-30.  blood glucose monitor strips, Test 3-4 times a day BEFORE MEALS and BEDTIME & as needed for symptoms of irregular blood glucose. Dispense sufficient amount for indicated testing frequency plus additional to accommodate PRN testing needs. (Patient not taking: Reported on 12/14/2023)  Insulin Syringe-Needle U-100 30G X 5/16\" 0.3 ML MISC, Use as directed for insulin injections.  Use a NEW needle with each insulin injection (Patient not taking: Reported on 12/14/2023)  Blood Glucose Monitoring Suppl (BLOOD GLUCOSE MONITOR SYSTEM) w/Device KIT, Pharmacist to identify preferred meter and strips. (Patient not taking: Reported on 12/14/2023)  Lancets 30G MISC, Test 3 times a day & as needed for symptoms of irregular blood glucose. Dispense sufficient amount for indicated testing frequency plus additional to accommodate PRN testing needs. Pharmacist to identify preferred brand. (Patient not taking: Reported on 12/14/2023)  aspirin 81 MG chewable tablet, Take 1 tablet by mouth daily (Patient not taking: Reported on 12/14/2023)  atorvastatin (LIPITOR) 40 MG tablet, Take 1 tablet by mouth nightly (Patient not taking: Reported on 12/14/2023)  lisinopril (PRINIVIL;ZESTRIL) 20 MG tablet, Take 1 tablet by mouth in the morning and at bedtime (Patient not taking: Reported on 12/14/2023)  carvedilol (COREG) 25 MG tablet, Take 1 tablet by mouth 2 times daily (with meals) 
.Progress Note (Hospitalist, Internal Medicine)  IDENTIFYING INFORMATION   PATIENT:  Lucretia Andres  MRN:  [id number]  ADMIT DATE: 12/13/2023  TIME OF EVALUATION: 1/4/2024 10:47 AM      SUBJECTIVE     No acute event over the night. Tolerating tube feeding.  Ongoing family discussion for PEG tube placement.  Denied fever, pain or shortness of breath.  MEDICATIONS   Medications Prior to Admission  Medications Prior to Admission: insulin 70-30 (HUMULIN;NOVOLIN) (70-30) 100 UNIT per ML injection vial, Inject 11 Units into the skin 2 times daily (with meals) Okay to substitute Humulin 70-30 or ReliOn 70-30.  blood glucose monitor strips, Test 3-4 times a day BEFORE MEALS and BEDTIME & as needed for symptoms of irregular blood glucose. Dispense sufficient amount for indicated testing frequency plus additional to accommodate PRN testing needs. (Patient not taking: Reported on 12/14/2023)  Insulin Syringe-Needle U-100 30G X 5/16\" 0.3 ML MISC, Use as directed for insulin injections.  Use a NEW needle with each insulin injection (Patient not taking: Reported on 12/14/2023)  Blood Glucose Monitoring Suppl (BLOOD GLUCOSE MONITOR SYSTEM) w/Device KIT, Pharmacist to identify preferred meter and strips. (Patient not taking: Reported on 12/14/2023)  Lancets 30G MISC, Test 3 times a day & as needed for symptoms of irregular blood glucose. Dispense sufficient amount for indicated testing frequency plus additional to accommodate PRN testing needs. Pharmacist to identify preferred brand. (Patient not taking: Reported on 12/14/2023)  aspirin 81 MG chewable tablet, Take 1 tablet by mouth daily (Patient not taking: Reported on 12/14/2023)  atorvastatin (LIPITOR) 40 MG tablet, Take 1 tablet by mouth nightly (Patient not taking: Reported on 12/14/2023)  lisinopril (PRINIVIL;ZESTRIL) 20 MG tablet, Take 1 tablet by mouth in the morning and at bedtime (Patient not taking: Reported on 12/14/2023)  carvedilol (COREG) 25 MG tablet, Take 1 tablet by 
.Progress Note (Hospitalist, Internal Medicine)  IDENTIFYING INFORMATION   PATIENT:  Lucretia Andres  MRN:  [id number]  ADMIT DATE: 12/13/2023  TIME OF EVALUATION: 1/1/2024 2:08 PM      HISTORY OF PRESENT ILLNESS   Lucretia Andres is a 53 y.o. female who presents with       SUBJECTIVE     Successfully extubated, fever persistent.      Leukocytosis and fever persistent.  Blood pressure still elevated.  MEDICATIONS   Medications Prior to Admission  Medications Prior to Admission: insulin 70-30 (HUMULIN;NOVOLIN) (70-30) 100 UNIT per ML injection vial, Inject 11 Units into the skin 2 times daily (with meals) Okay to substitute Humulin 70-30 or ReliOn 70-30.  blood glucose monitor strips, Test 3-4 times a day BEFORE MEALS and BEDTIME & as needed for symptoms of irregular blood glucose. Dispense sufficient amount for indicated testing frequency plus additional to accommodate PRN testing needs. (Patient not taking: Reported on 12/14/2023)  Insulin Syringe-Needle U-100 30G X 5/16\" 0.3 ML MISC, Use as directed for insulin injections.  Use a NEW needle with each insulin injection (Patient not taking: Reported on 12/14/2023)  Blood Glucose Monitoring Suppl (BLOOD GLUCOSE MONITOR SYSTEM) w/Device KIT, Pharmacist to identify preferred meter and strips. (Patient not taking: Reported on 12/14/2023)  Lancets 30G MISC, Test 3 times a day & as needed for symptoms of irregular blood glucose. Dispense sufficient amount for indicated testing frequency plus additional to accommodate PRN testing needs. Pharmacist to identify preferred brand. (Patient not taking: Reported on 12/14/2023)  aspirin 81 MG chewable tablet, Take 1 tablet by mouth daily (Patient not taking: Reported on 12/14/2023)  atorvastatin (LIPITOR) 40 MG tablet, Take 1 tablet by mouth nightly (Patient not taking: Reported on 12/14/2023)  lisinopril (PRINIVIL;ZESTRIL) 20 MG tablet, Take 1 tablet by mouth in the morning and at bedtime (Patient not taking: Reported on 
.Progress Note (Hospitalist, Internal Medicine)  IDENTIFYING INFORMATION   PATIENT:  Lucretia Andres  MRN:  [id number]  ADMIT DATE: 12/13/2023  TIME OF EVALUATION: 12/30/2023 9:52 AM      HISTORY OF PRESENT ILLNESS   Lucretia Andres is a 53 y.o. female who presents with       SUBJECTIVE     Sedated and intubated.  Fever persistent.  Blood sugar control as well as blood pressure control improved.  Leukocytosis persistent  MEDICATIONS   Medications Prior to Admission  Medications Prior to Admission: insulin 70-30 (HUMULIN;NOVOLIN) (70-30) 100 UNIT per ML injection vial, Inject 11 Units into the skin 2 times daily (with meals) Okay to substitute Humulin 70-30 or ReliOn 70-30.  blood glucose monitor strips, Test 3-4 times a day BEFORE MEALS and BEDTIME & as needed for symptoms of irregular blood glucose. Dispense sufficient amount for indicated testing frequency plus additional to accommodate PRN testing needs. (Patient not taking: Reported on 12/14/2023)  Insulin Syringe-Needle U-100 30G X 5/16\" 0.3 ML MISC, Use as directed for insulin injections.  Use a NEW needle with each insulin injection (Patient not taking: Reported on 12/14/2023)  Blood Glucose Monitoring Suppl (BLOOD GLUCOSE MONITOR SYSTEM) w/Device KIT, Pharmacist to identify preferred meter and strips. (Patient not taking: Reported on 12/14/2023)  Lancets 30G MISC, Test 3 times a day & as needed for symptoms of irregular blood glucose. Dispense sufficient amount for indicated testing frequency plus additional to accommodate PRN testing needs. Pharmacist to identify preferred brand. (Patient not taking: Reported on 12/14/2023)  aspirin 81 MG chewable tablet, Take 1 tablet by mouth daily (Patient not taking: Reported on 12/14/2023)  atorvastatin (LIPITOR) 40 MG tablet, Take 1 tablet by mouth nightly (Patient not taking: Reported on 12/14/2023)  lisinopril (PRINIVIL;ZESTRIL) 20 MG tablet, Take 1 tablet by mouth in the morning and at bedtime (Patient not taking: 
.Progress Note (Hospitalist, Internal Medicine)  IDENTIFYING INFORMATION   PATIENT:  Lucretia Andres  MRN:  [id number]  ADMIT DATE: 12/13/2023  TIME OF EVALUATION: 1/5/2024 1:54 PM      SUBJECTIVE     Tolerating tube feeding.  Denied fever, pain or shortness of breath.  Family decided PEG tube placement.    MEDICATIONS   Medications Prior to Admission  Medications Prior to Admission: insulin 70-30 (HUMULIN;NOVOLIN) (70-30) 100 UNIT per ML injection vial, Inject 11 Units into the skin 2 times daily (with meals) Okay to substitute Humulin 70-30 or ReliOn 70-30.  blood glucose monitor strips, Test 3-4 times a day BEFORE MEALS and BEDTIME & as needed for symptoms of irregular blood glucose. Dispense sufficient amount for indicated testing frequency plus additional to accommodate PRN testing needs. (Patient not taking: Reported on 12/14/2023)  Insulin Syringe-Needle U-100 30G X 5/16\" 0.3 ML MISC, Use as directed for insulin injections.  Use a NEW needle with each insulin injection (Patient not taking: Reported on 12/14/2023)  Blood Glucose Monitoring Suppl (BLOOD GLUCOSE MONITOR SYSTEM) w/Device KIT, Pharmacist to identify preferred meter and strips. (Patient not taking: Reported on 12/14/2023)  Lancets 30G MISC, Test 3 times a day & as needed for symptoms of irregular blood glucose. Dispense sufficient amount for indicated testing frequency plus additional to accommodate PRN testing needs. Pharmacist to identify preferred brand. (Patient not taking: Reported on 12/14/2023)  aspirin 81 MG chewable tablet, Take 1 tablet by mouth daily (Patient not taking: Reported on 12/14/2023)  atorvastatin (LIPITOR) 40 MG tablet, Take 1 tablet by mouth nightly (Patient not taking: Reported on 12/14/2023)  lisinopril (PRINIVIL;ZESTRIL) 20 MG tablet, Take 1 tablet by mouth in the morning and at bedtime (Patient not taking: Reported on 12/14/2023)  carvedilol (COREG) 25 MG tablet, Take 1 tablet by mouth 2 times daily (with meals) (Patient not 
0010: Dr. Schreiber notified of pt. blood sugars (420, repeat 429). Received order for 10 units of insulin.     0516: Dr. Schreiber notified of pt potassium 5.0. Received order to stop effer-k, and change fluids to d5 1/2 normal saline.   
0445   notified of Hgb 6.5, received order for 1 unit of PRBC's.  MD also notified of  on labs.  Received one time order for 10 units of Lispro.    0510  Blood transfusion consent obtained via telephone from , Tomi Andres.  
0715: Pt Temp 95.9 - placed on jessica tineo. MD made aware. LA drawn  
0730: CVC education provided to  and attempt made to obtain consent.  would like to wait to speak with Interventional Radiology.     0746: Spontaneous trial started. Pt able to follow commands.     0930:  Pt extubated. Sp02 98% 2LNC     0945: Pt sp02 77%. Unable to protect airway. RT called STAT. MD at bedside along with RT team. MD provided orders to re-intubate pt. Anesthesia called to bedside to prepare for intubation. See anesthesia notes.     1030:  provided consent for CVC placement. IR team notified. CVC placed at bedside. Signed consent in pt chart.       
07:30 am  Patient tachycardic, hypertensive, and shivering. Dr Kinsey was here and ordered 5 mg of Metoprolol IV. After this, HR drop to 90.    08:30 am  Patient with 100.6 temperature. Given 650 mg of Tylenol per NG Tube and put ice pack on groins and neck.  BP 80/48 - MAP - 58 -> Given 500 ml of NS 0.9% bolus per Dr. Bush. At 09:15 am patient's BP and MAP returned to normal values    10:00 am  Temperature is 99.4.  Decreased propofol to 15 mcg/kg/min and Fentanyl to 100 mcg/hr    12:10 pm  Patient with 101.5 temperature. Given 650 mg of Tylenol per NG Tube and put ice pack on groins and neck  
0830: blood transfusion completed  
1300: Failed NGT insertion. Pt HR 140s.  does not want another attempt made at this time. Will attempt at a later time. MD made aware.     1451: ENT consult to Dr. Devlin. Call made to Dr. Devlin.  (Possible vocal cord damage). Dr. Devlin will see pt during the week.   
1515 Spoke with  at bedside he wants to speak to Dr. Eduardo in person before the procedure tomorrow. He also would like to see the picture of the mass on her adrenal gland prior to surgery.   
1900: Temp is 100.7 . Cool cloth applied without improvement   2300: Temp peak up to 100.8.  Tylenol administered. Will continue to monitor        
2045H: Inquired with Dr. Green if it's okay to give Lantus since patient will be on NPO post midnight. Relayed latest blood sugars to MD. MD said it's okay to give it.     0640H: Still with no consent for the OR procedure, since the  wants to talk to Dr. Eduardo. Report given to OR with CAMILA Barlow. Tried to call  thrice to ask him to come to the hospital, no answer and left a voice message for him. Tried to call the son Isidro Burrell twice still no answer.     0700H: Updated Yen of OR that can't get hold of both the  and son. Still trying to reach the  since OR said that the patient was listed at 0730H procedure.   
2200 - Advised  to insert NGT for medication purposes and explained the importance,  refused as of the moment and wants her wife to recover first from extubation. He stated to do it in the morning.   
2311 - Pt Desaturated to 86% and continued to desat to 73% after suction and increasing FiO2 to 100%. RN disconnected pt from Ventilator and began BVM. RT at bedside and continued BVM. 97% saturation achieved and ventilator re-connected. MD notified - no new orders.    0000 - Thermometer unable to read oral and axillary. A different thermometer was attempted with same result. Esophageal thermometer inserted reading 93.4F. Warming blanket placed on pt until normothermia achieved.      0012 - . 8 units of insulin given and MD notified. No new orders    0640 - Pt temp 98.4 F. Warming blanket turned off.  
24 hour chart check completed.   
4 Eyes Skin Assessment     NAME:  Lucretia Andres  YOB: 1970  MEDICAL RECORD NUMBER:  [id number]    The patient is being assessed for  Other Weekly Assessment    I agree that at least one RN has performed a thorough Head to Toe Skin Assessment on the patient. ALL assessment sites listed below have been assessed.      Areas assessed by both nurses:    Head, Face, Ears, Shoulders, Back, Chest, Arms, Elbows, Hands, Sacrum. Buttock, Coccyx, Ischium, Legs. Feet and Heels, Under Medical Devices , and Other ***        Does the Patient have a Wound? Yes wound(s) were present on assessment. LDA wound assessment was Initiated and completed by RN       Alex Prevention initiated by RN: Yes  Wound Care Orders initiated by RN: Yes    Pressure Injury (Stage 3,4, Unstageable, DTI, NWPT, and Complex wounds) if present, place Wound referral order by RN under : No    New Ostomies, if present place, Ostomy referral order under : No     Nurse 1 eSignature: Electronically signed by Domi Adames RN on 1/24/24 at 7:06 PM EST    **SHARE this note so that the co-signing nurse can place an eSignature**    Nurse 2 eSignature: {Esignature:[id number]}   
Attempted tx. Per nsg, patient did well with mildly thick liquids this morning. Upon entering room, patient resting in bed, appears fatigued. Patient reports minimal appetite. Declines tx s/t \"I just want to get some rest right now.\" Will follow up at a later time.   
Called Dr. Felton Polk (uro) informing him that when the moment I head back to the pts room after my call to him and supposed to start hopper catheter insertion the patient peed a lot on the bed and some on the floor. Informed him that I will held hopper catheter insertion for now, new orders received to do intermittent catheterization every shift as needed.  
Chart (including ENT note) reviewed and spoke with nsg. Possible PEG per MD note.  Pt remains inadequately arousable to participate in SLP intervention at this time. Will d/c SLP consult; however, please re-consult when mentation improves.    
Chart reviewed, spoke with nsg. Pt not appropriate for SLP intervention at this time. Pt now with NG tube present. Will hold tx and cont to follow.   
Comprehensive Nutrition Assessment    Type and Reason for Visit:  Reassess    Nutrition Recommendations/Plan:   NPO    Adjust TF to Glucerna 1.5 (Diabetic) via NGT at goal rate of 50mL/hr.  Flush 150mL H2O q4hrs or per Neph  Provides: 1800kcal (100%), 99g protein (100%), 1812mL H2O (100%)     Monitor and record TF rate, flushes, and Bms in I/OS  Monitor for fluid shifts, EDW 67kg  Adjust insulin to promote euglycemia     Malnutrition Assessment:  Malnutrition Status:  No malnutrition (01/03/24 1642)    Context:  Acute Illness     Findings of the 6 clinical characteristics of malnutrition:  Energy Intake:  No significant decrease in energy intake  Weight Loss:  No significant weight loss (difficult to assess d/t fluid shifts)     Body Fat Loss:  No significant body fat loss     Muscle Mass Loss:  No significant muscle mass loss (likley immoblity related, not nutrition)    Fluid Accumulation:  No significant fluid accumulation (not nutrition related)     Strength:  Not Performed      Nutrition Assessment:    Admitted for ARF w/ hypoxia. Intubated, sedated, not currently requiring pressor support. Consulted for TF rec's- above. (12/18-20) Pt remains intubated, sedated, no pressors. TF tolerated at goal. (12/21) extubated and reintubated. (12/27-29) intubated, sedated, no pressors. Tolerating TF when receiving (12/30) extube. (1/2) Inappropriate for SLP, also with R vocal cord paresis per Pulm, PEG pending family decision. TF continued via NGT, difficulty with hyperglycemia despite low CHO formula, also on D5W for hypernatremia.  Discussed changing to Glucerna with RN. Labs: BUN 62, -322, Alb 2.7, H/H 10.2/31.7. Meds: Statin, clonidine, lovenox, famotidine, fluconazole, insulin glargine, insulin lispro, culturelle, keppra, meropenem, solumedrol.    Nutrition Related Findings:    NFPE finding moderate wasting, likey immobility related, no fat wasting present. NGT in place, infusing. No n/v. Trace extremity 
Comprehensive Nutrition Assessment    Type and Reason for Visit:  Reassess (Early Goal)    Nutrition Recommendations/Plan:   Continue diet, advance per SLP rec's.  Ensure Plus HP x2/d.  Dc TF orders when current bottle is finished.  Continue to monitor and document ALL PO and ONS intakes in I/Os.     Malnutrition Assessment:  Malnutrition Status:  Mild malnutrition (01/17/24 1114)    Context:  Acute Illness     Findings of the 6 clinical characteristics of malnutrition:  Energy Intake:  No significant decrease in energy intake  Weight Loss:  No significant weight loss     Body Fat Loss:  No significant body fat loss     Muscle Mass Loss:  Moderate muscle mass loss Thigh (quadraceps), Calf (gastrocnemius)  Fluid Accumulation:  No significant fluid accumulation     Strength:  Not Performed    Nutrition Assessment:    Admitted for ARF w/ hypoxia. Intubated, sedated, not currently requiring pressor support. Consulted for TF rec's- above. (12/18-20) Pt remains intubated, sedated, no pressors. TF tolerated at goal. (12/21) extubated and reintubated. (12/27-29) intubated, sedated, no pressors. Tolerating TF when receiving (12/30) Extubated. (1/2) Inappropriate for SLP, also with R vocal cord paresis per Pulm, PEG pending family decision. TF continued via NGT, difficulty w/ hyperglycemia despite low CHO formula, also on D5W for hypernatremia. Discussed changing to Glucerna with RN. (1/10) NPO x2 days d/t order to hold TFs. Pt's family amenable to PEG placement, now off floor for procedure. RD to provide regimen. TF started next day. (1/17) SLP advanced to Pureed, Mildly Thick Liq diet. Today, PO intakes fair- observed 45-50% of B tray. TF infused and tolerated at goal rate. RD to add ONS to aid in wound healing. (1/22) RD consulted regarding weaning off TF. Pt reported w/ good appetite/intakes w/ >75% of meals x3-4 days. Pt endorsed baseline of good appetite/intakes PTA, no ONS hx. RD discussed w/ Pt goals of care, 
Comprehensive Nutrition Assessment    Type and Reason for Visit:  Reassess (Goal)    Nutrition Recommendations/Plan:   NPO, decrease TF of  Vital HP 1.0 Joe (Peptide Based High Protein) to new goal rate of 40 mL/hr.  Flush with 90 mL H2O q4hrs.              Provides: 960 kcal (100%), 84 gm protein(84%), 1346 mL H2O(90%).                              237 kcal(114%) from Propofol at 20 mcg/kg/min.                  Consider bowel regimen-- no BM x7 days.    Continue to monitor and document TF rate, flushes, tolerance, BM      Malnutrition Assessment:  Malnutrition Status:  No malnutrition (12/18/23 1354)    Context:  Acute Illness       Nutrition Assessment:    Admitted for ARF w/ hypoxia. Intubated, sedated, not currently requiring pressor support. Consulted for TF rec's- above. (12/18) Pt remains intubated, sedated on propofol. Modify TF to meet energy needs. (12/20) Unsuccessful extubation trial today. Pt remains vented, on low propofol, no pressor support required. Current regimen meets >75% of EENs, continue as ordered. ?weight gain likely r/t fluid shifts. Labs: Cl 111, , BUN 32, Cr 1.21, GFR 54, H/H 7.0/22.0, -256. Meds: Propofol, fentanyl, Effer-K, zosyn, humalog, labetalol, lantus, duoneb, doxycycline, lipitor, hydralazine.    Nutrition Related Findings:    NFPE w/o acute findings, nourished. No N/V/D reported. NPO, NGT as primary nutrition source. Last BM PTA, +C despite miralax daily x2-3 days per RN. GRV=55 mL x24 hrs. Trace generalized edema. Wound Type: None       Current Nutrition Intake & Therapies:    Average Meal Intake: NPO  Average Supplements Intake: NPO  Diet NPO  ADULT TUBE FEEDING; Nasogastric; Peptide Based High Protein; Continuous; 28; Yes; 10; Q 4 hours; 45; 90; Q 4 hours    Anthropometric Measures:  Height: 157.5 cm (5' 2.01\")  Ideal Body Weight (IBW): 110 lbs (50 kg)    Current Body Weight: 74.9 kg (165 lb 2 oz) (12/20, consider fluid shifts.), 150.1 % IBW. Weight Source: Bed 
Comprehensive Nutrition Assessment    Type and Reason for Visit:  Reassess (Goal)    Nutrition Recommendations/Plan:   NPO, when medically appropriate start TF.    Start TF via PEG as Glucerna 1.5 Joe(Diabetic) at 30 mL/hr, advancing by 10 mL every 4 hrs as tolerated to goal rate of 50 mL/hr.  Flush with 150 mL H2O q4hrs.               Provides: 1800 kcal(100%), 99 gm protein(100%), 1812 mL H2O(100%).    Continue to monitor and document TF rate, flushes, tolerance, BM in I/Os.     Malnutrition Assessment:  Malnutrition Status:  No malnutrition (01/03/24 1642)    Context:  Acute Illness       Nutrition Assessment:    Admitted for ARF w/ hypoxia. Intubated, sedated, not currently requiring pressor support. Consulted for TF rec's- above. (12/18-20) Pt remains intubated, sedated, no pressors. TF tolerated at goal. (12/21) extubated and reintubated. (12/27-29) intubated, sedated, no pressors. Tolerating TF when receiving (12/30) Extubated. (1/2) Inappropriate for SLP, also with R vocal cord paresis per Pulm, PEG pending family decision. TF continued via NGT, difficulty with hyperglycemia despite low CHO formula, also on D5W for hypernatremia. Discussed changing to Glucerna with RN. (1/10) NPO x2 days d/t order to hold TFs. Pt's family amenable to PEG placement, now off floor for procedure. RD to provide regimen. Labs: K 3.2, Cl 110, Cr 0.45, H/H 7.5/23.0. Meds: D5 in LCR at 75, lopressor, duoneb.    Nutrition Related Findings:    NFPE finding moderate wasting, likey immobility related, no fat wasting present. NGT remaoved, going for PEG placement today. No N/V/D/C reported. Trace BLE; nonpitting BUE, and generalized edema. FMS removed; Last BM 1/8. Wound Type: Pressure Injury, Stage II, Surgical Incision (St II- R. coccyx; Incision- abdomen.)       Current Nutrition Intake & Therapies:    Average Meal Intake: NPO  Average Supplements Intake: NPO  Diet NPO    Anthropometric Measures:  Height: 157.2 cm (5' 1.89\")  Ideal 
Comprehensive Nutrition Assessment    Type and Reason for Visit:  Reassess (Goal)    Nutrition Recommendations/Plan:   Pureed, Mildly Thick Liq diet, advance per SLP rec's.  Decrease TF of Glucerna 1.5 Joe(Diabetic) to new goal rate of 40 mL/hr.  Increase water flushes to 200 mL H2O q4hrs.  Provide 1 packets of Prosource modular daily.  Provides: 1440 kcal(76%), 79 gm protein(100%), 1930 mL H2O(100%).                            60 kcal(82%), 15 gm protein(106%) from Prosource modular.    Provide bowel regimen as indicated.  Continue to monitor and document PO intakes, TF rate, flushes, tolerance, BM in I/Os.     Malnutrition Assessment:  Malnutrition Status:  Mild malnutrition (01/17/24 1114)    Context:  Acute Illness     Findings of the 6 clinical characteristics of malnutrition:  Energy Intake:  No significant decrease in energy intake  Weight Loss:  No significant weight loss     Body Fat Loss:  No significant body fat loss     Muscle Mass Loss:  Moderate muscle mass loss Thigh (quadraceps), Calf (gastrocnemius)  Fluid Accumulation:  No significant fluid accumulation     Strength:  Not Performed    Nutrition Assessment:    Admitted for ARF w/ hypoxia. Intubated, sedated, not currently requiring pressor support. Consulted for TF rec's- above. (12/18-20) Pt remains intubated, sedated, no pressors. TF tolerated at goal. (12/21) extubated and reintubated. (12/27-29) intubated, sedated, no pressors. Tolerating TF when receiving (12/30) Extubated. (1/2) Inappropriate for SLP, also with R vocal cord paresis per Pulm, PEG pending family decision. TF continued via NGT, difficulty w/ hyperglycemia despite low CHO formula, also on D5W for hypernatremia. Discussed changing to Glucerna with RN. (1/10) NPO x2 days d/t order to hold TFs. Pt's family amenable to PEG placement, now off floor for procedure. RD to provide regimen. TF started next day. (1/17) SLP advanced to Pureed, Mildly Thick Liq diet. Today, PO intakes 
Comprehensive Nutrition Assessment    Type and Reason for Visit:  Reassess (Interim)    Nutrition Recommendations/Plan:   NPO, advance TF of Vital HP 1.0 Joe (Peptide Based High Protein) by 10 mL every 4 hrs as tolerated to new goal rate of 45 mL/hr, continuously.  Flush with 90 mL H2O q4hrs.              Provides 1080 kcal (76%), 94 gm protein(100%), 1447 mL H2O(102%).                              483 kcal(110%) from Propofol at 45 mcg/kg/min.               Dc Prosource modulars.  Monitor and document TF rate, flushes, tolerance, BM      Malnutrition Assessment:  Malnutrition Status:  No malnutrition (12/18/23 1354)    Context:  Acute Illness     Findings of the 6 clinical characteristics of malnutrition:  Energy Intake:  No significant decrease in energy intake  Weight Loss:  No significant weight loss     Body Fat Loss:  No significant body fat loss     Muscle Mass Loss:  No significant muscle mass loss    Fluid Accumulation:  No significant fluid accumulation     Strength:  Not Performed    Nutrition Assessment:    Admitted for ARF w/ hypoxia. Intubated, sedated, not currently requiring pressor support. Consulted for TF rec's- above. (12/18) Pt remains intubated, sedated on propofol.  Modify TF to meet energy needs. Labs: , BUN 27, Cr 1.21, GFR 54, -278. Meds: Propofol, fentanyl, hydralazine, humalog, lantus, effer-K, lopressor, doxycycline, zosyn, duoneb, lovenox.    Nutrition Related Findings:    NFPE w/o acute findings, nourished. No N/V/D reported. NPO, NGT as primary nutrition source. Last BM PTA, +C despite miralax daily x2-3 days per RN. GRV negligent x24 hrs. Trace generalized edema. Wound Type: None       Current Nutrition Intake & Therapies:    Average Meal Intake: NPO  Average Supplements Intake: NPO  Diet NPO  ADULT TUBE FEEDING; Nasogastric; Peptide Based High Protein; Continuous; 28; No; 140; Q 4 hours; Protein; +2pkts prosource/day    Anthropometric Measures:  Height: 157.5 cm (5' 
Comprehensive Nutrition Assessment    Type and Reason for Visit:  Reassess (goal)    Nutrition Recommendations/Plan:   NPO  Rec: Vital HP at goal rate of 50mL/hr, 100mL H2O flushes q4hrs - provides 1200kcal (+425kcal propofol, 1625kcal total, 102%), 105g protein (100%), 1603mL H2O (101%)  If pt requires high dose or multiple pressors, rec hold TF  Monitor and record TF rate, flushes, and Bms in I/Os     Malnutrition Assessment:  Malnutrition Status:  No malnutrition (12/18/23 1354)    Context:  Acute Illness     Findings of the 6 clinical characteristics of malnutrition:  Energy Intake:  No significant decrease in energy intake  Weight Loss:  No significant weight loss     Body Fat Loss:  No significant body fat loss     Muscle Mass Loss:  No significant muscle mass loss    Fluid Accumulation:  No significant fluid accumulation     Strength:  Not Performed    Nutrition Assessment:    Admitted for ARF w/ hypoxia. Intubated, sedated, not currently requiring pressor support. Consulted for TF rec's- above. (12/18) Pt remains intubated, sedated on propofol. Modify TF to meet energy needs. (12/20) Unsuccessful extubation trial today. Pt remains vented, on low propofol, no pressor support required. Current regimen meets >75% of EENs, continue as ordered. ?weight gain likely r/t fluid shifts. (12/27) 12/21 extubated and reintubated. Currently intubated, sedated, not currently requiring pressors. Tolerating TF, rec's above. Labs: Na 143, K 4.2, BUN 67, Creat 1.22, Gluc 346. Meds: famotidine, furosemide, insulin glargine, insulin lispro, methylprednisolone, propfol    Nutrition Related Findings:    NFPE without acute findings. No n/v, d/c, NPO. 2+ b/l UE, LE edema, worsening. BM 12/26. Wound Type: None       Current Nutrition Intake & Therapies:    Average Meal Intake: NPO  Average Supplements Intake: NPO  Diet NPO  ADULT TUBE FEEDING; Nasogastric; Peptide Based High Protein; Continuous; 28; Yes; 10; Q 4 hours; 40; 200; Q 
Confirmed to Dr. Poon to administer 3 runs of potassium 10meqs / 100ml to get the potassium reach at 4mmol/L. Patients current latest potassium is 3.7 mmol/L.  
Consult received, chart reviewed. Pt is for possible PEG placement, nsg attempting to clarify at this time. Will attempt evaluation when pt able to participate. Nsg reports will contact SLP if PEG is not planned for this date.    Followed up with nsg at 1425, no change in plan at this time.    
Dr. Robin, myself, and RT at the bedside doing rounds. Decision was made to do a sedation vacation at this time and do a spontaneous breathing trail.     0804 all sedation stopped.    0835 patient following commands however HR in the 130's and RR in the 30's. Patient still appearing very lethargic and unable to open eyes fully.    0845 decision made to place patient back on the previous ventilator settings and turn the sedation back on.   
Dr. Shaikh spoke with writer r/t [employer]. Dr. Shaikh stated that patient can schedule to see him in 4 weeks for removal of peg tube. CM aware.   
Eval attempt. Patient NPO for procedure. Will f/u 1/9/23.   
I had a discussion with the patient's .  He had previously advised her not to discuss her health care with her extended family except for minimal information.  He wishes to that I communicate with him only.  He states that they have a large family and he has been fielding many questions.    We discussed her care.  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  He had questions about timeframe.  I did not speculate on when this problem started however it likely has been a gradual process over time.  I have become involved in her care recently.    She is medically optimized at this point.  We discussed proceeding to a robotic left adrenalectomy.    The patient's  was counseled on the risks, benefits and expected course of surgery. Surgery has risks of bleeding, infection, injury, pain, death or other consequences. Perioperative medications and antibiotic use were discussed including the potential for reactions and side effects. Some specific risks of surgery were discussed as well.  We discussed that she has a high risk condition and therefore high risk surgery.  Risks include stroke or even death.  She is expected to need an ICU stay afterwards.    He feels comfortable with proceeding to surgical management.    Yovany Eduardo MD    I spent over 25 minutes with the patient and family, greater than 50% that time in counseling.  
Intermittently febrile, Tmax of 100.5F, WBC trending down   Stool neg for C.diff toxin, underlying right IJ, placed on 12/21  External hopper cath in place. Start on empiric Fluconazole  D/c right IJ if fevers persist. Routine labs in the morning   DD is 2.6, doubt DVT   
Noticed patient to be tachycardic whole night. Panfilo served Dr. Lashay Kinsey. No orders given.   
OCCUPATIONAL THERAPY TREATMENT  Patient: Lucretia Andres (53 y.o. female)  Date: 1/14/2024  Primary Diagnosis: Seizure (HCC) [R56.9]  Hyperglycemia [R73.9]  NSTEMI (non-ST elevated myocardial infarction) (HCC) [I21.4]  Acute respiratory failure with hypoxia (HCC) [J96.01]  Hypertensive emergency [I16.1]  Tooth avulsion, initial encounter [S03.2XXA]  Acute respiratory failure with hypoxia and hypercarbia (HCC) [J96.01, J96.02]  Procedure(s) (LRB):  EGD ESOPHAGOGASTRODUODENOSCOPY PEG TUBE INSERTION (N/A) 4 Days Post-Op   Precautions: Fall Risk, Bed Alarm                Recommendations for nursing mobility: Encourage HEP in prep for ADLs/mobility; see handout for details, Frequent repositioning to prevent skin breakdown, Use of bed/chair alarm for safety, Assist x1, and Assist x2    In place during session: External Catheter, EKG/telemetry , and PEG Tube  Chart, occupational therapy assessment, plan of care, and goals were reviewed.    ASSESSMENT  Patient continues with skilled OT services and is progressing towards goals.  Pt received semi-supine in bed upon arrival, AXO x 4 and agreeable to CONTRERAS/PTA tx at this time. See below for objective details and assist levels.     Pt cooperative and demonstrated good effort during activities.  Pt tolerated therapy session fairly with the completion of bed mobility, ADL'S, and HEP with additional time.  Pt educated on SROM exercises however pt able to perform AROM Elbow E/F with decreased coordination with vc's for correct technique and pacing self, see grid below. Pt noted with improvements with bed mobility with decreased assistance with vc's for proper technique. Noted improved sitting balance while performing simple grooming and eating task. Pt uses R UE mostly however does incorporate L UE with self care task. Pt would benefit from built-up handles for utensil to improved gripping d/t hand weakness. Did not attempt STS as pt's -141 EOB. Pt limited by UE 
PT candelario gill received attempted evaluation at 1:00pm . Pt screened and is currently presenting with no  functional mobility/transfers.     Patient unable to communicate much, her voice output is very low. She was able to nodd yes/no. Stated she has no pain, she was able to follow 1 step commands.  After 5 min patient  arrived. He was able to provide some social history. Stated they live in an apartment on the main floor. His wife was independent with ADLs, did not use an assistive device, she would cook and care for their kids. Pt was able to slightly squeeze her hands and turn her head towards midline but no other muscle initiation was noted during assessment. Patient  was educated on PROM for UE/LEs and performed with PT. We will continue to assess patient progress.            [facility] AM-PAC™ “6 Clicks”         Basic Mobility Inpatient Short Form  How much difficulty does the patient currently have... Unable A Lot A Little None   1.  Turning over in bed (including adjusting bedclothes, sheets and blankets)?   [x] 1   [] 2   [] 3   [] 4   2.  Sitting down on and standing up from a chair with arms ( e.g., wheelchair, bedside commode, etc.)   [x] 1   [] 2   [] 3   [] 4   3.  Moving from lying on back to sitting on the side of the bed?   [x] 1   [] 2   [] 3   [] 4          How much help from another person does the patient currently need... Total A Lot A Little None   4.  Moving to and from a bed to a chair (including a wheelchair)?   [x] 1   [] 2   [] 3   [] 4   5.  Need to walk in hospital room?   [x] 1   [] 2   [] 3   [] 4   6.  Climbing 3-5 steps with a railing?   [x] 1   [] 2   [] 3   [] 4   © 2007, Trustees of [facility], under license to [employer]. All rights reserved     Score:  Initial: 6/24 Most Recent: X (Date: 12/31/2023 )   Interpretation of Tool:  Represents activities that are increasingly more difficult (i.e. Bed mobility, Transfers, Gait).  Score 24 23 22-20 19-15 
PT eval order received, attempted evaluation at 1:00 pm. Patient unable to communicate much, her voice output is very low. She was able to nodd yes/no. Stated she has no pain, she was able to follow 1 step commands.  After 5 min patient  arrived. He was able to provide some social history. Stated they live in an apartment on the main floor. His wife was independent with ADLs, did not use an assistive device, she would cook and care for their kids. Pt was able to slightly squeeze her hands and turn her head towards midline but no other muscle initiation was noted during assessment. Patient  was educated on PROM for UE/LEs and performed with PT. We will assess next time for PT evaluation.  
Patient  called facility stating that someone left him a message regarding his wife discharge. Writer explained to patient that CM wanted to talk with him regarding some concerns on the discharge of his wife (patient).  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  also stated that he will try to contact CM tomorrow 1/23/2024 between 11-4pm. Writer explained that CM may not be available after 4:30pm.  verbalized understanding.  
Patient arrived back to Room 27 via bed,  in at the bedside, patient drowsy but arouses to her name vitals obtained will cont to monitor.  
Patient currently on PT/OT caseload, however patient transferred to ICU from  following surgery on 1/8/24 Pt currently placed on hold and current PT/OT orders will be discontinued at this time. Will need new PT/OT evaluation order once pt medically stable for (re)assessment. Thank you.      
Patient off unit to OR. Report given to PACU nurse Cecilia JENSEN  
Pt EKG appeared to have T-wave. Dr Kingston with Cardiology notified. Troponin and EKG ordered and performed. Troponin resulted 455 which was an increase from the previous Troponin. Dr. Kingston notified. Recommendation to check next troponin tomorrow.   
Pt hypertensive 203/99 MAP of 127. Nicardipine restarted and titrated per order. Pt is increasingly tachycardic -120 and Febrile 102.4F. Tylenol given for fever and Dr. Green notified via [employer].  
Pt settings of 530/18/40/6 is over 10ml vt for pt. RT dropped vt to about 6 ml for patient to ventilate properly. Pt currently on 400/18/30%. No distress noted at this time.   
Received consult to evaluate the patient for PIV access in order to remove the patients RIJ before transferring out of the ICU. After assessing the patients left arm, the upper arm veins including the brachial and cephalic are non-compressible and unable to be used. There is no suitable vessel in the patients forearm that would be able to accommodate a PIV. In the right arm, there is one suitable vessel in the patients forearm that can accommodate a 20G piv however, after speaking with the primary RN, the patient is still requiring multiple drips including D5 and there is a possibility that the patient will need her Cardene drip restarted. At this time, it is the Vascular access teams best recommendation to leave the patient right IJ in place until medication need decreases. At this time, we can get a PIV placed.    Thank you.   
Renal Progress Note    Patient: Lucretia Andres MRN: [id number]  SSN: [id number]    YOB: 1970  Age: 53 y.o.  Sex: female      Admit Date: 12/13/2023    LOS: 14 days     Subjective:   Patient seen in ICU.  On ventilator, sedated  Son at bedside  Labs today are improving  Creatinine 1.2.  Sodium was 143    Current Facility-Administered Medications   Medication Dose Route Frequency    furosemide (LASIX) injection 40 mg  40 mg IntraVENous Daily    methylPREDNISolone sodium succ (SOLU-MEDROL) injection 20 mg  20 mg IntraVENous Q12H    budesonide (PULMICORT) nebulizer suspension 500 mcg  0.5 mg Nebulization BID RT    acetaZOLAMIDE (DIAMOX) tablet 500 mg  500 mg Oral Q6H    [START ON 12/29/2023] Vancomycin Trough: Please draw level on 12/29 at 0800  1 each Other Once    vancomycin (VANCOCIN) 1,250 mg in sodium chloride 0.9 % 250 mL IVPB (Nmxa5Toy)  1,250 mg IntraVENous Q24H    Vancomycin - Pharmacy to Dose  1 each Other RX Placeholder    cloNIDine (CATAPRES) tablet 0.2 mg  0.2 mg Oral 4x Daily    hydrALAZINE (APRESOLINE) tablet 50 mg  50 mg Oral 4 times per day    0.9 % sodium chloride infusion   IntraVENous PRN    insulin glargine (LANTUS) injection vial 35 Units  35 Units SubCUTAneous Daily    Petrolatum-Zinc Oxide ointment   Topical TID    amLODIPine (NORVASC) tablet 10 mg  10 mg Per NG tube Daily    famotidine (PEPCID) 20 mg in sodium chloride (PF) 0.9 % 10 mL injection  20 mg IntraVENous Daily    meropenem (MERREM) 1,000 mg in sodium chloride 0.9 % 100 mL IVPB (mini-bag)  1,000 mg IntraVENous Q8H    labetalol (NORMODYNE) tablet 300 mg  300 mg Oral 3 times per day    hydrALAZINE (APRESOLINE) injection 20 mg  20 mg IntraVENous Q4H PRN    racepinephrine HCl (VAPONEFPRIN) 2.25 % nebulizer solution NEBU 0.5 mL  0.5 mL Nebulization Once    levETIRAcetam (KEPPRA) injection 750 mg  750 mg IntraVENous BID    dexmedetomidine (PRECEDEX) 400 mcg in sodium chloride 0.9 % 100 mL infusion  0.1-1.5 mcg/kg/hr IntraVENous 
Renal Progress Note    Patient: Lucretia Andres MRN: [id number]  SSN: [id number]    YOB: 1970  Age: 53 y.o.  Sex: female      Admit Date: 12/13/2023    LOS: 12 days     Subjective:   Patient seen in ICU.  On ventilator, sedated  BUN/Cr improving from 69/1.4->64/1.3->62/1.1  Better  bps  Pulm edema resolving on cxray  On iv lasix  Excellent  good    Na 150, K 3.2    Current Facility-Administered Medications   Medication Dose Route Frequency    potassium bicarb-citric acid (EFFER-K) effervescent tablet 40 mEq  40 mEq Per NG tube Q6H    furosemide (LASIX) injection 60 mg  60 mg IntraVENous BID    vancomycin (VANCOCIN) 1,250 mg in sodium chloride 0.9 % 250 mL IVPB ([id number])  1,250 mg IntraVENous Q24H    Vancomycin - Pharmacy to Dose  1 each Other RX Placeholder    [START ON 12/26/2023] Vancomycin Trough: Please draw level on 12/26 at 0800  1 each Other Once    cloNIDine (CATAPRES) tablet 0.2 mg  0.2 mg Oral 4x Daily    hydrALAZINE (APRESOLINE) tablet 50 mg  50 mg Oral 4 times per day    0.9 % sodium chloride infusion   IntraVENous PRN    insulin glargine (LANTUS) injection vial 35 Units  35 Units SubCUTAneous Daily    Petrolatum-Zinc Oxide ointment   Topical TID    amLODIPine (NORVASC) tablet 10 mg  10 mg Per NG tube Daily    methylPREDNISolone sodium succ (SOLU-MEDROL) injection 40 mg  40 mg IntraVENous Q8H    famotidine (PEPCID) 20 mg in sodium chloride (PF) 0.9 % 10 mL injection  20 mg IntraVENous Daily    meropenem (MERREM) 1,000 mg in sodium chloride 0.9 % 100 mL IVPB (mini-bag)  1,000 mg IntraVENous Q8H    labetalol (NORMODYNE) tablet 300 mg  300 mg Oral 3 times per day    hydrALAZINE (APRESOLINE) injection 20 mg  20 mg IntraVENous Q4H PRN    racepinephrine HCl (VAPONEFPRIN) 2.25 % nebulizer solution NEBU 0.5 mL  0.5 mL Nebulization Once    levETIRAcetam (KEPPRA) injection 750 mg  750 mg IntraVENous BID    dexmedetomidine (PRECEDEX) 400 mcg in sodium chloride 0.9 % 100 mL infusion  0.1-1.5 mcg/kg/hr 
Renal Progress Note    Patient: Lucretia Andres MRN: [id number]  SSN: [id number]    YOB: 1970  Age: 53 y.o.  Sex: female      Admit Date: 12/13/2023    LOS: 11 days     Subjective:   Patient seen in ICU.  On ventilator, sedated  BUN/Cr little better from 69/1.4->64/1.3  Elevated bps  Pulm edema on cxray  On iv lasix  Uop good    Current Facility-Administered Medications   Medication Dose Route Frequency    furosemide (LASIX) injection 60 mg  60 mg IntraVENous BID    hydrALAZINE (APRESOLINE) tablet 25 mg  25 mg Oral 3 times per day    0.9 % sodium chloride infusion   IntraVENous PRN    insulin glargine (LANTUS) injection vial 35 Units  35 Units SubCUTAneous Daily    Petrolatum-Zinc Oxide ointment   Topical TID    amLODIPine (NORVASC) tablet 10 mg  10 mg Per NG tube Daily    methylPREDNISolone sodium succ (SOLU-MEDROL) injection 40 mg  40 mg IntraVENous Q8H    famotidine (PEPCID) 20 mg in sodium chloride (PF) 0.9 % 10 mL injection  20 mg IntraVENous Daily    meropenem (MERREM) 1,000 mg in sodium chloride 0.9 % 100 mL IVPB (mini-bag)  1,000 mg IntraVENous Q8H    labetalol (NORMODYNE) tablet 300 mg  300 mg Oral 3 times per day    cloNIDine (CATAPRES) tablet 0.2 mg  0.2 mg Oral Q4H PRN    hydrALAZINE (APRESOLINE) injection 20 mg  20 mg IntraVENous Q4H PRN    racepinephrine HCl (VAPONEFPRIN) 2.25 % nebulizer solution NEBU 0.5 mL  0.5 mL Nebulization Once    levETIRAcetam (KEPPRA) injection 750 mg  750 mg IntraVENous BID    dexmedetomidine (PRECEDEX) 400 mcg in sodium chloride 0.9 % 100 mL infusion  0.1-1.5 mcg/kg/hr IntraVENous Continuous    insulin lispro (HUMALOG) injection vial 0-8 Units  0-8 Units SubCUTAneous Q6H    potassium chloride (KLOR-CON M) extended release tablet 40 mEq  40 mEq Oral Once    ipratropium 0.5 mg-albuterol 2.5 mg (DUONEB) nebulizer solution 1 Dose  1 Dose Inhalation Q6H WA RT    atorvastatin (LIPITOR) tablet 40 mg  40 mg Oral Nightly    Midazolam in normal saline (VERSED) 50 MG/50ML 
Renal Progress Note    Patient: Lucretia Andres MRN: [id number]  SSN: [id number]    YOB: 1970  Age: 53 y.o.  Sex: female      Admit Date: 12/13/2023    LOS: 5 days     Subjective:   Patient seen in ICU.  On ventilator, sedated  Repeat labs done today showed a creatinine at 1.43--1.13--1.2 today   good urine output noted  Hemodynamically stable    Current Facility-Administered Medications   Medication Dose Route Frequency    labetalol (NORMODYNE) tablet 200 mg  200 mg Oral 3 times per day    vancomycin (VANCOCIN) 750 mg in sodium chloride 0.9 % 250 mL IVPB ([id number])  750 mg IntraVENous Q12H    [START ON 12/19/2023] Vancomycin - Please draw a trough prior to dose 12/19 @ 1100, thanks!   Other Once    potassium chloride (KLOR-CON M) extended release tablet 40 mEq  40 mEq Oral Once    potassium bicarb-citric acid (EFFER-K) effervescent tablet 20 mEq  20 mEq Oral BID    hydrALAZINE (APRESOLINE) injection 10 mg  10 mg IntraVENous Q4H PRN    [Held by provider] carvedilol (COREG) tablet 25 mg  25 mg Oral BID WC    ipratropium 0.5 mg-albuterol 2.5 mg (DUONEB) nebulizer solution 1 Dose  1 Dose Inhalation Q6H WA RT    atorvastatin (LIPITOR) tablet 40 mg  40 mg Oral Nightly    levETIRAcetam (KEPPRA) tablet 750 mg  750 mg Oral BID    Midazolam in normal saline (VERSED) 50 MG/50ML infusion  0-10 mg/hr IntraVENous Continuous    fentanyl (SUBLIMAZE) infusion 1000 mcg/100mL   mcg/hr IntraVENous Continuous    niCARdipine (CARDENE) 25 mg in sodium chloride 0.9 % 250 mL infusion ([id number])  2.5-15 mg/hr IntraVENous Continuous    sodium chloride flush 0.9 % injection 5-40 mL  5-40 mL IntraVENous 2 times per day    sodium chloride flush 0.9 % injection 5-40 mL  5-40 mL IntraVENous PRN    0.9 % sodium chloride infusion   IntraVENous PRN    enoxaparin Sodium (LOVENOX) injection 30 mg  30 mg SubCUTAneous Daily    ondansetron (ZOFRAN-ODT) disintegrating tablet 4 mg  4 mg Oral Q8H PRN    Or    ondansetron (ZOFRAN) injection 
Renal Progress Note    Patient: Lucretia Andres MRN: [id number]  SSN: [id number]    YOB: 1970  Age: 53 y.o.  Sex: female      Admit Date: 12/13/2023    LOS: 22 days     Subjective:   Patient seen in room. NGT in place. In no acute distress. No swelling.      Current Facility-Administered Medications   Medication Dose Route Frequency    predniSONE (DELTASONE) tablet 20 mg  20 mg Oral Daily    0.45 % sodium chloride infusion   IntraVENous Continuous    insulin lispro (HUMALOG) injection vial 5 Units  5 Units SubCUTAneous TID WC    ipratropium 0.5 mg-albuterol 2.5 mg (DUONEB) nebulizer solution 1 Dose  1 Dose Inhalation BID RT    insulin glargine (LANTUS) injection vial 40 Units  40 Units SubCUTAneous BID    insulin lispro (HUMALOG) injection vial 8 Units  8 Units SubCUTAneous Once    hydrALAZINE (APRESOLINE) injection 20 mg  20 mg IntraVENous Q4H PRN    cloNIDine (CATAPRES) tablet 0.3 mg  0.3 mg Oral TID    metoprolol (LOPRESSOR) injection 5 mg  5 mg IntraVENous Q6H PRN    hydrALAZINE (APRESOLINE) tablet 50 mg  50 mg Oral 3 times per day    insulin lispro (HUMALOG) injection vial 0-16 Units  0-16 Units SubCUTAneous 4 times per day    lactobacillus (CULTURELLE) capsule 1 capsule  1 capsule Per NG tube BID    [Held by provider] furosemide (LASIX) injection 40 mg  40 mg IntraVENous Daily    budesonide (PULMICORT) nebulizer suspension 500 mcg  0.5 mg Nebulization BID RT    0.9 % sodium chloride infusion   IntraVENous PRN    Petrolatum-Zinc Oxide ointment   Topical TID    amLODIPine (NORVASC) tablet 10 mg  10 mg Per NG tube Daily    famotidine (PEPCID) 20 mg in sodium chloride (PF) 0.9 % 10 mL injection  20 mg IntraVENous Daily    labetalol (NORMODYNE) tablet 300 mg  300 mg Oral 3 times per day    levETIRAcetam (KEPPRA) injection 750 mg  750 mg IntraVENous BID    atorvastatin (LIPITOR) tablet 40 mg  40 mg Oral Nightly    sodium chloride flush 0.9 % injection 5-40 mL  5-40 mL IntraVENous 2 times per day    
Renal Progress Note    Patient: Lucretia Andres MRN: [id number]  SSN: [id number]    YOB: 1970  Age: 53 y.o.  Sex: female      Admit Date: 12/13/2023    LOS: 4 days     Subjective:   Patient seen in ICU.  On ventilator, sedated  Repeat labs done today showed a creatinine at 1.43--1.13, good urine output noted  Hemodynamically stable    Current Facility-Administered Medications   Medication Dose Route Frequency    metoprolol (LOPRESSOR) injection 5 mg  5 mg IntraVENous Q4H    potassium chloride (KLOR-CON M) extended release tablet 40 mEq  40 mEq Oral Once    potassium bicarb-citric acid (EFFER-K) effervescent tablet 20 mEq  20 mEq Oral BID    hydrALAZINE (APRESOLINE) injection 10 mg  10 mg IntraVENous Q4H PRN    [Held by provider] carvedilol (COREG) tablet 25 mg  25 mg Oral BID WC    ipratropium 0.5 mg-albuterol 2.5 mg (DUONEB) nebulizer solution 1 Dose  1 Dose Inhalation Q6H WA RT    vancomycin (VANCOCIN) 1,000 mg in sodium chloride 0.9 % 250 mL IVPB ([id number])  1,000 mg IntraVENous Q24H    atorvastatin (LIPITOR) tablet 40 mg  40 mg Oral Nightly    levETIRAcetam (KEPPRA) tablet 750 mg  750 mg Oral BID    Midazolam in normal saline (VERSED) 50 MG/50ML infusion  0-10 mg/hr IntraVENous Continuous    fentanyl (SUBLIMAZE) infusion 1000 mcg/100mL   mcg/hr IntraVENous Continuous    niCARdipine (CARDENE) 25 mg in sodium chloride 0.9 % 250 mL infusion ([id number])  2.5-15 mg/hr IntraVENous Continuous    sodium chloride flush 0.9 % injection 5-40 mL  5-40 mL IntraVENous 2 times per day    sodium chloride flush 0.9 % injection 5-40 mL  5-40 mL IntraVENous PRN    0.9 % sodium chloride infusion   IntraVENous PRN    enoxaparin Sodium (LOVENOX) injection 30 mg  30 mg SubCUTAneous Daily    ondansetron (ZOFRAN-ODT) disintegrating tablet 4 mg  4 mg Oral Q8H PRN    Or    ondansetron (ZOFRAN) injection 4 mg  4 mg IntraVENous Q6H PRN    polyethylene glycol (GLYCOLAX) packet 17 g  17 g Oral Daily PRN    acetaminophen 
Renal Progress Note    Patient: Lucretia Andres MRN: [id number]  SSN: [id number]    YOB: 1970  Age: 53 y.o.  Sex: female      Admit Date: 12/13/2023    LOS: 3 days     Subjective:   Patient seen in ICU.  On ventilator, sedated  Repeat labs done today showed a stable creatinine at 1.43, good urine output noted  Hemodynamically stable    Current Facility-Administered Medications   Medication Dose Route Frequency    potassium chloride (KLOR-CON M) extended release tablet 40 mEq  40 mEq Oral Once    potassium bicarb-citric acid (EFFER-K) effervescent tablet 20 mEq  20 mEq Oral BID    hydrALAZINE (APRESOLINE) injection 10 mg  10 mg IntraVENous Q4H PRN    [Held by provider] carvedilol (COREG) tablet 25 mg  25 mg Oral BID WC    ipratropium 0.5 mg-albuterol 2.5 mg (DUONEB) nebulizer solution 1 Dose  1 Dose Inhalation Q6H WA RT    vancomycin (VANCOCIN) 1,000 mg in sodium chloride 0.9 % 250 mL IVPB ([id number])  1,000 mg IntraVENous Q24H    [START ON 12/17/2023] Vancomycin Trough: Please draw level on 12/17 at 0900  1 each Other Once    atorvastatin (LIPITOR) tablet 40 mg  40 mg Oral Nightly    levETIRAcetam (KEPPRA) tablet 750 mg  750 mg Oral BID    Midazolam in normal saline (VERSED) 50 MG/50ML infusion  0-10 mg/hr IntraVENous Continuous    fentanyl (SUBLIMAZE) infusion 1000 mcg/100mL   mcg/hr IntraVENous Continuous    niCARdipine (CARDENE) 25 mg in sodium chloride 0.9 % 250 mL infusion ([id number])  2.5-15 mg/hr IntraVENous Continuous    sodium chloride flush 0.9 % injection 5-40 mL  5-40 mL IntraVENous 2 times per day    sodium chloride flush 0.9 % injection 5-40 mL  5-40 mL IntraVENous PRN    0.9 % sodium chloride infusion   IntraVENous PRN    enoxaparin Sodium (LOVENOX) injection 30 mg  30 mg SubCUTAneous Daily    ondansetron (ZOFRAN-ODT) disintegrating tablet 4 mg  4 mg Oral Q8H PRN    Or    ondansetron (ZOFRAN) injection 4 mg  4 mg IntraVENous Q6H PRN    polyethylene glycol (GLYCOLAX) packet 17 g  17 g 
Renal Progress Note    Patient: Lucretia Andres MRN: [id number]  SSN: [id number]    YOB: 1970  Age: 53 y.o.  Sex: female      Admit Date: 12/13/2023    LOS: 17 days     Subjective:   Patient seen in ICU.  On ventilator, sedated  Creatinine 1.3.  Sodium again increased to 145    Current Facility-Administered Medications   Medication Dose Route Frequency    methylPREDNISolone sodium succ (SOLU-MEDROL) injection 40 mg  40 mg IntraVENous Q6H    metoprolol (LOPRESSOR) injection 5 mg  5 mg IntraVENous Q6H PRN    hydrALAZINE (APRESOLINE) tablet 50 mg  50 mg Oral 3 times per day    cloNIDine (CATAPRES) tablet 0.2 mg  0.2 mg Oral TID    insulin glargine (LANTUS) injection vial 25 Units  25 Units SubCUTAneous BID    insulin lispro (HUMALOG) injection vial 0-16 Units  0-16 Units SubCUTAneous 4 times per day    lactobacillus (CULTURELLE) capsule 1 capsule  1 capsule Per NG tube BID    fluconazole (DIFLUCAN) in 0.9 % sodium chloride IVPB 200 mg  200 mg IntraVENous Q24H    [Held by provider] furosemide (LASIX) injection 40 mg  40 mg IntraVENous Daily    budesonide (PULMICORT) nebulizer suspension 500 mcg  0.5 mg Nebulization BID RT    0.9 % sodium chloride infusion   IntraVENous PRN    Petrolatum-Zinc Oxide ointment   Topical TID    amLODIPine (NORVASC) tablet 10 mg  10 mg Per NG tube Daily    famotidine (PEPCID) 20 mg in sodium chloride (PF) 0.9 % 10 mL injection  20 mg IntraVENous Daily    meropenem (MERREM) 1,000 mg in sodium chloride 0.9 % 100 mL IVPB (mini-bag)  1,000 mg IntraVENous Q8H    labetalol (NORMODYNE) tablet 300 mg  300 mg Oral 3 times per day    hydrALAZINE (APRESOLINE) injection 20 mg  20 mg IntraVENous Q4H PRN    levETIRAcetam (KEPPRA) injection 750 mg  750 mg IntraVENous BID    ipratropium 0.5 mg-albuterol 2.5 mg (DUONEB) nebulizer solution 1 Dose  1 Dose Inhalation Q6H WA RT    atorvastatin (LIPITOR) tablet 40 mg  40 mg Oral Nightly    niCARdipine (CARDENE) 25 mg in sodium chloride 0.9 % 250 mL 
Renal Progress Note    Patient: Lucretia Andres MRN: [id number]  SSN: [id number]    YOB: 1970  Age: 53 y.o.  Sex: female      Admit Date: 12/13/2023    LOS: 15 days     Subjective:   Patient seen in ICU.  On ventilator, sedated  Creatinine 1.3.  Sodium was 139    Current Facility-Administered Medications   Medication Dose Route Frequency    insulin glargine (LANTUS) injection vial 25 Units  25 Units SubCUTAneous BID    insulin lispro (HUMALOG) injection vial 0-16 Units  0-16 Units SubCUTAneous 4 times per day    acidophilus/citrus pectin 1 tablet  1 tablet Oral BID    [Held by provider] furosemide (LASIX) injection 40 mg  40 mg IntraVENous Daily    budesonide (PULMICORT) nebulizer suspension 500 mcg  0.5 mg Nebulization BID RT    cloNIDine (CATAPRES) tablet 0.2 mg  0.2 mg Oral 4x Daily    hydrALAZINE (APRESOLINE) tablet 50 mg  50 mg Oral 4 times per day    0.9 % sodium chloride infusion   IntraVENous PRN    Petrolatum-Zinc Oxide ointment   Topical TID    amLODIPine (NORVASC) tablet 10 mg  10 mg Per NG tube Daily    famotidine (PEPCID) 20 mg in sodium chloride (PF) 0.9 % 10 mL injection  20 mg IntraVENous Daily    meropenem (MERREM) 1,000 mg in sodium chloride 0.9 % 100 mL IVPB (mini-bag)  1,000 mg IntraVENous Q8H    labetalol (NORMODYNE) tablet 300 mg  300 mg Oral 3 times per day    hydrALAZINE (APRESOLINE) injection 20 mg  20 mg IntraVENous Q4H PRN    racepinephrine HCl (VAPONEFPRIN) 2.25 % nebulizer solution NEBU 0.5 mL  0.5 mL Nebulization Once    levETIRAcetam (KEPPRA) injection 750 mg  750 mg IntraVENous BID    dexmedetomidine (PRECEDEX) 400 mcg in sodium chloride 0.9 % 100 mL infusion  0.1-1.5 mcg/kg/hr IntraVENous Continuous    ipratropium 0.5 mg-albuterol 2.5 mg (DUONEB) nebulizer solution 1 Dose  1 Dose Inhalation Q6H WA RT    atorvastatin (LIPITOR) tablet 40 mg  40 mg Oral Nightly    Midazolam in normal saline (VERSED) 50 MG/50ML infusion  0-10 mg/hr IntraVENous Continuous    fentanyl 
Renal Progress Note    Patient: Lucretia Andres MRN: [id number]  SSN: [id number]    YOB: 1970  Age: 53 y.o.  Sex: female      Admit Date: 12/13/2023    LOS: 13 days     Subjective:   Patient seen in ICU.  On ventilator, sedated  BUN/Cr improving from 69/1.4->64/1.3->62/1.1  Better  bps  Pulm edema resolving on cxray  On iv lasix  Excellent  good    Na 149, K 4.2    Current Facility-Administered Medications   Medication Dose Route Frequency    [START ON 12/29/2023] Vancomycin Trough: Please draw level on 12/29 at 0800  1 each Other Once    furosemide (LASIX) injection 40 mg  40 mg IntraVENous BID    methylPREDNISolone sodium succ (SOLU-MEDROL) injection 40 mg  40 mg IntraVENous Q12H    vancomycin (VANCOCIN) 1,250 mg in sodium chloride 0.9 % 250 mL IVPB ([id number])  1,250 mg IntraVENous Q24H    Vancomycin - Pharmacy to Dose  1 each Other RX Placeholder    cloNIDine (CATAPRES) tablet 0.2 mg  0.2 mg Oral 4x Daily    hydrALAZINE (APRESOLINE) tablet 50 mg  50 mg Oral 4 times per day    0.9 % sodium chloride infusion   IntraVENous PRN    insulin glargine (LANTUS) injection vial 35 Units  35 Units SubCUTAneous Daily    Petrolatum-Zinc Oxide ointment   Topical TID    amLODIPine (NORVASC) tablet 10 mg  10 mg Per NG tube Daily    famotidine (PEPCID) 20 mg in sodium chloride (PF) 0.9 % 10 mL injection  20 mg IntraVENous Daily    meropenem (MERREM) 1,000 mg in sodium chloride 0.9 % 100 mL IVPB (mini-bag)  1,000 mg IntraVENous Q8H    labetalol (NORMODYNE) tablet 300 mg  300 mg Oral 3 times per day    hydrALAZINE (APRESOLINE) injection 20 mg  20 mg IntraVENous Q4H PRN    racepinephrine HCl (VAPONEFPRIN) 2.25 % nebulizer solution NEBU 0.5 mL  0.5 mL Nebulization Once    levETIRAcetam (KEPPRA) injection 750 mg  750 mg IntraVENous BID    dexmedetomidine (PRECEDEX) 400 mcg in sodium chloride 0.9 % 100 mL infusion  0.1-1.5 mcg/kg/hr IntraVENous Continuous    insulin lispro (HUMALOG) injection vial 0-8 Units  0-8 Units 
Renal Progress Note    Patient: Lucretia Andres MRN: [id number]  SSN: [id number]    YOB: 1970  Age: 53 y.o.  Sex: female      Admit Date: 12/13/2023    LOS: 7 days     Subjective:   Patient seen in ICU.  On ventilator, sedated  Repeat labs done today showed a creatinine at 1.21 today   good urine output noted  Hemodynamically stable    Current Facility-Administered Medications   Medication Dose Route Frequency    dexmedetomidine (PRECEDEX) 400 mcg in sodium chloride 0.9 % 100 mL infusion  0.1-1.5 mcg/kg/hr IntraVENous Continuous    insulin glargine (LANTUS) injection vial 25 Units  25 Units SubCUTAneous Daily    insulin lispro (HUMALOG) injection vial 0-8 Units  0-8 Units SubCUTAneous Q6H    labetalol (NORMODYNE) tablet 200 mg  200 mg Oral 3 times per day    potassium chloride (KLOR-CON M) extended release tablet 40 mEq  40 mEq Oral Once    potassium bicarb-citric acid (EFFER-K) effervescent tablet 20 mEq  20 mEq Oral BID    hydrALAZINE (APRESOLINE) injection 10 mg  10 mg IntraVENous Q4H PRN    [Held by provider] carvedilol (COREG) tablet 25 mg  25 mg Oral BID WC    ipratropium 0.5 mg-albuterol 2.5 mg (DUONEB) nebulizer solution 1 Dose  1 Dose Inhalation Q6H WA RT    atorvastatin (LIPITOR) tablet 40 mg  40 mg Oral Nightly    levETIRAcetam (KEPPRA) tablet 750 mg  750 mg Oral BID    Midazolam in normal saline (VERSED) 50 MG/50ML infusion  0-10 mg/hr IntraVENous Continuous    fentanyl (SUBLIMAZE) infusion 1000 mcg/100mL   mcg/hr IntraVENous Continuous    niCARdipine (CARDENE) 25 mg in sodium chloride 0.9 % 250 mL infusion ([id number])  2.5-15 mg/hr IntraVENous Continuous    sodium chloride flush 0.9 % injection 5-40 mL  5-40 mL IntraVENous 2 times per day    sodium chloride flush 0.9 % injection 5-40 mL  5-40 mL IntraVENous PRN    0.9 % sodium chloride infusion   IntraVENous PRN    enoxaparin Sodium (LOVENOX) injection 30 mg  30 mg SubCUTAneous Daily    ondansetron (ZOFRAN-ODT) disintegrating tablet 4 
Renal Progress Note    Patient: Lucretia Andres MRN: [id number]  SSN: [id number]    YOB: 1970  Age: 53 y.o.  Sex: female      Admit Date: 12/13/2023    LOS: 10 days     Subjective:   Patient seen in ICU.  On ventilator, sedated  BUN/Cr little worse at 69/1.4  Elevated bps    Current Facility-Administered Medications   Medication Dose Route Frequency    0.9 % sodium chloride infusion   IntraVENous PRN    0.9 % sodium chloride infusion   IntraVENous Continuous    insulin glargine (LANTUS) injection vial 35 Units  35 Units SubCUTAneous Daily    Petrolatum-Zinc Oxide ointment   Topical TID    amLODIPine (NORVASC) tablet 10 mg  10 mg Per NG tube Daily    methylPREDNISolone sodium succ (SOLU-MEDROL) injection 40 mg  40 mg IntraVENous Q8H    famotidine (PEPCID) 20 mg in sodium chloride (PF) 0.9 % 10 mL injection  20 mg IntraVENous Daily    meropenem (MERREM) 1,000 mg in sodium chloride 0.9 % 100 mL IVPB (mini-bag)  1,000 mg IntraVENous Q8H    labetalol (NORMODYNE) tablet 300 mg  300 mg Oral 3 times per day    cloNIDine (CATAPRES) tablet 0.2 mg  0.2 mg Oral Q4H PRN    hydrALAZINE (APRESOLINE) injection 20 mg  20 mg IntraVENous Q4H PRN    racepinephrine HCl (VAPONEFPRIN) 2.25 % nebulizer solution NEBU 0.5 mL  0.5 mL Nebulization Once    levETIRAcetam (KEPPRA) injection 750 mg  750 mg IntraVENous BID    dexmedetomidine (PRECEDEX) 400 mcg in sodium chloride 0.9 % 100 mL infusion  0.1-1.5 mcg/kg/hr IntraVENous Continuous    insulin lispro (HUMALOG) injection vial 0-8 Units  0-8 Units SubCUTAneous Q6H    potassium chloride (KLOR-CON M) extended release tablet 40 mEq  40 mEq Oral Once    ipratropium 0.5 mg-albuterol 2.5 mg (DUONEB) nebulizer solution 1 Dose  1 Dose Inhalation Q6H WA RT    atorvastatin (LIPITOR) tablet 40 mg  40 mg Oral Nightly    Midazolam in normal saline (VERSED) 50 MG/50ML infusion  0-10 mg/hr IntraVENous Continuous    fentanyl (SUBLIMAZE) infusion 1000 mcg/100mL   mcg/hr IntraVENous 
Renal Progress Note    Patient: Lucretia Andres MRN: [id number]  SSN: [id number]    YOB: 1970  Age: 53 y.o.  Sex: female      Admit Date: 12/13/2023    LOS: 20 days     Subjective:   Patient seen in room.   NG in place    Current Facility-Administered Medications   Medication Dose Route Frequency    ipratropium 0.5 mg-albuterol 2.5 mg (DUONEB) nebulizer solution 1 Dose  1 Dose Inhalation BID RT    sodium chloride 0.225 % 1,000 mL infusion   IntraVENous Continuous    insulin glargine (LANTUS) injection vial 40 Units  40 Units SubCUTAneous BID    methylPREDNISolone sodium succ (SOLU-MEDROL) injection 20 mg  20 mg IntraVENous Q12H    insulin lispro (HUMALOG) injection vial 8 Units  8 Units SubCUTAneous Once    hydrALAZINE (APRESOLINE) injection 20 mg  20 mg IntraVENous Q4H PRN    cloNIDine (CATAPRES) tablet 0.3 mg  0.3 mg Oral TID    metoprolol (LOPRESSOR) injection 5 mg  5 mg IntraVENous Q6H PRN    hydrALAZINE (APRESOLINE) tablet 50 mg  50 mg Oral 3 times per day    insulin lispro (HUMALOG) injection vial 0-16 Units  0-16 Units SubCUTAneous 4 times per day    lactobacillus (CULTURELLE) capsule 1 capsule  1 capsule Per NG tube BID    fluconazole (DIFLUCAN) in 0.9 % sodium chloride IVPB 200 mg  200 mg IntraVENous Q24H    [Held by provider] furosemide (LASIX) injection 40 mg  40 mg IntraVENous Daily    budesonide (PULMICORT) nebulizer suspension 500 mcg  0.5 mg Nebulization BID RT    0.9 % sodium chloride infusion   IntraVENous PRN    Petrolatum-Zinc Oxide ointment   Topical TID    amLODIPine (NORVASC) tablet 10 mg  10 mg Per NG tube Daily    famotidine (PEPCID) 20 mg in sodium chloride (PF) 0.9 % 10 mL injection  20 mg IntraVENous Daily    meropenem (MERREM) 1,000 mg in sodium chloride 0.9 % 100 mL IVPB (mini-bag)  1,000 mg IntraVENous Q8H    labetalol (NORMODYNE) tablet 300 mg  300 mg Oral 3 times per day    levETIRAcetam (KEPPRA) injection 750 mg  750 mg IntraVENous BID    atorvastatin (LIPITOR) tablet 40 
ST attempt. Patient NPO for PEG placement today and leaving the floor. Spoke w/ patient's  regarding SLP's role for patient and patient's POC.   SLP to f/u 1/11/24 for speech and/or swallow evaluation if appropriate.   
ST consult received and chart reviewed. Nsg reports patient is not appropriate for PO trials at this time. No ST eval. ST will continue to follow   
Speak with Dr. Felton Polk (uro) over the phone regarding the patient unable to pee since hopper catheter removal, bladder scanned the patient shows >398 cc, orders to insert a hopper catheter and leave it.  
Spiritual Care Assessment/Progress Note  [facility]    Name: Lucretia Andres MRN: [id number]    Age: 53 y.o.     Sex: female   Language: English     Date: 12/18/2023            Total Time Calculated: 8 min              Spiritual Assessment begun in [address] ICU  Service Provided For:: Patient and family together  Referral/Consult From:: Rounding  Encounter Overview/Reason : Initial Encounter    Spiritual beliefs:      [] Involved in a brodie tradition/spiritual practice:      [] Supported by a brodie community:      [] Claims no spiritual orientation:      [] Seeking spiritual identity:           [] Adheres to an individual form of spirituality:      [x] Not able to assess:                Identified resources for coping and support system:   Support System: Spouse, Children, Family members       [] Prayer                  [] Devotional reading               [] Music                  [] Guided Imagery     [] Pet visits                                        [] Other: (COMMENT)     Specific area/focus of visit   Encounter:    Crisis:    Spiritual/Emotional needs: Type: Spiritual Support  Ritual, Rites and Sacraments:    Grief, Loss, and Adjustments:    Ethics/Mediation:    Behavioral Health:    Palliative Care:    Advance Care Planning:      Plan/Referrals: Other (Comment) ( is available if needed)    Narrative: Rounding visit in 2 ICU. Patient and pt's  were present during the visit, but the patient was not able to communicate at this moment due to her medical condition. However, pt's  shared how he is coping in this medical crisis. Mr. Krishna also shared about their support system. Patient and her  have a good family support, and patient used to have her home [employer] in [place].  provided a supportive presence with an active listening. Advised of  availability. Please, contact the spiritual health team if needed.    Rev. Merle Eduardo Mdiv.   
Spoke with  at bedside.  agrees to PEG placement tomorrow.  states he will sign consent tomorrow (1-) and he would like to be in house during the procedure. He will call in the morning for an estimated procedural time . Dr. Shaikh notified of 's decision.   
TRANSFER - IN REPORT:    Verbal report received from CAMILA Maynard on Lucretia Andres  being received from PACU for routine progression of patient care      Report consisted of patient's Situation, Background, Assessment and   Recommendations(SBAR).     Information from the following report(s) Nurse Handoff Report, Intake/Output, MAR, and Recent Results was reviewed with the receiving nurse.    Opportunity for questions and clarification was provided.      Assessment completed upon patient's arrival to unit and care assumed.     
This nurse spoke with the  yesterday in regards to possible peg tube placement. Patient  states at this time he does not want peg tube placement at this time because he feels patient has not had enough time to recover and that's why right now she can't eat. He states he would like more time for her to recover before he thinks about her having peg tube placement.   
UA collected and tubed to lab via straight cath per Dr. Huynh.   
Vancomycin Dosing Consult  Lucretia Andres is a 53 y.o. female with Aspiration PNA with possible bacteremia. Pharmacy was consulted by Dr. Robin to dose and monitor vancomycin. Today is day 0.    Antibiotic regimen: Vancomycin + Meropenem    Temp (24hrs), Av.4 °F (36.3 °C), Min:95.8 °F (35.4 °C), Max:98.7 °F (37.1 °C)    Recent Labs     23  0236 23  0225 23  1335 23  0200   WBC 18.0* 17.2* 19.9* 20.5*   CREATININE 1.61* 1.40*  --  1.33*   BUN 64* 69*  --  64*     Est CrCl: 46 mL/min  Concomitant nephrotoxic drugs: Loop diuretics    Cultures:    Blood x 2: NGTD   Resp (ET): Pending    MRSA Swab: Pending    Target range: AUC/PRABHU 400-600    Recent level history:  Date/Time Dose & Interval Measured Level (mcg/mL) Associated AUC/PRABHU              Assessment/Plan:   Pt with possible allergic reaction to Zosyn leading to intubation. Concern for possible respiratory infection and/or bacteremia  Renal function fairly stable compared to earlier days of this admission; not quite at baseline Scr of 1.1  Loading dose of vancomycin 1500mg x 1 dose ordered to start today  Will start a maintenance regimen of vancomycin 1250mg q24h to start  @ 1000 (predicted AUC of 482), but f/u tomorrow for any improvement in renal function  Previous course of vancomycin was transitioned to 750mg q12h when Scr improved to < 1.2  Level evaluation scheduled for  @ 0800  BMP ordered through   Antimicrobial stop date: TBD       
Vancomycin Dosing Consult  Lucretia Andres is a 53 y.o. female with Aspiration PNA with possible bacteremia. Pharmacy was consulted by Dr. Robin to dose and monitor vancomycin. Today is day 2.    Antibiotic regimen: Vancomycin + Meropenem    Temp (24hrs), Av.2 °F (37.3 °C), Min:98.5 °F (36.9 °C), Max:99.6 °F (37.6 °C)    Recent Labs     23  0200 23  1753 23  0505 23  0436   WBC 20.5*  --  23.3* 28.9*   CREATININE 1.33* 1.23* 1.19* 1.11*   BUN 64* 68* 62* 61*     Est CrCl: 55 mL/min  Concomitant nephrotoxic drugs: Loop diuretics    Cultures:    Blood x 2: NGTD   Resp (ET): NGTD    MRSA Swab: Not detected 2023    Target range: AUC/PRABHU 400-600    Recent level history:  Date/Time Dose & Interval Measured Level (mcg/mL) Associated AUC/PRABHU    @ 0922 1500mg x 1, then 1250mg q24h 12.4 532        Assessment/Plan:   Pt with possible allergic reaction to Zosyn leading to intubation. Concern for possible respiratory infection and/or bacteremia  Renal function fairly stable compared to earlier days of this admission; not quite at baseline Scr of 1.1  Level resulted at 12.4 (AUC of 532)  Continue vancomycin 1250mg q24h with AUC prediction within goal range of 400-600  Level evaluation scheduled for  @ 0800  BMP ordered through   Antimicrobial stop date: TBD         
Vancomycin Dosing Consult  Lucretia Andres is a 53 y.o. female with Sepsis (unknown source). Pharmacy was consulted by Dr. Kinsey to dose and monitor vancomycin. Today is day 0.    Antibiotic regimen: Vancomycin + Zosyn + Doxycycline    Temp (24hrs), Av.8 °F (38.2 °C), Min:98.7 °F (37.1 °C), Max:101.8 °F (38.8 °C)    Recent Labs     23  0532 23  0535   WBC  --  22.9*   CREATININE 1.54* 2.08*   BUN  --  27*     Est CrCl: 28 mL/min  Concomitant nephrotoxic drugs: Contrast agents (given on )    Cultures:    Blood x 2: Pending   Blood x 2: Pending    MRSA Swab: Not ordered, patient already received first dose of vancomycin    Target range: Re-dose for random level <15 mcg/mL    Recent level history:  Date/Time Dose & Interval Measured Level (mcg/mL) Associated AUC/PRABHU              Assessment/Plan:   Pt with sepsis on presentation and possible respiratory source of infection, but unsure at present.   Renal function indicates ADONIS (baseline Scr seems to be 1.2)  Loading dose of vancomycin 1500mg x 1 given in ICU  Given ADONIS, pulse dose for now  Level evaluation scheduled for  @ 1400  CMP already ordered through   Antimicrobial stop date: TBD       
Vancomycin Dosing Consult  Lucretia Andres is a 53 y.o. female with sepsis (suspected respiratory source). Pharmacy was consulted by Dr. Kinsey to dose and monitor vancomycin. Today is day 5.    Antibiotic regimen: Vancomycin + pip/tazo + doxy    Temp (24hrs), Av.4 °F (37.4 °C), Min:98.8 °F (37.1 °C), Max:100.9 °F (38.3 °C)    Recent Labs     12/15/23  0515 23  0255 23  0320   WBC 7.7 13.6* 13.6*   CREATININE 1.43*  1.41* 1.43* 1.13*   BUN 18  18 21* 22*     Est CrCl: 53 mL/min  Concomitant nephrotoxic drugs: Contrast agents (given on )    Cultures:    Blood x 2: NGTD, preliminary   Blood x 2: NGTD, preliminiary    MRSA Swab: Not ordered, patient already received first dose of vancomycin    Target range: AUC/PRABHU 400-600    Recent level history:  Date/Time Dose & Interval Measured Level (mcg/mL) Associated AUC/PRABHU    0840 1500 mg x 1 15.6 N/a   12/15 0515 1000 mg x 1 16.4 526    1058 1000 mg q24h 9.8 354        Assessment/Plan:   SCr continues to fall; continue AUC-based dosing  Extrapolated AUC of 354 is subtherapeutic (likely due to improved renal function), will increase dose to 750 mg q12h for a predicted AUC of 517  Will check a trough prior to dose  @ 1100  Antimicrobial stop date: TBD     
Vancomycin Dosing Consult  Lucretia Andres is a 53 y.o. female with sepsis (unknown source). Pharmacy was consulted by Dr. Kinsey to dose and monitor vancomycin. Today is day 2.    Antibiotic regimen: Vancomycin + pip/tazo + doxy    Temp (24hrs), Av.1 °F (37.8 °C), Min:97.1 °F (36.2 °C), Max:103 °F (39.4 °C)    Recent Labs     23  0535 23  2000 23  0425 23  0440 23  1810   WBC 22.9*  --   --  11.5*  --    CREATININE 2.08*   < > 1.57* 1.83* 1.52*   BUN 27*   < > 19 18 17    < > = values in this interval not displayed.     Est CrCl: 39 mL/min  Concomitant nephrotoxic drugs: Contrast agents (given on )    Cultures:    Blood x 2: NGTD, prelim   Blood x 2: NGTD, prelim    MRSA Swab: Not ordered, patient already received first dose of vancomycin    Target range: Re-dose for random level <15 mcg/mL    Recent level history:  Date/Time Dose & Interval Measured Level (mcg/mL) Associated AUC/PRABHU    0840 1500 mg x 1 15.6 N/a        Assessment/Plan:   Patient febrile with improved leukocytosis  Renal function improving.  Level anticipated to be ready for re-dosing.  Vancomycin 1000 mg x 1 ordered  Repeat level is scheduled for 12/15 with AM labs.  Antimicrobial stop date: TBD     Yareli Kahn, PharmD, BCPS, BCCCP  Clinical Pharmacist   [phone]    
Vancomycin Dosing Consult  Lucretia Andres is a 53 y.o. female with sepsis (unknown source). Pharmacy was consulted by Dr. Kinsey to dose and monitor vancomycin. Today is day 3.    Antibiotic regimen: Vancomycin + pip/tazo + doxy    Temp (24hrs), Av.3 °F (36.8 °C), Min:96.9 °F (36.1 °C), Max:100.4 °F (38 °C)    Recent Labs     23  0535 23  2000 23  0440 23  1810 12/15/23  0515   WBC 22.9*  --  11.5*  --  7.7   CREATININE 2.08*   < > 1.83* 1.52* 1.43*  1.41*   BUN 27*   < > 18 17 18  18    < > = values in this interval not displayed.     Est CrCl: 42 mL/min  Concomitant nephrotoxic drugs: Contrast agents (given on )    Cultures:    Blood x 2: NGTD x 24 hrs   Blood x 2: NGTD x 24 hrs    MRSA Swab: Not ordered, patient already received first dose of vancomycin    Target range: AUC/PRABHU 400-600    Recent level history:  Date/Time Dose & Interval Measured Level (mcg/mL) Associated AUC/PRABHU    0840 1500 mg x 1 15.6 N/a   12/15 @ 0515 1000mg x 1 16.4 526        Assessment/Plan:   Patient remains febrile this AM with improved leukocytosis; procal only mildly elevated (could potentially be caused by ADONIS, status epilepticus, intubation with MV started on  (which might correlate with increase in procal seen on that day)  Vent settings seem to be gradually improving, but may not be hemodynamically stable yet as of this AM (BP low with MAP of 60)  Given above info, MRSA risk seems to be low despite not obtaining MRSA nares screening, could consider stopping vancomycin  Renal function improving and seems to be back to baseline (comparing prior labs); will switch to AUC guided dosing  Level this AM resulted at 16.4 (predicted AUC of 526)  Start scheduled vancomycin 1000mg q24h and will continue to monitor renal function daily for changes  Next level evaluation scheduled for  @ 0900  CMP ordered through   Antimicrobial stop date: TBD           
01/01/24 0848     Special Requests No Special Requests        Gram stain No wbc's seen               Occasional Epithelial cells seen            No definite organism seen        Culture       Light Normal respiratory mercedes          Culture, Wound [[id number]] Collected: 12/29/23 2028    Order Status: Completed Specimen: Wound Updated: 01/02/24 1003     Special Requests No Special Requests        Culture NO ACINETOBACTER ISOLATED       Clostridium Difficile Toxin/Antigen [[id number]] Collected: 12/27/23 1630    Order Status: Completed Specimen: Stool Updated: 12/28/23 1351     GDH Antigen Negative        C difficile Toxin, EIA Negative        C Diff Toxin Interpretation       Negative for toxigenic C. difficile                  Assessment/Plan     Leukocytosis, no new source of acute infection         Afebrile, WBC stable at 13K, off pressor support         Urine Cx 01/08 is neg, clear lungs on exam         S/p 3 wks of antibiotics, Zosyn and Meropenem         Continue to monitor off antibiotics for now         Routine labs in the morning      2. Acute hypoxic respiratory failure: Suspected aspiration pneumonitis       Required intubation x 2,  Extubated on 12/30, on RA, weak cough at risk for aspiration       Normal Mercedes isolated from sputum Cx (12/23), repeat  from 12/29 is neg      Lungs exam remains clear, maintaining O2 sats on RA     3. Diarrhea w FMS in place, Stool neg for C.diff toxin on 12/27/24    4. Left retroperitoneal mass, due to Pheochromocytoma w  elevated Normetanephrine and Metanephrine       S/p robotic assisted lap left adrenalectomy on 01/08/24 by Dr Eduardo     5. Dry eyes: subjectively better, continue eye drops       Henrietta Zambrano MD    1/9/2024      
1/10/2024    EGD ESOPHAGOGASTRODUODENOSCOPY PEG TUBE INSERTION performed by Carissa Shaikh MD at [facility] ENDOSCOPY      History reviewed. No pertinent family history.  Social History     Tobacco Use    Smoking status: Never    Smokeless tobacco: Never   Substance Use Topics    Alcohol use: Not Currently      Prior to Admission medications    Medication Sig Start Date End Date Taking? Authorizing Provider   insulin 70-30 (HUMULIN;NOVOLIN) (70-30) 100 UNIT per ML injection vial Inject 11 Units into the skin 2 times daily (with meals) Okay to substitute Humulin 70-30 or ReliOn 70-30. 9/6/23   Genoveva Pak APRN - CNS   blood glucose monitor strips Test 3-4 times a day BEFORE MEALS and BEDTIME & as needed for symptoms of irregular blood glucose. Dispense sufficient amount for indicated testing frequency plus additional to accommodate PRN testing needs.  Patient not taking: Reported on 12/14/2023 9/6/23   Tessa Mejia APRN - NP   Insulin Syringe-Needle U-100 30G X 5/16\" 0.3 ML MISC Use as directed for insulin injections.  Use a NEW needle with each insulin injection  Patient not taking: Reported on 12/14/2023 9/6/23   Tessa Mejia APRN - NP   Blood Glucose Monitoring Suppl (BLOOD GLUCOSE MONITOR SYSTEM) w/Device KIT Pharmacist to identify preferred meter and strips.  Patient not taking: Reported on 12/14/2023 9/6/23   Tessa Mejia APRN - NP   Lancets 30G MISC Test 3 times a day & as needed for symptoms of irregular blood glucose. Dispense sufficient amount for indicated testing frequency plus additional to accommodate PRN testing needs. Pharmacist to identify preferred brand.  Patient not taking: Reported on 12/14/2023 9/6/23   Tessa Mejia APRN - NP   aspirin 81 MG chewable tablet Take 1 tablet by mouth daily  Patient not taking: Reported on 12/14/2023 8/2/23   Zana Najera MD   atorvastatin (LIPITOR) 40 MG tablet Take 1 tablet by mouth nightly  Patient not taking: Reported on 12/14/2023 8/2/23   Zana Najera MD 
250 mL infusion (Eaaz2Lkm)  2.5-15 mg/hr IntraVENous Continuous    sodium chloride flush 0.9 % injection 5-40 mL  5-40 mL IntraVENous 2 times per day    sodium chloride flush 0.9 % injection 5-40 mL  5-40 mL IntraVENous PRN    0.9 % sodium chloride infusion   IntraVENous PRN    enoxaparin Sodium (LOVENOX) injection 30 mg  30 mg SubCUTAneous Daily    ondansetron (ZOFRAN-ODT) disintegrating tablet 4 mg  4 mg Oral Q8H PRN    Or    ondansetron (ZOFRAN) injection 4 mg  4 mg IntraVENous Q6H PRN    polyethylene glycol (GLYCOLAX) packet 17 g  17 g Oral Daily PRN    acetaminophen (TYLENOL) tablet 650 mg  650 mg Oral Q6H PRN    Or    acetaminophen (TYLENOL) suppository 650 mg  650 mg Rectal Q6H PRN    doxycycline (VIBRAMYCIN) 100 mg in sodium chloride 0.9 % 100 mL IVPB (mini-bag)  100 mg IntraVENous Q12H    glucose chewable tablet 16 g  4 tablet Oral PRN    dextrose bolus 10% 125 mL  125 mL IntraVENous PRN    Or    dextrose bolus 10% 250 mL  250 mL IntraVENous PRN    glucagon injection 1 mg  1 mg SubCUTAneous PRN    dextrose 10 % infusion   IntraVENous Continuous PRN    piperacillin-tazobactam (ZOSYN) 3,375 mg in sodium chloride 0.9 % 50 mL IVPB (mini-bag)  3,375 mg IntraVENous Q8H    propofol infusion  5-50 mcg/kg/min IntraVENous Continuous        Vitals:    12/21/23 1020 12/21/23 1100 12/21/23 1132 12/21/23 1200   BP: (!) 227/95 (!) 209/89  (!) 164/78   Pulse:  (!) 133 (!) 113 (!) 111   Resp:  15 25 18   Temp:       TempSrc:       SpO2:  100% 99% 100%   Weight:       Height:         Objective:   General: On ventilator, sedated, no acute distress.  HEENT: no Icterus, no Pallor, ET tube in place, mucosa is dry neck: Neck is supple, No JVD  Lungs: Fair air entry present bilaterally  CVS: heart sounds normal,  GI: soft, nontender, normal BS.  Extremeties: no cyanosis, no edema,   Neuro: Sedated on vent  Skin: normal skin turgor, no skin rashes.        Intake and Output:  Current Shift: 12/21 0701 - 12/21 1900  In: -   Out: 850 
Dose  1 Dose Inhalation Q6H WA RT    atorvastatin (LIPITOR) tablet 40 mg  40 mg Oral Nightly    niCARdipine (CARDENE) 25 mg in sodium chloride 0.9 % 250 mL infusion ([id number])  2.5-15 mg/hr IntraVENous Continuous    sodium chloride flush 0.9 % injection 5-40 mL  5-40 mL IntraVENous 2 times per day    sodium chloride flush 0.9 % injection 5-40 mL  5-40 mL IntraVENous PRN    0.9 % sodium chloride infusion   IntraVENous PRN    enoxaparin Sodium (LOVENOX) injection 30 mg  30 mg SubCUTAneous Daily    ondansetron (ZOFRAN-ODT) disintegrating tablet 4 mg  4 mg Oral Q8H PRN    Or    ondansetron (ZOFRAN) injection 4 mg  4 mg IntraVENous Q6H PRN    polyethylene glycol (GLYCOLAX) packet 17 g  17 g Oral Daily PRN    acetaminophen (TYLENOL) tablet 650 mg  650 mg Oral Q6H PRN    Or    acetaminophen (TYLENOL) suppository 650 mg  650 mg Rectal Q6H PRN    glucose chewable tablet 16 g  4 tablet Oral PRN    dextrose bolus 10% 125 mL  125 mL IntraVENous PRN    Or    dextrose bolus 10% 250 mL  250 mL IntraVENous PRN    glucagon injection 1 mg  1 mg SubCUTAneous PRN    dextrose 10 % infusion   IntraVENous Continuous PRN        Vitals:    12/31/23 0700 12/31/23 0836 12/31/23 0936 12/31/23 1100   BP:   (!) 188/85    Pulse:       Resp:       Temp: 99.2 °F (37.3 °C)   97.5 °F (36.4 °C)   TempSrc: Axillary   Axillary   SpO2:  100%     Weight:       Height:         Objective:   General: She is awake.  Nods her head to questions.  In no distress  HEENT: no Icterus, no Pallor, ET tube in place, mucosa is dry   neck: Neck is supple, No JVD  Lungs: Fair air entry present bilaterally  CVS: heart sounds normal,  GI: soft, nontender, normal BS.  Extremeties: no cyanosis, no edema,   Neuro: Wake  Skin: normal skin turgor, no skin rashes.        Intake and Output:  Current Shift: 12/31 0701 - 12/31 1900  In: -   Out: 400 [Urine:400]  Last three shifts: 12/29 1901 - 12/31 0700  In: 1787.7 [I.V.:302.7]  Out: 2700 [Urine:2550]      Lab/Data Review:  Recent 
Type 2 MI.   Echo with EF 60-65%  Likely secondary to hypertension, seizure, respiratory failure  Consider ischemic work up as OP, caution with lexiscan as stress agent in a patient with seizure  Sinus tachycardia, continue to treat underlying causes, control pain, check TSH, resume labetalol 50mg TID, will increase as needed   Respiratory failure, resolved   Seizure, per neurology    Please do not hesitate to call if additional questions arise.    PATRIA ROMAN, APRN - NP  1/17/2024  
n/v, d/c, NPO. 2+ extremity edema, worsening. FMS in place, decreasing OP with just smears today. Wound Type: None           Current Nutrition Intake & Therapies:    Average Meal Intake: NPO  Average Supplements Intake: NPO  Diet NPO  ADULT TUBE FEEDING; Nasogastric; Peptide Based High Protein; Continuous; 50; No; 200; Q 4 hours    Anthropometric Measures:  Height: 157.2 cm (5' 1.89\")  Ideal Body Weight (IBW): 109 lbs (50 kg)       Current Body Weight: 69.1 kg (152 lb 5.4 oz), 139.8 % IBW. Weight Source: Bed Scale  Current BMI (kg/m2): 28        Weight Adjustment For:  (EDW 67kg; BMI 27.1)                 BMI Categories: Overweight (BMI 25.0-29.9)    Estimated Daily Nutrient Needs:  Energy Requirements Based On: Formula  Weight Used for Energy Requirements: Other (Comment) (EDW)  Energy (kcal/day): 1546 kcals (PSU 2003b using EDW)  Weight Used for Protein Requirements: Current  Protein (g/day): 80-101g (1.2-1.5g/kg)  Method Used for Fluid Requirements: 1 ml/kcal  Fluid (ml/day): 1546 ml    Nutrition Diagnosis:   Inadequate oral intake related to impaired respiratory function as evidenced by NPO or clear liquid status due to medical condition, intubation, nutrition support - enteral nutrition    Nutrition Interventions:   Food and/or Nutrient Delivery: Continue NPO, Modify Tube Feeding  Nutrition Education/Counseling: No recommendation at this time  Coordination of Nutrition Care: Continue to monitor while inpatient  Plan of Care discussed with: rn    Goals:  Previous Goal Met: Progressing toward Goal(s)  Goals: Meet at least 75% of estimated needs, Tolerate nutrition support at goal rate, by next RD assessment       Nutrition Monitoring and Evaluation:   Behavioral-Environmental Outcomes: None Identified  Food/Nutrient Intake Outcomes: Enteral Nutrition Intake/Tolerance  Physical Signs/Symptoms Outcomes: GI Status, Hemodynamic Status, Weight, Fluid Status or Edema, Biochemical Data    Discharge Planning:    Too soon 
polyethylene glycol (GLYCOLAX) packet 17 g  17 g Oral Daily PRN    acetaminophen (TYLENOL) tablet 650 mg  650 mg Oral Q6H PRN    Or    acetaminophen (TYLENOL) suppository 650 mg  650 mg Rectal Q6H PRN    doxycycline (VIBRAMYCIN) 100 mg in sodium chloride 0.9 % 100 mL IVPB (mini-bag)  100 mg IntraVENous Q12H    glucose chewable tablet 16 g  4 tablet Oral PRN    dextrose bolus 10% 125 mL  125 mL IntraVENous PRN    Or    dextrose bolus 10% 250 mL  250 mL IntraVENous PRN    glucagon injection 1 mg  1 mg SubCUTAneous PRN    dextrose 10 % infusion   IntraVENous Continuous PRN    piperacillin-tazobactam (ZOSYN) 3,375 mg in sodium chloride 0.9 % 50 mL IVPB (mini-bag)  3,375 mg IntraVENous Q8H    propofol infusion  5-50 mcg/kg/min IntraVENous Continuous        Vitals:    12/19/23 1200 12/19/23 1300 12/19/23 1400 12/19/23 1500   BP: (!) 156/73 (!) 168/76 (!) 174/81 120/67   Pulse: 99 98 (!) 101 93   Resp: 15 14 14 14   Temp:    98.8 °F (37.1 °C)   TempSrc:    Esophageal   SpO2: 97% 96% 97% 97%   Weight:       Height:         Objective:   General: On ventilator, sedated, no acute distress.  HEENT: no Icterus, no Pallor, ET tube in place, mucosa is dry neck: Neck is supple, No JVD  Lungs: Fair air entry present bilaterally  CVS: heart sounds normal,  GI: soft, nontender, normal BS.  Extremeties: no cyanosis, no edema,   Neuro: Sedated on vent  Skin: normal skin turgor, no skin rashes.        Intake and Output:  Current Shift: 12/19 0701 - 12/19 1900  In: 390   Out: 650 [Urine:650]  Last three shifts: 12/17 1901 - 12/19 0700  In: 4084.1 [I.V.:916.1]  Out: 3100 [Urine:3100]      Lab/Data Review:  Recent Labs     12/17/23  0320 12/19/23  1137   WBC 13.6* 14.1*   HGB 8.7* 7.1*   HCT 26.2* 22.3*    385     Recent Labs     12/17/23  0320 12/18/23  0330 12/19/23  0245    140 140   K 3.7 3.8 4.0   * 109* 110*   CO2 23 23 21   GLUCOSE 194* 189* 192*   BUN 22* 27* 32*   CREATININE 1.13* 1.21* 1.15*   CALCIUM 8.7 8.4* 
used smokeless tobacco. She reports that she does not currently use alcohol.    ALL: No Known Allergies       MEDS:   [x] Reviewed - As Below   [] Not reviewed    Current Facility-Administered Medications   Medication Dose Route Frequency Provider Last Rate Last Admin    morphine (PF) injection 2 mg  2 mg IntraVENous Q4H PRN Yovany Eduardo MD   2 mg at 01/15/24 0610    HYDROcodone-acetaminophen (NORCO) 5-325 MG per tablet 1 tablet  1 tablet PEG Tube Q6H PRN Yovany Eduardo MD   1 tablet at 01/15/24 0926    carboxymethylcellulose PF (THERATEARS/REFRESH) 1 % ophthalmic gel 1 drop  1 drop Both Eyes BID Sergio Poon MD   1 drop at 01/15/24 0928    Petrolatum-Zinc Oxide ointment   Topical BID Sergio Poon MD   Given at 01/15/24 0927    [Held by provider] insulin glargine (LANTUS) injection vial 10 Units  10 Units SubCUTAneous Nightly Sergio Poon MD   10 Units at 01/10/24 2100    [Held by provider] insulin lispro (HUMALOG) injection vial 5 Units  5 Units SubCUTAneous TID WC Salbador Dougherty MD   5 Units at 01/07/24 1804    ipratropium 0.5 mg-albuterol 2.5 mg (DUONEB) nebulizer solution 1 Dose  1 Dose Inhalation BID RT Flavio Kay MD   1 Dose at 01/15/24 0838    insulin lispro (HUMALOG) injection vial 8 Units  8 Units SubCUTAneous Once Dirk Rankin MD        hydrALAZINE (APRESOLINE) injection 20 mg  20 mg IntraVENous Q4H PRN Dirk Rankin MD   20 mg at 01/03/24 2109    metoprolol (LOPRESSOR) injection 5 mg  5 mg IntraVENous Q6H PRN Dirk Rankin MD   5 mg at 01/11/24 0018    insulin lispro (HUMALOG) injection vial 0-16 Units  0-16 Units SubCUTAneous 4 times per day Dirk Rankin MD   12 Units at 01/07/24 1804    lactobacillus (CULTURELLE) capsule 1 capsule  1 capsule Per NG tube BID Dirk Rankin MD   1 capsule at 01/15/24 0927    [Held by provider] furosemide (LASIX) injection 40 mg  40 mg IntraVENous Daily Flavio Kay MD   40 mg at 12/30/23 0814    budesonide 
  01/04/2024 11:03  136 - 145 mmol/L Final     Potassium   Date/Time Value Ref Range Status   01/04/2024 11:03 AM 4.2 3.5 - 5.1 mmol/L Final     Chloride   Date/Time Value Ref Range Status   01/04/2024 11:03  (H) 97 - 108 mmol/L Final     CO2   Date/Time Value Ref Range Status   01/04/2024 11:03 AM 22 21 - 32 mmol/L Final     Hemoglobin   Date/Time Value Ref Range Status   01/03/2024 05:06 AM 10.2 (L) 11.5 - 16.0 g/dL Final     Hematocrit   Date/Time Value Ref Range Status   01/03/2024 05:06 AM 31.7 (L) 35.0 - 47.0 % Final     Platelets   Date/Time Value Ref Range Status   01/03/2024 05:06  (H) 150 - 400 K/uL Final      BUN   Date/Time Value Ref Range Status   01/04/2024 11:03 AM 56 (H) 6 - 20 mg/dL Final     Creatinine   Date/Time Value Ref Range Status   01/04/2024 11:03 AM 0.83 0.55 - 1.02 mg/dL Final             Assessment/     Patient Active Problem List   Diagnosis    Hyperosmolar hyperglycemic state (HHS) (HCC)    Seizure (HCC)    Old cerebrovascular accident (CVA) without late effect    Type 2 diabetes mellitus with hyperglycemia, without long-term current use of insulin (HCC)    Acute respiratory failure with hypoxia and hypercarbia (HCC)    Hypertensive emergency    NSTEMI (non-ST elevated myocardial infarction) (HCC)    Diarrhea    Acute respiratory failure with hypoxia (HCC)    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  CT reviewed. Independently by me.  Suspect adrenal tumor not renal.       Hyperglycemia and HTN index of suspicion higher.    Cortisol level normal 1/3, not obviously [place] even with exogenous steroids.    Aldosterone level normal 12/14  Significantly elevated catecholamines concerning for pheochromocytoma  MRI T2 images may be supportive.        HTN: on Labetalol 300 tid, amlodipine 10mg qd and hydralazine 50mg tid, controlled      ADONIS:  better with fluids      DM: glycemic control not optimal.  On SSI.  
0.0 - 0.4 K/UL    Basophils Absolute 0.1 0.0 - 0.1 K/UL    Absolute Immature Granulocyte 0.2 (H) 0.00 - 0.04 K/UL    Differential Type AUTOMATED     POCT Glucose    Collection Time: 12/20/23  3:54 AM   Result Value Ref Range    POC Glucose 219 (H) 65 - 100 mg/dL    Performed by: An Harris    POCT Glucose    Collection Time: 12/20/23 11:47 AM   Result Value Ref Range    POC Glucose 249 (H) 65 - 100 mg/dL    Performed by: Brnaden Perkins         A/P as discussed with Dr. Kingston  Hypertensive urgency, blood pressure close to goal  Continue labetalol, PRN hydralazine  Elevated troponin, trop peaked at 692, following Type 2 MI.   Echo with EF 60-65%  Likely secondary to hypertension, seizure, respiratory failure  Consider ischemic work up as OP, caution with lexiscan as stress agent in a patient with seizure  Sinus tachycardia, continue to treat underlying causes, continue on labetalol   Seizure d/o, per neurology  Respiratory failure, pulm is following, wean as able    Please do not hesitate to call if additional questions arise.    AMPARO BRODERICK, APRN - NP  12/20/2023  
0700  In: 4091.4 [I.V.:971.1]  Out: 4700 [Urine:4500]      Lab/Data Review:  Recent Labs     12/27/23 0445 12/28/23 0405 12/29/23 0347   WBC 23.5* 21.2* 21.1*   HGB 9.4* 9.4* 9.2*   HCT 29.4* 30.0* 28.9*   * 586* 553*     Recent Labs     12/27/23 0445 12/28/23 0405 12/29/23 0347    139 145   K 4.2 4.3 3.4*    108 112*   CO2 32 24 25   GLUCOSE 352* 410* 242*   BUN 67* 69* 79*   CREATININE 1.22* 1.33* 1.23*   CALCIUM 9.2 8.3* 8.8   MG 2.3 2.6*  --    PHOS 4.1 5.3* 4.7   LABALBU 2.4* 2.1* 2.2*     Recent Labs     12/27/23  0432 12/28/23  0617 12/29/23  0409   PHART 7.54* 7.45 7.43   PGQ8UMO 37 33* 33*   PO2ART 68* 81 69*   SEE8HYQ 30* 22 22   BEART 7.6*  --   --    FIO2A 40 35 30   R4GZORHF 93* 95 92*   OXYHEM 92.0* 94.3* 91.4*   CARBOXHGBART 0.3* 0.3* 0.3*   METHGBART 0.4 0.5 0.6   TEMP 100.0 99.7 99.7     Recent Results (from the past 24 hour(s))   POCT Glucose    Collection Time: 12/28/23  4:37 PM   Result Value Ref Range    POC Glucose 310 (H) 65 - 100 mg/dL    Performed by: Keegan Cornell    POCT Glucose    Collection Time: 12/29/23 12:16 AM   Result Value Ref Range    POC Glucose 258 (H) 65 - 100 mg/dL    Performed by: Jefferson Denny (PCT)    Renal Function Panel    Collection Time: 12/29/23  3:47 AM   Result Value Ref Range    Sodium 145 136 - 145 mmol/L    Potassium 3.4 (L) 3.5 - 5.1 mmol/L    Chloride 112 (H) 97 - 108 mmol/L    CO2 25 21 - 32 mmol/L    Anion Gap 8 5 - 15 mmol/L    Glucose 242 (H) 65 - 100 mg/dL    BUN 79 (H) 6 - 20 mg/dL    Creatinine 1.23 (H) 0.55 - 1.02 mg/dL    Bun/Cre Ratio 64 (H) 12 - 20      Est, Glom Filt Rate 53 (L) >60 ml/min/1.73m2    Calcium 8.8 8.5 - 10.1 mg/dL    Phosphorus 4.7 2.6 - 4.7 mg/dL    Albumin 2.2 (L) 3.5 - 5.0 g/dL   CBC with Auto Differential    Collection Time: 12/29/23  3:47 AM   Result Value Ref Range    WBC 21.1 (H) 3.6 - 11.0 K/uL    RBC 3.05 (L) 3.80 - 5.20 M/uL    Hemoglobin 9.2 (L) 11.5 - 16.0 g/dL    Hematocrit 28.9 (L) 35.0 - 47.0 
12/14/23  5:05 AM   Result Value Ref Range    pH, Arterial 7.64 (HH) 7.35 - 7.45      pCO2, Arterial 20 (L) 35 - 45 mmHg    pO2, Arterial 145 (H) 80 - 100 mmHg    HCO3, Arterial 21 (L) 22 - 26 mmol/L    Base Excess, Arterial 1.6 0 - 3 mmol/L    O2 Sat, Arterial 99 95 - 99 %    Calcium, Ionized 1.12 (L) 1.13 - 1.32 mmol/L    O2 Method VENT      FIO2 Arterial 40 %    POC TIDAL VOLUME 530.0      Set Rate, POC 18      Source Arterial      Site Right Radial      Olivier Test YES      Carboxyhgb, Arterial 0.3 (L) 1 - 2 %    Methemoglobin, Arterial 0.3 0 - 1.4 %    Oxyhemoglobin 98.0 95 - 99 %    Performed by: Kristie España     Critical Value Read Back Called to Jennyfer Nichols on 12/14/2023 at 05:13     Temperature 100.5     Lactic Acid    Collection Time: 12/14/23  5:10 AM   Result Value Ref Range    Lactic Acid, Plasma 2.2 (HH) 0.4 - 2.0 mmol/L   POCT Glucose    Collection Time: 12/14/23  8:36 AM   Result Value Ref Range    POC Glucose 218 (H) 65 - 100 mg/dL    Performed by: BRAN BOYD    Vancomycin Level, Random    Collection Time: 12/14/23  8:40 AM   Result Value Ref Range    Vancomycin Rm 15.6 ug/mL   Lactic Acid    Collection Time: 12/14/23 11:25 AM   Result Value Ref Range    Lactic Acid, Plasma 0.8 0.4 - 2.0 mmol/L   POCT Glucose    Collection Time: 12/14/23 11:25 AM   Result Value Ref Range    POC Glucose 207 (H) 65 - 100 mg/dL    Performed by: BRAN BOYD         Case discussed with Dr. Kingston and our impression and recommendations are as follows:  Hypertensive urgency, blood pressure has improved, now off cardene gtt   Elevated troponin, trop [phone]  Trend trop to peak  Obtain echo to reassess EF  Likely secondary to hypertension, seizure, respiratory failure  Sinus tachycardia, continue to treat underlying causes  Seizure, per neurology  Respiratory failure, pulm is following    Please do not hesitate to call if additional questions arise.    PATRIA ROMAN, APRN - NP  12/14/2023  
524*     Recent Labs     01/01/24  0553 01/02/24  1046 01/03/24  0506   * 148* 144   K 4.0 4.0 4.9   * 119* 115*   CO2 22 22 23   GLUCOSE 431* 322* 263*   BUN 86* 73* 62*   CREATININE 1.24* 1.00 0.88   CALCIUM 9.7 9.5 9.5   MG 2.8*  --   --    PHOS 4.0  --  3.4   LABALBU 2.6*  --  2.7*     No results for input(s): \"PHART\", \"HJV9UEL\", \"PO2ART\", \"UIJ1VDH\", \"BEART\", \"ICN9DUEA\", \"HGBART\", \"IE3JPSAJZ\", \"FIO2A\", \"W5TCDNQR\", \"OXYHEM\", \"CARBOXHGBART\", \"METHGBART\", \"M3LZPVCPV\", \"PHCORART\", \"TEMP\" in the last 72 hours.    Invalid input(s): \"YKO2LAXKD\"  Recent Results (from the past 24 hour(s))   POCT Glucose    Collection Time: 01/02/24 11:40 AM   Result Value Ref Range    POC Glucose 311 (H) 65 - 100 mg/dL    Performed by: Pedro Berumen    POCT Glucose    Collection Time: 01/02/24  4:29 PM   Result Value Ref Range    POC Glucose 231 (H) 65 - 100 mg/dL    Performed by: Pedro Berumen    POCT Glucose    Collection Time: 01/02/24  8:55 PM   Result Value Ref Range    POC Glucose 271 (H) 65 - 100 mg/dL    Performed by: KING YASMANI    CBC    Collection Time: 01/03/24  5:06 AM   Result Value Ref Range    WBC 19.3 (H) 3.6 - 11.0 K/uL    RBC 3.43 (L) 3.80 - 5.20 M/uL    Hemoglobin 10.2 (L) 11.5 - 16.0 g/dL    Hematocrit 31.7 (L) 35.0 - 47.0 %    MCV 92.4 80.0 - 99.0 FL    MCH 29.7 26.0 - 34.0 PG    MCHC 32.2 30.0 - 36.5 g/dL    RDW 13.5 11.5 - 14.5 %    Platelets 524 (H) 150 - 400 K/uL    MPV 12.5 8.9 - 12.9 FL    Nucleated RBCs 0.0 0.0  WBC    nRBC 0.00 0.00 - 0.01 K/uL   Renal Function Panel    Collection Time: 01/03/24  5:06 AM   Result Value Ref Range    Sodium 144 136 - 145 mmol/L    Potassium 4.9 3.5 - 5.1 mmol/L    Chloride 115 (H) 97 - 108 mmol/L    CO2 23 21 - 32 mmol/L    Anion Gap 6 5 - 15 mmol/L    Glucose 263 (H) 65 - 100 mg/dL    BUN 62 (H) 6 - 20 mg/dL    Creatinine 0.88 0.55 - 1.02 mg/dL    Bun/Cre Ratio 70 (H) 12 - 20      Est, Glom Filt Rate >60 >60 ml/min/1.73m2    Calcium 9.5 8.5 - 10.1 mg/dL 
COVID-19.   This test has been authorized by the FDA under an Emergency Use Authorization (EUA) for use by authorized laboratories.   Fact sheet for Healthcare Providers:  https://www.fda.gov/media/437056/download Fact sheet for Patients: https://www.fda.gov/media/605989/download   Methodology: Isothermal Nucleic Acid Amplification       Rapid influenza A/B antigens [[id number]] Collected: 12/13/23 0544    Order Status: Canceled Specimen: Nasal Washing              Diagnostic   XR CHEST PORTABLE    Result Date: 12/26/2023  Persistent bilateral alveolar opacities appear stable.     XR CHEST 1 VIEW    Result Date: 12/25/2023  Pulmonary edema.  Improvement in aeration of the right lung base.     XR CHEST PORTABLE    Result Date: 12/24/2023  No change.    XR CHEST 1 VIEW    Result Date: 12/24/2023  Pulmonary edema which appears increased.     XR CHEST 1 VIEW    Result Date: 12/23/2023  Increase in pulmonary edema.     CT CHEST ABDOMEN PELVIS WO CONTRAST Additional Contrast? None    Result Date: 12/22/2023  1.  No acute intra-abdominal pathology. 2.  Left-sided retroperitoneal mass possibly arising from the left kidney although the fat plane between the mass in the left kidney appears to be preserved. Recommend further assessment with contrast-enhanced CT or MRI. 3.  Bilateral pleural effusions and bilateral lower lobe volume loss.     IR NONTUNNELED VASCULAR CATHETER > 5 YEARS    Result Date: 12/22/2023  Technically successful ultrasound guided placement of a right internal jugular vein temporary central venous catheter.  A post procedure chest x-ray is pending.  The catheter may be used once placement is confirmed.    XR CHEST PORTABLE    Result Date: 12/21/2023  The enteric tube terminates in the stomach. Mildly increased bilateral lower lung opacities.     XR CHEST PORTABLE    Result Date: 12/21/2023  No pneumothorax following right IJ catheter placement. Stable bibasilar atelectasis.     XR CHEST PORTABLE    Result 
Limits  Orientation Level: Oriented X4  Cognition  Overall Cognitive Status: Exceptions  Arousal/Alertness: Appropriate responses to stimuli  Following Commands: Follows one step commands consistently  Attention Span: Appears intact  Safety Judgement: Decreased awareness of need for safety  Problem Solving: Assistance required to correct errors made;Assistance required to generate solutions;Decreased awareness of errors;Assistance required to identify errors made;Assistance required to implement solutions  Insights: Fully aware of deficits  Initiation: Requires cues for some  Sequencing: Requires cues for some    Functional Mobility and Transfers for ADLs:  Bed Mobility:  Bed Mobility Training  Bed Mobility Training: Yes  Interventions: Verbal cues;Tactile cues  Supine to Sit: Minimum assistance;Moderate assistance;Assist X2;Additional time  Sit to Supine: Maximum assistance;Assist X2;Additional time  Scooting: Maximum assistance    Transfers:  Transfer Training  Transfer Training: No      Balance:  Balance  Sitting: Impaired  Sitting - Static: Fair (occasional)  Sitting - Dynamic: Poor (constant support);Fair (occasional)      ADL Intervention:    Feeding: Contact guard assistance;Setup;Increased time to complete;Stand by assistance;Verbal cueing;Thickened liquids   Feeding Skilled Clinical Factors: semi supine in bed. Pt given built up handle for silverware.     Grooming: Setup;Stand by assistance   Grooming Skilled Clinical Factors: semi supine in bed facial care. Used R hand only.    LE Dressing: Dependent/Total  LE Dressing Skilled Clinical Factors: Semi supine: donning/dofffing villa socks d/t decreased anterior reach/ UE/LE weakness    Therapeutic exercise:  Completed semi supine in bed, required [place] w/ shoulder and elbow d/t weakness. Min/ mod vc's for proper technique and self pacing.   Exercise Sets Reps AROM AAROM PROM Self PROM Comments   Shoulder flex/ext 1 10 [] [x] [] []    Elbow flex/ext 1 10 [] [x] [] 
NG tube BID Dirk Rankin MD   1 capsule at 01/06/24 1044    [Held by provider] furosemide (LASIX) injection 40 mg  40 mg IntraVENous Daily Flavio Kay MD   40 mg at 12/30/23 0814    budesonide (PULMICORT) nebulizer suspension 500 mcg  0.5 mg Nebulization BID RT Flavio Kay MD   500 mcg at 01/05/24 1951    0.9 % sodium chloride infusion   IntraVENous PRN Az Schreiber MD        Petrolatum-Zinc Oxide ointment   Topical TID Sky Huynh MD   Given at 01/06/24 1046    amLODIPine (NORVASC) tablet 10 mg  10 mg Per NG tube Daily Sky Huynh MD   10 mg at 01/06/24 1044    famotidine (PEPCID) 20 mg in sodium chloride (PF) 0.9 % 10 mL injection  20 mg IntraVENous Daily Jeffrey Robin MD   20 mg at 01/06/24 1044    labetalol (NORMODYNE) tablet 300 mg  300 mg Oral 3 times per day Steve Kingston MD   300 mg at 01/06/24 0451    levETIRAcetam (KEPPRA) injection 750 mg  750 mg IntraVENous BID David Amaya MD   750 mg at 01/06/24 1044    atorvastatin (LIPITOR) tablet 40 mg  40 mg Oral Nightly Dony Kinsey MD   40 mg at 01/05/24 2130    sodium chloride flush 0.9 % injection 5-40 mL  5-40 mL IntraVENous 2 times per day Dony Kinsey MD   10 mL at 01/06/24 1045    sodium chloride flush 0.9 % injection 5-40 mL  5-40 mL IntraVENous PRN Dony Kinsey MD        0.9 % sodium chloride infusion   IntraVENous PRN Dony Kinsey MD        enoxaparin Sodium (LOVENOX) injection 30 mg  30 mg SubCUTAneous Daily Dony Kinsey MD   30 mg at 01/06/24 1046    ondansetron (ZOFRAN-ODT) disintegrating tablet 4 mg  4 mg Oral Q8H PRN Dony Kinsey MD        Or    ondansetron (ZOFRAN) injection 4 mg  4 mg IntraVENous Q6H PRN Dony Kinsey MD        polyethylene glycol (GLYCOLAX) packet 17 g  17 g Oral Daily PRN Dony Kinsey MD   17 g at 12/20/23 1727    acetaminophen (TYLENOL) tablet 650 mg  650 mg Oral Q6H PRN Dony Kinsey MD   650 mg at 12/29/23 2203    Or    acetaminophen (TYLENOL) suppository 650 mg  650 
Ref Range    POC Glucose 263 (H) 65 - 100 mg/dL    Performed by: Len Delgado    POCT Glucose    Collection Time: 12/30/23  7:31 AM   Result Value Ref Range    POC Glucose 314 (H) 65 - 100 mg/dL    Performed by: Li Lambert         Impression and recommendations:  Hypertensive urgency, post reintubation, BP better with adjusted meds initially but now low overnight  - Continue current therapy.  Elevated troponin, trop peaked at 692, following Type 2 MI.   Echo with EF 60-65%  Likely secondary to hypertension, seizure, respiratory failure  Consider ischemic work up as OP, caution with lexiscan as stress agent in a patient with seizure  Sinus tachycardia, continue to treat underlying causes, continue on labetalol   Respiratory failure, pulm is following, wean as able, repeat limited echo after reintubation with EF 60-65%    Please do not hesitate to call if additional questions arise.    Zeke Mendenhall MD  12/30/2023  
[Urine:2150]    General:  Alert, cooperative, no distress, appears stated age.   Lungs:   Clear to auscultation bilaterally.   Chest wall:  No tenderness or deformity.   Heart:  Tachycardia, S1, S2 normal, no murmur, click, rub or gallop.   Abdomen:   Soft, non-tender. Bowel sounds normal. No masses,  No organomegaly.   Extremities: Extremities normal, atraumatic, no cyanosis or edema.   Pulses: 2+ and symmetric all extremities.   Skin: Skin color, texture, turgor normal. No rashes or lesions   Neurologic: No gross sensory or motor deficits     Data Review:       Recent Days:  No results for input(s): \"WBC\", \"HGB\", \"HCT\", \"PLT\" in the last 72 hours.    No results for input(s): \"NA\", \"K\", \"CL\", \"CO2\", \"GLU\", \"BUN\", \"CREA\", \"CA\", \"MG\", \"PHOS\", \"ALB\", \"ALT\", \"INR\" in the last 72 hours.    Invalid input(s): \"TBIL\", \"TBILI\", \"SGOT\"    No results for input(s): \"PH\", \"PCO2\", \"PO2\", \"HCO3\", \"FIO2\" in the last 72 hours.    24 Hour Results:  Recent Results (from the past 24 hour(s))   POCT Glucose    Collection Time: 01/17/24 11:48 AM   Result Value Ref Range    POC Glucose 162 (H) 65 - 100 mg/dL    Performed by: Oswaldo Sherman    Vascular duplex lower extremity venous bilateral    Collection Time: 01/17/24  3:29 PM   Result Value Ref Range    Body Surface Area 1.71 m2   POCT Glucose    Collection Time: 01/17/24  4:30 PM   Result Value Ref Range    POC Glucose 157 (H) 65 - 100 mg/dL    Performed by: Oswaldo Sherman    POCT Glucose    Collection Time: 01/17/24 11:42 PM   Result Value Ref Range    POC Glucose 147 (H) 65 - 100 mg/dL    Performed by: Jefferson Quesada (MURRAY RN)    POCT Glucose    Collection Time: 01/18/24  8:33 AM   Result Value Ref Range    POC Glucose 107 (H) 65 - 100 mg/dL    Performed by: Rachelle Cornell    POCT Glucose    Collection Time: 01/18/24  9:07 AM   Result Value Ref Range    POC Glucose 123 (H) 65 - 100 mg/dL    Performed by: Rico White        RADIOLOGY:  No results found.        Assessment/Plan:     Acute 
[Urine:2650]      Lab/Data Review:  Recent Labs     12/30/23  0300 12/31/23  0353 01/01/24  0553   WBC 23.0* 22.9* 19.7*   HGB 8.8* 9.4* 9.3*   HCT 27.8* 30.1* 30.2*   * 543* 509*     Recent Labs     12/30/23  0300 12/31/23  0353 01/01/24  0553    151* 149*   K 3.9 4.0 4.0   * 119* 120*   CO2 25 26 22   GLUCOSE 229* 252* 431*   BUN 76* 77* 86*   CREATININE 1.32* 1.23* 1.24*   CALCIUM 8.8 9.8 9.7   MG  --   --  2.8*   PHOS 4.6  --  4.0   LABALBU 2.1* 2.6* 2.6*   BILITOT  --  0.3  --    AST  --  40*  --    ALT  --  106*  --      Recent Labs     12/30/23  0336   PHART 7.46*   TYS9IPI 33*   PO2ART 80   QNB0GSL 23   FIO2A 30   E0PQXUHZ 95   OXYHEM 94.7*   CARBOXHGBART 0.2*   METHGBART 0.5   TEMP 100.3     Recent Results (from the past 24 hour(s))   POCT Glucose    Collection Time: 12/31/23  5:37 PM   Result Value Ref Range    POC Glucose 286 (H) 65 - 100 mg/dL    Performed by: Keegan Cornell    POCT Glucose    Collection Time: 12/31/23 11:25 PM   Result Value Ref Range    POC Glucose 242 (H) 65 - 100 mg/dL    Performed by: LIZETH FRITZ    POCT Glucose    Collection Time: 01/01/24  5:39 AM   Result Value Ref Range    POC Glucose 417 (H) 65 - 100 mg/dL    Performed by: Galen Chacon    POCT Glucose    Collection Time: 01/01/24  5:41 AM   Result Value Ref Range    POC Glucose 384 (H) 65 - 100 mg/dL    Performed by: Galen Chacon    Renal Function Panel    Collection Time: 01/01/24  5:53 AM   Result Value Ref Range    Sodium 149 (H) 136 - 145 mmol/L    Potassium 4.0 3.5 - 5.1 mmol/L    Chloride 120 (H) 97 - 108 mmol/L    CO2 22 21 - 32 mmol/L    Anion Gap 7 5 - 15 mmol/L    Glucose 431 (H) 65 - 100 mg/dL    BUN 86 (H) 6 - 20 mg/dL    Creatinine 1.24 (H) 0.55 - 1.02 mg/dL    Bun/Cre Ratio 69 (H) 12 - 20      Est, Glom Filt Rate 52 (L) >60 ml/min/1.73m2    Calcium 9.7 8.5 - 10.1 mg/dL    Phosphorus 4.0 2.6 - 4.7 mg/dL    Albumin 2.6 (L) 3.5 - 5.0 g/dL   CBC with Auto Differential    
chloride (PF) 0.9 % 10 mL injection  20 mg IntraVENous Daily Jeffrey Robin MD   20 mg at 12/28/23 0852    meropenem (MERREM) 1,000 mg in sodium chloride 0.9 % 100 mL IVPB (mini-bag)  1,000 mg IntraVENous Q8H Jeffrey Robin MD   Stopped at 12/28/23 0808    labetalol (NORMODYNE) tablet 300 mg  300 mg Oral 3 times per day Steve Kingston MD   300 mg at 12/28/23 0641    hydrALAZINE (APRESOLINE) injection 20 mg  20 mg IntraVENous Q4H PRN Sky Huynh MD   20 mg at 12/27/23 0305    racepinephrine HCl (VAPONEFPRIN) 2.25 % nebulizer solution NEBU 0.5 mL  0.5 mL Nebulization Once Jeffrey Robin MD        levETIRAcetam (KEPPRA) injection 750 mg  750 mg IntraVENous BID David Amaya MD   750 mg at 12/28/23 0857    dexmedetomidine (PRECEDEX) 400 mcg in sodium chloride 0.9 % 100 mL infusion  0.1-1.5 mcg/kg/hr IntraVENous Continuous Sky Huynh MD 7.5 mL/hr at 12/28/23 0022 0.4 mcg/kg/hr at 12/28/23 0022    ipratropium 0.5 mg-albuterol 2.5 mg (DUONEB) nebulizer solution 1 Dose  1 Dose Inhalation Q6H WA RT Jeffrey Robin MD   1 Dose at 12/28/23 0828    atorvastatin (LIPITOR) tablet 40 mg  40 mg Oral Nightly Dony Kinsey MD   40 mg at 12/27/23 2112    Midazolam in normal saline (VERSED) 50 MG/50ML infusion  0-10 mg/hr IntraVENous Continuous Jeffrey Robin MD   Stopped at 12/25/23 0800    fentanyl (SUBLIMAZE) infusion 1000 mcg/100mL   mcg/hr IntraVENous Continuous Zana Lucas MD 5 mL/hr at 12/28/23 0902 50 mcg/hr at 12/28/23 0902    niCARdipine (CARDENE) 25 mg in sodium chloride 0.9 % 250 mL infusion (Eqbq2Axh)  2.5-15 mg/hr IntraVENous Continuous Zana Lucas MD 25 mL/hr at 12/25/23 0618 2.5 mg/hr at 12/25/23 0618    sodium chloride flush 0.9 % injection 5-40 mL  5-40 mL IntraVENous 2 times per day Ali, Dony Michael MD   10 mL at 12/28/23 0858    sodium chloride flush 0.9 % injection 5-40 mL  5-40 mL IntraVENous PRN Amelie, Dony Michael MD        0.9 % sodium chloride infusion   IntraVENous PRN Ali, 
continuing to assess with progress     SUBJECTIVE:   Patient stated “I will tell him.”      OBJECTIVE DATA SUMMARY:   Cognitive/Behavioral Status:  Orientation  Overall Orientation Status: Within Functional Limits  Orientation Level: Oriented X4  Cognition  Overall Cognitive Status: Exceptions  Arousal/Alertness: Appropriate responses to stimuli  Following Commands: Follows one step commands with increased time;Follows one step commands with repetition;Follows one step commands consistently  Attention Span: Appears intact  Safety Judgement: Decreased awareness of need for safety;Decreased awareness of need for assistance  Problem Solving: Assistance required to correct errors made;Assistance required to generate solutions;Decreased awareness of errors;Assistance required to identify errors made;Assistance required to implement solutions  Insights: Decreased awareness of deficits  Initiation: Requires cues for some  Sequencing: Requires cues for some    Functional Mobility and Transfers for ADLs:  Bed Mobility:  Bed Mobility Training  Bed Mobility Training: Yes  Interventions: Verbal cues;Tactile cues  Rolling: Minimum assistance  Supine to Sit: Minimum assistance  Sit to Supine: Moderate assistance  Scooting: Moderate assistance    Transfers:  Transfer Training  Transfer Training: Yes  Interventions: Safety awareness training;Tactile cues;Weight shifting training/pressure relief;Verbal cues  Sit to Stand: Moderate assistance;Assist X2;Additional time;Maximum assistance  Stand to Sit: Moderate assistance;Maximum assistance;Additional time;Assist X2      Balance:  Balance  Sitting: Impaired  Sitting - Static: Good (unsupported)  Sitting - Dynamic: Fair (occasional);Good (unsupported)  Standing: Impaired  Standing - Static: Poor;Constant support  Standing - Dynamic: Poor;Constant support      ADL Intervention:    Grooming: Setup;Stand by assistance   Grooming Skilled Clinical Factors: semi supine in bed facial care. 
discharges home will need the following DME: continuing to assess with progress     SUBJECTIVE:   Patient stated “My  was very happy about me standing.”      OBJECTIVE DATA SUMMARY:   Cognitive/Behavioral Status:  Orientation  Overall Orientation Status: Within Functional Limits  Orientation Level: Oriented X4  Cognition  Following Commands: Follows one step commands with increased time;Follows one step commands with repetition  Attention Span: Appears intact  Safety Judgement: Decreased awareness of need for safety;Decreased awareness of need for assistance  Problem Solving: Assistance required to generate solutions;Assistance required to implement solutions;Assistance required to identify errors made  Insights: Decreased awareness of deficits  Initiation: Requires cues for some  Sequencing: Requires cues for some    Functional Mobility and Transfers for ADLs:  Bed Mobility:  Bed Mobility Training  Interventions: Verbal cues;Tactile cues  Rolling: Minimum assistance;Assist X1  Supine to Sit: Minimum assistance;Assist X1  Sit to Supine: Minimum assistance;Assist X1  Scooting: Minimum assistance;Assist X1    Transfers:  Transfer Training  Interventions: Safety awareness training;Tactile cues;Weight shifting training/pressure relief;Verbal cues  Sit to Stand: Moderate assistance;Assist X2  Stand to Sit: Moderate assistance;Assist X2      Balance:  Balance  Sitting: Impaired  Sitting - Static: Good (unsupported)  Sitting - Dynamic: Fair (occasional)  Standing: Impaired  Standing - Static: Poor;Constant support  Standing - Dynamic: Poor;Constant support      ADL Intervention:    Grooming: Setup;Stand by assistance   Grooming Skilled Clinical Factors: semi supine in bed facial care.    Therapeutic exercise:  Chest press completed sitting EOB and all others completed semi supine in bed. Min verbal and tactile for proper technique and self pacing.   Exercise Sets Reps AROM AAROM PROM Self PROM Comments   Chest press 
distress.     Appearance: She is ill-appearing (chronically). She is not toxic-appearing or diaphoretic.   Eyes:      Extraocular Movements: Extraocular movements intact.      Conjunctiva/sclera: Conjunctivae normal.   Cardiovascular:      Rate and Rhythm: Normal rate and regular rhythm.   Pulmonary:      Effort: Pulmonary effort is normal. No respiratory distress.      Breath sounds: No wheezing.   Abdominal:      Palpations: Abdomen is soft. There is no mass.      Tenderness: There is no abdominal tenderness. There is no guarding or rebound.      Comments: PEG tube  FMS   Genitourinary:     Comments:    Musculoskeletal:         General: No deformity or signs of injury.      Cervical back: No rigidity.   Skin:     General: Skin is warm and dry.   Neurological:      General: No focal deficit present.      Mental Status: She is alert. Mental status is at baseline.      Comments: Interactive and expressed understanding   Psychiatric:         Behavior: Behavior normal.          Vitals:    01/12/24 1226   BP:    Pulse: (!) 116   Resp: 17   Temp:    SpO2:          Data Review:       Recent Days:  Sodium   Date/Time Value Ref Range Status   01/12/2024 05:43  136 - 145 mmol/L Final     Potassium   Date/Time Value Ref Range Status   01/12/2024 05:43 AM 3.7 3.5 - 5.1 mmol/L Final     Chloride   Date/Time Value Ref Range Status   01/12/2024 05:43  97 - 108 mmol/L Final     CO2   Date/Time Value Ref Range Status   01/12/2024 05:43 AM 26 21 - 32 mmol/L Final     Hemoglobin   Date/Time Value Ref Range Status   01/12/2024 05:43 AM 8.2 (L) 11.5 - 16.0 g/dL Final     Hematocrit   Date/Time Value Ref Range Status   01/12/2024 05:43 AM 25.0 (L) 35.0 - 47.0 % Final     Platelets   Date/Time Value Ref Range Status   01/12/2024 05:43  150 - 400 K/uL Final     Recent Labs     01/12/24  0543   CREATININE 0.41*           Assessment/PLAN     Patient Active Problem List   Diagnosis    Hyperosmolar hyperglycemic state (HHS) 
glucose chewable tablet 16 g  4 tablet Oral PRN    dextrose bolus 10% 125 mL  125 mL IntraVENous PRN    Or    dextrose bolus 10% 250 mL  250 mL IntraVENous PRN    glucagon injection 1 mg  1 mg SubCUTAneous PRN    dextrose 10 % infusion   IntraVENous Continuous PRN       Review of Systems:   Pertinent items are noted in HPI.    Objective:   Physical Exam:     /64   Pulse (!) 107   Temp 99.2 °F (37.3 °C) (Axillary)   Resp 12   Ht 1.572 m (5' 1.89\")   Wt 72.4 kg (159 lb 9.8 oz)   SpO2 100%   BMI 29.30 kg/m²  O2 Flow Rate (L/min): 2 L/min O2 Device: Nasal cannula    Temp (24hrs), Av.6 °F (37 °C), Min:97 °F (36.1 °C), Max:99.5 °F (37.5 °C)    701 - 1900  In: -   Out: 550 [Urine:550]   1901 - 700  In: 3177.2 [I.V.:1837.2]  Out: 3300 [Urine:3250]    Mode Rate TV Press PEEP FiO2 PIP Min. Vent   CPAP/PS 6 bpm  400 mL    4.5 97 %  12 cmH2O           General:   Awake and alert   Lungs:   Clear to auscultation bilaterally.   Chest wall:  No tenderness or deformity.   Heart:  Regular rate S1 S2   Abdomen:   Soft, non-tender.    Extremities: No LE edema        Skin: Skin color, texture, turgor normal. No rashes or lesions   Neurologic:  Generalized symmetrical weakness          Data Review:       Recent Days:  Recent Labs     24  0624   WBC 13.7* 13.4*   HGB 9.3* 7.2*   HCT 28.4* 21.7*   * 285       Recent Labs     24  0624    140   K 3.8 3.9    110*   CO2 25 23   GLUCOSE 158* 106*   BUN 45* 28*   CREATININE 0.75 0.58   CALCIUM 8.8 8.4*           24 Hour Results:  Recent Results (from the past 24 hour(s))   POCT Glucose    Collection Time: 24  4:34 PM   Result Value Ref Range    POC Glucose 128 (H) 65 - 100 mg/dL    Performed by: BRAN BOYD    POCT Glucose    Collection Time: 24  8:20 PM   Result Value Ref Range    POC Glucose 154 (H) 65 - 100 mg/dL    Performed by: Josse Cardoza    POCT Glucose    
in place    5. Left retroperitoneal mass, suspected adrenal tumor per Dr Eduardo       Catecholamines are pending, will follow. Plans of Robotic adrenalectomy       MRI of abdomen ordered, will follow findings           Henrietta Zambrano MD    1/4/2024      
nausea and vomiting.             Objective:   Physical Exam  Vitals and nursing note reviewed.   Constitutional:       Appearance: She is ill-appearing. She is not toxic-appearing or diaphoretic.   Eyes:      Extraocular Movements: Extraocular movements intact.      Conjunctiva/sclera: Conjunctivae normal.   Cardiovascular:      Rate and Rhythm: Normal rate and regular rhythm.   Pulmonary:      Effort: Pulmonary effort is normal. No respiratory distress.      Breath sounds: No wheezing.   Abdominal:      Palpations: Abdomen is soft. There is no mass.      Tenderness: There is no abdominal tenderness. There is no guarding or rebound.      Comments: NGT in place.   Genitourinary:     Comments: Menezes with clear, yellow urine draining.  Musculoskeletal:         General: No deformity or signs of injury.      Cervical back: No rigidity.   Skin:     General: Skin is warm and dry.   Neurological:      General: No focal deficit present.      Mental Status: She is alert. Mental status is at baseline.      Comments: Interactive and expressed understanding   Psychiatric:         Behavior: Behavior normal.          Vitals:    01/09/24 0800   BP: (!) 121/55   Pulse: 83   Resp: 11   Temp:    SpO2: 95%         Data Review:       Recent Days:  Sodium   Date/Time Value Ref Range Status   01/09/2024 02:21  136 - 145 mmol/L Final     Potassium   Date/Time Value Ref Range Status   01/09/2024 02:21 AM 3.9 3.5 - 5.1 mmol/L Final     Chloride   Date/Time Value Ref Range Status   01/09/2024 02:21  (H) 97 - 108 mmol/L Final     CO2   Date/Time Value Ref Range Status   01/09/2024 02:21 AM 23 21 - 32 mmol/L Final     Hemoglobin   Date/Time Value Ref Range Status   01/09/2024 02:21 AM 7.2 (L) 11.5 - 16.0 g/dL Final     Hematocrit   Date/Time Value Ref Range Status   01/09/2024 02:21 AM 21.7 (L) 35.0 - 47.0 % Final     Platelets   Date/Time Value Ref Range Status   01/09/2024 02:21  150 - 400 K/uL Final 
stress agent in a patient with seizure  Sinus tachycardia, continue to treat underlying causes, continue on labetalol   Respiratory failure, pulm is managing.    Please do not hesitate to call if additional questions arise.    Zeke Mendenhall MD  12/31/2023  
  Diagnosis    Hyperosmolar hyperglycemic state (HHS) (HCC)    Seizure (HCC)    Old cerebrovascular accident (CVA) without late effect    Type 2 diabetes mellitus with hyperglycemia, without long-term current use of insulin (HCC)    Acute respiratory failure with hypoxia and hypercarbia (HCC)    Hypertensive emergency    NSTEMI (non-ST elevated myocardial infarction) ([facility])    Diarrhea    Acute respiratory failure with hypoxia (HCC)    Adrenal mass greater than 4 cm in diameter (HCC)    Hyperglycemia    Pheochromocytoma of left adrenal gland       Left adrenal mass, pheochromocytoma,  > 5cm.  Now s/p LEFT adrenalectomy on 1/8/24.  Clinically she had pheochromocytoma, pathology pending.    Tachycardia may be from pain  Postop pain: start morphine prn, norco per PEG if possible prn    HTN: Preoop on Labetalol 300 tid, metoprolol 5mg prn, amlodipine 10mg qd and hydralazine 50mg tid and prn clonidine 0.3mg tid.  Post-op hypotension, now off of pressors.  Stable BP.  On amlodipine 10mg, prn hydralazine not given postop, prn metoprolol given 0018hrs     Anemia, chronic.  Drop in H/H due to hemodilution with volume replacement.  I do not suspect surgical site bleeding. H/o NSTEMI. Transfuse as indicated.     ADONIS:  resolved.  Hopper draining well. Can d/c hopper       DM: glycemic control better.  On Lantus 10u qpm and humalog 5u tid with meals, SSI, stable.  It does not appear she is needing SSI.     Yovany Eduardo MD        
23   BUN 86* 73* 62*   MG 2.8*  --   --    PHOS 4.0  --  3.4       24 Hour Results:  Recent Results (from the past 24 hour(s))   POCT Glucose    Collection Time: 01/02/24  4:29 PM   Result Value Ref Range    POC Glucose 231 (H) 65 - 100 mg/dL    Performed by: Pedro Berumen    POCT Glucose    Collection Time: 01/02/24  8:55 PM   Result Value Ref Range    POC Glucose 271 (H) 65 - 100 mg/dL    Performed by: KING YASMANI    CBC    Collection Time: 01/03/24  5:06 AM   Result Value Ref Range    WBC 19.3 (H) 3.6 - 11.0 K/uL    RBC 3.43 (L) 3.80 - 5.20 M/uL    Hemoglobin 10.2 (L) 11.5 - 16.0 g/dL    Hematocrit 31.7 (L) 35.0 - 47.0 %    MCV 92.4 80.0 - 99.0 FL    MCH 29.7 26.0 - 34.0 PG    MCHC 32.2 30.0 - 36.5 g/dL    RDW 13.5 11.5 - 14.5 %    Platelets 524 (H) 150 - 400 K/uL    MPV 12.5 8.9 - 12.9 FL    Nucleated RBCs 0.0 0.0  WBC    nRBC 0.00 0.00 - 0.01 K/uL   Cortisol AM, Total    Collection Time: 01/03/24  5:06 AM   Result Value Ref Range    Cortisol - AM 4.8 4.30 - 22.45 ug/dL   Renal Function Panel    Collection Time: 01/03/24  5:06 AM   Result Value Ref Range    Sodium 144 136 - 145 mmol/L    Potassium 4.9 3.5 - 5.1 mmol/L    Chloride 115 (H) 97 - 108 mmol/L    CO2 23 21 - 32 mmol/L    Anion Gap 6 5 - 15 mmol/L    Glucose 263 (H) 65 - 100 mg/dL    BUN 62 (H) 6 - 20 mg/dL    Creatinine 0.88 0.55 - 1.02 mg/dL    Bun/Cre Ratio 70 (H) 12 - 20      Est, Glom Filt Rate >60 >60 ml/min/1.73m2    Calcium 9.5 8.5 - 10.1 mg/dL    Phosphorus 3.4 2.6 - 4.7 mg/dL    Albumin 2.7 (L) 3.5 - 5.0 g/dL   C-Reactive Protein    Collection Time: 01/03/24  5:06 AM   Result Value Ref Range    CRP 0.36 0.00 - 0.60 mg/dL   Procalcitonin    Collection Time: 01/03/24  5:06 AM   Result Value Ref Range    Procalcitonin 0.18 (H) 0 ng/mL       Assessment     Patient Active Problem List   Diagnosis    Hyperosmolar hyperglycemic state (HHS) (HCC)    Seizure (HCC)    Old cerebrovascular accident (CVA) without late effect    Type 2 diabetes 
without late effect    Type 2 diabetes mellitus with hyperglycemia, without long-term current use of insulin (HCC)    Acute respiratory failure with hypoxia and hypercarbia (HCC)    Hypertensive emergency    NSTEMI (non-ST elevated myocardial infarction) (HCC)    Diarrhea    Acute respiratory failure with hypoxia (HCC)    Adrenal mass greater than 4 cm in diameter (HCC)    Hyperglycemia    Pheochromocytoma of left adrenal gland   Left retroperitoneal mass > 5cm.  CT reviewed. Independently by me.  Suspect adrenal tumor not renal.       Significantly elevated catecholamines concerning for pheochromocytoma.  Index of suspicion remains high and we have to treat her as such.   MRI consistent with pheo    HTN: on Labetalol 300 tid, metoprolol 5mg prn, amlodipine 10mg qd and hydralazine 50mg tid and prn clonidine 0.3mg tid, controlled      ADONIS:  better with fluids      DM: glycemic control better.  On SSI.  likely exacerbated by catecholamines     Adrenal tumor.  I suspect left adrenal pheochromocytoma.  I explained this is a hypersecreting tumor potentially contributory to multiple medical issues.  Due to size alone, there is a 10% change of maligancy.  Treatment would be adrenalectomy.  The patient was counseled on the risks, benefits and expected course of surgery. Surgery has risks of bleeding, infection, injury, pain, death or other consequences. Perioperative medications and antibiotic use were discussed including the potential for reactions and side effects. Some specific risks of surgery were discussed as well.    Anesthesia involved, aware  Current plan for robotic adrenalectomy tomorrow   Needs chris william MD      
without long-term current use of insulin (HCC)    Acute respiratory failure with hypoxia and hypercarbia (HCC)    Hypertensive emergency    NSTEMI (non-ST elevated myocardial infarction) (HCC)    Diarrhea    Acute respiratory failure with hypoxia (HCC)    Adrenal mass greater than 4 cm in diameter (HCC)    Hyperglycemia    Pheochromocytoma of left adrenal gland   Left retroperitoneal mass > 5cm.  CT reviewed. Independently by me.  Suspect adrenal tumor not renal.       Significantly elevated catecholamines concerning for pheochromocytoma.  Index of suspicion remains high and we have to treat her as such.   MRI final report pending.  Not classically bright on T2.      HTN: on Labetalol 300 tid, metoprolol 5mg prn, amlodipine 10mg qd and hydralazine 50mg tid and prn clonidine 0.3mg tid, controlled      ADONIS:  better with fluids      DM: glycemic control better.  On SSI.  likely exacerbated by catecholamines    I had a conversation with the .   I suspect left adrenal pheochromocytoma.  I explained this is a hypersecreting tumor potentially contributory to multiple medical issues.  Due to size alone, there is a 10% change of maligancy.  Treatment would be adrenalectomy.  The patient was counseled on the risks, benefits and expected course of surgery. Surgery has risks of bleeding, infection, injury, pain, death or other consequences. Perioperative medications and antibiotic use were discussed including the potential for reactions and side effects. Some specific risks of surgery were discussed as well.    Anesthesia involved, discussed repeat cardiac evaluation  Current plan for robotic adrenalectomy Monday.     Yovany Eduardo MD      
Hematocrit 23.8 (L) 35.0 - 47.0 %   Echo (TTE) limited (PRN contrast/bubble/strain/3D)    Collection Time: 12/22/23 10:54 AM   Result Value Ref Range    LA [employer] 4.2 cm    LA Area 4C 12.1 cm2    LA Volume MOD A4C 29 22 - 52 mL    LA Diameter 3.5 cm    IVSd 1.1 (A) 0.6 - 0.9 cm    LVIDd 3.7 (A) 3.9 - 5.3 cm    LVIDs 2.4 cm    LVPWd 1.2 (A) 0.6 - 0.9 cm    LV E' Lateral Velocity 9 cm/s    LV E' Septal Velocity 9 cm/s    MR Peak Velocity 1.7 m/s    MR Peak Gradient 12 mmHg    MV Max Velocity 1.2 m/s    MV Peak Gradient 6 mmHg    MV E Wave Deceleration Time 164.0 ms    MV A Velocity 0.86 m/s    MV E Velocity 0.64 m/s    MV Mean Gradient 3 mmHg    MV VTI 30.4 cm    MV Mean Velocity 0.8 m/s    RV Free Wall Peak S' 15 cm/s    TAPSE 2.2 1.7 cm    TR Max Velocity 2.38 m/s    TR Peak Gradient 23 mmHg    Body Surface Area 1.71 m2    Fractional Shortening 2D 35 28 - 44 %    LVIDd Index 2.15 cm/m2    LVIDs Index 1.40 cm/m2    LV RWT Ratio 0.65     LV Mass 2D 138.2 67 - 162 g    LV Mass 2D Index 80.3 43 - 95 g/m2    MV E/A 0.74     E/E' Ratio (Averaged) 7.11     E/E' Lateral 7.11     LA Volume Index MOD A4C 17 16 - 34 ml/m2    LA Size Index 2.03 cm/m2   POCT Glucose    Collection Time: 12/22/23 11:57 AM   Result Value Ref Range    POC Glucose 394 (H) 65 - 100 mg/dL    Performed by: Jorden Ernst (Float)    POCT Glucose    Collection Time: 12/22/23  5:51 PM   Result Value Ref Range    POC Glucose 291 (H) 65 - 100 mg/dL    Performed by: Jorden Ernst (Float)    Occult Blood, Fecal    Collection Time: 12/22/23  6:10 PM   Result Value Ref Range    Occult Blood, Stool #1 Negative Negative      Date 12,222,023     Occult blood gastric / duodenum    Collection Time: 12/22/23  6:10 PM   Result Value Ref Range    Occult Blood, Gastric Negative Negative      pH, Gastric 5.0 (H) 1.5 - 3.5          Assessment and Plan:        1- ADONIS on CKD III stage B:  -2/2 pre renal azotemia from volume depletion  Improved renal functions and 
chair alarm activated, Caregiver / family present, Side rails x3, and Heels elevated for pressure relief and nsg updated.    COMMUNICATION/EDUCATION:   The patient’s plan of care was discussed with: Occupational therapist, Registered nurse, and Case management    Patient Education  Education Given To: Patient;Family  Education Provided: Role of Therapy;Plan of Care;Home Exercise Program;Fall Prevention Strategies;Transfer Training;Orientation  Education Provided Comments: DEBORA hayes DC recommendations  Education Method: Demonstration;Verbal  Barriers to Learning: Cognition  Education Outcome: Verbalized understanding;Continued education needed    PT/OT sessions occurred together for increased safety of pt and clinician.    Thank you for this referral.  Melanie White, PT, DPT  Minutes: 35        
results found for: \"PLATELET\"  Chemistry  [unfilled]  [unfilled]  No results found for: \"LDH\"  Coagulation Studies  Lab Results   Component Value Date/Time    INR 1.0 12/14/2023 04:40 AM     Liver Function Studies  Lab Results   Component Value Date/Time     12/31/2023 03:53 AM    AST 40 12/31/2023 03:53 AM    ALKPHOS 215 12/31/2023 03:53 AM    ALBUMIN 0.5 12/31/2023 03:53 AM       Recent Imaging        Relevant labs and imaging reviewed    ASSESSMENT AND PLAN     Status epilepticus  EEG revealing drowsy brain activity with no seizure activity noted  Continue Keppra twice daily  Follow-up with neurology recommendations    Acute hypoxic respiratory failure  Most likely due to seizure-like activity as well as aspiration  Successfully extubated and saturating adequately on nasal cannula oxygen  Chest CT findings with unremarkable acute findings however with a retroperitoneal mass.  MRI follow-up    Aspiration pneumonia  Continue meropenem, DC vancomycin in light of negative MRSA screening  Follow-up on CRP, ESR as well as DD  Nothing on cultures yet.  Persistently febrile.  Persistent leukocytosis  Stool negative for C. difficile.  DC right IJ if fever persists per ID.  Patient initiated on fluconazole    Diarrhea  C. difficile negative    Hypertensive urgency  Blood pressure control suboptimal  Continue labetalol, amlodipine, hydralazine as well as clonidine.  Initiated on nicardipine drip    Acute diastolic congestive heart failure  Currently euvolemic.  Diuresis as needed    DM type II  Blood sugar control suboptimal  Fingersticks blood sugar monitoring  Insulin blood sugar management.  Increase to high-dose sliding scale  Increase Lantus dose    Acute on chronic anemia  Patient transfused with PRBC with appropriate correction in hemoglobin.  This has remained stable    Elevated troponin  Most likely demand ischemia.  Reviewed by cardiology.  Recommend outpatient ischemic workup    Acute on chronic renal 
  bilaterally right greater than left.   2. Temperature probe projects over the upper portion of the neck. There appears   to be a loop in the hypopharynx..      XR CHEST PORTABLE   Final Result   Bilateral alveolar opacities appear slightly decreased.         XR CHEST PORTABLE   Final Result   Persistent bilateral alveolar opacities appear stable.         XR CHEST 1 VIEW   Final Result   Pulmonary edema.  Improvement in aeration of the right lung base.         XR CHEST PORTABLE   Final Result   No change.      XR CHEST 1 VIEW   Final Result   Pulmonary edema which appears increased.         XR CHEST 1 VIEW   Final Result   Increase in pulmonary edema.         CT CHEST ABDOMEN PELVIS WO CONTRAST Additional Contrast? None   Final Result   1.  No acute intra-abdominal pathology.      2.  Left-sided retroperitoneal mass possibly arising from the left kidney   although the fat plane between the mass in the left kidney appears to be   preserved. Recommend further assessment with contrast-enhanced CT or MRI.      3.  Bilateral pleural effusions and bilateral lower lobe volume loss.         XR CHEST PORTABLE   Final Result      The enteric tube terminates in the stomach. Mildly increased bilateral lower   lung opacities.         XR CHEST PORTABLE   Final Result      No pneumothorax following right IJ catheter placement. Stable bibasilar   atelectasis.         IR NONTUNNELED VASCULAR CATHETER > 5 YEARS   Final Result   Technically successful ultrasound guided placement of a right internal jugular   vein temporary central venous catheter.        A post procedure chest x-ray is pending.  The catheter may be used once   placement is confirmed.      XR CHEST PORTABLE   Final Result      Satisfactory endotracheal tube placement. Stable bibasilar atelectasis.         XR CHEST PORTABLE   Final Result      The endotracheal tube terminates at the right mainstem bronchus origin and could   be withdrawn 1 to 2 cm for optimal placement 
[facility]        RADIOLOGY:  XR CHEST 1 VIEW    Result Date: 12/17/2023  PORTABLE CHEST RADIOGRAPH/S: 12/17/2023 2:10 AM INDICATION: CHF. COMPARISON: 12/16/2023, 12/15/2023, 12/14/2023, 7/28/2023. TECHNIQUE: Portable frontal semiupright radiograph/s of the chest. FINDINGS: An ET tube, NG tube, and esophageal temperature probe are in appropriate position. The lungs are slightly hypoinflated, with mild patchy atelectasis. The central airways are patent. No pneumothorax or pleural effusion.     No acute process.    XR CHEST 1 VIEW    Result Date: 12/16/2023  Clinical history: chf INDICATION:   chf COMPARISON: 12/15/2023 FINDINGS: AP portable upright view of the chest demonstrates a stable  cardiopericardial silhouette.There is no pleural effusion..NG tube extends below the diaphragm. ET tube in appropriate position. Minimal atelectasis on the right..There is no pneumothorax.. Patient is on a cardiac monitor.     No significant interval change     Echo (TTE) limited (PRN contrast/bubble/strain/3D)    Result Date: 12/15/2023    Left Ventricle: The EF by visual approximation is 60 - 65%. Findings consistent with concentric hypertrophy.   Right Ventricle: Not well visualized.   Aortic Valve: Trileaflet valve.   Image quality is adequate. Contrast used: Definity. Procedure performed with the patient in a supine position.       Assessment/Plan:     Status epilepticus  12/15/2023 status post EEG with patient revealing drowsy brain activity however no seizure activity noted.  Appreciate neurology consultation.  Continue Keppra twice daily.    Acute hypoxic respiratory failure  Associated with significant seizure activity and possible aspiration.  Bronchodilators  12/21/2023 -  Attempted extubation however after 20 minutes patient with significant bronchospasm requiring intubation.  Continue on mechanical ventilation at 100% FiO2.  Currently requiring fentanyl and propofol gtt. for sedation.  Pulmonology following.    Aspiration 
Medicine  12/26/2023 at 10:28 AM  
by cardiology.  Recommend outpatient ischemic workup    Acute on chronic renal failure  Most likely cardiorenal.  Renal function improved with diuresis.    DVT prophylaxis  Lovenox    Disposition: May need PEG tube placement prior to discharge to the care of her  at home.      Salbador cherry MD   Hospitalist, Internal Medicine  1/3/2024 at 11:23 AM  
injection 5-40 mL  5-40 mL IntraVENous PRN Dony Kinsey MD        0.9 % sodium chloride infusion   IntraVENous PRN Dony Kinsey MD        enoxaparin Sodium (LOVENOX) injection 30 mg  30 mg SubCUTAneous Daily Dony Kinsey MD   30 mg at 12/15/23 0808    ondansetron (ZOFRAN-ODT) disintegrating tablet 4 mg  4 mg Oral Q8H PRN Dony Kinsey MD        Or    ondansetron (ZOFRAN) injection 4 mg  4 mg IntraVENous Q6H PRN Amelie, Dony Michael MD        polyethylene glycol (GLYCOLAX) packet 17 g  17 g Oral Daily PRN Dony Kinsey MD        acetaminophen (TYLENOL) tablet 650 mg  650 mg Oral Q6H PRN Dony Kinsey MD   650 mg at 12/15/23 0821    Or    acetaminophen (TYLENOL) suppository 650 mg  650 mg Rectal Q6H PRN Dony Kinsey MD   650 mg at 12/13/23 1555    doxycycline (VIBRAMYCIN) 100 mg in sodium chloride 0.9 % 100 mL IVPB (mini-bag)  100 mg IntraVENous Q12H Dony Kinsey  mL/hr at 12/15/23 0753 100 mg at 12/15/23 0753    glucose chewable tablet 16 g  4 tablet Oral PRN Amelie, Dony Michael MD        dextrose bolus 10% 125 mL  125 mL IntraVENous PRN Dony Kinsey MD        Or    dextrose bolus 10% 250 mL  250 mL IntraVENous PRN Dony Kinsey MD        glucagon injection 1 mg  1 mg SubCUTAneous PRN Dony Kinsey MD        dextrose 10 % infusion   IntraVENous Continuous PRN Dony Kinsey MD        insulin glargine (LANTUS) injection vial 20 Units  20 Units SubCUTAneous Daily Dony Kinsey MD   20 Units at 12/15/23 0753    insulin lispro (HUMALOG) injection vial 0-8 Units  0-8 Units SubCUTAneous Q4H Dony Kinsey MD   2 Units at 12/15/23 0530    piperacillin-tazobactam (ZOSYN) 3,375 mg in sodium chloride 0.9 % 50 mL IVPB (mini-bag)  3,375 mg IntraVENous Q8H Dony Kinsey MD 12.5 mL/hr at 12/15/23 0624 3,375 mg at 12/15/23 0624    propofol infusion  5-50 mcg/kg/min IntraVENous Continuous Ali, Dony Michael MD 16.1 mL/hr at 12/15/23 0618 40 mcg/kg/min at 12/15/23 0618    vancomycin (VANCOCIN) intermittent 
[facility] bed at [facility] per case management.     ADONIS   Resolved      Troponin   Likely demand ischemia   Outpatient cardiology work up      DVT prophylaxis   Enoxaparin       Discussion/MDM: Patient with multiple medical comorbidities, each with high likelihood for morbidity and mortality if left untreated.   I have reviewed patient's presenting subjective and objective findings, as well as all laboratory studies, imaging studies, and vital signs to date as well as treatment rendered and patient's response to those treatments.  In addition, prior medical, surgical and relevant social and family histories were reviewed. I have discussed management plan with patient/family and with nursing staff.      Toxic drug monitoring:      Care Plan discussed with: Patient/Family and Nurse    Total time spent with patient: 30 minutes.    With greater than 50% spent in coordination of care and counseling.    SIRI HOLMAN MD   
5-40 mL IntraVENous PRN Dony Kinsey MD        0.9 % sodium chloride infusion   IntraVENous PRN Dony Kinsey MD        [Held by provider] enoxaparin Sodium (LOVENOX) injection 30 mg  30 mg SubCUTAneous Daily Dony Kinsey MD   30 mg at 01/06/24 1046    ondansetron (ZOFRAN-ODT) disintegrating tablet 4 mg  4 mg Oral Q8H PRN Dony Kinsey MD        Or    ondansetron (ZOFRAN) injection 4 mg  4 mg IntraVENous Q6H PRN Dony Kinsey MD        polyethylene glycol (GLYCOLAX) packet 17 g  17 g Oral Daily PRN Dony Kinsey MD   17 g at 12/20/23 1727    acetaminophen (TYLENOL) tablet 650 mg  650 mg Oral Q6H PRN Dony Kinsey MD   650 mg at 12/29/23 2203    Or    acetaminophen (TYLENOL) suppository 650 mg  650 mg Rectal Q6H PRN Dony Kinsey MD   650 mg at 12/16/23 1406    glucose chewable tablet 16 g  4 tablet Oral PRN Dony Kinsey MD        dextrose bolus 10% 125 mL  125 mL IntraVENous PRN Dony Kinsey MD        Or    dextrose bolus 10% 250 mL  250 mL IntraVENous PRN Dony Kinsey MD   Stopped at 01/08/24 1106    glucagon injection 1 mg  1 mg SubCUTAneous PRN Dony Kinsey MD        dextrose 10 % infusion   IntraVENous Continuous PRN Dony Kinsey MD            ARTERIAL BLOOD GAS  Recent Labs     01/08/24  0321   PHART 7.49*   KZY0DJR 30*   PO2ART 88   FIU9AQR 23   FIO2A 21.0   [id number] 96   OXYHEM 95.1   CARBOXHGBART 0.3*   METHGBART 0.5   TEMP 98.0     1/1 on 2 L nasal oxygen saturation 100%   12/30 spontaneous pressure support 6 PEEP 5 FiO2 30% with pO2 80 pCO2 33 pH 7.46   12/29 spontaneous pressure support 6 PEEP 5 FiO2 30% pO2 69 pCO2 33 pH 7.43   12/28 spontaneous pressure support 12 PEEP 5 FiO2 35% with pO2 81 pCO2 33 pH 7.45   12/27 IMV 6  PS 15 PEEP 5 FiO2 40% with pO2 68 pCO2 37 pH 7.54   12/26 A/C12  PEEP 6 FiO2 40% with pO2 82 pCO2 35 pH 7.53   12/25 A/C 12  PEEP 6 FiO2 65% with pO2 84 pCO2 32 pH 7.40  Labs:    Recent Labs     01/07/24  0601 01/09/24  0221   WBC 
status post echocardiogram.  Recommend outpatient ischemic workup when appropriate.    Acute on chronic renal failure  Slowly improving with IV fluids.  Appreciate nephrology recommendations.    Possible pregnancy  During the ED course a pregnancy test was positive but US did not reveal IUP nor ectopic pregnancy. Possibly reflective of a miscarriage.  Obstetrics on board  Trend HCG 7->4->2     DVT prophylaxis   enoxaparin     Code status: Full Code       Discussion/MDM: Patient with multiple medical comorbidities, each with high likelihood for morbidity and mortality if left untreated.   I have reviewed patient's presenting subjective and objective findings, as well as all laboratory studies, imaging studies, and vital signs to date as well as treatment rendered and patient's response to those treatments.  In addition, prior medical, surgical and relevant social and family histories were reviewed. I have discussed management plan with patient/family and with nursing staff.      Toxic drug monitoring:      Care Plan discussed with: Nurse    Total time spent with patient: 40 minutes.    With greater than 50% spent in coordination of care and counseling.    SIRI HOLMAN MD   
laboratory studies, imaging studies, and vital signs to date as well as treatment rendered and patient's response to those treatments.      In addition, prior medical, surgical and relevant social and family histories were reviewed. I have discussed management plan with patient/family and with nursing staff.     I personally reviewed labs: all labs past 24 hours  I personally reviewed imaging/EKG: MRI abdomen   Toxic drug monitoring: NA  Discussed case with: patient's        Assessment and Plan:    Pheochromocytoma   Based on imaging results and metanephrines  - Plan for robotic adrenalectomy with urology      Status epilepticus  EEG  with no seizure activity   Continue Keppra 750 mg twice daily    Vocal cord paresis   - Speech therapy   - Consideration for PEG tube placement     DM2   Hyperglycemia   Well controlled with Glargine 40 units BID, short acting 5 pre meal plus SSI     Sacral wound   - Appreciate wound care recommendations     Hypoxemic respiratory failure   Resolved     Physical deconditioning   PT     ADONIS   Resolved     Troponin   Likely demand ischemia   Outpatient cardiology work up     DVT prophylaxis   Enoxaparin       Code status: Full code     Social determinants of health: none    Estimated discharge date//time frame/disposition: TBD     Barriers to discharge: Urology clearance, surgical intervention           Sergio Poon MD  
  Resolved     Physical deconditioning   PT/OT eval appreciated, recommending IRF placement    ADONIS   Resolved     Troponin   Likely demand ischemia   Outpatient cardiology work up     DVT prophylaxis   Enoxaparin       Code status: Full code     Social determinants of health: none    Estimated discharge date//time frame/disposition: IRF     Barriers to discharge: Tube feeding to goal    Disposition -IRF, awaiting michael bed approval      Sergio Poon MD  
adrenal mass does not represent a typical adenoma. Differential   diagnosis includes pheochromocytoma, carcinoma, and atypical adenoma.   2. Normal biliary tree.         XR CHEST PORTABLE   Final Result      Mild right basilar airspace disease. No significant edema.         XR CHEST PORTABLE   Final Result   No significant change.      XR CHEST PORTABLE   Final Result      Satisfactory NG tube placement         XR CHEST PORTABLE   Final Result      High position of NG tube. Recommend advancing to ensure that the proximal port   is below the hemidiaphragm.   Right basilar subsegmental atelectasis      XR CHEST PORTABLE   Final Result      NG tube tip in stomach         XR CHEST PORTABLE   Final Result      No evidence of pneumonia or significant pleural effusion.         XR CHEST PORTABLE   Final Result   1. ET tube is 6.5 cm above the alysia. This should be advanced 2 cm.      XR CHEST PORTABLE   Final Result   1. Overall the aeration has improved. There is persistent areas of atelectasis   bilaterally right greater than left.   2. Temperature probe projects over the upper portion of the neck. There appears   to be a loop in the hypopharynx..      XR CHEST PORTABLE   Final Result   Bilateral alveolar opacities appear slightly decreased.         XR CHEST PORTABLE   Final Result   Persistent bilateral alveolar opacities appear stable.         XR CHEST 1 VIEW   Final Result   Pulmonary edema.  Improvement in aeration of the right lung base.         XR CHEST PORTABLE   Final Result   No change.      XR CHEST 1 VIEW   Final Result   Pulmonary edema which appears increased.         XR CHEST 1 VIEW   Final Result   Increase in pulmonary edema.         CT CHEST ABDOMEN PELVIS WO CONTRAST Additional Contrast? None   Final Result   1.  No acute intra-abdominal pathology.      2.  Left-sided retroperitoneal mass possibly arising from the left kidney   although the fat plane between the mass in the left kidney appears to be 
n.p.o. as well as blood sugars remaining normal at this time  Will reinitiate once patient becomes hyperglycemic/when we initiate tube feeding    Sacral wound   - Appreciate wound care recommendations     Hypoxemic respiratory failure   Resolved     Physical deconditioning   PT/OT candelario appreciated, recommending IRF placement    ADONIS   Resolved     Troponin   Likely demand ischemia   Outpatient cardiology work up     DVT prophylaxis   Enoxaparin       Code status: Full code     Social determinants of health: none    Estimated discharge date//time frame/disposition: IRF     Barriers to discharge: Tube feeding to goal    Disposition -IRF, patient will be stable for discharge this afternoon once tube feeds are at goal, discussed with Case Management      Sergio Poon MD  
CHEST PORTABLE   Final Result      No acute process on portable chest. ET tube and NG tube satisfactory position.         IR NONTUNNELED VASCULAR CATHETER > 5 YEARS    (Results Pending)   IR ULTRASOUND GUIDANCE VASCULAR ACCESS    (Results Pending)       US OB TRANSVAGINAL    Result Date: 12/13/2023  TRANSVAGINAL OBSTETRICAL ULTRASOUND: INDICATION: pregnant, unknown GA. COMPARISON: None. TECHNIQUE: Ultrasound of the pelvis was performed transvaginally. FINDINGS: There is a single, living, intrauterine gestation the mean gestational age of 6 weeks, 2  days. Gestational sac shape is normal. Amniotic fluid is within normal limits. Regular fetal cardiac activity measures 142  beats per minute. Placenta is anterior. The uterus measures 5.3 cm x 5.1 cm x 3.4 cm. 7 mm x 6 mm x 5 mm hypoechoic lesion in the fundus may reflect a fibroid. Endometrial stripe measures 13 mm in thickness. No IUP identified at this time. The right ovary measures 2.2 cm x 1.5 cm x 0.8 cm. The left ovary measures 2.3 cm x 1.9 cm x 1.0 cm. Normal flow to both ovaries.     1.  No IUP or ectopic pregnancy identified at this time. Serial beta hCG values and/or continued imaging follow-up may be of benefit. 2.  Possible small uterine fundal fibroid. 3.  Unremarkable ovaries.    CT HEAD WO CONTRAST    Result Date: 12/13/2023  INDICATION: unresponsive EXAMINATION:  CT ANGIOGRAPHY HEAD AND NECK W CEREBRAL PERFUSION, AND NONCONTRAST HEAD CT COMPARISON: None TECHNIQUE:  Noncontrast axial head CT was performed.  Following the uneventful administration of intravenous contrast, axial CT angiography of the head and neck was performed.  3D image postprocessing was performed.  CT dose reduction was achieved through use of a standardized protocol tailored for this examination and automatic exposure control for dose modulation. Cerebral perfusion analysis using computed tomography with contrast administration, including post processing of parametric maps with the 
evidence of aneurysm or vascular malformation. CT PERFUSION: There are no regional areas of elevated Tmax, decreased cerebral blood flow or blood volume.  RCBF < 30% = 0 cc.  Tmax > 6 seconds = 0 cc. Mismatch volume = 0 cc.     1.  No acute large vessel occlusion, arterial dissection, or hemodynamically significant stenosis. 2.  No perfusion abnormality. No acute process on noncontrast head CT. No abnormal delayed enhancement.     CTA HEAD NECK W CONTRAST    Result Date: 12/13/2023  INDICATION: unresponsive EXAMINATION:  CT ANGIOGRAPHY HEAD AND NECK W CEREBRAL PERFUSION, AND NONCONTRAST HEAD CT COMPARISON: None TECHNIQUE:  Noncontrast axial head CT was performed.  Following the uneventful administration of intravenous contrast, axial CT angiography of the head and neck was performed.  3D image postprocessing was performed.  CT dose reduction was achieved through use of a standardized protocol tailored for this examination and automatic exposure control for dose modulation. Cerebral perfusion analysis using computed tomography with contrast administration, including post processing of parametric maps with the termination of cerebral blood flow, cerebral blood volume, and mean transit time. This study was analyzed by the [employer] algorithm. FINDINGS: CT HEAD: Ventricles: Midline, no hydrocephalus. Intracranial Hemorrhage: None. Brain Parenchyma/Brainstem: Normal for age. Basal Cisterns: Normal. Paranasal Sinuses: Visualized sinuses are clear. Additional Comments: No abnormal enhancement on delayed imaging. CTA NECK Aortic Arch: Patent. Right Common Carotid Artery: Patent. Right Internal Carotid Artery: Patent. NASCET Right: 0-25%. Left Common Carotid Artery: Patent. Left Internal Carotid Artery: Patent without significant stenosis, mild noncalcified plaque at the origin posteriorly. NASCET Left: 0-25%. Carotid stenosis determined using NASCET criteria. Right Vertebral Artery: Patent. Left Vertebral Artery: Patent. 
the esophagus, distal aspect not included on this study. Fluid in the oropharynx/nasopharynx. Lung Apices: No significant abnormality. Bones: No destructive bone lesion. Additional Comments: N/A. CTA HEAD Posterior Circulation: No flow limiting stenosis or occlusion. Anterior Circulation: No flow limiting stenosis or occlusion. Venous Sinuses:  Patent. Additional Comments: No evidence of aneurysm or vascular malformation. CT PERFUSION: There are no regional areas of elevated Tmax, decreased cerebral blood flow or blood volume.  RCBF < 30% = 0 cc.  Tmax > 6 seconds = 0 cc. Mismatch volume = 0 cc.     1.  No acute large vessel occlusion, arterial dissection, or hemodynamically significant stenosis. 2.  No perfusion abnormality. No acute process on noncontrast head CT. No abnormal delayed enhancement.     XR CHEST PORTABLE    Result Date: 12/13/2023  EXAM:  XR CHEST PORTABLE INDICATION: Altered mental status COMPARISON: 7/28/2023 TECHNIQUE: portable chest AP view FINDINGS: The cardiac silhouette is within normal limits. The pulmonary vasculature is within normal limits. The lungs and pleural spaces are clear. NG tube tip projects over the fundus. ET tube has been placed with the tip projecting abdomen the thoracic inlet.     No acute process on portable chest. ET tube and NG tube satisfactory position.        12/14 sedated fentanyl, Versed and propofol will change the vent settings decrease the rate decrease FiO2 metabolic and respiratory alkalosis pH 7.6 supplement potassium patient was febrile vancomycin added  12/15 remain intubated on ventilator ABG acceptable 30% FiO2 having seizure-like activity also tachycardic will continue with the current vent settings neurology input metoprolol given continuing antiseizure medication and sedation  12/16 seizure improved remain intubated on ventilator ABG acceptable patient has a temperature 101.8 on doxycycline and Zosyn we will add vancomycin  12/17 Intubated on ventilator 
Artery: Patent. Cervical Soft Tissues: Endotracheal tube present above the alysia. Nasogastric tube present in the esophagus, distal aspect not included on this study. Fluid in the oropharynx/nasopharynx. Lung Apices: No significant abnormality. Bones: No destructive bone lesion. Additional Comments: N/A. CTA HEAD Posterior Circulation: No flow limiting stenosis or occlusion. Anterior Circulation: No flow limiting stenosis or occlusion. Venous Sinuses:  Patent. Additional Comments: No evidence of aneurysm or vascular malformation. CT PERFUSION: There are no regional areas of elevated Tmax, decreased cerebral blood flow or blood volume.  RCBF < 30% = 0 cc.  Tmax > 6 seconds = 0 cc. Mismatch volume = 0 cc.     1.  No acute large vessel occlusion, arterial dissection, or hemodynamically significant stenosis. 2.  No perfusion abnormality. No acute process on noncontrast head CT. No abnormal delayed enhancement.     XR CHEST PORTABLE    Result Date: 12/13/2023  EXAM:  XR CHEST PORTABLE INDICATION: Altered mental status COMPARISON: 7/28/2023 TECHNIQUE: portable chest AP view FINDINGS: The cardiac silhouette is within normal limits. The pulmonary vasculature is within normal limits. The lungs and pleural spaces are clear. NG tube tip projects over the fundus. ET tube has been placed with the tip projecting abdomen the thoracic inlet.     No acute process on portable chest. ET tube and NG tube satisfactory position.        12/14 sedated fentanyl, Versed and propofol will change the vent settings decrease the rate decrease FiO2 metabolic and respiratory alkalosis pH 7.6 supplement potassium patient was febrile vancomycin added  12/15 remain intubated on ventilator ABG acceptable 30% FiO2 having seizure-like activity also tachycardic will continue with the current vent settings neurology input metoprolol given continuing antiseizure medication and sedation  12/16 seizure improved remain intubated on ventilator ABG acceptable 
hydrocephalus. Intracranial Hemorrhage: None. Brain Parenchyma/Brainstem: Normal for age. Basal Cisterns: Normal. Paranasal Sinuses: Visualized sinuses are clear. Additional Comments: No abnormal enhancement on delayed imaging. CTA NECK Aortic Arch: Patent. Right Common Carotid Artery: Patent. Right Internal Carotid Artery: Patent. NASCET Right: 0-25%. Left Common Carotid Artery: Patent. Left Internal Carotid Artery: Patent without significant stenosis, mild noncalcified plaque at the origin posteriorly. NASCET Left: 0-25%. Carotid stenosis determined using NASCET criteria. Right Vertebral Artery: Patent. Left Vertebral Artery: Patent. Cervical Soft Tissues: Endotracheal tube present above the alysia. Nasogastric tube present in the esophagus, distal aspect not included on this study. Fluid in the oropharynx/nasopharynx. Lung Apices: No significant abnormality. Bones: No destructive bone lesion. Additional Comments: N/A. CTA HEAD Posterior Circulation: No flow limiting stenosis or occlusion. Anterior Circulation: No flow limiting stenosis or occlusion. Venous Sinuses:  Patent. Additional Comments: No evidence of aneurysm or vascular malformation. CT PERFUSION: There are no regional areas of elevated Tmax, decreased cerebral blood flow or blood volume.  RCBF < 30% = 0 cc.  Tmax > 6 seconds = 0 cc. Mismatch volume = 0 cc.     1.  No acute large vessel occlusion, arterial dissection, or hemodynamically significant stenosis. 2.  No perfusion abnormality. No acute process on noncontrast head CT. No abnormal delayed enhancement.     XR CHEST PORTABLE    Result Date: 12/13/2023  EXAM:  XR CHEST PORTABLE INDICATION: Altered mental status COMPARISON: 7/28/2023 TECHNIQUE: portable chest AP view FINDINGS: The cardiac silhouette is within normal limits. The pulmonary vasculature is within normal limits. The lungs and pleural spaces are clear. NG tube tip projects over the fundus. ET tube has been placed with the tip projecting 
N/A. CTA HEAD Posterior Circulation: No flow limiting stenosis or occlusion. Anterior Circulation: No flow limiting stenosis or occlusion. Venous Sinuses:  Patent. Additional Comments: No evidence of aneurysm or vascular malformation. CT PERFUSION: There are no regional areas of elevated Tmax, decreased cerebral blood flow or blood volume.  RCBF < 30% = 0 cc.  Tmax > 6 seconds = 0 cc. Mismatch volume = 0 cc.     1.  No acute large vessel occlusion, arterial dissection, or hemodynamically significant stenosis. 2.  No perfusion abnormality. No acute process on noncontrast head CT. No abnormal delayed enhancement.     XR CHEST PORTABLE    Result Date: 12/13/2023  EXAM:  XR CHEST PORTABLE INDICATION: Altered mental status COMPARISON: 7/28/2023 TECHNIQUE: portable chest AP view FINDINGS: The cardiac silhouette is within normal limits. The pulmonary vasculature is within normal limits. The lungs and pleural spaces are clear. NG tube tip projects over the fundus. ET tube has been placed with the tip projecting abdomen the thoracic inlet.     No acute process on portable chest. ET tube and NG tube satisfactory position.        12/14 sedated fentanyl, Versed and propofol will change the vent settings decrease the rate decrease FiO2 metabolic and respiratory alkalosis pH 7.6 supplement potassium patient was febrile vancomycin added  12/15 remain intubated on ventilator ABG acceptable 30% FiO2 having seizure-like activity also tachycardic will continue with the current vent settings neurology input metoprolol given continuing antiseizure medication and sedation  12/16 seizure improved remain intubated on ventilator ABG acceptable patient has a temperature 101.8 on doxycycline and Zosyn we will add vancomycin  12/17 Intubated on ventilator on cooling blanket chest x-ray no acute infiltrate ABG acceptable pCO2 34 pO2 90 on 24% FiO2 we will start weaning in a.m.  12/18 remain intubated on ventilator tachycardic ABG acceptable 
PERFUSION: There are no regional areas of elevated Tmax, decreased cerebral blood flow or blood volume.  RCBF < 30% = 0 cc.  Tmax > 6 seconds = 0 cc. Mismatch volume = 0 cc.     1.  No acute large vessel occlusion, arterial dissection, or hemodynamically significant stenosis. 2.  No perfusion abnormality. No acute process on noncontrast head CT. No abnormal delayed enhancement.     XR CHEST PORTABLE    Result Date: 12/13/2023  EXAM:  XR CHEST PORTABLE INDICATION: Altered mental status COMPARISON: 7/28/2023 TECHNIQUE: portable chest AP view FINDINGS: The cardiac silhouette is within normal limits. The pulmonary vasculature is within normal limits. The lungs and pleural spaces are clear. NG tube tip projects over the fundus. ET tube has been placed with the tip projecting abdomen the thoracic inlet.     No acute process on portable chest. ET tube and NG tube satisfactory position.        12/14 sedated fentanyl, Versed and propofol will change the vent settings decrease the rate decrease FiO2 metabolic and respiratory alkalosis pH 7.6 supplement potassium patient was febrile vancomycin added  12/15 remain intubated on ventilator ABG acceptable 30% FiO2 having seizure-like activity also tachycardic will continue with the current vent settings neurology input metoprolol given continuing antiseizure medication and sedation  12/16 seizure improved remain intubated on ventilator ABG acceptable patient has a temperature 101.8 on doxycycline and Zosyn we will add vancomycin  12/17 Intubated on ventilator on cooling blanket chest x-ray no acute infiltrate ABG acceptable pCO2 34 pO2 90 on 24% FiO2 we will start weaning in a.m.  12/18 remain intubated on ventilator tachycardic ABG acceptable will do sedation vacation will try to wean  12/19 will do sedation vacation ABG acceptable chest x-ray shows worsening right lower lobe infiltrate  12/20 tried sedation vacation again this morning could not tolerate become tachypneic 
101.8 on doxycycline and Zosyn we will add vancomycin  12/17 Intubated on ventilator on cooling blanket chest x-ray no acute infiltrate ABG acceptable pCO2 34 pO2 90 on 24% FiO2 we will start weaning in a.m.  12/18 remain intubated on ventilator tachycardic ABG acceptable will do sedation vacation will try to wean  12/19 will do sedation vacation ABG acceptable chest x-ray shows worsening right lower lobe infiltrate  12/20 tried sedation vacation again this morning could not tolerate become tachypneic tachycardic back on sedation we will try again tomorrow  12/21 sedated on ventilator we will do sedation vacation-change vent setting to spontaneous follow hemoglobin on Zosyn chest x-ray improved no seizures noted  12/22 patient was extubated yesterday reintubated back on assist-control mode ABG acceptable will increase FiO2 she is on 60% hemoglobin 6.5 need blood transfusion  12/23 patient had allergic reaction most likely secondary antibiotic Zosyn swollen lip eyes tongue received Benadryl and Solu-Medrol swelling much improved CAT scan of abdomen shows left-sided retroperitoneal mass urology consult continue with the vent setting we will try to wean oxygen chest x-ray shows congestive changes bibasilar atelectasis antibiotic has been changed to meropenem  12/24 when intubated now she is hypothermic 80% FiO2 bicarb is 19 this morning will give 2 A of bicarb chest x-ray worsening of the pulmonary edema will recent sputum C&S Gram stain pending facial swelling mild swelling oral swelling much improved on Solu-Medrol and Benadryl patient is on meropenem, blood culture negative sputum was negative initially we will resend it.  12/25 remains on the ventilator we will decrease FiO2 to 55%.  Sodium is rising will increase water flushes per tube.  Lasix twice daily started yesterday with good diuresis still edematous however will continue to replace potassium  Time of care 35 minutes  Flavio Kay MD    
Solu-Medrol and Benadryl patient is on meropenem, blood culture negative sputum was negative initially we will resend it.  12/25 remains on the ventilator we will decrease FiO2 to 55%.  Sodium is rising will increase water flushes per tube.  Lasix twice daily started yesterday with good diuresis still edematous however will continue to replace potassium  12/26 FiO2 down to 40% will start weaning ventilator in a.m.  Sodium remains elevated although minimally improved continue water flushes per NG.  WBC count has risen and nurses note watery diarrhea.  Possible C. difficile colitis.  12/27 ABG stable does have metabolic alkalosis will give Diamox today.  Sodium now normal we will decrease water flushes slightly.  Creatinine has risen slightly will change Lasix to single daily dosing.  12/28 tolerating spontaneous vent setting we will leave as is today last dose Solu-Medrol given today if no facial swelling tomorrow and tolerating further decrease in the ventilator we will plan extubation  12/29 doing well on spontaneous settings will hold propofol and attempt to wean the ventilator.  Potassium is low to be repleted.  Still having low-grade fever and WBC elevation chest x-ray relatively clear  Time of care 30 minutes  Addendum starting to wake up have changed ventilator to to compensate setting she generates a tidal volume of 400 respirations 19.  Will place back on pressure support leave her off propofol and fentanyl once she awakens will extubate to nasal oxygen  12/30 has been on spontaneous settings last 24 hours off sedation she is still very lethargic will weakly open her eyes ABGs well-maintained good self ventilation will extubate to nasal oxygen creatinine has risen again we will hold Lasix having fever with procalcitonin and CRP rising chest x-ray is clear no obvious source of infection  12/31 extubated 12/30 doing well on nasal oxygen.  OG tube lost with extubation and without water flushes sodium has risen 
given today if no facial swelling tomorrow and tolerating further decrease in the ventilator we will plan extubation  12/29 doing well on spontaneous settings will hold propofol and attempt to wean the ventilator.  Potassium is low to be repleted.  Still having low-grade fever and WBC elevation chest x-ray relatively clear  Time of care 30 minutes  Addendum starting to wake up have changed ventilator to to compensate setting she generates a tidal volume of 400 respirations 19.  Will place back on pressure support leave her off propofol and fentanyl once she awakens will extubate to nasal oxygen  12/30 has been on spontaneous settings last 24 hours off sedation she is still very lethargic will weakly open her eyes ABGs well-maintained good self ventilation will extubate to nasal oxygen creatinine has risen again we will hold Lasix having fever with procalcitonin and CRP rising chest x-ray is clear no obvious source of infection  12/31 extubated 12/30 doing well on nasal oxygen.  OG tube lost with extubation and without water flushes sodium has risen again.  Will place on D5W and attempt to replace OG tube will ask speech to evaluate  1/1 doing well on nasal oxygen sodium ai when water flushes lost NG tube back in water flushes going sodium is improved but getting quarters saline as well will repeat x-ray in a.m.  1/2 right vocal cord paresis she is not safe for oral intake will need a PEG tube doing well on room air at this time.  Chest x-ray is read as right lower lobe infiltrate to me it is improved may simply be atelectasis  1/3 she is on room air continue with antibiotic chest x-ray still shows infiltrate right lower lobe  1/4 Room air getting IV Solu-Medrol nebulizer treatment patient has diarrhea FMS in place off meropenem  1/5 now on prednisone awake on room air vocal cord paralysis high risk for aspiration  1/6 alert awake on room air patient is going for robotic adrenalectomy because of pheochromocytoma 
mode ABG acceptable will increase FiO2 she is on 60% hemoglobin 6.5 need blood transfusion  12/23 patient had allergic reaction most likely secondary antibiotic Zosyn swollen lip eyes tongue received Benadryl and Solu-Medrol swelling much improved CAT scan of abdomen shows left-sided retroperitoneal mass urology consult continue with the vent setting we will try to wean oxygen chest x-ray shows congestive changes bibasilar atelectasis antibiotic has been changed to meropenem  12/24 when intubated now she is hypothermic 80% FiO2 bicarb is 19 this morning will give 2 A of bicarb chest x-ray worsening of the pulmonary edema will recent sputum C&S Gram stain pending facial swelling mild swelling oral swelling much improved on Solu-Medrol and Benadryl patient is on meropenem, blood culture negative sputum was negative initially we will resend it.  12/25 remains on the ventilator we will decrease FiO2 to 55%.  Sodium is rising will increase water flushes per tube.  Lasix twice daily started yesterday with good diuresis still edematous however will continue to replace potassium  12/26 FiO2 down to 40% will start weaning ventilator in a.m.  Sodium remains elevated although minimally improved continue water flushes per NG.  WBC count has risen and nurses note watery diarrhea.  Possible C. difficile colitis.  12/27 ABG stable does have metabolic alkalosis will give Diamox today.  Sodium now normal we will decrease water flushes slightly.  Creatinine has risen slightly will change Lasix to single daily dosing.  12/28 tolerating spontaneous vent setting we will leave as is today last dose Solu-Medrol given today if no facial swelling tomorrow and tolerating further decrease in the ventilator we will plan extubation  12/29 doing well on spontaneous settings will hold propofol and attempt to wean the ventilator.  Potassium is low to be repleted.  Still having low-grade fever and WBC elevation chest x-ray relatively clear  Time of 
will do sedation vacation will try to wean  12/19 will do sedation vacation ABG acceptable chest x-ray shows worsening right lower lobe infiltrate  12/20 tried sedation vacation again this morning could not tolerate become tachypneic tachycardic back on sedation we will try again tomorrow  12/21 sedated on ventilator we will do sedation vacation-change vent setting to spontaneous follow hemoglobin on Zosyn chest x-ray improved no seizures noted  12/22 patient was extubated yesterday reintubated back on assist-control mode ABG acceptable will increase FiO2 she is on 60% hemoglobin 6.5 need blood transfusion  12/23 patient had allergic reaction most likely secondary antibiotic Zosyn swollen lip eyes tongue received Benadryl and Solu-Medrol swelling much improved CAT scan of abdomen shows left-sided retroperitoneal mass urology consult continue with the vent setting we will try to wean oxygen chest x-ray shows congestive changes bibasilar atelectasis antibiotic has been changed to meropenem  12/24 when intubated now she is hypothermic 80% FiO2 bicarb is 19 this morning will give 2 A of bicarb chest x-ray worsening of the pulmonary edema will recent sputum C&S Gram stain pending facial swelling mild swelling oral swelling much improved on Solu-Medrol and Benadryl patient is on meropenem, blood culture negative sputum was negative initially we will resend it.  12/25 remains on the ventilator we will decrease FiO2 to 55%.  Sodium is rising will increase water flushes per tube.  Lasix twice daily started yesterday with good diuresis still edematous however will continue to replace potassium  12/26 FiO2 down to 40% will start weaning ventilator in a.m.  Sodium remains elevated although minimally improved continue water flushes per NG.  WBC count has risen and nurses note watery diarrhea.  Possible C. difficile colitis.  12/27 ABG stable does have metabolic alkalosis will give Diamox today.  Sodium now normal we will 
and nurses note watery diarrhea.  Possible C. difficile colitis.  12/27 ABG stable does have metabolic alkalosis will give Diamox today.  Sodium now normal we will decrease water flushes slightly.  Creatinine has risen slightly will change Lasix to single daily dosing.  12/28 tolerating spontaneous vent setting we will leave as is today last dose Solu-Medrol given today if no facial swelling tomorrow and tolerating further decrease in the ventilator we will plan extubation  12/29 doing well on spontaneous settings will hold propofol and attempt to wean the ventilator.  Potassium is low to be repleted.  Still having low-grade fever and WBC elevation chest x-ray relatively clear  Time of care 30 minutes  Addendum starting to wake up have changed ventilator to to compensate setting she generates a tidal volume of 400 respirations 19.  Will place back on pressure support leave her off propofol and fentanyl once she awakens will extubate to nasal oxygen  12/30 has been on spontaneous settings last 24 hours off sedation she is still very lethargic will weakly open her eyes ABGs well-maintained good self ventilation will extubate to nasal oxygen creatinine has risen again we will hold Lasix having fever with procalcitonin and CRP rising chest x-ray is clear no obvious source of infection  12/31 extubated 12/30 doing well on nasal oxygen.  OG tube lost with extubation and without water flushes sodium has risen again.  Will place on D5W and attempt to replace OG tube will ask speech to evaluate  1/1 doing well on nasal oxygen sodium ai when water flushes lost NG tube back in water flushes going sodium is improved but getting quarters saline as well will repeat x-ray in a.m.  1/2 right vocal cord paresis she is not safe for oral intake will need a PEG tube doing well on room air at this time.  Chest x-ray is read as right lower lobe infiltrate to me it is improved may simply be atelectasis  1/3 she is on room air continue 
and nurses note watery diarrhea.  Possible C. difficile colitis.  12/27 ABG stable does have metabolic alkalosis will give Diamox today.  Sodium now normal we will decrease water flushes slightly.  Creatinine has risen slightly will change Lasix to single daily dosing.  12/28 tolerating spontaneous vent setting we will leave as is today last dose Solu-Medrol given today if no facial swelling tomorrow and tolerating further decrease in the ventilator we will plan extubation  12/29 doing well on spontaneous settings will hold propofol and attempt to wean the ventilator.  Potassium is low to be repleted.  Still having low-grade fever and WBC elevation chest x-ray relatively clear  Time of care 30 minutes  Addendum starting to wake up have changed ventilator to to compensate setting she generates a tidal volume of 400 respirations 19.  Will place back on pressure support leave her off propofol and fentanyl once she awakens will extubate to nasal oxygen  12/30 has been on spontaneous settings last 24 hours off sedation she is still very lethargic will weakly open her eyes ABGs well-maintained good self ventilation will extubate to nasal oxygen creatinine has risen again we will hold Lasix having fever with procalcitonin and CRP rising chest x-ray is clear no obvious source of infection  12/31 extubated 12/30 doing well on nasal oxygen.  OG tube lost with extubation and without water flushes sodium has risen again.  Will place on D5W and attempt to replace OG tube will ask speech to evaluate  1/1 doing well on nasal oxygen sodium ai when water flushes lost NG tube back in water flushes going sodium is improved but getting quarters saline as well will repeat x-ray in a.m.  1/2 right vocal cord paresis she is not safe for oral intake will need a PEG tube doing well on room air at this time.  Chest x-ray is read as right lower lobe infiltrate to me it is improved may simply be atelectasis  Flavio Kay MD    
slightly.  Creatinine has risen slightly will change Lasix to single daily dosing.  Time of care 30 minutes  Flavio Kay MD

## 2024-01-26 LAB
BACTERIA SPEC CULT: NORMAL
BACTERIA SPEC CULT: NORMAL
Lab: NORMAL
Lab: NORMAL

## 2024-02-02 ENCOUNTER — HOSPITAL ENCOUNTER (EMERGENCY)
Facility: HOSPITAL | Age: 54
Discharge: HOME | End: 2024-02-02
Attending: STUDENT IN AN ORGANIZED HEALTH CARE EDUCATION/TRAINING PROGRAM

## 2024-02-02 ENCOUNTER — OFFICE VISIT (OUTPATIENT)
Age: 54
End: 2024-02-02

## 2024-02-02 VITALS
HEART RATE: 109 BPM | WEIGHT: 139 LBS | DIASTOLIC BLOOD PRESSURE: 67 MMHG | RESPIRATION RATE: 14 BRPM | BODY MASS INDEX: 25.58 KG/M2 | OXYGEN SATURATION: 98 % | HEIGHT: 62 IN | SYSTOLIC BLOOD PRESSURE: 115 MMHG

## 2024-02-02 VITALS
TEMPERATURE: 98.4 F | HEART RATE: 103 BPM | OXYGEN SATURATION: 100 % | SYSTOLIC BLOOD PRESSURE: 124 MMHG | DIASTOLIC BLOOD PRESSURE: 76 MMHG | RESPIRATION RATE: 18 BRPM

## 2024-02-02 DIAGNOSIS — D35.02 PHEOCHROMOCYTOMA OF LEFT ADRENAL GLAND: Primary | ICD-10-CM

## 2024-02-02 DIAGNOSIS — Z09 FOLLOW-UP EXAMINATION: Primary | ICD-10-CM

## 2024-02-02 DIAGNOSIS — E11.65 TYPE 2 DIABETES MELLITUS WITH HYPERGLYCEMIA, WITHOUT LONG-TERM CURRENT USE OF INSULIN (HCC): ICD-10-CM

## 2024-02-02 DIAGNOSIS — I10 PRIMARY HYPERTENSION: ICD-10-CM

## 2024-02-02 PROBLEM — I16.1 HYPERTENSIVE EMERGENCY: Status: RESOLVED | Noted: 2023-12-26 | Resolved: 2024-02-02

## 2024-02-02 PROCEDURE — 99282 EMERGENCY DEPT VISIT SF MDM: CPT

## 2024-02-02 PROCEDURE — 3074F SYST BP LT 130 MM HG: CPT | Performed by: UROLOGY

## 2024-02-02 PROCEDURE — 3078F DIAST BP <80 MM HG: CPT | Performed by: UROLOGY

## 2024-02-02 PROCEDURE — 99214 OFFICE O/P EST MOD 30 MIN: CPT | Performed by: UROLOGY

## 2024-02-02 NOTE — ASSESSMENT & PLAN NOTE
Improved after adrenalectomy.  Not on therapy and sugars lower.  She is referred to endocrine for monitoring.

## 2024-02-02 NOTE — PROGRESS NOTES
HISTORY OF PRESENT ILLNESS  Lucretia Andres is a 53 y.o. female.   has a past medical history of CKD (chronic kidney disease), stage III (HCC), CVA (cerebral vascular accident) (HCC), Diabetes mellitus (HCC), Dyslipidemia, Hypertension, and Seizure disorder (HCC).  has a past surgical history that includes IR NONTUNNELED VASCULAR CATHETER > 5 YEARS (12/21/2023); Adrenalectomy (N/A, 1/8/2024); and Upper gastrointestinal endoscopy (N/A, 1/10/2024).  Chief Complaint   Patient presents with    Follow-up     Procedure  Discomfort     She is here with .  He helps give the history.     She was admitted 12/13/24 with seizure activity, hypertensive crisis and hyperglycemia.  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     She is s/p a robotic left adrenalectomy 1/8/24 for pheochromocytoma.  Pathology with a 5.7cm tumor consistent with pheo.     She has a PEG tube but she is not using it.  It is uncomfortable.  She is able to eat on her own.  She has follow up with GI to discuss removal.      She is more mentally clear.  She is a little mixed up on time now compared to before her hospitalization.  She can get mixed up with AM and PM.  Otherwise she seems aware.     She is in a wheelchair.  She can walk a little.  She is using a walker.  She has not yet started physical therapy.  They are looking for financial assistance.      Her BP has been 117's systolic, normal per the .  She is on amlodipine and metoprolol.      She was diagnosed with type diabetes in her two hospitalizations.  She is not on medication.  Her BS has been around 109.            1. Pheochromocytoma of left adrenal gland  Assessment & Plan:   S/p left adrenalectomy for pheo 1/8/24.  BP and BS improved.    I would defer further monitoring to endocrine.    Orders:  -     [facility] - Marcelina Latif MD, Endocrinology, [place]  2. Primary hypertension  Assessment & Plan:  Stable on 2 BP meds.   3. Type 2 diabetes mellitus with hyperglycemia,

## 2024-02-02 NOTE — PROGRESS NOTES
Chief Complaint   Patient presents with    Follow-up     Procedure     1. Have you been to the ER, urgent care clinic since your last visit?  Hospitalized since your last visit?No    2. Have you seen or consulted any other health care providers outside of the [facility] since your last visit?  Include any pap smears or colon screening. No  /67 (Site: Left Upper Arm, Position: Sitting, Cuff Size: Medium Adult)   Pulse (!) 109   Resp 14   Ht 1.572 m (5' 1.89\")   Wt 63 kg (139 lb)   SpO2 98%   BMI 25.51 kg/m²

## 2024-02-02 NOTE — ED PROVIDER NOTES
U-100 30G X 5/16\" 0.3 ML Misc  Use as directed for insulin injections.  Use a NEW needle with each insulin injection     Lancets 30G Misc  Test 3 times a day & as needed for symptoms of irregular blood glucose. Dispense sufficient amount for indicated testing frequency plus additional to accommodate PRN testing needs. Pharmacist to identify preferred brand.            ASK your doctor about these medications      amLODIPine 10 MG tablet  Commonly known as: NORVASC  1 tablet by Per NG tube route daily     atorvastatin 40 MG tablet  Commonly known as: LIPITOR  Take 1 tablet by mouth nightly     budesonide 0.5 MG/2ML nebulizer suspension  Commonly known as: PULMICORT  Take 2 mLs by nebulization in the morning and 2 mLs in the evening.     carboxymethylcellulose PF 1 % Gel ophthalmic gel  Commonly known as: THERATEARS/REFRESH  Place 1 drop into both eyes in the morning and at bedtime     levETIRAcetam 750 MG tablet  Commonly known as: KEPPRA  Take 1 tablet by mouth 2 times daily     metoprolol tartrate 25 MG tablet  Commonly known as: LOPRESSOR  Take 1 tablet by mouth in the morning, at noon, and at bedtime     polyethylene glycol 17 g packet  Commonly known as: GLYCOLAX  Take 1 packet by mouth daily as needed for Constipation                DISCONTINUED MEDICATIONS:  Current Discharge Medication List          I am the Primary Clinician of Record: Arcadio Brown MD (electronically signed)    (Please note that parts of this dictation were completed with voice recognition software. Quite often unanticipated grammatical, syntax, homophones, and other interpretive errors are inadvertently transcribed by the computer software. Please disregards these errors. Please excuse any errors that have escaped final proofreading.)     Arcadio Brown MD  02/02/24 1124

## 2024-02-02 NOTE — ED TRIAGE NOTES
Presents for \"check up\" states \"peg tube need to be removed\". Per spouse provider Ricardo stated for pt to come in.

## 2024-02-02 NOTE — ASSESSMENT & PLAN NOTE
S/p left adrenalectomy for pheo 1/8/24.  BP and BS improved.    I would defer further monitoring to endocrine.

## 2024-02-02 NOTE — DISCHARGE INSTRUCTIONS
Please go to Dr. Silva office today for your appointment.  He is expecting you.  We have provided his contact information in this paperwork.  We have also reached out to Dr. Shaikh, we advise you to contact them regarding your eventual G-tube removal.  Please return the emergency department if you have any worsening symptoms or other concerns.

## 2024-02-06 ENCOUNTER — TELEPHONE (OUTPATIENT)
Age: 54
End: 2024-02-06

## 2024-02-06 NOTE — TELEPHONE ENCOUNTER
----- Message from Marcelina Latif MD sent at 2/6/2024  1:00 PM EST -----  Interesting presentation Dr Alesha Rodgers ,  Please schedule this patient.        ----- Message -----  From: Yovany Eduardo MD  Sent: 2/2/2024   1:12 PM EST  To: Marcelina Latif MD

## 2024-02-06 NOTE — TELEPHONE ENCOUNTER
Lvm asking patient to call and ask for favio to schedule new pt apt Pheochromocytoma of left adrenal gland

## 2024-02-23 NOTE — TELEPHONE ENCOUNTER
Dr. Latif ask that patient now be scheduled with Dr Whitley as she is overbooked for third attemp left message at number on file there is no email or my chart available, advising patient to call us or Dr. Eduardo if not wanting to schedule at this time or to move forward with appointment. Did advise that we are trying to get her in soon as we can.

## 2024-04-01 NOTE — ED TRIAGE NOTES
-- DO NOT REPLY / DO NOT REPLY ALL --  -- Message is from [employer] ([facility]) --    General Patient Message: patient is calling he is requesting that paper of  paper work be printed out so he can come pick them up he needs a couple of copies please call when ready.      Caller Information         Type Contact Phone/Fax    04/01/2024 02:16 PM CDT Phone (Incoming) Mike Laws (Self) [phone] (M)          Alternative phone number: none     Can a detailed message be left? Yes    Message Turnaround:     Is it Working Hours? Yes - Working Hours                      Pt brought in by EMS after having 4 seizures this evening and was in the middle of her 4th seizure when EMS arrived. EMS gave 2mg Versed. Pt was recently admitted for seizures and had an EEG where family states there was nothing found. Pt was discharged with no new medications after EEG. EMS stated pt's BG is 500 something.

## (undated) DEVICE — SUTURE MCRYL + SZ 4-0 L27IN ABSRB UD L19MM PS-2 3/8 CIR MCP426H

## (undated) DEVICE — HYPODERMIC SAFETY NEEDLE: Brand: MONOJECT

## (undated) DEVICE — TUBING INSUFFLATOR HEAT HUMIDIFIED SMK EVAC SET PNEUMOCLEAR

## (undated) DEVICE — SUTURE PDS II SZ 0 L27IN ABSRB VLT L26MM CT-2 1/2 CIR Z334H

## (undated) DEVICE — GOWN,SIRUS,POLYRNF,BRTHSLV,XLN/XL,20/CS: Brand: MEDLINE

## (undated) DEVICE — LIQUIBAND RAPID ADHESIVE 36/CS 0.8ML: Brand: MEDLINE

## (undated) DEVICE — SOLUTION IV 1000ML 0.9% SOD CHL PH 5 INJ USP VIAFLX PLAS

## (undated) DEVICE — CANNULA NASAL ADULT 10FT ETCO2/CO2 VENTFLO

## (undated) DEVICE — APPLICATOR MEDICATED 26 CC SOLUTION HI LT ORNG CHLORAPREP

## (undated) DEVICE — VESSEL SEALER EXTEND: Brand: ENDOWRIST

## (undated) DEVICE — ENDOSCOPIC KIT 1.1+ DE BOWL

## (undated) DEVICE — ARM DRAPE

## (undated) DEVICE — 1LYRTR 16FR10ML100%SILI UMSNAP: Brand: MEDLINE INDUSTRIES, INC.

## (undated) DEVICE — MOUTHPIECE ENDOSCP L CTRL OPN AND SIDE PORTS DISP

## (undated) DEVICE — SOLUTION IRRIG 500ML 0.9% SOD CHLO USP POUR PLAS BTL

## (undated) DEVICE — COVER,MAYO STAND,STERILE: Brand: MEDLINE

## (undated) DEVICE — TISSUE RETRIEVAL SYSTEM: Brand: INZII RETRIEVAL SYSTEM

## (undated) DEVICE — BLADE,CARBON-STEEL,15,STRL,DISPOSABLE,TB: Brand: MEDLINE

## (undated) DEVICE — SUTURE VCRL + SZ 2-0 L27IN ABSRB WHT SH 1/2 CIR TAPERCUT VCP417H

## (undated) DEVICE — GLOVE SURG SZ 75 L12IN FNGR THK79MIL GRN LTX FREE

## (undated) DEVICE — ENDOVIVE SFT PEG KIT PULL WENFIT 20F BX2

## (undated) DEVICE — SYRINGE MED 30ML STD CLR PLAS LUERLOCK TIP N CTRL DISP

## (undated) DEVICE — GARMENT,MEDLINE,DVT,INT,CALF,MED, GEN2: Brand: MEDLINE

## (undated) DEVICE — ELECTRODE PT RET AD L9FT HI MOIST COND ADH HYDRGEL CORDED

## (undated) DEVICE — COLUMN DRAPE

## (undated) DEVICE — TIP COVER ACCESSORY

## (undated) DEVICE — ACCESS PLATFORM FOR MINIMALLY INVASIVE SURGERY: Brand: GELPOINT®  MINI ADVANCED ACCESS PLATFORM

## (undated) DEVICE — LAPAROSCOPIC CHOLE PACK: Brand: MEDLINE INDUSTRIES, INC.

## (undated) DEVICE — PUMP SUC IRR TBNG L10FT W/ HNDPC ASSEMB STRYKEFLOW 2

## (undated) DEVICE — SOUTHSIDE TURNOVER: Brand: MEDLINE INDUSTRIES, INC.

## (undated) DEVICE — BLADELESS OBTURATOR: Brand: WECK VISTA

## (undated) DEVICE — LAMINECTOMY ARM CRADLE FOAM POSITIONER: Brand: CARDINAL HEALTH

## (undated) DEVICE — TAPE,CLOTH/SILK,CURAD,3"X10YD,LF,40/CS: Brand: CURAD

## (undated) DEVICE — CANNULA SEAL

## (undated) DEVICE — SPONGE LAPAROTOMY W18XL18IN WHITE STRUNG RADIOPAQUE STERILE